# Patient Record
Sex: FEMALE | Race: WHITE | NOT HISPANIC OR LATINO | Employment: OTHER | URBAN - METROPOLITAN AREA
[De-identification: names, ages, dates, MRNs, and addresses within clinical notes are randomized per-mention and may not be internally consistent; named-entity substitution may affect disease eponyms.]

---

## 2017-01-03 ENCOUNTER — OFFICE VISIT (OUTPATIENT)
Dept: FAMILY MEDICINE | Facility: CLINIC | Age: 82
End: 2017-01-03
Payer: MEDICARE

## 2017-01-03 ENCOUNTER — ANTICOAGULATION THERAPY VISIT (OUTPATIENT)
Dept: NURSING | Facility: CLINIC | Age: 82
End: 2017-01-03
Payer: MEDICARE

## 2017-01-03 VITALS
RESPIRATION RATE: 16 BRPM | DIASTOLIC BLOOD PRESSURE: 72 MMHG | SYSTOLIC BLOOD PRESSURE: 138 MMHG | BODY MASS INDEX: 29.87 KG/M2 | WEIGHT: 185 LBS | HEART RATE: 78 BPM | OXYGEN SATURATION: 97 % | TEMPERATURE: 97.2 F

## 2017-01-03 DIAGNOSIS — Z23 NEED FOR PROPHYLACTIC VACCINATION WITH TETANUS-DIPHTHERIA (TD): ICD-10-CM

## 2017-01-03 DIAGNOSIS — F02.80 ALZHEIMER'S DEMENTIA WITHOUT BEHAVIORAL DISTURBANCE, UNSPECIFIED TIMING OF DEMENTIA ONSET: ICD-10-CM

## 2017-01-03 DIAGNOSIS — B35.1 DERMATOPHYTOSIS OF NAIL: ICD-10-CM

## 2017-01-03 DIAGNOSIS — I10 ESSENTIAL HYPERTENSION WITH GOAL BLOOD PRESSURE LESS THAN 140/90: ICD-10-CM

## 2017-01-03 DIAGNOSIS — Z79.01 LONG-TERM (CURRENT) USE OF ANTICOAGULANTS: Primary | ICD-10-CM

## 2017-01-03 DIAGNOSIS — G30.9 ALZHEIMER'S DEMENTIA WITHOUT BEHAVIORAL DISTURBANCE, UNSPECIFIED TIMING OF DEMENTIA ONSET: ICD-10-CM

## 2017-01-03 DIAGNOSIS — I48.20 CHRONIC ATRIAL FIBRILLATION (H): ICD-10-CM

## 2017-01-03 DIAGNOSIS — Z23 NEED FOR PROPHYLACTIC VACCINATION AND INOCULATION AGAINST INFLUENZA: ICD-10-CM

## 2017-01-03 DIAGNOSIS — R60.0 BILATERAL LOWER EXTREMITY EDEMA: Primary | ICD-10-CM

## 2017-01-03 LAB — INR POINT OF CARE: 2.6 (ref 0.86–1.14)

## 2017-01-03 PROCEDURE — 85610 PROTHROMBIN TIME: CPT | Mod: QW

## 2017-01-03 PROCEDURE — 99207 ZZC NO CHARGE NURSE ONLY: CPT

## 2017-01-03 PROCEDURE — 99214 OFFICE O/P EST MOD 30 MIN: CPT | Performed by: INTERNAL MEDICINE

## 2017-01-03 PROCEDURE — 36416 COLLJ CAPILLARY BLOOD SPEC: CPT

## 2017-01-03 RX ORDER — PRENATAL VIT 91/IRON/FOLIC/DHA 28-975-200
COMBINATION PACKAGE (EA) ORAL 2 TIMES DAILY
Qty: 12 G | Refills: 1 | Status: CANCELLED | OUTPATIENT
Start: 2017-01-03

## 2017-01-03 RX ORDER — RIVASTIGMINE TARTRATE 3 MG/1
CAPSULE ORAL
Refills: 3 | COMMUNITY
Start: 2016-12-20 | End: 2017-11-16

## 2017-01-03 ASSESSMENT — PAIN SCALES - GENERAL: PAINLEVEL: NO PAIN (0)

## 2017-01-03 NOTE — NURSING NOTE
"Chief Complaint   Patient presents with     Leg Swelling     right leg and ankle and left big toe       Initial /72 mmHg  Pulse 78  Temp(Src) 97.2  F (36.2  C) (Oral)  Resp 16  Wt 185 lb (83.915 kg)  SpO2 97%  Breastfeeding? No Estimated body mass index is 29.87 kg/(m^2) as calculated from the following:    Height as of 5/13/16: 5' 6\" (1.676 m).    Weight as of this encounter: 185 lb (83.915 kg).  BP completed using cuff size: sharath Ramirez CMA      "

## 2017-01-03 NOTE — PATIENT INSTRUCTIONS
[facility]    If you have any questions regarding to your visit please contact your care team:     Team Pink:   Clinic Hours Telephone Number   Internal Medicine:  Dr. Yani Braga NP       7am-7pm  Monday - Thursday   7am-5pm  Fridays  [phone]  (Appointment scheduling available 24/7)    Questions about your visit?  Team Line  [phone]   Urgent Care - [place] and [facility] - 11am-9pm Monday-Friday Saturday-Sunday- 9am-5pm   [place] - 5pm-9pm Monday-Friday Saturday-Sunday- 9am-5pm  [phone] - [place]   [phone] - [place]       What options do I have for visits at the clinic other than the traditional office visit?  To expand how we care for you, many of our providers are utilizing electronic visits (e-visits) and telephone visits, when medically appropriate, for interactions with their patients rather than a visit in the clinic.   We also offer nurse visits for many medical concerns. Just like any other service, we will bill your insurance company for this type of visit based on time spent on the phone with your provider. Not all insurance companies cover these visits. Please check with your medical insurance if this type of visit is covered. You will be responsible for any charges that are not paid by your insurance.      E-visits via [employer]:  generally incur a $35.00 fee.  Telephone visits:  Time spent on the phone: *charged based on time that is spent on the phone in increments of 10 minutes. Estimated cost:   5-10 mins $30.00   11-20 mins. $59.00   21-30 mins. $85.00   Use [employer] (secure email communication and access to your chart) to send your primary care provider a message or make an appointment. Ask someone on your Team how to sign up for [employer].    For a Price Quote for your services, please call our Consumer Price Line at [phone].    As always, Thank you for trusting us with your health care  needs!    Discharged by Raven EVERETT CMA ([facility])

## 2017-01-03 NOTE — PROGRESS NOTES
SUBJECTIVE:                                                    Guera Peters is a 81 year old female who presents to clinic today for the following health issues:    Chief Complaint   Patient presents with     Leg Swelling     right leg and ankle and left big toe        Bilateral lower extremity edema  Dermatophytosis of nail  Need for prophylactic vaccination and inoculation against influenza  Need for prophylactic vaccination with tetanus-diphtheria (TD)  Alzheimer's dementia without behavioral disturbance, unspecified timing of dementia onset  Essential hypertension with goal blood pressure less than 140/90     Guera Peters is a pleasant elderly woman here with spouse and daughter. There are a few different issues to review but basically patient is generally doing well. She's incapacitated by Alzhemiers dementia and has some chronic medical problems that are under adequate control with a history of coronary artery disease and myocardial infarction, status post percutaneous coronary intervention with drug-eluting stents, and hypertension. Seeing neurologist . Has hypothyroidism ,   TSH   Date Value Ref Range Status   05/05/2016 0.75 mcU/mL Final     Chief complaint is that daughter noticed how truly swollen this patient's legs were just over the holidays. It's right greater then left . Unfortunately , patient is unable to give a reliable history. Daughter honestly is not sure how long this edema has been present. She does not have a diagnosis of diabetes mellitus / diabetes neuropathy. Had a intraluminal peripheral arterial stent of the right leg at some point , details uncertain. No history of venous thromboembolic disease though she remains on coumadin secondary to a history of a mitral valve replacement and a pacemaker and chronic atrial fibrillation and has had a transient ischemic attack but no history of cerebrovascular accident.    This edema is better in the morning and worse at night ,  sometimes after taking of shoes and socks she sees a sock-line. There is some associated stasis dermatitis that looks really rather chronic. No ulcerations or signs or symptoms  Of cellulitis. She is known to have varicose veins. Absolutely no complaints of coughing, paroxsysmal nocturnal dyspnea or orthopnea or chest pain. No coughing or upper respiratory tract infection  Symptoms.    Wt Readings from Last 5 Encounters:   17 185 lb (83.915 kg)   16 181 lb 8 oz (82.328 kg)       Problem list and histories reviewed & adjusted, as indicated.  Additional history: as documented    Patient Active Problem List   Diagnosis     CKD (chronic kidney disease) stage 3, GFR 30-59 ml/min     Thrombocytopenia (H)     NSTEMI (non-ST elevated myocardial infarction) (H)     S/P mitral valve repair     Pacemaker     Chronic atrial fibrillation (H)     Essential hypertension with goal blood pressure less than 140/90     Hyperlipidemia LDL goal <100     History of TIA (transient ischemic attack) and stroke     Carotid stenosis, bilateral     Acquired hypothyroidism     A-fib (H)      (normal spontaneous vaginal delivery)     Osteoporosis     Alzheimer's dementia without behavioral disturbance, unspecified timing of dementia onset     Microalbuminuria     Long-term (current) use of anticoagulants [Z79.01]     Dermatophytosis of nail     Past Surgical History   Procedure Laterality Date     Embolization of angiomyolipoma  2010     Hc ptca single vessel  90      see Care Everywhere for cardiac medical records     Hc ptca single vessel  2006     PTCA/Taxus Stents of Proximal and Mid RCA [facility] ptca single vessel  2011     Successful complex percutaneous coronary intervention of the LAD using  Promus drug-eluting stents     Implant pacemaker       Repair valve mitral  2007     mitral valve repair and PFO closure (07)       Angioplasty       right leg     Appendectomy       Repair patent foramen  ovale  5/4/2007     mitral valve repair and PFO closure (5/4/07)       Hc removal of breast implant       C partial excision thyroid,unilat         Social History   Substance Use Topics     Smoking status: Never Smoker      Smokeless tobacco: Not on file     Alcohol Use: Yes      Comment: occ, monthly      History reviewed. No pertinent family history.      Current Outpatient Prescriptions   Medication Sig Dispense Refill     levothyroxine (SYNTHROID/LEVOTHROID) 100 MCG tablet TAKE 1 TABLET BY MOUTH DAILY 90 tablet 1     lisinopril (PRINIVIL,ZESTRIL) 20 MG tablet TAKE 1 TABLET BY MOUTH EVERY DAY 90 tablet 0     rosuvastatin (CRESTOR) 20 MG tablet Take 1 tablet (20 mg) by mouth At Bedtime 90 tablet 1     aspirin EC 81 MG tablet Take 81 mg by mouth       warfarin (COUMADIN) 5 MG tablet Take 1 tablet (5 mg) by mouth See Admin Instructions (Patient taking differently: Take 5 mg by mouth See Admin Instructions 5mg Sun/Thurs and 2.5mg ROW) 90 tablet 1     carvedilol (COREG) 12.5 MG tablet Take 1 tablet (12.5 mg) by mouth 2 times daily (with meals) 180 tablet 1     nitroglycerin (NITROSTAT) 0.4 MG SL tablet Place 1 tablet (0.4 mg) under the tongue every 5 minutes as needed for chest pain 25 tablet 1     alendronate (FOSAMAX) 70 MG tablet Take 1 tablet (70 mg) by mouth every 7 days 12 tablet 1     terbinafine (LAMISIL) 250 MG tablet Take 1 tablet (250 mg) by mouth daily 90 tablet 0     rivastigmine (EXELON) 3 MG capsule   3     No Known Allergies  Recent Labs   Lab Test  06/03/16   1142 05/05/16 05/01/15   LDL  75   --   101   HDL   --    --   43   TRIG   --    --   92   ALT  25   --    --    CR  0.75   --   0.89   GFRESTIMATED  74   --   >60   GFRESTBLACK  >90   GFR Calc     --   >60   POTASSIUM  4.0   --   4.0   TSH   --   0.75  0.98      BP Readings from Last 3 Encounters:   01/03/17 138/72   06/03/16 136/78   05/13/16 159/84    Wt Readings from Last 3 Encounters:   01/03/17 185 lb (83.915 kg)   05/13/16  181 lb 8 oz (82.328 kg)                  Labs reviewed in EPIC  Problem list, Medication list, Allergies, and Medical/Social/Surgical histories reviewed in [facility] and updated as appropriate.    ROS:  Constitutional, HEENT, cardiovascular, pulmonary, gi and gu systems are negative, except as otherwise noted.    OBJECTIVE:                                                    /72 mmHg  Pulse 78  Temp(Src) 97.2  F (36.2  C) (Oral)  Resp 16  Wt 185 lb (83.915 kg)  SpO2 97%  Breastfeeding? No  Body mass index is 29.87 kg/(m^2).  GENERAL APPEARANCE: healthy, alert and no distress, she has maintains the social graces but with a few simple direct questions , marked cognitive dysfunction is apparent.  EYES: Eyes grossly normal to inspection, PERRL and conjunctivae and sclerae normal  RESP: lungs clear to auscultation - no rales, rhonchi or wheezes  CV: regular rates and rhythm, normal S1 S2, no S3 or S4 and no murmur, click or rub  MS: there is a mild leg circumference discrepancy with right greater then left . Positive dorsalis pedis pulses present , within normal limits sensation . There's a solid 2+ edema left lower extremity  , 2-3+ right lower extremity  . Some varicose veins   SKIN: no suspicious lesions or rashes, mild to moderate stasis dermatitis / xerosis, she has significantly abnormal toenails consistent with onchyomycotic toenails especially with the great toenails but there's 3-4 affected toes on each foot  NEURO: Normal strength and tone, no focal findings , beyond cognitive dysfunction and some tendency towards confabulation    Diagnostic test results:  Diagnostic Test Results:  No orders of the defined types were placed in this encounter.          ASSESSMENT/PLAN:                                                    1. Bilateral lower extremity edema  We had a long discussion . This has all the earmarks of a chronic process and I don't think there's any compelling evidence of an underlying pathological  process here beyond dependant lower extremity edema  . We emphasized the 3 things that we can do for lower extremity edema     1. Leg elevation as much as possible , minimum of 20-25 minutes 2-3 times a day  2. Recommended regularly use of support stockings while out of bed  3. Judicious use of diuretic therapy - which we opted not to go with at all today   4. Possible further work-up depending on how things go . Doppler study to rule out acute deep vein thromboses, pelvic CT scan to rule out lesions causing obstruction of outflow veins but after some careful thought we are taking a posture of observation  At this point        2. Dermatophytosis of nail  She has opted for a cycle of treatment with Lamisil (Terbinafine)     3. Need for prophylactic vaccination and inoculation against influenza      4. Need for prophylactic vaccination with tetanus-diphtheria (TD)      5. Alzheimer's dementia without behavioral disturbance, unspecified timing of dementia onset  We lack the office visit notes with [facility] , We lack these medical records and will have patient sign a release of information for them.     6. Essential hypertension with goal blood pressure less than 140/90  Not out of range today. blood pressure  BP Readings from Last 3 Encounters:   01/03/17 138/72   06/03/16 136/78   05/13/16 159/84           Follow up with Provider - 3-6 months . We agreed that we could forego laboratory studies today but would want to do these around May-June     Brett Babb MD  [facility]

## 2017-01-03 NOTE — MR AVS SNAPSHOT
After Visit Summary   1/3/2017    Guera Peters    MRN: [id number]           Patient Information     Date Of Birth          1935        Visit Information        Provider Department      1/3/2017 2:10 PM Brett Babb MD [facility]        Today's Diagnoses     Bilateral lower extremity edema    -  1     Need for prophylactic vaccination and inoculation against influenza         Need for prophylactic vaccination with tetanus-diphtheria (TD)           Care Instructions    [facility]    If you have any questions regarding to your visit please contact your care team:     Team Pink:   Clinic Hours Telephone Number   Internal Medicine:  Dr. Yani Braga NP       7am-7pm  Monday - Thursday   7am-5pm  Fridays  [phone]  (Appointment scheduling available 24/7)    Questions about your visit?  Team Line  [phone]   [facility] [place] [facility] - 11am-9pm Monday-Friday Saturday-Sunday- 9am-5pm   [place] - 5pm-9pm Monday-Friday Saturday-Sunday- 9am-5pm  [phone] - [place]   [phone] - [place]       What options do I have for visits at the clinic other than the traditional office visit?  To expand how we care for you, many of our providers are utilizing electronic visits (e-visits) and telephone visits, when medically appropriate, for interactions with their patients rather than a visit in the clinic.   We also offer nurse visits for many medical concerns. Just like any other service, we will bill your insurance company for this type of visit based on time spent on the phone with your provider. Not all insurance companies cover these visits. Please check with your medical insurance if this type of visit is covered. You will be responsible for any charges that are not paid by your insurance.      E-visits via [employer]:  generally incur a $35.00 fee.  Telephone visits:  Time spent on the phone:  "*charged based on time that is spent on the phone in increments of 10 minutes. Estimated cost:   5-10 mins $30.00   11-20 mins. $59.00   21-30 mins. $85.00   Use [employer] (secure email communication and access to your chart) to send your primary care provider a message or make an appointment. Ask someone on your Team how to sign up for [employer].    For a Price Quote for your services, please call our Consumer Price Line at [phone].    As always, Thank you for trusting us with your health care needs!    Discharged by Raven EVERETT CMA ([facility])          Follow-ups after your visit        Who to contact     If you have questions or need follow up information about today's clinic visit or your schedule please contact [facility] directly at [phone].  Normal or non-critical lab and imaging results will be communicated to you by MyChart, letter or phone within 4 business days after the clinic has received the results. If you do not hear from us within 7 days, please contact the clinic through [employer] or phone. If you have a critical or abnormal lab result, we will notify you by phone as soon as possible.  Submit refill requests through [employer] or call your pharmacy and they will forward the refill request to us. Please allow 3 business days for your refill to be completed.          Additional Information About Your Visit        [employer] Information     [employer] lets you send messages to your doctor, view your test results, renew your prescriptions, schedule appointments and more. To sign up, go to [email or web address] . Click on \"Log in\" on the left side of the screen, which will take you to the Welcome page. Then click on \"Sign up Now\" on the right side of the page.     You will be asked to enter the access code listed below, as well as some personal information. Please follow the directions to create your username and password.     Your access code is: [email or web address]  Expires: 4/3/2017  3:00 PM     Your " access code will  in 90 days. If you need help or a new code, please call your [place] clinic or [phone].        Care EveryWhere ID     This is your Care EveryWhere ID. This could be used by other organizations to access your [place] medical records  [id number]        Your Vitals Were     Pulse Temperature Respirations Pulse Oximetry Breastfeeding?       78 97.2  F (36.2  C) (Oral) 16 97% No        Blood Pressure from Last 3 Encounters:   17 138/72   16 136/78   16 159/84    Weight from Last 3 Encounters:   17 185 lb (83.915 kg)   16 181 lb 8 oz (82.328 kg)              We Performed the Following     TDAP (ADACEL AGES 11-64)          Today's Medication Changes          These changes are accurate as of: 1/3/17  3:01 PM.  If you have any questions, ask your nurse or doctor.               These medicines have changed or have updated prescriptions.        Dose/Directions    warfarin 5 MG tablet   Commonly known as:  COUMADIN   This may have changed:  additional instructions   Used for:  Chronic atrial fibrillation (H)        Dose:  5 mg   Take 1 tablet (5 mg) by mouth See Admin Instructions   Quantity:  90 tablet   Refills:  1                Primary Care Provider Office Phone #    [facility] [phone]       No address on file        Thank you!     Thank you for choosing [facility]  for your care. Our goal is always to provide you with excellent care. Hearing back from our patients is one way we can continue to improve our services. Please take a few minutes to complete the written survey that you may receive in the mail after your visit with us. Thank you!             Your Updated Medication List - Protect others around you: Learn how to safely use, store and throw away your medicines at www.disposemymeds.org.          This list is accurate as of: 1/3/17  3:01 PM.  Always use your most recent med list.                   Brand Name Dispense  Instructions for use    alendronate 70 MG tablet    FOSAMAX    12 tablet    Take 1 tablet (70 mg) by mouth every 7 days       aspirin EC 81 MG EC tablet      Take 81 mg by mouth       carvedilol 12.5 MG tablet    COREG    180 tablet    Take 1 tablet (12.5 mg) by mouth 2 times daily (with meals)       levothyroxine 100 MCG tablet    SYNTHROID/LEVOTHROID    90 tablet    TAKE 1 TABLET BY MOUTH DAILY       lisinopril 20 MG tablet    PRINIVIL/ZESTRIL    90 tablet    TAKE 1 TABLET BY MOUTH EVERY DAY       nitroglycerin 0.4 MG sublingual tablet    NITROSTAT    25 tablet    Place 1 tablet (0.4 mg) under the tongue every 5 minutes as needed for chest pain       rivastigmine 3 MG capsule    EXELON         rosuvastatin 20 MG tablet    CRESTOR    90 tablet    Take 1 tablet (20 mg) by mouth At Bedtime       warfarin 5 MG tablet    COUMADIN    90 tablet    Take 1 tablet (5 mg) by mouth See Admin Instructions

## 2017-01-03 NOTE — MR AVS SNAPSHOT
Guera SHANIT Peetrs   1/3/2017 2:45 PM   Anticoagulation Therapy Visit    Description:  81 year old female   Provider:  [facility]   Department:  [place] [facility]           INR as of 1/3/2017     Selected INR 2.6 (1/3/2017)      Anticoagulation Summary as of 1/3/2017     INR goal 2.0-3.0   Selected INR 2.6 (1/3/2017)   Full instructions 5 mg on Sun, Thu; 2.5 mg all other days   Next INR check 1/31/2017    Indications   Long-term (current) use of anticoagulants [Z79.01] [Z79.01]  Chronic atrial fibrillation (H) [I48.2]         Your next Anticoagulation Clinic appointment(s)     Jan 31, 2017 10:30 AM   Anticoagulation Visit with [facility]   [facility]    [address] [address] [phone]              Contact Numbers     [facility]  Please call [phone] to cancel and/or reschedule your appointment   Please call [phone] with any problems or questions regarding your therapy.        January 2017 Details    Sun Mon Tue Wed Thu Fri Sat     1               2               3      2.5 mg   See details      4      2.5 mg         5      5 mg         6      2.5 mg         7      2.5 mg           8      5 mg         9      2.5 mg         10      2.5 mg         11      2.5 mg         12      5 mg         13      2.5 mg         14      2.5 mg           15      5 mg         16      2.5 mg         17      2.5 mg         18      2.5 mg         19      5 mg         20      2.5 mg         21      2.5 mg           22      5 mg         23      2.5 mg         24      2.5 mg         25      2.5 mg         26      5 mg         27      2.5 mg         28      2.5 mg           29      5 mg         30      2.5 mg         31                 Date Details   01/03 This INR check       Date of next INR:  1/31/2017         How to take your warfarin dose     To take:  2.5 mg Take 0.5 of a 5 mg tablet.    To take:  5 mg Take 1 of the 5 mg tablets.

## 2017-01-03 NOTE — PROGRESS NOTES
ANTICOAGULATION FOLLOW-UP CLINIC VISIT    Patient Name:  Guera Peters  Date:  1/3/2017  Contact Type:  Face to Face    SUBJECTIVE:     Patient Findings     Positives No Problem Findings           OBJECTIVE    INR PROTIME   Date Value Ref Range Status   01/03/2017 2.6* 0.86 - 1.14 Final       ASSESSMENT / PLAN  INR assessment THER    Recheck INR In: 4 WEEKS    INR Location Clinic      Anticoagulation Summary as of 1/3/2017     INR goal 2.0-3.0   Selected INR 2.6 (1/3/2017)   Maintenance plan 5 mg (5 mg x 1) on Sun, Thu; 2.5 mg (5 mg x 0.5) all other days   Full instructions 5 mg on Sun, Thu; 2.5 mg all other days   Weekly total 22.5 mg   No change documented Danish Garcia RN   Plan last modified Kemi Donaldson RN (6/21/2016)   Next INR check 1/31/2017   Priority INR   Target end date Indefinite    Indications   Long-term (current) use of anticoagulants [Z79.01] [Z79.01]  Chronic atrial fibrillation (H) [I48.2]         Anticoagulation Episode Summary     INR check location     Preferred lab     Send INR reminders to  ANTICOAG CLINIC    Comments       Anticoagulation Care Providers     Provider Role Specialty Phone number    Brett Babb MD [facility] Internal Medicine [phone]            See the Encounter Report to view Anticoagulation Flowsheet and Dosing Calendar (Go to Encounters tab in chart review, and find the Anticoagulation Therapy Visit)    RN reviewed patient's weekly warfarin dose.  Pt INR remains within therapeutic range.  Pt will continue with current dosing and monitoring as planned, based on physician directed care plan.    [facility] ANTI-COAG CLINIC     Danish Garcia RN

## 2017-01-04 ENCOUNTER — TELEPHONE (OUTPATIENT)
Dept: FAMILY MEDICINE | Facility: CLINIC | Age: 82
End: 2017-01-04

## 2017-01-04 DIAGNOSIS — B35.1 DERMATOPHYTOSIS OF NAIL: Primary | ICD-10-CM

## 2017-01-04 RX ORDER — TERBINAFINE HYDROCHLORIDE 250 MG/1
250 TABLET ORAL DAILY
Qty: 90 TABLET | Refills: 0 | Status: SHIPPED | OUTPATIENT
Start: 2017-01-04 | End: 2017-11-16

## 2017-01-04 NOTE — TELEPHONE ENCOUNTER
Per epic med list review, patient has never been prescribed anything for toenail fungus through [facility].    Routing to Dr. Babb to please advise.  Daughter requesting a prescription.    Michel Seymour RN

## 2017-01-04 NOTE — TELEPHONE ENCOUNTER
Reason for call: Medication   If this is a refill request, has the caller requested the refill from the pharmacy already? Yes  Will the patient be using a [facility]? No  Name of the pharmacy and phone number for the current request: [id number] - [place], [place] - [address] [place] [address] AT [facility] [address]    Name of the medication requested: Daughter unsure of name it is for toenail fungus    Other request: Please call patient when script has been sent.    Phone Number Pt can be reached at: Home number on file [phone] (home)  Best Time: anytime  Can we leave a detailed message on this number? YES

## 2017-01-05 NOTE — TELEPHONE ENCOUNTER
Ok. Lamisil (Terbinafine) is the medication. This is taken once a day.    For 3 months     Brett Babb MD

## 2017-01-05 NOTE — TELEPHONE ENCOUNTER
Patient notified of Provider's message as written.  Patient verbalized understanding.  Michel Seymour RN

## 2017-01-15 DIAGNOSIS — I10 ESSENTIAL HYPERTENSION WITH GOAL BLOOD PRESSURE LESS THAN 140/90: Primary | ICD-10-CM

## 2017-01-15 DIAGNOSIS — N18.30 CKD (CHRONIC KIDNEY DISEASE) STAGE 3, GFR 30-59 ML/MIN (H): ICD-10-CM

## 2017-01-16 RX ORDER — LISINOPRIL 40 MG/1
40 TABLET ORAL DAILY
Qty: 90 TABLET | Refills: 1 | Status: SHIPPED | OUTPATIENT
Start: 2017-01-16 | End: 2017-01-23

## 2017-01-16 NOTE — TELEPHONE ENCOUNTER
Reason for Call:  Other prescription    Detailed comments:  Sienna would like to know the current dosage patient should be taking for lisinopril    Phone Number Patient can be reached at: Cell number on file:    No relevant phone numbers on file.       Best Time: ASAP    Can we leave a detailed message on this number? YES    Call taken on 1/16/2017 at 11:13 AM by Vanita Borja

## 2017-01-16 NOTE — TELEPHONE ENCOUNTER
Lisinopril       Last Written Prescription Date: 11/28/2016  Last Fill Quantity: 90, # refills: 0    Last Office Visit with G, [facility] or [facility] prescribing provider:  01/03/2017   Future Office Visit:        BP Readings from Last 3 Encounters:   01/03/17 138/72   06/03/16 136/78   05/13/16 159/84

## 2017-01-16 NOTE — TELEPHONE ENCOUNTER
Yes, lisinopril is supposed to be 40 milligrams a day. She had been on 20 milligrams a day when she last saw her cardiologist, and her dose was increased since 9/2016. She was to take two of the 20 milligram tabs until gone and then she was going to go to the 40 milligram tab, once a day. Medication refill sent    Brett Babb MD

## 2017-01-16 NOTE — TELEPHONE ENCOUNTER
Per patient's daughter, patient has memory issues and currently in [place].  She was seen at a clinic there and her INR was high d/t patient had misunderstood and had been taking warfarin 5mg QD (should be 5mg Sun, Thur, and 2.5mg all other days of the week).  INR is being addressed there and is coming back down.  However, daughter, Sienna, would like to verify Lisinopril dose.  Patient has been taking 40mg daily since 9/2016, changed by Cardiology?  BP was good at 1/3/17 appointment with Dr. Babb  She wanted to make sure this is in fact the dose patient should be on.    Care Everywhere has this listed as 40mg daily  Refills need to go to [facility] as jim'd up    Ok to leave a detailed message on Sienna's   Michel Seymour RN

## 2017-01-19 ENCOUNTER — OFFICE VISIT (OUTPATIENT)
Dept: FAMILY MEDICINE | Facility: CLINIC | Age: 82
End: 2017-01-19
Payer: MEDICARE

## 2017-01-19 VITALS
HEIGHT: 65 IN | WEIGHT: 185 LBS | HEART RATE: 80 BPM | BODY MASS INDEX: 30.82 KG/M2 | SYSTOLIC BLOOD PRESSURE: 100 MMHG | TEMPERATURE: 97 F | DIASTOLIC BLOOD PRESSURE: 60 MMHG | OXYGEN SATURATION: 96 %

## 2017-01-19 DIAGNOSIS — L03.115 CELLULITIS OF RIGHT LOWER LEG: Primary | ICD-10-CM

## 2017-01-19 DIAGNOSIS — I95.9 HYPOTENSION, UNSPECIFIED HYPOTENSION TYPE: ICD-10-CM

## 2017-01-19 DIAGNOSIS — R53.81 MALAISE: ICD-10-CM

## 2017-01-19 DIAGNOSIS — S80.11XA CONTUSION OF RIGHT LEG, INITIAL ENCOUNTER: ICD-10-CM

## 2017-01-19 DIAGNOSIS — Z79.01 LONG-TERM (CURRENT) USE OF ANTICOAGULANTS: ICD-10-CM

## 2017-01-19 LAB
ALBUMIN SERPL-MCNC: 3.9 G/DL (ref 3.4–5)
ALBUMIN UR-MCNC: 30 MG/DL
ALP SERPL-CCNC: 96 U/L (ref 40–150)
ALT SERPL W P-5'-P-CCNC: 25 U/L (ref 0–50)
ANION GAP SERPL CALCULATED.3IONS-SCNC: 10 MMOL/L (ref 3–14)
APPEARANCE UR: CLEAR
AST SERPL W P-5'-P-CCNC: 20 U/L (ref 0–45)
BACTERIA #/AREA URNS HPF: ABNORMAL /HPF
BASOPHILS # BLD AUTO: 0 10E9/L (ref 0–0.2)
BASOPHILS NFR BLD AUTO: 0.5 %
BILIRUB SERPL-MCNC: 1.4 MG/DL (ref 0.2–1.3)
BILIRUB UR QL STRIP: ABNORMAL
BUN SERPL-MCNC: 17 MG/DL (ref 7–30)
CALCIUM SERPL-MCNC: 9.2 MG/DL (ref 8.5–10.1)
CHLORIDE SERPL-SCNC: 105 MMOL/L (ref 94–109)
CO2 SERPL-SCNC: 25 MMOL/L (ref 20–32)
COLOR UR AUTO: YELLOW
CREAT SERPL-MCNC: 0.9 MG/DL (ref 0.52–1.04)
DIFFERENTIAL METHOD BLD: ABNORMAL
EOSINOPHIL # BLD AUTO: 0.1 10E9/L (ref 0–0.7)
EOSINOPHIL NFR BLD AUTO: 1 %
ERYTHROCYTE [DISTWIDTH] IN BLOOD BY AUTOMATED COUNT: 13.8 % (ref 10–15)
GFR SERPL CREATININE-BSD FRML MDRD: 60 ML/MIN/1.7M2
GLUCOSE SERPL-MCNC: 139 MG/DL (ref 70–99)
GLUCOSE UR STRIP-MCNC: NEGATIVE MG/DL
HCT VFR BLD AUTO: 45 % (ref 35–47)
HGB BLD-MCNC: 13.8 G/DL (ref 11.7–15.7)
HGB UR QL STRIP: ABNORMAL
INR PPP: 2 (ref 0.86–1.14)
KETONES UR STRIP-MCNC: NEGATIVE MG/DL
LEUKOCYTE ESTERASE UR QL STRIP: NEGATIVE
LYMPHOCYTES # BLD AUTO: 0.8 10E9/L (ref 0.8–5.3)
LYMPHOCYTES NFR BLD AUTO: 13.5 %
MCH RBC QN AUTO: 27.7 PG (ref 26.5–33)
MCHC RBC AUTO-ENTMCNC: 30.7 G/DL (ref 31.5–36.5)
MCV RBC AUTO: 90 FL (ref 78–100)
MONOCYTES # BLD AUTO: 0.5 10E9/L (ref 0–1.3)
MONOCYTES NFR BLD AUTO: 8.4 %
MUCOUS THREADS #/AREA URNS LPF: PRESENT /LPF
NEUTROPHILS # BLD AUTO: 4.8 10E9/L (ref 1.6–8.3)
NEUTROPHILS NFR BLD AUTO: 76.6 %
NITRATE UR QL: NEGATIVE
NON-SQ EPI CELLS #/AREA URNS LPF: ABNORMAL /LPF
PH UR STRIP: 5.5 PH (ref 5–7)
PLATELET # BLD AUTO: 180 10E9/L (ref 150–450)
POTASSIUM SERPL-SCNC: 4.1 MMOL/L (ref 3.4–5.3)
PROT SERPL-MCNC: 7.3 G/DL (ref 6.8–8.8)
RBC # BLD AUTO: 4.98 10E12/L (ref 3.8–5.2)
RBC #/AREA URNS AUTO: ABNORMAL /HPF (ref 0–2)
SODIUM SERPL-SCNC: 140 MMOL/L (ref 133–144)
SP GR UR STRIP: >1.03 (ref 1–1.03)
URN SPEC COLLECT METH UR: ABNORMAL
UROBILINOGEN UR STRIP-ACNC: 1 EU/DL (ref 0.2–1)
WBC # BLD AUTO: 6.2 10E9/L (ref 4–11)
WBC #/AREA URNS AUTO: ABNORMAL /HPF (ref 0–2)

## 2017-01-19 PROCEDURE — 81001 URINALYSIS AUTO W/SCOPE: CPT | Performed by: NURSE PRACTITIONER

## 2017-01-19 PROCEDURE — 85610 PROTHROMBIN TIME: CPT | Performed by: NURSE PRACTITIONER

## 2017-01-19 PROCEDURE — 99214 OFFICE O/P EST MOD 30 MIN: CPT | Performed by: NURSE PRACTITIONER

## 2017-01-19 PROCEDURE — 85025 COMPLETE CBC W/AUTO DIFF WBC: CPT | Performed by: NURSE PRACTITIONER

## 2017-01-19 PROCEDURE — 36415 COLL VENOUS BLD VENIPUNCTURE: CPT | Performed by: NURSE PRACTITIONER

## 2017-01-19 PROCEDURE — 80053 COMPREHEN METABOLIC PANEL: CPT | Performed by: NURSE PRACTITIONER

## 2017-01-19 ASSESSMENT — PAIN SCALES - GENERAL: PAINLEVEL: NO PAIN (0)

## 2017-01-19 NOTE — NURSING NOTE
"Chief Complaint   Patient presents with     Cellulitis     [place] u/c[place] x 1 wk swollen and bruised lower right leg. INR level was at and 8 last checked on Monday at 1.9       Initial /56 mmHg  Pulse 80  Temp(Src) 97  F (36.1  C) (Oral)  Ht 5' 4.57\" (1.64 m)  Wt 185 lb (83.915 kg)  BMI 31.20 kg/m2  SpO2 96%  Breastfeeding? No Estimated body mass index is 31.2 kg/(m^2) as calculated from the following:    Height as of this encounter: 5' 4.57\" (1.64 m).    Weight as of this encounter: 185 lb (83.915 kg).  BP completed using cuff size: regular  K.JEROME/MA      "

## 2017-01-19 NOTE — MR AVS SNAPSHOT
After Visit Summary   1/19/2017    Guera Peters    MRN: [id number]           Patient Information     Date Of Birth          1935        Visit Information        Provider Department      1/19/2017 10:20 AM Ilana Braga APRN [facility]        Today's Diagnoses     Cellulitis of right lower leg    -  1     Contusion of right leg, initial encounter         Malaise         Long-term (current) use of anticoagulants [Z79.01]         Hypotension, unspecified hypotension type           Care Instructions    Hold lisinopril.  Follow-up on Monday.    [facility]    If you have any questions regarding to your visit please contact your care team:     Team Pink:   Clinic Hours Telephone Number   Internal Medicine:  Dr. Yani Braga, NP       7am-7pm  Monday - Thursday   7am-5pm  Fridays  [phone]  (Appointment scheduling available 24/7)    Questions about your visit?  Team Line  [phone]   Urgent Care - [place] - 11am-9pm Monday-Friday Saturday-Sunday- 9am-5pm   [place] - 5pm-9pm Monday-Friday Saturday-Sunday- 9am-5pm  [phone] - [place]   [phone] - [place]       What options do I have for visits at the clinic other than the traditional office visit?  To expand how we care for you, many of our providers are utilizing electronic visits (e-visits) and telephone visits, when medically appropriate, for interactions with their patients rather than a visit in the clinic.   We also offer nurse visits for many medical concerns. Just like any other service, we will bill your insurance company for this type of visit based on time spent on the phone with your provider. Not all insurance companies cover these visits. Please check with your medical insurance if this type of visit is covered. You will be responsible for any charges that are not paid by your insurance.      E-visits via  [employer]:  generally incur a $35.00 fee.  Telephone visits:  Time spent on the phone: *charged based on time that is spent on the phone in increments of 10 minutes. Estimated cost:   5-10 mins $30.00   11-20 mins. $59.00   21-30 mins. $85.00   Use [employer] (secure email communication and access to your chart) to send your primary care provider a message or make an appointment. Ask someone on your Team how to sign up for [employer].    For a Price Quote for your services, please call our [employer] Price Line at [phone].    As always, Thank you for trusting us with your health care needs!    Kary Funez CMA          Follow-ups after your visit        Your next 10 appointments already scheduled     Jan 31, 2017 10:30 AM   Anticoagulation Visit with [facility]   [facility] ([facility])    [address] [address] [phone]              Who to contact     If you have questions or need follow up information about today's clinic visit or your schedule please contact [facility] directly at [phone].  Normal or non-critical lab and imaging results will be communicated to you by MyChart, letter or phone within 4 business days after the clinic has received the results. If you do not hear from us within 7 days, please contact the clinic through MyChart or phone. If you have a critical or abnormal lab result, we will notify you by phone as soon as possible.  Submit refill requests through [employer] or call your pharmacy and they will forward the refill request to us. Please allow 3 business days for your refill to be completed.          Additional Information About Your Visit        [employer] Information     [employer] gives you secure access to your electronic health record. If you see a primary care provider, you can also send messages to your care team and make appointments. If you have questions, please call your primary care clinic.  If you do not have a  "primary care provider, please call [phone] and they will assist you.        Care EveryWhere ID     This is your Care EveryWhere ID. This could be used by other organizations to access your [place] medical records  [id number]        Your Vitals Were     Pulse Temperature Height BMI (Body Mass Index) Pulse Oximetry Breastfeeding?    80 97  F (36.1  C) (Oral) 5' 4.57\" (1.64 m) 31.20 kg/m2 96% No       Blood Pressure from Last 3 Encounters:   01/19/17 100/60   01/03/17 138/72   06/03/16 136/78    Weight from Last 3 Encounters:   01/19/17 185 lb (83.915 kg)   01/03/17 185 lb (83.915 kg)   05/13/16 181 lb 8 oz (82.328 kg)              We Performed the Following     *UA reflex to Microscopic and Culture ([facility] (except [place] and [place])     CBC with platelets differential     Comprehensive metabolic panel (BMP + Alb, Alk Phos, ALT, AST, Total. Bili, TP)     INR     Urine Microscopic          Today's Medication Changes          These changes are accurate as of: 1/19/17 11:52 AM.  If you have any questions, ask your nurse or doctor.               These medicines have changed or have updated prescriptions.        Dose/Directions    warfarin 5 MG tablet   Commonly known as:  COUMADIN   This may have changed:  additional instructions   Used for:  Chronic atrial fibrillation (H)        Dose:  5 mg   Take 1 tablet (5 mg) by mouth See Admin Instructions   Quantity:  90 tablet   Refills:  1                Primary Care Provider Office Phone #    [facility] [phone]       No address on file        Thank you!     Thank you for choosing [facility]  for your care. Our goal is always to provide you with excellent care. Hearing back from our patients is one way we can continue to improve our services. Please take a few minutes to complete the written survey that you may receive in the mail after your visit with us. Thank you!             Your Updated Medication " List - Protect others around you: Learn how to safely use, store and throw away your medicines at www.disposemymeds.org.          This list is accurate as of: 1/19/17 11:52 AM.  Always use your most recent med list.                   Brand Name Dispense Instructions for use    alendronate 70 MG tablet    FOSAMAX    12 tablet    Take 1 tablet (70 mg) by mouth every 7 days       aspirin EC 81 MG EC tablet      Take 81 mg by mouth       carvedilol 12.5 MG tablet    COREG    180 tablet    Take 1 tablet (12.5 mg) by mouth 2 times daily (with meals)       CEPHALEXIN PO      Take 500 mg by mouth 3 times daily       levothyroxine 100 MCG tablet    SYNTHROID/LEVOTHROID    90 tablet    TAKE 1 TABLET BY MOUTH DAILY       lisinopril 40 MG tablet    PRINIVIL/ZESTRIL    90 tablet    Take 1 tablet (40 mg) by mouth daily       nitroglycerin 0.4 MG sublingual tablet    NITROSTAT    25 tablet    Place 1 tablet (0.4 mg) under the tongue every 5 minutes as needed for chest pain       rivastigmine 3 MG capsule    EXELON         rosuvastatin 20 MG tablet    CRESTOR    90 tablet    Take 1 tablet (20 mg) by mouth At Bedtime       terbinafine 250 MG tablet    lamISIL    90 tablet    Take 1 tablet (250 mg) by mouth daily       warfarin 5 MG tablet    COUMADIN    90 tablet    Take 1 tablet (5 mg) by mouth See Admin Instructions

## 2017-01-19 NOTE — PATIENT INSTRUCTIONS
Hold lisinopril.  Follow-up on Monday.    [facility]    If you have any questions regarding to your visit please contact your care team:     Team Pink:   Clinic Hours Telephone Number   Internal Medicine:  Dr. Yani Braga, NP       7am-7pm  Monday - Thursday   7am-5pm  Fridays  [phone]  (Appointment scheduling available 24/7)    Questions about your visit?  Team Line  [phone]   [facility] [place] [facility] 11am-9pm Monday-Friday Saturday-Sunday- 9am-5pm   [place] - 5pm-9pm Monday-Friday Saturday-Sunday- 9am-5pm  [phone] - [place]   [phone] - [place]       What options do I have for visits at the clinic other than the traditional office visit?  To expand how we care for you, many of our providers are utilizing electronic visits (e-visits) and telephone visits, when medically appropriate, for interactions with their patients rather than a visit in the clinic.   We also offer nurse visits for many medical concerns. Just like any other service, we will bill your insurance company for this type of visit based on time spent on the phone with your provider. Not all insurance companies cover these visits. Please check with your medical insurance if this type of visit is covered. You will be responsible for any charges that are not paid by your insurance.      E-visits via [employer]:  generally incur a $35.00 fee.  Telephone visits:  Time spent on the phone: *charged based on time that is spent on the phone in increments of 10 minutes. Estimated cost:   5-10 mins $30.00   11-20 mins. $59.00   21-30 mins. $85.00   Use [employer] (secure email communication and access to your chart) to send your primary care provider a message or make an appointment. Ask someone on your Team how to sign up for [employer].    For a Price Quote for your services, please call our Consumer Price Line at [phone].    As always, Thank you for  trusting us with your health care needs!    Kary Funez, CMA

## 2017-01-19 NOTE — PROGRESS NOTES
SUBJECTIVE:                                                    Guera Peters is a 81 year old female who presents to clinic today for the following health issues:      Chief Complaint   Patient presents with     Cellulitis     [place] u[place] 1 wk swollen and bruised lower right leg. INR level was at and 8 last checked on Monday at 1.9     K.JEROME/MA    Patient was seen in ED in [place] for cellulitis, supratherapeutic INR of 8 on 1/3/17.  Patient had been taking her warfarin incorrectly.  She has been on 2.5 mg daily for the past 3 days.  She was treated with IV ceftriaxone and sent home with cephalexin.  Patient denies fever.  Patient complains of continued fatigue, malaise.  Redness has decreased, but a large area of ecchymosis remains.  Patient's blood pressure is lower than normal.  She denies dizziness, chest pain.      Problem list and histories reviewed & adjusted, as indicated.  Additional history: as documented    Patient Active Problem List   Diagnosis     CKD (chronic kidney disease) stage 3, GFR 30-59 ml/min     Thrombocytopenia (H)     NSTEMI (non-ST elevated myocardial infarction) (H)     S/P mitral valve repair     Pacemaker     Chronic atrial fibrillation (H)     Essential hypertension with goal blood pressure less than 140/90     Hyperlipidemia LDL goal <100     History of TIA (transient ischemic attack) and stroke     Carotid stenosis, bilateral     Acquired hypothyroidism     A-fib (H)      (normal spontaneous vaginal delivery)     Osteoporosis     Alzheimer's dementia without behavioral disturbance, unspecified timing of dementia onset     Microalbuminuria     Long-term (current) use of anticoagulants [Z79.01]     Dermatophytosis of nail     Past Surgical History   Procedure Laterality Date     Embolization of angiomyolipoma  2010      ptca single vessel  89      see Care Everywhere for cardiac medical records      ptca single vessel  2006     PTCA/Taxus  Stents of Proximal and Mid RCA [facility] ptca single vessel  9/1/2011     Successful complex percutaneous coronary intervention of the LAD using  Promus drug-eluting stents     Implant pacemaker       Repair valve mitral  5/4/2007     mitral valve repair and PFO closure (5/4/07)       Angioplasty       right leg     Appendectomy       Repair patent foramen ovale  5/4/2007     mitral valve repair and PFO closure (5/4/07)       Hc removal of breast implant       C partial excision thyroid,unilat         Social History   Substance Use Topics     Smoking status: Never Smoker      Smokeless tobacco: Not on file     Alcohol Use: Yes      Comment: occ, monthly      History reviewed. No pertinent family history.      Current Outpatient Prescriptions   Medication Sig Dispense Refill     CEPHALEXIN PO Take 500 mg by mouth 3 times daily       lisinopril (PRINIVIL/ZESTRIL) 40 MG tablet Take 1 tablet (40 mg) by mouth daily 90 tablet 1     terbinafine (LAMISIL) 250 MG tablet Take 1 tablet (250 mg) by mouth daily 90 tablet 0     rivastigmine (EXELON) 3 MG capsule   3     levothyroxine (SYNTHROID/LEVOTHROID) 100 MCG tablet TAKE 1 TABLET BY MOUTH DAILY 90 tablet 1     rosuvastatin (CRESTOR) 20 MG tablet Take 1 tablet (20 mg) by mouth At Bedtime 90 tablet 1     aspirin EC 81 MG tablet Take 81 mg by mouth       warfarin (COUMADIN) 5 MG tablet Take 1 tablet (5 mg) by mouth See Admin Instructions (Patient taking differently: Take 5 mg by mouth See Admin Instructions 5mg Sun/Thurs and 2.5mg ROW) 90 tablet 1     carvedilol (COREG) 12.5 MG tablet Take 1 tablet (12.5 mg) by mouth 2 times daily (with meals) 180 tablet 1     nitroglycerin (NITROSTAT) 0.4 MG SL tablet Place 1 tablet (0.4 mg) under the tongue every 5 minutes as needed for chest pain 25 tablet 1     alendronate (FOSAMAX) 70 MG tablet Take 1 tablet (70 mg) by mouth every 7 days 12 tablet 1     No Known Allergies  BP Readings from Last 3 Encounters:   01/19/17 100/60  "  01/03/17 138/72   06/03/16 136/78    Wt Readings from Last 3 Encounters:   01/19/17 185 lb (83.915 kg)   01/03/17 185 lb (83.915 kg)   05/13/16 181 lb 8 oz (82.328 kg)                  Problem list, Medication list, Allergies, and Medical/Social/Surgical histories reviewed in [facility] and updated as appropriate.    ROS:  Constitutional, HEENT, cardiovascular, pulmonary, gi and gu systems are negative, except as otherwise noted.    OBJECTIVE:                                                    /60 mmHg  Pulse 80  Temp(Src) 97  F (36.1  C) (Oral)  Ht 5' 4.57\" (1.64 m)  Wt 185 lb (83.915 kg)  BMI 31.20 kg/m2  SpO2 96%  Breastfeeding? No  Body mass index is 31.2 kg/(m^2).  GENERAL: healthy, alert and no distress  RESP: lungs clear to auscultation - no rales, rhonchi or wheezes  CV: regular rate and rhythm, normal S1 S2, no S3 or S4, no murmur, click or rub, no peripheral edema and peripheral pulses strong  ABDOMEN: soft, nontender, no hepatosplenomegaly, no masses and bowel sounds normal  MS: no gross musculoskeletal defects noted, no edema  SKIN: large area of ecchymosis and swelling noted to right lower leg, mild erythema noted to center, no warmth detected, hematoma present to right upper shin.    Diagnostic Test Results:  pending     ASSESSMENT/PLAN:                                                      1. Cellulitis of right lower leg  Appears to be resolving.  Finish cephalexin.    2. Contusion of right leg, initial encounter  Patient advised that this may take some time to resolve.  I recommended elevating leg, applying heat.    3. Malaise  Normal CBC, urine.  Patient to push fluids, rest.  - CBC with platelets differential  - Comprehensive metabolic panel (BMP + Alb, Alk Phos, ALT, AST, Total. Bili, TP)  - *UA reflex to Microscopic and Culture ([facility] (except [place] and [place])  - Urine Microscopic    4. Long-term (current) use of anticoagulants [Z79.01]    - " INR    5. Hypotension, unspecified hypotension type  Patient is possibly hypovolemic.  I encouraged her to push fluids.  I asked that she hold her lisinopril and follow-up in 4 days for recheck.  If she developed dizziness, she should seek emergency care.  Patient verbalized understanding.       FUTURE APPOINTMENTS:       - Follow-up visit in 4 days.    JANIS Milan CNP  [facility]

## 2017-01-19 NOTE — PROGRESS NOTES
Quick Note:    Lab results discussed here. Please see visit notes and plan.    Ilana Braga, CNP    ______

## 2017-01-20 ENCOUNTER — TELEPHONE (OUTPATIENT)
Dept: FAMILY MEDICINE | Facility: CLINIC | Age: 82
End: 2017-01-20

## 2017-01-20 NOTE — PROGRESS NOTES
Quick Note:    Please call patient-    Her labs show a slight decrease in her kidney function and I think she is dehydrated. Please encourage her to push fluids.    Ilana Braga CNP    ______

## 2017-01-20 NOTE — TELEPHONE ENCOUNTER
Reason for Call:  Other call back    Detailed comments: patient daughter calling and seeing a lab result on patient's mychart. Patient sees that someone is supposed to contact patient to discuss the results. Daughter is requesting to call her instead of patient as she can become confused at times. Please contact daughter    Phone Number Patient can be reached at: Other phone number:  [phone] cell Phone: [phone]        Best Time: any time    Can we leave a detailed message on this number? YES    Call taken on 1/20/2017 at 12:36 PM by Bettye Miller

## 2017-01-23 ENCOUNTER — ANTICOAGULATION THERAPY VISIT (OUTPATIENT)
Dept: NURSING | Facility: CLINIC | Age: 82
End: 2017-01-23
Payer: MEDICARE

## 2017-01-23 ENCOUNTER — OFFICE VISIT (OUTPATIENT)
Dept: FAMILY MEDICINE | Facility: CLINIC | Age: 82
End: 2017-01-23
Payer: MEDICARE

## 2017-01-23 ENCOUNTER — TELEPHONE (OUTPATIENT)
Dept: FAMILY MEDICINE | Facility: CLINIC | Age: 82
End: 2017-01-23

## 2017-01-23 VITALS
DIASTOLIC BLOOD PRESSURE: 90 MMHG | TEMPERATURE: 97.7 F | OXYGEN SATURATION: 97 % | SYSTOLIC BLOOD PRESSURE: 146 MMHG | BODY MASS INDEX: 31.37 KG/M2 | WEIGHT: 186 LBS | HEART RATE: 78 BPM

## 2017-01-23 DIAGNOSIS — N18.30 CKD (CHRONIC KIDNEY DISEASE) STAGE 3, GFR 30-59 ML/MIN (H): ICD-10-CM

## 2017-01-23 DIAGNOSIS — L03.115 CELLULITIS OF RIGHT LEG: ICD-10-CM

## 2017-01-23 DIAGNOSIS — I48.20 CHRONIC ATRIAL FIBRILLATION (H): ICD-10-CM

## 2017-01-23 DIAGNOSIS — I10 ESSENTIAL HYPERTENSION WITH GOAL BLOOD PRESSURE LESS THAN 140/90: ICD-10-CM

## 2017-01-23 DIAGNOSIS — Z79.01 LONG-TERM (CURRENT) USE OF ANTICOAGULANTS: Primary | ICD-10-CM

## 2017-01-23 DIAGNOSIS — I48.20 CHRONIC ATRIAL FIBRILLATION (H): Primary | ICD-10-CM

## 2017-01-23 LAB — INR POINT OF CARE: 2.5 (ref 0.86–1.14)

## 2017-01-23 PROCEDURE — 85610 PROTHROMBIN TIME: CPT | Mod: QW

## 2017-01-23 PROCEDURE — 36416 COLLJ CAPILLARY BLOOD SPEC: CPT

## 2017-01-23 PROCEDURE — 99213 OFFICE O/P EST LOW 20 MIN: CPT | Performed by: NURSE PRACTITIONER

## 2017-01-23 PROCEDURE — 99207 ZZC NO CHARGE NURSE ONLY: CPT

## 2017-01-23 RX ORDER — WARFARIN SODIUM 5 MG/1
5 TABLET ORAL SEE ADMIN INSTRUCTIONS
Qty: 90 TABLET | Refills: 1 | Status: SHIPPED | OUTPATIENT
Start: 2017-01-23 | End: 2017-11-16

## 2017-01-23 RX ORDER — LISINOPRIL 20 MG/1
20 TABLET ORAL DAILY
Qty: 90 TABLET | Refills: 1 | Status: SHIPPED | OUTPATIENT
Start: 2017-01-23 | End: 2017-09-07

## 2017-01-23 ASSESSMENT — PAIN SCALES - GENERAL: PAINLEVEL: NO PAIN (0)

## 2017-01-23 NOTE — NURSING NOTE
"Chief Complaint   Patient presents with     RECHECK     BP       Initial /90 mmHg  Pulse 78  Temp(Src) 97.7  F (36.5  C) (Oral)  Wt 186 lb (84.369 kg)  SpO2 97%  Breastfeeding? No Estimated body mass index is 31.37 kg/(m^2) as calculated from the following:    Height as of 1/19/17: 5' 4.57\" (1.64 m).    Weight as of this encounter: 186 lb (84.369 kg).  BP completed using cuff size: sharath Funez CMA      "

## 2017-01-23 NOTE — MR AVS SNAPSHOT
Guera Pridesen   1/23/2017 1:30 PM   Anticoagulation Therapy Visit    Description:  81 year old female   Provider:  [facility]   Department:  [place] [facility]           INR as of 1/23/2017     Selected INR 2.5 (1/23/2017)      Anticoagulation Summary as of 1/23/2017     INR goal 2.0-3.0   Selected INR 2.5 (1/23/2017)   Full instructions 1/29: 2.5 mg; Otherwise 5 mg on Sun, Thu; 2.5 mg all other days   Next INR check 1/31/2017    Indications   Long-term (current) use of anticoagulants [Z79.01] [Z79.01]  Chronic atrial fibrillation (H) [I48.2]         Your next Anticoagulation Clinic appointment(s)     Jan 31, 2017 10:30 AM   Anticoagulation Visit with [facility] ANTI [facility]   [facility] ([address] [address] [phone]              Contact Numbers     [facility]  Please call [phone] to cancel and/or reschedule your appointment   Please call [phone] with any problems or questions regarding your therapy.        January 2017 Details    Sun Mon Tue Wed Thu Fri Sat     1               2               3               4               5               6               7                 8               9               10               11               12               13               14                 15               16               17               18               19               20               21                 22               23      2.5 mg   See details      24      2.5 mg         25      2.5 mg         26      5 mg         27      2.5 mg         28      2.5 mg           29      2.5 mg         30      2.5 mg         31                 Date Details   01/23 This INR check       Date of next INR:  1/31/2017         How to take your warfarin dose     To take:  2.5 mg Take 0.5 of a 5 mg tablet.    To take:  5 mg Take 1 of the 5 mg tablets.

## 2017-01-23 NOTE — TELEPHONE ENCOUNTER
Patient called RN hotline stating that she would like her results.  Advised patient that labs were not drawn.  Patient is already scheduled for lab f/u on 1/31/17- informed patient of time, patient wrote this on her calendar.   Stefanie Ballesteros RN

## 2017-01-23 NOTE — PATIENT INSTRUCTIONS
Start lisinopril 20 mg daily.  Follow-up with nurse visit for blood pressure check in 1 week.  Schedule follow-up lab only appointment to recheck kidney function.    [facility]    If you have any questions regarding to your visit please contact your care team:     Team Pink:   Clinic Hours Telephone Number   Internal Medicine:  Dr. Yani Braga, NP       7am-7pm  Monday - Thursday   7am-5pm  Fridays  [phone]  (Appointment scheduling available 24/7)    Questions about your visit?  Team Line  [phone]   [facility] [place] - 11am-9pm Monday-Friday Saturday-Sunday- 9am-5pm   [place] - 5pm-9pm Monday-Friday Saturday-Sunday- 9am-5pm  [phone] - [place]   [phone] - [place]       What options do I have for visits at the clinic other than the traditional office visit?  To expand how we care for you, many of our providers are utilizing electronic visits (e-visits) and telephone visits, when medically appropriate, for interactions with their patients rather than a visit in the clinic.   We also offer nurse visits for many medical concerns. Just like any other service, we will bill your insurance company for this type of visit based on time spent on the phone with your provider. Not all insurance companies cover these visits. Please check with your medical insurance if this type of visit is covered. You will be responsible for any charges that are not paid by your insurance.      E-visits via [employer]:  generally incur a $35.00 fee.  Telephone visits:  Time spent on the phone: *charged based on time that is spent on the phone in increments of 10 minutes. Estimated cost:   5-10 mins $30.00   11-20 mins. $59.00   21-30 mins. $85.00   Use [employer] (secure email communication and access to your chart) to send your primary care provider a message or make an appointment. Ask someone on your Team how to sign up for  [employer].    For a Price Quote for your services, please call our Consumer Price Line at [phone].    As always, Thank you for trusting us with your health care needs!    Discharged by UNIQUE Juares

## 2017-01-23 NOTE — PROGRESS NOTES
ANTICOAGULATION FOLLOW-UP CLINIC VISIT    Patient Name:  Guera Peters  Date:  1/23/2017  Contact Type:  Face to Face    SUBJECTIVE:     Patient Findings     Positives No Problem Findings           OBJECTIVE    INR PROTIME   Date Value Ref Range Status   01/23/2017 2.5* 0.86 - 1.14 Final       ASSESSMENT / PLAN  INR assessment THER    Recheck INR In: 1 WEEK    INR Location Clinic      Anticoagulation Summary as of 1/23/2017     INR goal 2.0-3.0   Selected INR 2.5 (1/23/2017)   Maintenance plan 5 mg (5 mg x 1) on Sun, Thu; 2.5 mg (5 mg x 0.5) all other days   Full instructions 1/29: 2.5 mg; Otherwise 5 mg on Sun, Thu; 2.5 mg all other days   Weekly total 22.5 mg   Plan last modified Kemi Donaldson RN (6/21/2016)   Next INR check 1/31/2017   Priority INR   Target end date Indefinite    Indications   Long-term (current) use of anticoagulants [Z79.01] [Z79.01]  Chronic atrial fibrillation (H) [I48.2]         Anticoagulation Episode Summary     INR check location     Preferred lab     Send INR reminders to [facility]    Comments       Anticoagulation Care Providers     Provider Role Specialty Phone number    Brett Babb MD Responsible Internal Medicine [phone]            See the Encounter Report to view Anticoagulation Flowsheet and Dosing Calendar (Go to Encounters tab in chart review, and find the Anticoagulation Therapy Visit)    RN reviewed patient's weekly warfarin dose.  Pt INR remains within therapeutic range.  Pt will continue with current dosing and monitoring as planned, based on physician directed care plan.  Patient had INR >8 while in [place]. Patient was taking double doses of coumadin and was found to have cellulitis.  Patient currently on antibiotics. Will monitor closely until cellulitis improves.    [place] [facility]     Danish Garcia RN

## 2017-01-23 NOTE — TELEPHONE ENCOUNTER
RN spoke with patient's daughter Sienna and states they have seen the [employer] message on Friday and she has been encouraging her mother to drink fluid over the weekend.  Per Sienna patient is coming to see Ilana as well.  No further action needed.           Notes Recorded by Michel Seymour RN on 1/20/2017 at 5:40 PM  [employer] message sent    Michel Seymour RN    Notes Recorded by Audi Walls RN on 1/20/2017 at 10:59 AM  Message left for patient to call the RN hotline # at [phone]    Audi RODRIGES RN, BSN    Notes Recorded by Ilana Braga APRN CNP on 1/20/2017 at 7:54 AM  Please call patient-    Her labs show a slight decrease in her kidney function and I think she is dehydrated.  Please encourage her to push fluids.    Ilana RODRIGES, RN, BSN

## 2017-01-23 NOTE — MR AVS SNAPSHOT
After Visit Summary   1/23/2017    Guera Peters    MRN: [id number]           Patient Information     Date Of Birth          1935        Visit Information        Provider Department      1/23/2017 1:40 PM Ilana Braga APRN [facility]        Today's Diagnoses     Chronic atrial fibrillation (H)    -  1     Essential hypertension with goal blood pressure less than 140/90         CKD (chronic kidney disease) stage 3, GFR 30-59 ml/min         Cellulitis of right leg           Care Instructions    Start lisinopril 20 mg daily.  Follow-up with nurse visit for blood pressure check in 1 week.  Schedule follow-up lab only appointment to recheck kidney function.    [facility]    If you have any questions regarding to your visit please contact your care team:     Team Pink:   Clinic Hours Telephone Number   Internal Medicine:  Dr. Yani Braga, NP       7am-7pm  Monday - Thursday   7am-5pm  Fridays  [phone]  (Appointment scheduling available 24/7)    Questions about your visit?  Team Line  [phone]   Urgent Care - [place] [facility] - 11am-9pm Monday-Friday Saturday-Sunday- 9am-5pm   [place] - 5pm-9pm Monday-Friday Saturday-Sunday- 9am-5pm  [phone] - [place]   [phone] - [place]       What options do I have for visits at the clinic other than the traditional office visit?  To expand how we care for you, many of our providers are utilizing electronic visits (e-visits) and telephone visits, when medically appropriate, for interactions with their patients rather than a visit in the clinic.   We also offer nurse visits for many medical concerns. Just like any other service, we will bill your insurance company for this type of visit based on time spent on the phone with your provider. Not all insurance companies cover these visits. Please check with your medical insurance if this  type of visit is covered. You will be responsible for any charges that are not paid by your insurance.      E-visits via Dailymotionhart:  generally incur a $35.00 fee.  Telephone visits:  Time spent on the phone: *charged based on time that is spent on the phone in increments of 10 minutes. Estimated cost:   5-10 mins $30.00   11-20 mins. $59.00   21-30 mins. $85.00   Use VeriWavet (secure email communication and access to your chart) to send your primary care provider a message or make an appointment. Ask someone on your Team how to sign up for flikdate.    For a Price Quote for your services, please call our Topple Track Line at [phone].    As always, Thank you for trusting us with your health care needs!    Discharged by UNIQUE Juares          Follow-ups after your visit        Your next 10 appointments already scheduled     Jan 31, 2017 10:30 AM   Anticoagulation Visit with FZ ANTI COAG   [facility] ([place] [id number] [facility] [address] [phone]              Future tests that were ordered for you today     Open Future Orders        Priority Expected Expires Ordered    Basic metabolic panel Routine 1/30/2017 1/23/2018 1/23/2017            Who to contact     If you have questions or need follow up information about today's clinic visit or your schedule please contact [facility] directly at [phone].  Normal or non-critical lab and imaging results will be communicated to you by MyChart, letter or phone within 4 business days after the clinic has received the results. If you do not hear from us within 7 days, please contact the clinic through MyChart or phone. If you have a critical or abnormal lab result, we will notify you by phone as soon as possible.  Submit refill requests through flikdate or call your pharmacy and they will forward the refill request to us. Please allow 3 business days for your refill to be completed.          Additional  Information About Your Visit        Varentechart Information     [employer]SC[employer]P gives you secure access to your electronic health record. If you see a primary care provider, you can also send messages to your care team and make appointments. If you have questions, please call your primary care clinic.  If you do not have a primary care provider, please call [phone] and they will assist you.        Care EveryWhere ID     This is your Care EveryWhere ID. This could be used by other organizations to access your [place] medical records  [id number]        Your Vitals Were     Pulse Temperature Pulse Oximetry Breastfeeding?          78 97.7  F (36.5  C) (Oral) 97% No         Blood Pressure from Last 3 Encounters:   01/23/17 146/90   01/19/17 100/60   01/03/17 138/72    Weight from Last 3 Encounters:   01/23/17 186 lb (84.369 kg)   01/19/17 185 lb (83.915 kg)   01/03/17 185 lb (83.915 kg)                 Today's Medication Changes          These changes are accurate as of: 1/23/17  2:31 PM.  If you have any questions, ask your nurse or doctor.               These medicines have changed or have updated prescriptions.        Dose/Directions    lisinopril 20 MG tablet   Commonly known as:  PRINIVIL/ZESTRIL   This may have changed:    - medication strength  - how much to take   Used for:  Essential hypertension with goal blood pressure less than 140/90, CKD (chronic kidney disease) stage 3, GFR 30-59 ml/min   Changed by:  Ilana Braga APRN CNP        Dose:  20 mg   Take 1 tablet (20 mg) by mouth daily   Quantity:  90 tablet   Refills:  1       warfarin 5 MG tablet   Commonly known as:  COUMADIN   This may have changed:  additional instructions   Used for:  Chronic atrial fibrillation (H)        Dose:  5 mg   Take 1 tablet (5 mg) by mouth See Admin Instructions   Quantity:  90 tablet   Refills:  1            Where to get your medicines      These medications were sent to [id number] - [place], [place] -  [address]  [address], [place] [address]    Hours:  24-hours Phone:  [phone]    - lisinopril 20 MG tablet  - warfarin 5 MG tablet             Primary Care Provider Office Phone #    Natividad [facility] [phone]       No address on file        Thank you!     Thank you for choosing [facility]  for your care. Our goal is always to provide you with excellent care. Hearing back from our patients is one way we can continue to improve our services. Please take a few minutes to complete the written survey that you may receive in the mail after your visit with us. Thank you!             Your Updated Medication List - Protect others around you: Learn how to safely use, store and throw away your medicines at www.disposemymeds.org.          This list is accurate as of: 1/23/17  2:31 PM.  Always use your most recent med list.                   Brand Name Dispense Instructions for use    alendronate 70 MG tablet    FOSAMAX    12 tablet    Take 1 tablet (70 mg) by mouth every 7 days       aspirin EC 81 MG EC tablet      Take 81 mg by mouth       carvedilol 12.5 MG tablet    COREG    180 tablet    Take 1 tablet (12.5 mg) by mouth 2 times daily (with meals)       CEPHALEXIN PO      Take 500 mg by mouth 3 times daily       levothyroxine 100 MCG tablet    SYNTHROID/LEVOTHROID    90 tablet    TAKE 1 TABLET BY MOUTH DAILY       lisinopril 20 MG tablet    PRINIVIL/ZESTRIL    90 tablet    Take 1 tablet (20 mg) by mouth daily       nitroglycerin 0.4 MG sublingual tablet    NITROSTAT    25 tablet    Place 1 tablet (0.4 mg) under the tongue every 5 minutes as needed for chest pain       rivastigmine 3 MG capsule    EXELON         rosuvastatin 20 MG tablet    CRESTOR    90 tablet    Take 1 tablet (20 mg) by mouth At Bedtime       terbinafine 250 MG tablet    lamISIL    90 tablet    Take 1 tablet (250 mg) by mouth daily       warfarin 5 MG tablet    COUMADIN     90 tablet    Take 1 tablet (5 mg) by mouth See Admin Instructions

## 2017-01-23 NOTE — PROGRESS NOTES
SUBJECTIVE:                                                    Guera Peters is a 81 year old female who presents to clinic today for the following health issues:      Chief Complaint   Patient presents with     RECHECK     BP     Patient was noted to by hypotensive at last visit after follow-up of cellulitis.  Patient's lisinopril was held.  She reports feeling much better.  She continues to finish cephalexin and denies fever or pain to her right lower leg.  She denies malaise as well.  Her INR has been stable.    Problem list and histories reviewed & adjusted, as indicated.  Additional history: as documented    Patient Active Problem List   Diagnosis     CKD (chronic kidney disease) stage 3, GFR 30-59 ml/min     Thrombocytopenia (H)     NSTEMI (non-ST elevated myocardial infarction) (H)     S/P mitral valve repair     Pacemaker     Chronic atrial fibrillation (H)     Essential hypertension with goal blood pressure less than 140/90     Hyperlipidemia LDL goal <100     History of TIA (transient ischemic attack) and stroke     Carotid stenosis, bilateral     Acquired hypothyroidism     A-fib (H)      (normal spontaneous vaginal delivery)     Osteoporosis     Alzheimer's dementia without behavioral disturbance, unspecified timing of dementia onset     Microalbuminuria     Long-term (current) use of anticoagulants [Z79.01]     Dermatophytosis of nail     Past Surgical History   Procedure Laterality Date     Embolization of angiomyolipoma  2010      ptca single vessel  90      see Care Everywhere for cardiac medical records      ptca single vessel  2006     PTCA/Taxus Stents of Proximal and Mid RCA [facility] ptca single vessel  2011     Successful complex percutaneous coronary intervention of the LAD using  Promus drug-eluting stents     Implant pacemaker       Repair valve mitral  2007     mitral valve repair and PFO closure (07)       Angioplasty       right leg      Appendectomy       Repair patent foramen ovale  5/4/2007     mitral valve repair and PFO closure (5/4/07)       Hc removal of breast implant       C partial excision thyroid,unilat         Social History   Substance Use Topics     Smoking status: Never Smoker      Smokeless tobacco: Not on file     Alcohol Use: Yes      Comment: occ, monthly      History reviewed. No pertinent family history.      Current Outpatient Prescriptions   Medication Sig Dispense Refill     warfarin (COUMADIN) 5 MG tablet Take 1 tablet (5 mg) by mouth See Admin Instructions 90 tablet 1     lisinopril (PRINIVIL/ZESTRIL) 20 MG tablet Take 1 tablet (20 mg) by mouth daily 90 tablet 1     CEPHALEXIN PO Take 500 mg by mouth 3 times daily       terbinafine (LAMISIL) 250 MG tablet Take 1 tablet (250 mg) by mouth daily 90 tablet 0     rivastigmine (EXELON) 3 MG capsule   3     levothyroxine (SYNTHROID/LEVOTHROID) 100 MCG tablet TAKE 1 TABLET BY MOUTH DAILY 90 tablet 1     rosuvastatin (CRESTOR) 20 MG tablet Take 1 tablet (20 mg) by mouth At Bedtime 90 tablet 1     aspirin EC 81 MG tablet Take 81 mg by mouth       carvedilol (COREG) 12.5 MG tablet Take 1 tablet (12.5 mg) by mouth 2 times daily (with meals) 180 tablet 1     nitroglycerin (NITROSTAT) 0.4 MG SL tablet Place 1 tablet (0.4 mg) under the tongue every 5 minutes as needed for chest pain 25 tablet 1     alendronate (FOSAMAX) 70 MG tablet Take 1 tablet (70 mg) by mouth every 7 days 12 tablet 1     No Known Allergies  BP Readings from Last 3 Encounters:   01/23/17 146/90   01/19/17 100/60   01/03/17 138/72    Wt Readings from Last 3 Encounters:   01/23/17 186 lb (84.369 kg)   01/19/17 185 lb (83.915 kg)   01/03/17 185 lb (83.915 kg)                  Problem list, Medication list, Allergies, and Medical/Social/Surgical histories reviewed in EPIC and updated as appropriate.    ROS:  Constitutional, HEENT, cardiovascular, pulmonary, gi and gu systems are negative, except as otherwise  noted.    OBJECTIVE:                                                    /90 mmHg  Pulse 78  Temp(Src) 97.7  F (36.5  C) (Oral)  Wt 186 lb (84.369 kg)  SpO2 97%  Breastfeeding? No  Body mass index is 31.37 kg/(m^2).  GENERAL: healthy, alert and no distress  RESP: lungs clear to auscultation - no rales, rhonchi or wheezes  CV: regular rate and rhythm, normal S1 S2, no S3 or S4, no murmur, click or rub, no peripheral edema and peripheral pulses strong  MS: no gross musculoskeletal defects noted, no edema  SKIN: Ecchymosis and mild erythema noted to right shin, no warmth noted.    Diagnostic Test Results:  none      ASSESSMENT/PLAN:                                                      1. Chronic atrial fibrillation (H)  Refill requested.  - warfarin (COUMADIN) 5 MG tablet; Take 1 tablet (5 mg) by mouth See Admin Instructions  Dispense: 90 tablet; Refill: 1    2. Essential hypertension with goal blood pressure less than 140/90  Blood pressure has improved.  Will resume lisinopril at lower dose and have patient return for ancillary blood pressure check and lab check in 1 week.  - lisinopril (PRINIVIL/ZESTRIL) 20 MG tablet; Take 1 tablet (20 mg) by mouth daily  Dispense: 90 tablet; Refill: 1  - Basic metabolic panel; Future    3. CKD (chronic kidney disease) stage 3, GFR 30-59 ml/min  As above.   - lisinopril (PRINIVIL/ZESTRIL) 20 MG tablet; Take 1 tablet (20 mg) by mouth daily  Dispense: 90 tablet; Refill: 1    4. Cellulitis of right leg  Improved.      FUTURE APPOINTMENTS:       - Follow-up for annual visit or as needed    JANIS [facility]

## 2017-01-30 NOTE — TELEPHONE ENCOUNTER
Patient left a message on RN hotline requesting lab results.  Called patient and reminded patient that her labs need to be drawn and her appointment is tomorrow.  Patient states she did have this written down and will come to clinic tomorrow     Stefanie Ballesteros RN

## 2017-01-31 ENCOUNTER — DOCUMENTATION ONLY (OUTPATIENT)
Dept: LAB | Facility: CLINIC | Age: 82
End: 2017-01-31

## 2017-01-31 ENCOUNTER — ALLIED HEALTH/NURSE VISIT (OUTPATIENT)
Dept: NURSING | Facility: CLINIC | Age: 82
End: 2017-01-31

## 2017-01-31 ENCOUNTER — ANTICOAGULATION THERAPY VISIT (OUTPATIENT)
Dept: NURSING | Facility: CLINIC | Age: 82
End: 2017-01-31
Payer: MEDICARE

## 2017-01-31 VITALS — HEART RATE: 92 BPM | DIASTOLIC BLOOD PRESSURE: 76 MMHG | OXYGEN SATURATION: 97 % | SYSTOLIC BLOOD PRESSURE: 128 MMHG

## 2017-01-31 DIAGNOSIS — I48.21 PERMANENT ATRIAL FIBRILLATION (H): Primary | ICD-10-CM

## 2017-01-31 DIAGNOSIS — Z79.01 LONG-TERM (CURRENT) USE OF ANTICOAGULANTS: ICD-10-CM

## 2017-01-31 DIAGNOSIS — I48.21 PERMANENT ATRIAL FIBRILLATION (H): ICD-10-CM

## 2017-01-31 DIAGNOSIS — Z79.01 LONG-TERM (CURRENT) USE OF ANTICOAGULANTS: Primary | ICD-10-CM

## 2017-01-31 DIAGNOSIS — I10 ESSENTIAL HYPERTENSION WITH GOAL BLOOD PRESSURE LESS THAN 140/90: Primary | ICD-10-CM

## 2017-01-31 DIAGNOSIS — I48.20 CHRONIC ATRIAL FIBRILLATION (H): ICD-10-CM

## 2017-01-31 DIAGNOSIS — I10 ESSENTIAL HYPERTENSION WITH GOAL BLOOD PRESSURE LESS THAN 140/90: ICD-10-CM

## 2017-01-31 LAB
ANION GAP SERPL CALCULATED.3IONS-SCNC: 10 MMOL/L (ref 3–14)
BUN SERPL-MCNC: 15 MG/DL (ref 7–30)
CALCIUM SERPL-MCNC: 9.5 MG/DL (ref 8.5–10.1)
CHLORIDE SERPL-SCNC: 104 MMOL/L (ref 94–109)
CO2 SERPL-SCNC: 27 MMOL/L (ref 20–32)
CREAT SERPL-MCNC: 0.8 MG/DL (ref 0.52–1.04)
GFR SERPL CREATININE-BSD FRML MDRD: 69 ML/MIN/1.7M2
GLUCOSE SERPL-MCNC: 115 MG/DL (ref 70–99)
INR POINT OF CARE: 2.2 (ref 0.86–1.14)
INR PPP: 2.2 (ref 0.86–1.14)
POTASSIUM SERPL-SCNC: 3.9 MMOL/L (ref 3.4–5.3)
SODIUM SERPL-SCNC: 141 MMOL/L (ref 133–144)

## 2017-01-31 PROCEDURE — 85610 PROTHROMBIN TIME: CPT | Mod: QW

## 2017-01-31 PROCEDURE — 36416 COLLJ CAPILLARY BLOOD SPEC: CPT

## 2017-01-31 PROCEDURE — 85610 PROTHROMBIN TIME: CPT | Performed by: INTERNAL MEDICINE

## 2017-01-31 PROCEDURE — 99207 ZZC NO CHARGE NURSE ONLY: CPT

## 2017-01-31 PROCEDURE — 80048 BASIC METABOLIC PNL TOTAL CA: CPT | Performed by: NURSE PRACTITIONER

## 2017-01-31 NOTE — PROGRESS NOTES
ANTICOAGULATION FOLLOW-UP CLINIC VISIT    Patient Name:  Guera Peters  Date:  1/31/2017  Contact Type:  Face to Face    SUBJECTIVE:     Patient Findings     Positives No Problem Findings           OBJECTIVE    INR PROTIME   Date Value Ref Range Status   01/31/2017 2.2* 0.86 - 1.14 Final       ASSESSMENT / PLAN  INR assessment THER    Recheck INR In: 2 WEEKS    INR Location Clinic      Anticoagulation Summary as of 1/31/2017     INR goal 2.0-3.0   Selected INR 2.2 (1/31/2017)   Maintenance plan 5 mg (5 mg x 1) on Thu; 2.5 mg (5 mg x 0.5) all other days   Full instructions 5 mg on Thu; 2.5 mg all other days   Weekly total 20 mg   Plan last modified Giovanna Reveles RN (1/31/2017)   Next INR check 2/14/2017   Priority INR   Target end date Indefinite    Indications   Long-term (current) use of anticoagulants [Z79.01] [Z79.01]  Chronic atrial fibrillation (H) [I48.2]         Anticoagulation Episode Summary     INR check location     Preferred lab     Send INR reminders to [facility]    Comments       Anticoagulation Care Providers     Provider Role Specialty Phone number    Brett Babb MD Responsible Internal Medicine [phone]            See the Encounter Report to view Anticoagulation Flowsheet and Dosing Calendar (Go to Encounters tab in chart review, and find the Anticoagulation Therapy Visit)    Patient's last 7 days of warfarin dosing reviewed and patient is therapeutic, therefore the weekly dosage of warfarin will stay the same.      Giovanna Reveles RN

## 2017-01-31 NOTE — MR AVS SNAPSHOT
Guera Peters   1/31/2017   Anticoagulation Therapy Visit    Description:  81 year old female   Provider:  Natividad Stauffer   Department:  [place] [facility]           INR as of 1/31/2017     Selected INR 2.2 (1/31/2017)      Anticoagulation Summary as of 1/31/2017     INR goal 2.0-3.0   Selected INR 2.2 (1/31/2017)   Full instructions 5 mg on Thu; 2.5 mg all other days   Next INR check 2/14/2017    Indications   Long-term (current) use of anticoagulants [Z79.01] [Z79.01]  Chronic atrial fibrillation (H) [I48.2]         Your next Anticoagulation Clinic appointment(s)     Feb 14, 2017 11:00 AM   Anticoagulation Visit with FZ ANTI COAG   [facility] ([facility]    [address] [place], [place] [address] [address] [phone]              Contact Numbers     [facility]  Please call [phone] to cancel and/or reschedule your appointment   Please call [phone] with any problems or questions regarding your therapy.        January 2017 Details    Sun Mon Tue Wed Thu Fri Sat     1               2               3               4               5               6               7                 8               9               10               11               12               13               14                 15               16               17               18               19               20               21                 22               23               24               25               26               27               28                 29               30               31      2.5 mg   See details           Date Details   01/31 This INR check               How to take your warfarin dose     To take:  2.5 mg Take 0.5 of a 5 mg tablet.           February 2017 Details    Sun Mon Tue Wed Thu Fri Sat        1      2.5 mg         2      5 mg         3      2.5 mg         4      2.5 mg           5      2.5 mg         6      2.5 mg         7      2.5 mg         8      2.5 mg          9      5 mg         10      2.5 mg         11      2.5 mg           12      2.5 mg         13      2.5 mg         14            15               16               17               18                 19               20               21               22               23               24               25                 26               27               28                    Date Details   No additional details    Date of next INR:  2/14/2017         How to take your warfarin dose     To take:  2.5 mg Take 0.5 of a 5 mg tablet.    To take:  5 mg Take 1 of the 5 mg tablets.

## 2017-01-31 NOTE — NURSING NOTE
"Chief Complaint   Patient presents with     Hypertension     BP Check       Initial /76 mmHg  Pulse 92  SpO2 97%  Breastfeeding? No Estimated body mass index is 31.37 kg/(m^2) as calculated from the following:    Height as of 1/19/17: 5' 4.57\" (1.64 m).    Weight as of 1/23/17: 186 lb (84.369 kg).  BP completed using cuff size: sharath Peters is a 81 year old female who comes in today for a Blood Pressure check because of medication change.    *Document pulse and BP  *Use new set of vitals button for multiple readings.  *Use extended vitals for orthostatic    Vitals as recorded, a regular cuff was used.    Patient is taking medication as prescribed  Patient is tolerating medications well.  Patient is monitoring Blood Pressure at home.  Average readings if yes are unknown    Current complaints: none    Disposition: results routed to MD/CARLITOS HERNANDEZ CMA ([facility])        "

## 2017-02-01 NOTE — PROGRESS NOTES
Quick Note:    Dear Guera,    Your recent test results are attached.     Normal kidney function and electrolytes.      If you have any questions please feel free to contact [phone] or myself via Intelligent Fingerprintingt.    Sincerely,  Ilana Braga CNP    ______

## 2017-02-03 ENCOUNTER — TELEPHONE (OUTPATIENT)
Dept: NURSING | Facility: CLINIC | Age: 82
End: 2017-02-03

## 2017-02-03 NOTE — TELEPHONE ENCOUNTER
"Call Type: Triage Call    Presenting Problem: \" I am calling for Gita, and she has been in a  few times for her leg. She had cellulitis and was treated in [place]  and then saw Ilana up here in [place]. Her leg looks much better,  but still has the same  lump on it, it is warm to touch and  painful  and swollen. She is off the antibiotic.  What should we do?\" Advised  to be seen within 8 hours per guidelines.  Patient prefers to wait  until tomorrow AM at  in [place] or [place]. Advised  to call with questions or concerns.  Triage Note:  Guideline Title: Leg Injury  Recommended Disposition: See Provider within 8 Hours  Original Inclination: Wanted to speak with a nurse  Override Disposition:  Intended Action: Go to Urgent Care Center  Physician Contacted: No  Any signs and symptoms of soft tissue infection that have not improved with 48  hours of medical care ?  YES  Burn injury ? NO  Orthopedic hardware (metal plate, renetta or screw) newly bulging under or through  skin ? NO  New moderate to severe pain (able to bear some weight, limits normal activities) ?  NO  Wearing cast or splint AND new or worsening pain, swelling, numbness, tingling,  coolness or change in color that is NOT improved by elevation for 30 minutes OR  not resolved within 2 hours ? NO  New marked swelling (twice the normal size as compared to usual appearance) ? NO  New onset numbness/tingling, weakness/paralysis, or inability to purposely move at  or below the site of injury ? NO  Crushing or penetrating injury ? NO  New onset of inability to take unassisted weight-bearing steps ? NO  Obvious new deformity (bone is visibly out of place or misshapen) ? NO  Unbearable pain since injury ? NO  Cut, abrasion, or laceration is the primary concern ? NO  Puncture wound is the primary concern ? NO  Extremity swelling or limitation of range of motion AND known bleeding disorder OR  taking blood thinner, chemotherapy or transplant medications ? " NO  Any new OR worsening signs and symptoms of soft tissue infection ? NO  Severe traumatic injury to extremity(ies) including complete or partial amputation  (other than single finger/toe); large amount or uncontrolled bleeding; severe  crush injury; or open fracture (bone through skin) ? NO  Injury immediately followed new (within last 8 hours) unexplained  weakness/paralysis, change in sensation, or inability to purposely move,  especially when only one side of body is involved. ? NO  New, persistent  change in sensation (numb, tingling, or no feeling), change in  skin color (pale or blue), feels cool to the touch, severe pain or no pulse  below level of injury. ? NO  Physician Instructions:  Care Advice: Another adult should drive.  Call provider if symptoms worsen or new symptoms develop.  CAUTIONS  List, or take, all current prescription(s), nonprescription or alternative  medication(s) to provider for evaluation.

## 2017-02-06 ENCOUNTER — TELEPHONE (OUTPATIENT)
Dept: FAMILY MEDICINE | Facility: CLINIC | Age: 82
End: 2017-02-06

## 2017-02-06 NOTE — TELEPHONE ENCOUNTER
Spoke with patient.  Her right leg cellulitis is better but she has a persistent lump that is warm to the touch.  Explained to patient that she should reschedule the appointment she cancelled to have her leg re assessed in person.  She agreed.  Appointment made for tomorrow with Ilana Braga NP.  She will have her daughter call to reschedule if this time does not work out    Michel Seymour RN

## 2017-02-06 NOTE — TELEPHONE ENCOUNTER
Reason for Call:  Other call back    Detailed comments: daughter angela calling. Guera is cancelling the appointment today to have her leg re checked. There is a warm lump yet. Is this normal, or will it go away? Please call and advise.     Phone Number Patient can be reached at: Home number on file [phone] (home)    Best Time: any    Can we leave a detailed message on this number? YES    Call taken on 2/6/2017 at 9:29 AM by Alannah Clement

## 2017-02-07 ENCOUNTER — OFFICE VISIT (OUTPATIENT)
Dept: FAMILY MEDICINE | Facility: CLINIC | Age: 82
End: 2017-02-07
Payer: MEDICARE

## 2017-02-07 VITALS
BODY MASS INDEX: 31.4 KG/M2 | OXYGEN SATURATION: 99 % | SYSTOLIC BLOOD PRESSURE: 118 MMHG | DIASTOLIC BLOOD PRESSURE: 70 MMHG | TEMPERATURE: 97.1 F | WEIGHT: 186.2 LBS | HEART RATE: 85 BPM

## 2017-02-07 DIAGNOSIS — L03.115 CELLULITIS OF RIGHT LEG WITHOUT FOOT: Primary | ICD-10-CM

## 2017-02-07 PROCEDURE — 99213 OFFICE O/P EST LOW 20 MIN: CPT | Performed by: NURSE PRACTITIONER

## 2017-02-07 RX ORDER — CLINDAMYCIN HCL 300 MG
300 CAPSULE ORAL 3 TIMES DAILY
Qty: 21 CAPSULE | Refills: 0 | Status: SHIPPED | OUTPATIENT
Start: 2017-02-07 | End: 2017-02-14

## 2017-02-07 ASSESSMENT — PAIN SCALES - GENERAL: PAINLEVEL: MILD PAIN (3)

## 2017-02-07 NOTE — PATIENT INSTRUCTIONS
[facility]    If you have any questions regarding to your visit please contact your care team:     Team Pink:   Clinic Hours Telephone Number   Internal Medicine:  Dr. Yani Braga NP       7am-7pm  Monday - Thursday   7am-5pm  Fridays  [phone]  (Appointment scheduling available 24/7)    Questions about your visit?  Team Line  [phone]   Urgent Care - [place] [facility] - 11am-9pm Monday-Friday Saturday-Sunday- 9am-5pm   [place] - 5pm-9pm Monday-Friday Saturday-Sunday- 9am-5pm  [phone] - [place]   [phone] - [place]       What options do I have for visits at the clinic other than the traditional office visit?  To expand how we care for you, many of our providers are utilizing electronic visits (e-visits) and telephone visits, when medically appropriate, for interactions with their patients rather than a visit in the clinic.   We also offer nurse visits for many medical concerns. Just like any other service, we will bill your insurance company for this type of visit based on time spent on the phone with your provider. Not all insurance companies cover these visits. Please check with your medical insurance if this type of visit is covered. You will be responsible for any charges that are not paid by your insurance.      E-visits via Kamida:  generally incur a $35.00 fee.  Telephone visits:  Time spent on the phone: *charged based on time that is spent on the phone in increments of 10 minutes. Estimated cost:   5-10 mins $30.00   11-20 mins. $59.00   21-30 mins. $85.00   Use Informaatt (secure email communication and access to your chart) to send your primary care provider a message or make an appointment. Ask someone on your Team how to sign up for Kamida.    For a Price Quote for your services, please call our Consumer Price Line at [phone].    As always, Thank you for trusting us with your health care  needs!    Discharged by Raven EVERETT CMA ([facility])

## 2017-02-07 NOTE — NURSING NOTE
"Chief Complaint   Patient presents with     RECHECK     lump on right leg, myriam down in size but still warm to touch.       Initial /80 mmHg  Pulse 85  Temp(Src) 97.1  F (36.2  C) (Oral)  Wt 186 lb 3.2 oz (84.46 kg)  SpO2 99%  Breastfeeding? No Estimated body mass index is 31.4 kg/(m^2) as calculated from the following:    Height as of 1/19/17: 5' 4.57\" (1.64 m).    Weight as of this encounter: 186 lb 3.2 oz (84.46 kg).  Medication Reconciliation: complete       Kary Funez CMA      "

## 2017-02-07 NOTE — PROGRESS NOTES
SUBJECTIVE:                                                    Guera Peters is a 81 year old female who presents to clinic today for the following health issues:      Chief Complaint   Patient presents with     RECHECK     lump on right leg, myriam down in size but still warm to touch.     Patient was diagnosed with cellulitis nearly a month ago in [place].  She completed a course of cephalexin and symptoms had improved.  She has noticed some warmth and tenderness to the area of the contusion that had been on her leg.  Swelling has gone down overall.  Patient denies fever, malaise.    Problem list and histories reviewed & adjusted, as indicated.  Additional history: as documented    Patient Active Problem List   Diagnosis     CKD (chronic kidney disease) stage 3, GFR 30-59 ml/min     Thrombocytopenia (H)     NSTEMI (non-ST elevated myocardial infarction) (H)     S/P mitral valve repair     Pacemaker     Chronic atrial fibrillation (H)     Essential hypertension with goal blood pressure less than 140/90     Hyperlipidemia LDL goal <100     History of TIA (transient ischemic attack) and stroke     Carotid stenosis, bilateral     Acquired hypothyroidism      (normal spontaneous vaginal delivery)     Osteoporosis     Alzheimer's dementia without behavioral disturbance, unspecified timing of dementia onset     Microalbuminuria     Long-term (current) use of anticoagulants [Z79.01]     Dermatophytosis of nail     Permanent atrial fibrillation (H)     Past Surgical History   Procedure Laterality Date     Embolization of angiomyolipoma  2010      ptca single vessel  90      see Care Everywhere for cardiac medical records     Hc ptca single vessel  2006     PTCA/Taxus Stents of Proximal and Mid RCA [facility] ptca single vessel  2011     Successful complex percutaneous coronary intervention of the LAD using  Promus drug-eluting stents     Implant pacemaker       Repair valve mitral  2007      mitral valve repair and PFO closure (5/4/07)       Angioplasty       right leg     Appendectomy       Repair patent foramen ovale  5/4/2007     mitral valve repair and PFO closure (5/4/07)       Hc removal of breast implant       C partial excision thyroid,unilat         Social History   Substance Use Topics     Smoking status: Never Smoker      Smokeless tobacco: Not on file     Alcohol Use: Yes      Comment: occ, monthly      History reviewed. No pertinent family history.      Current Outpatient Prescriptions   Medication Sig Dispense Refill     clindamycin (CLEOCIN) 300 MG capsule Take 1 capsule (300 mg) by mouth 3 times daily for 7 days 21 capsule 0     warfarin (COUMADIN) 5 MG tablet Take 1 tablet (5 mg) by mouth See Admin Instructions 90 tablet 1     lisinopril (PRINIVIL/ZESTRIL) 20 MG tablet Take 1 tablet (20 mg) by mouth daily 90 tablet 1     terbinafine (LAMISIL) 250 MG tablet Take 1 tablet (250 mg) by mouth daily 90 tablet 0     rivastigmine (EXELON) 3 MG capsule   3     levothyroxine (SYNTHROID/LEVOTHROID) 100 MCG tablet TAKE 1 TABLET BY MOUTH DAILY 90 tablet 1     rosuvastatin (CRESTOR) 20 MG tablet Take 1 tablet (20 mg) by mouth At Bedtime 90 tablet 1     aspirin EC 81 MG tablet Take 81 mg by mouth       carvedilol (COREG) 12.5 MG tablet Take 1 tablet (12.5 mg) by mouth 2 times daily (with meals) 180 tablet 1     nitroglycerin (NITROSTAT) 0.4 MG SL tablet Place 1 tablet (0.4 mg) under the tongue every 5 minutes as needed for chest pain 25 tablet 1     alendronate (FOSAMAX) 70 MG tablet Take 1 tablet (70 mg) by mouth every 7 days 12 tablet 1     No Known Allergies  BP Readings from Last 3 Encounters:   02/07/17 118/70   01/31/17 128/76   01/23/17 146/90    Wt Readings from Last 3 Encounters:   02/07/17 186 lb 3.2 oz (84.46 kg)   01/23/17 186 lb (84.369 kg)   01/19/17 185 lb (83.915 kg)                  Problem list, Medication list, Allergies, and Medical/Social/Surgical histories reviewed in EPIC and  updated as appropriate.    ROS:  C: NEGATIVE for fever, chills, change in weight  INTEGUMENTARY/SKIN: POSITIVE for erythema, swelling  R: NEGATIVE for significant cough or SOB  CV: NEGATIVE for chest pain, palpitations or peripheral edema    OBJECTIVE:                                                    /70 mmHg  Pulse 85  Temp(Src) 97.1  F (36.2  C) (Oral)  Wt 186 lb 3.2 oz (84.46 kg)  SpO2 99%  Breastfeeding? No  Body mass index is 31.4 kg/(m^2).  GENERAL: healthy, alert and no distress  RESP: lungs clear to auscultation - no rales, rhonchi or wheezes  CV: irregularly irregular rhythm, normal S1 S2, no S3 or S4, no murmur, click or rub, peripheral pulses strong and no peripheral edema  MS: no gross musculoskeletal defects noted, no edema  SKIN: warmth, erythema, and tenderness noted over hematoma to right shin.    Diagnostic Test Results:  none      ASSESSMENT/PLAN:                                                      1. Cellulitis of right leg without foot    - clindamycin (CLEOCIN) 300 MG capsule; Take 1 capsule (300 mg) by mouth 3 times daily for 7 days  Dispense: 21 capsule; Refill: 0    FUTURE APPOINTMENTS:       - Follow-up visit in 1 week.    JANIS Milan [facility]

## 2017-02-07 NOTE — MR AVS SNAPSHOT
After Visit Summary   2/7/2017    Guera Peters    MRN: [id number]           Patient Information     Date Of Birth          1935        Visit Information        Provider Department      2/7/2017 10:20 AM Ilana Braga APRN CNP [facility]        Today's Diagnoses     Cellulitis of right leg without foot    -  1       Care Instructions    [facility]    If you have any questions regarding to your visit please contact your care team:     Team Pink:   Clinic Hours Telephone Number   Internal Medicine:  Dr. Yani Braga, NP       7am-7pm  Monday - Thursday   7am-5pm  Fridays  [phone]  (Appointment scheduling available 24/7)    Questions about your visit?  Team Line  [phone]   Urgent Care - [place] [facility] - 11am-9pm Monday-Friday Saturday-Sunday- 9am-5pm   [place] - 5pm-9pm Monday-Friday Saturday-Sunday- 9am-5pm  [phone] - [place]   [phone] - [place]       What options do I have for visits at the clinic other than the traditional office visit?  To expand how we care for you, many of our providers are utilizing electronic visits (e-visits) and telephone visits, when medically appropriate, for interactions with their patients rather than a visit in the clinic.   We also offer nurse visits for many medical concerns. Just like any other service, we will bill your insurance company for this type of visit based on time spent on the phone with your provider. Not all insurance companies cover these visits. Please check with your medical insurance if this type of visit is covered. You will be responsible for any charges that are not paid by your insurance.      E-visits via Zimride:  generally incur a $35.00 fee.  Telephone visits:  Time spent on the phone: *charged based on time that is spent on the phone in increments of 10 minutes. Estimated cost:   5-10 mins $30.00   11-20 mins.  $59.00   21-30 mins. $85.00   Use Brandkidst (secure email communication and access to your chart) to send your primary care provider a message or make an appointment. Ask someone on your Team how to sign up for Brandkidst.    For a Price Quote for your services, please call our Consumer Price Line at [phone].    As always, Thank you for trusting us with your health care needs!    Discharged by Raven EVERETT CMA ([facility])          Follow-ups after your visit        Your next 10 appointments already scheduled     Feb 14, 2017 11:00 AM   Anticoagulation Visit with [facility] AN[facility]   [facility]    [address] [address] [phone]              Who to contact     If you have questions or need follow up information about today's clinic visit or your schedule please contact [facility] directly at [phone].  Normal or non-critical lab and imaging results will be communicated to you by Xencorhart, letter or phone within 4 business days after the clinic has received the results. If you do not hear from us within 7 days, please contact the clinic through Brandkidst or phone. If you have a critical or abnormal lab result, we will notify you by phone as soon as possible.  Submit refill requests through Mimix Broadband or call your pharmacy and they will forward the refill request to us. Please allow 3 business days for your refill to be completed.          Additional Information About Your Visit        Xencorhart Information     Mimix Broadband gives you secure access to your electronic health record. If you see a primary care provider, you can also send messages to your care team and make appointments. If you have questions, please call your primary care clinic.  If you do not have a primary care provider, please call [phone] and they will assist you.        Care EveryWhere ID     This is your Care EveryWhere ID. This could be used by other organizations to access your  [facility] medical records  [id number]        Your Vitals Were     Pulse Temperature Pulse Oximetry Breastfeeding?          85 97.1  F (36.2  C) (Oral) 99% No         Blood Pressure from Last 3 Encounters:   02/07/17 118/70   01/31/17 128/76   01/23/17 146/90    Weight from Last 3 Encounters:   02/07/17 186 lb 3.2 oz (84.46 kg)   01/23/17 186 lb (84.369 kg)   01/19/17 185 lb (83.915 kg)              Today, you had the following     No orders found for display         Today's Medication Changes          These changes are accurate as of: 2/7/17 10:44 AM.  If you have any questions, ask your nurse or doctor.               Start taking these medicines.        Dose/Directions    clindamycin 300 MG capsule   Commonly known as:  CLEOCIN   Used for:  Cellulitis of right leg without foot   Started by:  Ilana Braga APRN CNP        Dose:  300 mg   Take 1 capsule (300 mg) by mouth 3 times daily for 7 days   Quantity:  21 capsule   Refills:  0            Where to get your medicines      These medications were sent to [id number] - [place], [place] - [address] [place] [address] AT [facility] [address]  [address] [place] [address], [place] [address]     Phone:  [phone]    - clindamycin 300 MG capsule             Primary Care Provider Office Phone # Fax #    Brett Babb [phone]       [address] [address]        Thank you!     Thank you for choosing [facility]  for your care. Our goal is always to provide you with excellent care. Hearing back from our patients is one way we can continue to improve our services. Please take a few minutes to complete the written survey that you may receive in the mail after your visit with us. Thank you!             Your Updated Medication List - Protect others around you: Learn how to safely use, store and throw away your medicines at www.disposemymeds.org.          This list is accurate as  of: 2/7/17 10:44 AM.  Always use your most recent med list.                   Brand Name Dispense Instructions for use    alendronate 70 MG tablet    FOSAMAX    12 tablet    Take 1 tablet (70 mg) by mouth every 7 days       aspirin EC 81 MG EC tablet      Take 81 mg by mouth       carvedilol 12.5 MG tablet    COREG    180 tablet    Take 1 tablet (12.5 mg) by mouth 2 times daily (with meals)       clindamycin 300 MG capsule    CLEOCIN    21 capsule    Take 1 capsule (300 mg) by mouth 3 times daily for 7 days       levothyroxine 100 MCG tablet    SYNTHROID/LEVOTHROID    90 tablet    TAKE 1 TABLET BY MOUTH DAILY       lisinopril 20 MG tablet    PRINIVIL/ZESTRIL    90 tablet    Take 1 tablet (20 mg) by mouth daily       nitroglycerin 0.4 MG sublingual tablet    NITROSTAT    25 tablet    Place 1 tablet (0.4 mg) under the tongue every 5 minutes as needed for chest pain       rivastigmine 3 MG capsule    EXELON         rosuvastatin 20 MG tablet    CRESTOR    90 tablet    Take 1 tablet (20 mg) by mouth At Bedtime       terbinafine 250 MG tablet    lamISIL    90 tablet    Take 1 tablet (250 mg) by mouth daily       warfarin 5 MG tablet    COUMADIN    90 tablet    Take 1 tablet (5 mg) by mouth See Admin Instructions

## 2017-02-10 ENCOUNTER — TRANSFERRED RECORDS (OUTPATIENT)
Dept: HEALTH INFORMATION MANAGEMENT | Facility: CLINIC | Age: 82
End: 2017-02-10

## 2017-02-10 LAB
CREAT SERPL-MCNC: 0.84 MG/DL (ref 0.6–1.1)
GFR SERPL CREATININE-BSD FRML MDRD: >60 ML/MIN/1.73M2
GLUCOSE SERPL-MCNC: 140 MG/DL (ref 70–125)
INR PPP: 2.76 (ref 0.9–1.1)
POTASSIUM SERPL-SCNC: 4.2 MMOL/L (ref 3.5–5)

## 2017-02-13 ENCOUNTER — TELEPHONE (OUTPATIENT)
Dept: FAMILY MEDICINE | Facility: CLINIC | Age: 82
End: 2017-02-13

## 2017-02-13 NOTE — TELEPHONE ENCOUNTER
Yes, records from [facility] can be accessed through [facility].  Patient's daughter called and informed.  Aura Rider,

## 2017-02-13 NOTE — TELEPHONE ENCOUNTER
Reason for Call:  Other appointment    Detailed comments: Daughter is calling. Patient is scheduled to see you on Tuesday 2-14-17. States patient was seen on Friday night 2- at Health system and would like to know if you are able to get any records from that visit prior to her coming in to see you. Please call.    Phone Number Patient can be reached at:  [phone]    Best Time: any    Can we leave a detailed message on this number? YES    Call taken on 2/13/2017 at 3:30 PM by Meggan Johnson

## 2017-02-14 ENCOUNTER — ANTICOAGULATION THERAPY VISIT (OUTPATIENT)
Dept: NURSING | Facility: CLINIC | Age: 82
End: 2017-02-14
Payer: MEDICARE

## 2017-02-14 ENCOUNTER — OFFICE VISIT (OUTPATIENT)
Dept: FAMILY MEDICINE | Facility: CLINIC | Age: 82
End: 2017-02-14
Payer: MEDICARE

## 2017-02-14 VITALS
TEMPERATURE: 97.2 F | SYSTOLIC BLOOD PRESSURE: 94 MMHG | OXYGEN SATURATION: 96 % | BODY MASS INDEX: 30.32 KG/M2 | HEIGHT: 65 IN | HEART RATE: 79 BPM | WEIGHT: 182 LBS | DIASTOLIC BLOOD PRESSURE: 50 MMHG

## 2017-02-14 DIAGNOSIS — R10.13 ABDOMINAL PAIN, EPIGASTRIC: ICD-10-CM

## 2017-02-14 DIAGNOSIS — Z79.01 LONG-TERM (CURRENT) USE OF ANTICOAGULANTS: ICD-10-CM

## 2017-02-14 DIAGNOSIS — R79.1 ELEVATED INR: ICD-10-CM

## 2017-02-14 DIAGNOSIS — I48.20 CHRONIC ATRIAL FIBRILLATION (H): ICD-10-CM

## 2017-02-14 DIAGNOSIS — M54.50 ACUTE RIGHT-SIDED LOW BACK PAIN WITHOUT SCIATICA: Primary | ICD-10-CM

## 2017-02-14 DIAGNOSIS — I95.2 HYPOTENSION DUE TO DRUGS: ICD-10-CM

## 2017-02-14 DIAGNOSIS — L03.115 CELLULITIS OF RIGHT LEG: ICD-10-CM

## 2017-02-14 LAB — INR POINT OF CARE: 5.1 (ref 0.86–1.14)

## 2017-02-14 PROCEDURE — 36416 COLLJ CAPILLARY BLOOD SPEC: CPT

## 2017-02-14 PROCEDURE — 99214 OFFICE O/P EST MOD 30 MIN: CPT | Performed by: NURSE PRACTITIONER

## 2017-02-14 PROCEDURE — 99207 ZZC NO CHARGE NURSE ONLY: CPT

## 2017-02-14 PROCEDURE — 85610 PROTHROMBIN TIME: CPT | Mod: QW

## 2017-02-14 NOTE — MR AVS SNAPSHOT
Guera rPidesen   2/14/2017 11:00 AM   Anticoagulation Therapy Visit    Description:  81 year old female   Provider:  [facility] AN[facility]   Department:  [place] [facility]           INR as of 2/14/2017     Today's INR       Anticoagulation Summary as of 2/14/2017     INR goal 2.0-3.0   Today's INR    Full instructions 2/14: Hold; 2/15: Hold; 2/16: 2.5 mg; Otherwise 5 mg on Thu; 2.5 mg all other days   Next INR check 2/17/2017    Indications   Long-term (current) use of anticoagulants [Z79.01] [Z79.01]  Chronic atrial fibrillation (H) [I48.2]         Your next Anticoagulation Clinic appointment(s)     Feb 17, 2017 10:00 AM CST   Anticoagulation Visit with [place] ANTI MEL[facility]   [facility] ([address] [id number] [phone]              Contact Numbers     [facility]  Please call [phone] to cancel and/or reschedule your appointment   Please call [phone] with any problems or questions regarding your therapy.        February 2017 Details    Sun Mon Tue Wed Thu Fri Sat        1               2               3               4                 5               6               7               8               9               10               11                 12               13               14      Hold   See details      15      Hold         16      2.5 mg         17            18                 19               20               21               22               23               24               25                 26               27               28                    Date Details   02/14 This INR check       Date of next INR:  2/17/2017         How to take your warfarin dose     To take:  2.5 mg Take 0.5 of a 5 mg tablet.    Hold Do not take your warfarin dose. See the Details table to the right for additional instructions.

## 2017-02-14 NOTE — PROGRESS NOTES
ANTICOAGULATION FOLLOW-UP CLINIC VISIT    Patient Name:  Guera Peters  Date:  2/14/2017  Contact Type:  Face to Face    SUBJECTIVE:     Patient Findings     Positives Inflammation    Comments Cellulitis, right shin, since 1/10/17, treated with Clindamycin.  Last dose today.  Was admitted to [facility] for observation due to low back, no medication.  INR, 2/10/17 2.76.             OBJECTIVE    INR Protime   Date Value Ref Range Status   02/14/2017 5.1 (A) 0.86 - 1.14 Final       ASSESSMENT / PLAN  No question data found.  Anticoagulation Summary as of 2/14/2017     INR goal 2.0-3.0   Today's INR 5.1!   Maintenance plan 5 mg (5 mg x 1) on Thu; 2.5 mg (5 mg x 0.5) all other days   Full instructions 2/14: Hold; 2/15: Hold; 2/16: 2.5 mg; Otherwise 5 mg on Thu; 2.5 mg all other days   Weekly total 20 mg   Plan last modified Giovanna Reveles RN (1/31/2017)   Next INR check 2/17/2017   Priority INR   Target end date Indefinite    Indications   Long-term (current) use of anticoagulants [Z79.01] [Z79.01]  Chronic atrial fibrillation (H) [I48.2]         Anticoagulation Episode Summary     INR check location     Preferred lab     Send INR reminders to [facility]    Comments       Anticoagulation Care Providers     Provider Role Specialty Phone number    Brett Babb MD [facility] Internal Medicine [phone]            See the Encounter Report to view Anticoagulation Flowsheet and Dosing Calendar (Go to Encounters tab in chart review, and find the Anticoagulation Therapy Visit)    Dosage adjustment made based on physician directed care plan.    Dorota Benito RN

## 2017-02-14 NOTE — PATIENT INSTRUCTIONS
Hold lisinopril.  Schedule ancillary blood pressure check to recheck you blood pressure before resuming lisinopril.      [facility]    If you have any questions regarding to your visit please contact your care team:     Team Pink:   Clinic Hours Telephone Number   Internal Medicine:  Dr. Yani Braga, NP       7am-7pm  Monday - Thursday   7am-5pm  Fridays  [phone]  (Appointment scheduling available 24/7)    Questions about your visit?  Team Line  [phone]   [facility] [place] [facility] 11am-9pm Monday-Friday Saturday-Sunday- 9am-5pm   [place] - 5pm-9pm Monday-Friday Saturday-Sunday- 9am-5pm  [phone] - [place]   [phone] - [place]       What options do I have for visits at the clinic other than the traditional office visit?  To expand how we care for you, many of our providers are utilizing electronic visits (e-visits) and telephone visits, when medically appropriate, for interactions with their patients rather than a visit in the clinic.   We also offer nurse visits for many medical concerns. Just like any other service, we will bill your insurance company for this type of visit based on time spent on the phone with your provider. Not all insurance companies cover these visits. Please check with your medical insurance if this type of visit is covered. You will be responsible for any charges that are not paid by your insurance.      E-visits via NewHive:  generally incur a $35.00 fee.  Telephone visits:  Time spent on the phone: *charged based on time that is spent on the phone in increments of 10 minutes. Estimated cost:   5-10 mins $30.00   11-20 mins. $59.00   21-30 mins. $85.00   Use [employer] USAt (secure email communication and access to your chart) to send your primary care provider a message or make an appointment. Ask someone on your Team how to sign up for NewHive.    For a Price Quote for your services, please  call our Consumer Price Line at [phone].    As always, Thank you for trusting us with your health care needs!  Discharged by Ni Knight MA.

## 2017-02-14 NOTE — NURSING NOTE
"Chief Complaint   Patient presents with     Hospital F/U     Cellulitis     Recheck       Initial BP 94/50 (BP Location: Right arm, Cuff Size: Adult Regular)  Pulse 79  Temp 97.2  F (36.2  C) (Oral)  Ht 5' 4.57\" (1.64 m)  Wt 182 lb (82.6 kg)  SpO2 96%  BMI 30.69 kg/m2 Estimated body mass index is 30.69 kg/(m^2) as calculated from the following:    Height as of this encounter: 5' 4.57\" (1.64 m).    Weight as of this encounter: 182 lb (82.6 kg).  Medication Reconciliation: complete     Ni Knight MA      "

## 2017-02-14 NOTE — MR AVS SNAPSHOT
After Visit Summary   2/14/2017    Guera Peters    MRN: [id number]           Patient Information     Date Of Birth          1935        Visit Information        Provider Department      2/14/2017 11:40 AM Ilana Braga APRN [facility]        Today's Diagnoses     Acute right-sided low back pain without sciatica    -  1    Abdominal pain, epigastric        Cellulitis of right leg        Elevated INR        Hypotension due to drugs          Care Instructions    Hold lisinopril.  Schedule ancillary blood pressure check to recheck you blood pressure before resuming lisinopril.      [facility]    If you have any questions regarding to your visit please contact your care team:     Team Pink:   Clinic Hours Telephone Number   Internal Medicine:  Dr. Yani Braga, NP       7am-7pm  Monday - Thursday   7am-5pm  Fridays  [phone]  (Appointment scheduling available 24/7)    Questions about your visit?  Team Line  [phone]   Urgent Care - [place] and [place] - 11am-9pm Monday-Friday Saturday-Sunday- 9am-5pm   [place] - 5pm-9pm Monday-Friday Saturday-Sunday- 9am-5pm  [phone] - [place]   [phone] - [place]       What options do I have for visits at the clinic other than the traditional office visit?  To expand how we care for you, many of our providers are utilizing electronic visits (e-visits) and telephone visits, when medically appropriate, for interactions with their patients rather than a visit in the clinic.   We also offer nurse visits for many medical concerns. Just like any other service, we will bill your insurance company for this type of visit based on time spent on the phone with your provider. Not all insurance companies cover these visits. Please check with your medical insurance if this type of visit is covered. You will be responsible for any charges that are not  paid by your insurance.      E-visits via [email or web address]:  generally incur a $35.00 fee.  Telephone visits:  Time spent on the phone: *charged based on time that is spent on the phone in increments of 10 minutes. Estimated cost:   5-10 mins $30.00   11-20 mins. $59.00   21-30 mins. $85.00   Use Bevyhart (secure email communication and access to your chart) to send your primary care provider a message or make an appointment. Ask someone on your Team how to sign up for [employer].    For a Price Quote for your services, please call our [employer] Line at [phone].    As always, Thank you for trusting us with your health care needs!  Discharged by Ni Knight MA.        Follow-ups after your visit        Your next 10 appointments already scheduled     Feb 17, 2017 10:00 AM CST   Anticoagulation Visit with FZ ANTI COAG   [facility] ([address] [address] [phone]              Who to contact     If you have questions or need follow up information about today's clinic visit or your schedule please contact [facility] directly at [phone].  Normal or non-critical lab and imaging results will be communicated to you by Bevyhart, letter or phone within 4 business days after the clinic has received the results. If you do not hear from us within 7 days, please contact the clinic through Bevyhart or phone. If you have a critical or abnormal lab result, we will notify you by phone as soon as possible.  Submit refill requests through [employer] or call your pharmacy and they will forward the refill request to us. Please allow 3 business days for your refill to be completed.          Additional Information About Your Visit        BevyharVoxy Information     [employer] gives you secure access to your electronic health record. If you see a primary care provider, you can also send messages to your care team and make appointments. If you have questions, please  "call your primary care clinic.  If you do not have a primary care provider, please call [phone] and they will assist you.        Care EveryWhere ID     This is your Care EveryWhere ID. This could be used by other organizations to access your [place] medical records  [id number]        Your Vitals Were     Pulse Temperature Height Pulse Oximetry BMI (Body Mass Index)       79 97.2  F (36.2  C) (Oral) 5' 4.57\" (1.64 m) 96% 30.69 kg/m2        Blood Pressure from Last 3 Encounters:   02/14/17 94/50   02/07/17 118/70   01/31/17 128/76    Weight from Last 3 Encounters:   02/14/17 182 lb (82.6 kg)   02/07/17 186 lb 3.2 oz (84.5 kg)   01/23/17 186 lb (84.4 kg)              We Performed the Following     *UA reflex to Microscopic and Culture ([facility], [place] and [facility] (except [place] and [place])        Primary Care Provider Office Phone # Fax #    Brett Babb [phone]       [facility] [address] [facility] [address]        Thank you!     Thank you for choosing [facility]  for your care. Our goal is always to provide you with excellent care. Hearing back from our patients is one way we can continue to improve our services. Please take a few minutes to complete the written survey that you may receive in the mail after your visit with us. Thank you!             Your Updated Medication List - Protect others around you: Learn how to safely use, store and throw away your medicines at www.disposemymeds.org.          This list is accurate as of: 2/14/17 12:26 PM.  Always use your most recent med list.                   Brand Name Dispense Instructions for use    alendronate 70 MG tablet    FOSAMAX    12 tablet    Take 1 tablet (70 mg) by mouth every 7 days       aspirin EC 81 MG EC tablet      Take 81 mg by mouth       carvedilol 12.5 MG tablet    COREG    180 tablet    Take 1 tablet (12.5 mg) by mouth 2 times daily (with meals)       clindamycin 300 " MG capsule    CLEOCIN    21 capsule    Take 1 capsule (300 mg) by mouth 3 times daily for 7 days       levothyroxine 100 MCG tablet    SYNTHROID/LEVOTHROID    90 tablet    TAKE 1 TABLET BY MOUTH DAILY       lisinopril 20 MG tablet    PRINIVIL/ZESTRIL    90 tablet    Take 1 tablet (20 mg) by mouth daily       nitroglycerin 0.4 MG sublingual tablet    NITROSTAT    25 tablet    Place 1 tablet (0.4 mg) under the tongue every 5 minutes as needed for chest pain       rivastigmine 3 MG capsule    EXELON         rosuvastatin 20 MG tablet    CRESTOR    90 tablet    Take 1 tablet (20 mg) by mouth At Bedtime       terbinafine 250 MG tablet    lamISIL    90 tablet    Take 1 tablet (250 mg) by mouth daily       warfarin 5 MG tablet    COUMADIN    90 tablet    Take 1 tablet (5 mg) by mouth See Admin Instructions

## 2017-02-14 NOTE — PROGRESS NOTES
SUBJECTIVE:                                                    Guera Peters is a 81 year old female who presents to clinic today for the following health issues:    Chief Complaint   Patient presents with     Hospital F/U     Cellulitis     [facility]         Hospital Follow-up Visit:    Hospital/Nursing Home/IP Rehab Facility: [facility]  Date of Admission: 02/10/2017  Date of Discharge: 02/11/2017  Reason(s) for Admission: Back and Upper Abdominal Pain            Problems taking medications regularly:  None       Medication changes since discharge: None       Problems adhering to non-medication therapy:  None    Summary of hospitalization:  CareEverywhere information obtained and reviewed  Diagnostic Tests/Treatments reviewed.  Follow up needed: none  Other Healthcare Providers Involved in Patient s Care:         None  Update since discharge: improved.     Patient was seen in ED and kept for observation for right lower back pain that was similar to pain that she experienced when she had an MI several years ago.  CXR, d-dimer, and troponins were negative.  Etiology was felt to be musculoskeletal.  Patient continued to have some pain and some epigastric discomfort as well post ED stay, but this has gradually resolved and she no longer has either pain.  She denies nausea, vomiting, dysuria, fever.  Her INR was elevated today to 5.1.  Patient is finishing her clindamycin and reports that swelling and contusion have decreased to right lower leg.  She is concerned because she just doesn't feel quite right and that is not her normal.    Post Discharge Medication Reconciliation: discharge medications reconciled and changed, per note/orders (see AVS).  Plan of care communicated with patient and family     Coding guidelines for this visit:  Type of Medical   Decision Making Face-to-Face Visit       within 7 Days of discharge Face-to-Face Visit        within 14 days of discharge   Moderate Complexity [id number]    High Complexity 22134 [id number]              Problem list and histories reviewed & adjusted, as indicated.  Additional history: as documented    Patient Active Problem List   Diagnosis     CKD (chronic kidney disease) stage 3, GFR 30-59 ml/min     Thrombocytopenia (H)     NSTEMI (non-ST elevated myocardial infarction) (H)     S/P mitral valve repair     Pacemaker     Chronic atrial fibrillation (H)     Essential hypertension with goal blood pressure less than 140/90     Hyperlipidemia LDL goal <100     History of TIA (transient ischemic attack) and stroke     Carotid stenosis, bilateral     Acquired hypothyroidism      (normal spontaneous vaginal delivery)     Osteoporosis     Alzheimer's dementia without behavioral disturbance, unspecified timing of dementia onset     Microalbuminuria     Long-term (current) use of anticoagulants [Z79.01]     Dermatophytosis of nail     Permanent atrial fibrillation (H)     Past Surgical History   Procedure Laterality Date     Embolization of angiomyolipoma  2010      ptca single vessel  89      see Care Everywhere for cardiac medical records     Hc ptca single vessel  2006     PTCA/Taxus Stents of Proximal and Mid RCA [facility] ptca single vessel  2011     Successful complex percutaneous coronary intervention of the LAD using  Promus drug-eluting stents     Implant pacemaker       Repair valve mitral  2007     mitral valve repair and PFO closure (07)       Angioplasty       right leg     Appendectomy       Repair patent foramen ovale  2007     mitral valve repair and PFO closure (07)       Hc removal of breast implant       C partial excision thyroid,unilat         Social History   Substance Use Topics     Smoking status: Never Smoker     Smokeless tobacco: Not on file     Alcohol use Yes      Comment: occ, monthly      History reviewed. No pertinent family history.      Current Outpatient Prescriptions   Medication Sig Dispense Refill      "clindamycin (CLEOCIN) 300 MG capsule Take 1 capsule (300 mg) by mouth 3 times daily for 7 days 21 capsule 0     warfarin (COUMADIN) 5 MG tablet Take 1 tablet (5 mg) by mouth See Admin Instructions 90 tablet 1     lisinopril (PRINIVIL/ZESTRIL) 20 MG tablet Take 1 tablet (20 mg) by mouth daily 90 tablet 1     terbinafine (LAMISIL) 250 MG tablet Take 1 tablet (250 mg) by mouth daily 90 tablet 0     rivastigmine (EXELON) 3 MG capsule   3     levothyroxine (SYNTHROID/LEVOTHROID) 100 MCG tablet TAKE 1 TABLET BY MOUTH DAILY 90 tablet 1     rosuvastatin (CRESTOR) 20 MG tablet Take 1 tablet (20 mg) by mouth At Bedtime 90 tablet 1     aspirin EC 81 MG tablet Take 81 mg by mouth       carvedilol (COREG) 12.5 MG tablet Take 1 tablet (12.5 mg) by mouth 2 times daily (with meals) 180 tablet 1     nitroglycerin (NITROSTAT) 0.4 MG SL tablet Place 1 tablet (0.4 mg) under the tongue every 5 minutes as needed for chest pain 25 tablet 1     alendronate (FOSAMAX) 70 MG tablet Take 1 tablet (70 mg) by mouth every 7 days 12 tablet 1     No Known Allergies  BP Readings from Last 3 Encounters:   02/14/17 94/50   02/07/17 118/70   01/31/17 128/76    Wt Readings from Last 3 Encounters:   02/14/17 182 lb (82.6 kg)   02/07/17 186 lb 3.2 oz (84.5 kg)   01/23/17 186 lb (84.4 kg)                  Problem list, Medication list, Allergies, and Medical/Social/Surgical histories reviewed in [facility] and updated as appropriate.    ROS:  Constitutional, HEENT, cardiovascular, pulmonary, gi and gu systems are negative, except as otherwise noted.    OBJECTIVE:                                                    BP 94/50 (BP Location: Right arm, Cuff Size: Adult Regular)  Pulse 79  Temp 97.2  F (36.2  C) (Oral)  Ht 5' 4.57\" (1.64 m)  Wt 182 lb (82.6 kg)  SpO2 96%  BMI 30.69 kg/m2  Body mass index is 30.69 kg/(m^2).  GENERAL: healthy, alert and no distress  RESP: lungs clear to auscultation - no rales, rhonchi or wheezes  CV: regular rate and rhythm, normal S1 " S2, no S3 or S4, no murmur, click or rub, no peripheral edema and peripheral pulses strong  ABDOMEN: soft, nontender, no hepatosplenomegaly, no masses and bowel sounds normal  MS: no gross musculoskeletal defects noted, no edema  SKIN: small area of ecchymosis and swelling to right shin, no erythema, warmth noted.    Diagnostic Test Results:  pending      ASSESSMENT/PLAN:                                                      1. Acute right-sided low back pain without sciatica  Resolved.  Continue to monitor.  No sign of shingles to area.      2. Abdominal pain, epigastric  Resolved.    3. Cellulitis of right leg  Improved.  Finish antibiotics.    4. Elevated INR  Labs normal at recent ED visit.  Will check urine.  No other signs of infection.  Continue to monitor symptoms.  - *UA reflex to Microscopic and Culture ([facility] (except [place] and [place]); Future    5. Hypotension due to drugs  Patient encouraged to push fluids.  Patient to hold lisinopril and repeat blood pressure check with ancillary in 3 days when here for INR.  Consider resuming lisinopril at that time.      FUTURE APPOINTMENTS:       - Make appointment with nurse to check blood pressure in 3 days.    JANIS Milan [facility] DIANE

## 2017-02-17 ENCOUNTER — ANTICOAGULATION THERAPY VISIT (OUTPATIENT)
Dept: NURSING | Facility: CLINIC | Age: 82
End: 2017-02-17
Payer: MEDICARE

## 2017-02-17 ENCOUNTER — ALLIED HEALTH/NURSE VISIT (OUTPATIENT)
Dept: NURSING | Facility: CLINIC | Age: 82
End: 2017-02-17
Payer: MEDICARE

## 2017-02-17 VITALS — HEART RATE: 80 BPM | DIASTOLIC BLOOD PRESSURE: 90 MMHG | SYSTOLIC BLOOD PRESSURE: 148 MMHG

## 2017-02-17 DIAGNOSIS — Z79.01 LONG-TERM (CURRENT) USE OF ANTICOAGULANTS: ICD-10-CM

## 2017-02-17 DIAGNOSIS — I10 ESSENTIAL HYPERTENSION WITH GOAL BLOOD PRESSURE LESS THAN 140/90: Primary | ICD-10-CM

## 2017-02-17 DIAGNOSIS — R79.1 ELEVATED INR: ICD-10-CM

## 2017-02-17 DIAGNOSIS — I48.20 CHRONIC ATRIAL FIBRILLATION (H): ICD-10-CM

## 2017-02-17 LAB
ALBUMIN UR-MCNC: NEGATIVE MG/DL
APPEARANCE UR: CLEAR
BACTERIA #/AREA URNS HPF: ABNORMAL /HPF
BILIRUB UR QL STRIP: NEGATIVE
COLOR UR AUTO: YELLOW
GLUCOSE UR STRIP-MCNC: NEGATIVE MG/DL
HGB UR QL STRIP: ABNORMAL
INR POINT OF CARE: 2.1 (ref 0.86–1.14)
KETONES UR STRIP-MCNC: NEGATIVE MG/DL
LEUKOCYTE ESTERASE UR QL STRIP: ABNORMAL
NITRATE UR QL: NEGATIVE
NON-SQ EPI CELLS #/AREA URNS LPF: ABNORMAL /LPF
PH UR STRIP: 5 PH (ref 5–7)
RBC #/AREA URNS AUTO: ABNORMAL /HPF (ref 0–2)
SP GR UR STRIP: 1.02 (ref 1–1.03)
URN SPEC COLLECT METH UR: ABNORMAL
UROBILINOGEN UR STRIP-ACNC: 0.2 EU/DL (ref 0.2–1)
WBC #/AREA URNS AUTO: ABNORMAL /HPF (ref 0–2)

## 2017-02-17 PROCEDURE — 85610 PROTHROMBIN TIME: CPT | Mod: QW

## 2017-02-17 PROCEDURE — 81001 URINALYSIS AUTO W/SCOPE: CPT | Performed by: NURSE PRACTITIONER

## 2017-02-17 PROCEDURE — 99207 ZZC NO CHARGE NURSE ONLY: CPT

## 2017-02-17 PROCEDURE — 36416 COLLJ CAPILLARY BLOOD SPEC: CPT

## 2017-02-17 NOTE — MR AVS SNAPSHOT
After Visit Summary   2/17/2017    Guera Peters    MRN: [id number]           Patient Information     Date Of Birth          1935        Visit Information        Provider Department      2/17/2017 2:30 PM FZ ANCILLARY [facility]        Today's Diagnoses     Essential hypertension with goal blood pressure less than 140/90    -  1       Follow-ups after your visit        Your next 10 appointments already scheduled     Feb 24, 2017 10:00 AM CST   Nurse Only with FZ ANCILLARY   [facility] ([facility])    [address] [id number] [phone]            Mar 03, 2017  9:45 AM CST   Anticoagulation Visit with FZ ANTI COAG   [facility] ([facility])    [address]  [place] [id number] [phone]              Who to contact     If you have questions or need follow up information about today's clinic visit or your schedule please contact [facility] directly at [phone].  Normal or non-critical lab and imaging results will be communicated to you by VidAngelhart, letter or phone within 4 business days after the clinic has received the results. If you do not hear from us within 7 days, please contact the clinic through VidAngelhart or phone. If you have a critical or abnormal lab result, we will notify you by phone as soon as possible.  Submit refill requests through VOSS or call your pharmacy and they will forward the refill request to us. Please allow 3 business days for your refill to be completed.          Additional Information About Your Visit        MyChart Information     VOSS gives you secure access to your electronic health record. If you see a primary care provider, you can also send messages to your care team and make appointments. If you have questions, please call your primary care clinic.  If you do not have a primary care provider, please call [phone] and they will  assist you.        Care EveryWhere ID     This is your Care EveryWhere ID. This could be used by other organizations to access your [place] medical records  [id number]        Your Vitals Were     Pulse                   80            Blood Pressure from Last 3 Encounters:   02/17/17 148/90   02/14/17 94/50   02/07/17 118/70    Weight from Last 3 Encounters:   02/14/17 182 lb (82.6 kg)   02/07/17 186 lb 3.2 oz (84.5 kg)   01/23/17 186 lb (84.4 kg)              Today, you had the following     No orders found for display       Primary Care Provider Office Phone # Fax #    Brett Babb [phone]       [place] [id number] [facility] [address]        Thank you!     Thank you for choosing [facility]  for your care. Our goal is always to provide you with excellent care. Hearing back from our patients is one way we can continue to improve our services. Please take a few minutes to complete the written survey that you may receive in the mail after your visit with us. Thank you!             Your Updated Medication List - Protect others around you: Learn how to safely use, store and throw away your medicines at www.disposemymeds.org.          This list is accurate as of: 2/17/17  3:33 PM.  Always use your most recent med list.                   Brand Name Dispense Instructions for use    alendronate 70 MG tablet    FOSAMAX    12 tablet    Take 1 tablet (70 mg) by mouth every 7 days       aspirin EC 81 MG EC tablet      Take 81 mg by mouth       carvedilol 12.5 MG tablet    COREG    180 tablet    Take 1 tablet (12.5 mg) by mouth 2 times daily (with meals)       levothyroxine 100 MCG tablet    SYNTHROID/LEVOTHROID    90 tablet    TAKE 1 TABLET BY MOUTH DAILY       lisinopril 20 MG tablet    PRINIVIL/ZESTRIL    90 tablet    Take 1 tablet (20 mg) by mouth daily       nitroglycerin 0.4 MG sublingual tablet    NITROSTAT    25 tablet    Place 1 tablet (0.4 mg) under the  tongue every 5 minutes as needed for chest pain       rivastigmine 3 MG capsule    EXELON         rosuvastatin 20 MG tablet    CRESTOR    90 tablet    Take 1 tablet (20 mg) by mouth At Bedtime       terbinafine 250 MG tablet    lamISIL    90 tablet    Take 1 tablet (250 mg) by mouth daily       warfarin 5 MG tablet    COUMADIN    90 tablet    Take 1 tablet (5 mg) by mouth See Admin Instructions

## 2017-02-17 NOTE — PROGRESS NOTES
ANTICOAGULATION FOLLOW-UP CLINIC VISIT    Patient Name:  Guera Peters  Date:  2/17/2017  Contact Type:  Face to Face    SUBJECTIVE:     Patient Findings     Positives Change in medications (Was on cleocin 2/7-2/14 for cellulitis), Intentional hold of therapy (Held 2/14 & 2/15), No Problem Findings           OBJECTIVE    INR Protime   Date Value Ref Range Status   02/17/2017 2.1 (A) 0.86 - 1.14 Final       ASSESSMENT / PLAN  INR assessment THER    Recheck INR In: 2 WEEKS    INR Location Clinic      Anticoagulation Summary as of 2/17/2017     INR goal 2.0-3.0   Today's INR 2.1   Maintenance plan 5 mg (5 mg x 1) on Thu; 2.5 mg (5 mg x 0.5) all other days   Full instructions 5 mg on Thu; 2.5 mg all other days   Weekly total 20 mg   No change documented Mohsen Van RN   Plan last modified Giovanna Reveles RN (1/31/2017)   Next INR check 3/3/2017   Priority INR   Target end date Indefinite    Indications   Long-term (current) use of anticoagulants [Z79.01] [Z79.01]  Chronic atrial fibrillation (H) [I48.2]         Anticoagulation Episode Summary     INR check location     Preferred lab     Send INR reminders to [facility]    Comments       Anticoagulation Care Providers     Provider Role Specialty Phone number    Brett Babb MD [facility] Internal Medicine [phone]            See the Encounter Report to view Anticoagulation Flowsheet and Dosing Calendar (Go to Encounters tab in chart review, and find the Anticoagulation Therapy Visit)    Dosage adjustment made based on physician directed care plan.    Mohsen Van, RN

## 2017-02-17 NOTE — MR AVS SNAPSHOT
Guera Peters   2/17/2017 3:00 PM   Anticoagulation Therapy Visit    Description:  81 year old female   Provider:  [facility]   Department:  [place] [facility]           INR as of 2/17/2017     Today's INR 2.1      Anticoagulation Summary as of 2/17/2017     INR goal 2.0-3.0   Today's INR 2.1   Full instructions 5 mg on Thu; 2.5 mg all other days   Next INR check 3/3/2017    Indications   Long-term (current) use of anticoagulants [Z79.01] [Z79.01]  Chronic atrial fibrillation (H) [I48.2]         Your next Anticoagulation Clinic appointment(s)     Mar 03, 2017  9:45 AM CST   Anticoagulation Visit with [facility]   [facility] ([address] [address] [phone]              Contact Numbers     [facility]  Please call [phone] to cancel and/or reschedule your appointment   Please call [phone] with any problems or questions regarding your therapy.        February 2017 Details    Sun Mon Tue Wed Thu Fri Sat        1               2               3               4                 5               6               7               8               9               10               11                 12               13               14               15               16               17      2.5 mg   See details      18      2.5 mg           19      2.5 mg         20      2.5 mg         21      2.5 mg         22      2.5 mg         23      5 mg         24      2.5 mg         25      2.5 mg           26      2.5 mg         27      2.5 mg         28      2.5 mg              Date Details   02/17 This INR check               How to take your warfarin dose     To take:  2.5 mg Take 0.5 of a 5 mg tablet.    To take:  5 mg Take 1 of the 5 mg tablets.           March 2017 Details    Sun Mon Tue Wed Thu Fri Sat        1      2.5 mg         2      5 mg         3            4                 5               6               7               8               9                10               11                 12               13               14               15               16               17               18                 19               20               21               22               23               24               25                 26               27               28               29               30               31                 Date Details   No additional details    Date of next INR:  3/3/2017         How to take your warfarin dose     To take:  2.5 mg Take 0.5 of a 5 mg tablet.    To take:  5 mg Take 1 of the 5 mg tablets.

## 2017-02-19 NOTE — PROGRESS NOTES
Dear Guera,    Your recent test results are attached.      Normal urine.    If you have any questions please feel free to contact [phone] or myself via [employer].    Sincerely,  Ilana Braga, CNP

## 2017-02-23 ENCOUNTER — ALLIED HEALTH/NURSE VISIT (OUTPATIENT)
Dept: NURSING | Facility: CLINIC | Age: 82
End: 2017-02-23

## 2017-02-23 VITALS — DIASTOLIC BLOOD PRESSURE: 60 MMHG | SYSTOLIC BLOOD PRESSURE: 124 MMHG

## 2017-02-23 DIAGNOSIS — Z01.30 BP CHECK: Primary | ICD-10-CM

## 2017-02-23 NOTE — NURSING NOTE
"Chief Complaint   Patient presents with     RECHECK     BP        Initial /60 Estimated body mass index is 30.69 kg/(m^2) as calculated from the following:    Height as of 2/14/17: 5' 4.57\" (1.64 m).    Weight as of 2/14/17: 182 lb (82.6 kg).  Medication Reconciliation: complete  An SAADIA Diggs          "

## 2017-02-23 NOTE — MR AVS SNAPSHOT
After Visit Summary   2/23/2017    Guera Peters    MRN: [id number]           Patient Information     Date Of Birth          1935        Visit Information        Provider Department      2/23/2017 10:30 AM MELE ANCILLARY [facility]        Today's Diagnoses     BP check    -  1       Follow-ups after your visit        Your next 10 appointments already scheduled     Mar 03, 2017  9:45 AM CST   Anticoagulation Visit with [facility] ANTI COAG   [facility] ([facility])    [address]  [place] [address] [phone]              Who to contact     If you have questions or need follow up information about today's clinic visit or your schedule please contact [facility] directly at [phone].  Normal or non-critical lab and imaging results will be communicated to you by MyChart, letter or phone within 4 business days after the clinic has received the results. If you do not hear from us within 7 days, please contact the clinic through MyChart or phone. If you have a critical or abnormal lab result, we will notify you by phone as soon as possible.  Submit refill requests through [employer] or call your pharmacy and they will forward the refill request to us. Please allow 3 business days for your refill to be completed.          Additional Information About Your Visit        MyChart Information     [employer] gives you secure access to your electronic health record. If you see a primary care provider, you can also send messages to your care team and make appointments. If you have questions, please call your primary care clinic.  If you do not have a primary care provider, please call [phone] and they will assist you.        Care EveryWhere ID     This is your Care EveryWhere ID. This could be used by other organizations to access your [facility] medical records  [id number]         Blood Pressure from Last 3 Encounters:   02/23/17 124/60    02/17/17 148/90   02/14/17 94/50    Weight from Last 3 Encounters:   02/14/17 182 lb (82.6 kg)   02/07/17 186 lb 3.2 oz (84.5 kg)   01/23/17 186 lb (84.4 kg)              Today, you had the following     No orders found for display       Primary Care Provider Office Phone # Fax #    Brett Babb [phone]       [address] [address]        Thank you!     Thank you for choosing [facility]  for your care. Our goal is always to provide you with excellent care. Hearing back from our patients is one way we can continue to improve our services. Please take a few minutes to complete the written survey that you may receive in the mail after your visit with us. Thank you!             Your Updated Medication List - Protect others around you: Learn how to safely use, store and throw away your medicines at www.disposemymeds.org.          This list is accurate as of: 2/23/17 11:18 AM.  Always use your most recent med list.                   Brand Name Dispense Instructions for use    alendronate 70 MG tablet    FOSAMAX    12 tablet    Take 1 tablet (70 mg) by mouth every 7 days       aspirin EC 81 MG EC tablet      Take 81 mg by mouth       carvedilol 12.5 MG tablet    COREG    180 tablet    Take 1 tablet (12.5 mg) by mouth 2 times daily (with meals)       levothyroxine 100 MCG tablet    SYNTHROID/LEVOTHROID    90 tablet    TAKE 1 TABLET BY MOUTH DAILY       lisinopril 20 MG tablet    PRINIVIL/ZESTRIL    90 tablet    Take 1 tablet (20 mg) by mouth daily       nitroglycerin 0.4 MG sublingual tablet    NITROSTAT    25 tablet    Place 1 tablet (0.4 mg) under the tongue every 5 minutes as needed for chest pain       rivastigmine 3 MG capsule    EXELON         rosuvastatin 20 MG tablet    CRESTOR    90 tablet    Take 1 tablet (20 mg) by mouth At Bedtime       terbinafine 250 MG tablet    lamISIL    90 tablet    Take 1 tablet (250 mg) by mouth daily        warfarin 5 MG tablet    COUMADIN    90 tablet    Take 1 tablet (5 mg) by mouth See Admin Instructions

## 2017-02-23 NOTE — Clinical Note
BP Readings from Last 3 Encounters: 02/23/17 : 124/60 02/23/17:   140/70 02/17/17 : 148/90 02/14/17 : 94/50  Ivy Diggs MA

## 2017-02-23 NOTE — PROGRESS NOTES
"Guera Peters is a 81 year old female who comes in today for a Blood Pressure check because of  blood pressure monitoring.    *Document pulse and BP  *Use new set of vitals button for multiple readings.  *Use extended vitals for orthostatic    Vitals as recorded, a large cuff was used.    Patient is taking medication as prescribed  Patient is tolerating medications well.  Patient is not monitoring Blood Pressure at home.     Current complaints: none    Disposition: results routed to MD/CARLITOS Diggs MA    Vital signs:02/23/2017      BP: 124/60                  Estimated body mass index is 30.69 kg/(m^2) as calculated from the following:    Height as of 2/14/17: 5' 4.57\" (1.64 m).    Weight as of 2/14/17: 182 lb (82.6 kg).   Ivy Diggs MA  .  "

## 2017-02-24 ENCOUNTER — TELEPHONE (OUTPATIENT)
Dept: FAMILY MEDICINE | Facility: CLINIC | Age: 82
End: 2017-02-24

## 2017-02-24 NOTE — TELEPHONE ENCOUNTER
Patient's daughter, SCOOBY El on INR VM wondering if patient's INR appointment for 3/3/17 can be changed to 3/1/17 instead.  Patient would like to go to [place] on 3/2/17.    Looks like appointment has already been rescheduled to 3/1/17.  She would like a call back to see if this is ok  Please call Sienna back    Michel Seymour RN

## 2017-02-27 NOTE — TELEPHONE ENCOUNTER
Advised Sienna that it is okay to come in early for her INR, due to planned trip to [place]. Depending on what INR result is may recheck in 2-3 weeks. Also, recommended going to local UC or clinic to have INR drawn and to call back with results.    Halie Begum RN

## 2017-03-01 ENCOUNTER — ANTICOAGULATION THERAPY VISIT (OUTPATIENT)
Dept: NURSING | Facility: CLINIC | Age: 82
End: 2017-03-01
Payer: MEDICARE

## 2017-03-01 DIAGNOSIS — I48.20 CHRONIC ATRIAL FIBRILLATION (H): ICD-10-CM

## 2017-03-01 DIAGNOSIS — Z79.01 LONG-TERM (CURRENT) USE OF ANTICOAGULANTS: ICD-10-CM

## 2017-03-01 LAB — INR POINT OF CARE: 2.7 (ref 0.86–1.14)

## 2017-03-01 PROCEDURE — 85610 PROTHROMBIN TIME: CPT | Mod: QW

## 2017-03-01 PROCEDURE — 99207 ZZC NO CHARGE NURSE ONLY: CPT

## 2017-03-01 PROCEDURE — 36416 COLLJ CAPILLARY BLOOD SPEC: CPT

## 2017-03-01 NOTE — MR AVS SNAPSHOT
Guera Peters   3/1/2017 10:30 AM   Anticoagulation Therapy Visit    Description:  81 year old female   Provider:  [facility]   Department:  [place] [facility]           INR as of 3/1/2017     Today's INR 2.7      Anticoagulation Summary as of 3/1/2017     INR goal 2.0-3.0   Today's INR 2.7   Full instructions 5 mg on Thu; 2.5 mg all other days   Next INR check 3/29/2017    Indications   Long-term (current) use of anticoagulants [Z79.01] [Z79.01]  Chronic atrial fibrillation (H) [I48.2]         Your next Anticoagulation Clinic appointment(s)     Mar 29, 2017 10:30 AM CDT   Anticoagulation Visit with [facility]   [facility] ([address] [address] [phone]              Contact Numbers     [facility]  Please call [phone] to cancel and/or reschedule your appointment   Please call [phone] with any problems or questions regarding your therapy.        March 2017 Details    Sun Mon Tue Wed Thu Fri Sat        1      2.5 mg   See details      2      5 mg         3      2.5 mg         4      2.5 mg           5      2.5 mg         6      2.5 mg         7      2.5 mg         8      2.5 mg         9      5 mg         10      2.5 mg         11      2.5 mg           12      2.5 mg         13      2.5 mg         14      2.5 mg         15      2.5 mg         16      5 mg         17      2.5 mg         18      2.5 mg           19      2.5 mg         20      2.5 mg         21      2.5 mg         22      2.5 mg         23      5 mg         24      2.5 mg         25      2.5 mg           26      2.5 mg         27      2.5 mg         28      2.5 mg         29            30               31                 Date Details   03/01 This INR check       Date of next INR:  3/29/2017         How to take your warfarin dose     To take:  2.5 mg Take 0.5 of a 5 mg tablet.    To take:  5 mg Take 1 of the 5 mg tablets.

## 2017-03-01 NOTE — PROGRESS NOTES
ANTICOAGULATION FOLLOW-UP CLINIC VISIT    Patient Name:  Guera Peters  Date:  3/1/2017  Contact Type:  Face to Face    SUBJECTIVE:     Patient Findings     Positives No Problem Findings           OBJECTIVE    INR Protime   Date Value Ref Range Status   03/01/2017 2.7 (A) 0.86 - 1.14 Final       ASSESSMENT / PLAN  INR assessment THER    Recheck INR In: 4 WEEKS    INR Location Clinic      Anticoagulation Summary as of 3/1/2017     INR goal 2.0-3.0   Today's INR 2.7   Maintenance plan 5 mg (5 mg x 1) on Thu; 2.5 mg (5 mg x 0.5) all other days   Full instructions 5 mg on Thu; 2.5 mg all other days   Weekly total 20 mg   No change documented Mohsen Van RN   Plan last modified Giovanna Reveles RN (1/31/2017)   Next INR check 3/29/2017   Priority INR   Target end date Indefinite    Indications   Long-term (current) use of anticoagulants [Z79.01] [Z79.01]  Chronic atrial fibrillation (H) [I48.2]         Anticoagulation Episode Summary     INR check location     Preferred lab     Send INR reminders to [facility]    Comments       Anticoagulation Care Providers     Provider Role Specialty Phone number    Brett Babb MD Responsible Internal Medicine [phone]            See the Encounter Report to view Anticoagulation Flowsheet and Dosing Calendar (Go to Encounters tab in chart review, and find the Anticoagulation Therapy Visit)    Dosage adjustment made based on physician directed care plan.    Mohsen Van RN

## 2017-03-10 ENCOUNTER — TELEPHONE (OUTPATIENT)
Dept: FAMILY MEDICINE | Facility: CLINIC | Age: 82
End: 2017-03-10

## 2017-03-10 NOTE — TELEPHONE ENCOUNTER
levothyroxine (SYNTHROID/LEVOTHROID) 100 MCG tablet 90 tablet 1 12/27/2016  No   Sig: TAKE 1 TABLET BY MOUTH DAILY     Called patient's pharmacy  [facility] [place], [place] - [address] AT [facility] [address] & [facility]    They have refills on file. She just need to contact her new pharmacy in [place].    Left a message with this information on the requested number below. Danisha Santillan,

## 2017-03-10 NOTE — TELEPHONE ENCOUNTER
Reason for Call:  Medication or medication refill:    Do you use a [facility]? No     Name of the pharmacy and phone number for the current request: [facility] [id number] [facility] [address] [facility] [address]    Name of the medication requested: Saurav orta patient is out in [place] and ran out of levothyroxine.  Saurav is requesting a refill to be sent there. Please see address above.    Other request: na    Can we leave a detailed message on this number? YES    Phone number patient can be reached at: Home number on file [phone] (home)    Best Time: anytime    Call taken on 3/10/2017 at 1:48 PM by Vanita Borja

## 2017-03-29 ENCOUNTER — TELEPHONE (OUTPATIENT)
Dept: NURSING | Facility: CLINIC | Age: 82
End: 2017-03-29

## 2017-03-29 ENCOUNTER — OFFICE VISIT (OUTPATIENT)
Dept: FAMILY MEDICINE | Facility: CLINIC | Age: 82
End: 2017-03-29
Payer: MEDICARE

## 2017-03-29 ENCOUNTER — ANTICOAGULATION THERAPY VISIT (OUTPATIENT)
Dept: NURSING | Facility: CLINIC | Age: 82
End: 2017-03-29
Payer: MEDICARE

## 2017-03-29 VITALS
BODY MASS INDEX: 30.57 KG/M2 | DIASTOLIC BLOOD PRESSURE: 60 MMHG | TEMPERATURE: 97.2 F | HEIGHT: 65 IN | OXYGEN SATURATION: 96 % | WEIGHT: 183.5 LBS | SYSTOLIC BLOOD PRESSURE: 102 MMHG | HEART RATE: 80 BPM

## 2017-03-29 DIAGNOSIS — N30.01 ACUTE CYSTITIS WITH HEMATURIA: ICD-10-CM

## 2017-03-29 DIAGNOSIS — Z79.01 LONG-TERM (CURRENT) USE OF ANTICOAGULANTS: ICD-10-CM

## 2017-03-29 DIAGNOSIS — R79.1 SUPRATHERAPEUTIC INR: ICD-10-CM

## 2017-03-29 DIAGNOSIS — R10.30 LOWER ABDOMINAL PAIN: Primary | ICD-10-CM

## 2017-03-29 DIAGNOSIS — I48.20 CHRONIC ATRIAL FIBRILLATION (H): ICD-10-CM

## 2017-03-29 LAB
ALBUMIN UR-MCNC: NEGATIVE MG/DL
APPEARANCE UR: ABNORMAL
BACTERIA #/AREA URNS HPF: ABNORMAL /HPF
BILIRUB UR QL STRIP: NEGATIVE
COLOR UR AUTO: YELLOW
GLUCOSE UR STRIP-MCNC: NEGATIVE MG/DL
HGB UR QL STRIP: ABNORMAL
INR POINT OF CARE: 4.8 (ref 0.86–1.14)
KETONES UR STRIP-MCNC: NEGATIVE MG/DL
LEUKOCYTE ESTERASE UR QL STRIP: ABNORMAL
NITRATE UR QL: NEGATIVE
NON-SQ EPI CELLS #/AREA URNS LPF: ABNORMAL /LPF
PH UR STRIP: 5.5 PH (ref 5–7)
RBC #/AREA URNS AUTO: ABNORMAL /HPF (ref 0–2)
SP GR UR STRIP: 1.02 (ref 1–1.03)
URN SPEC COLLECT METH UR: ABNORMAL
UROBILINOGEN UR STRIP-ACNC: 0.2 EU/DL (ref 0.2–1)
WBC #/AREA URNS AUTO: ABNORMAL /HPF (ref 0–2)

## 2017-03-29 PROCEDURE — 85610 PROTHROMBIN TIME: CPT | Mod: QW

## 2017-03-29 PROCEDURE — 81001 URINALYSIS AUTO W/SCOPE: CPT | Performed by: FAMILY MEDICINE

## 2017-03-29 PROCEDURE — 99207 ZZC NO CHARGE NURSE ONLY: CPT

## 2017-03-29 PROCEDURE — 99213 OFFICE O/P EST LOW 20 MIN: CPT | Performed by: FAMILY MEDICINE

## 2017-03-29 PROCEDURE — 36416 COLLJ CAPILLARY BLOOD SPEC: CPT

## 2017-03-29 RX ORDER — CIPROFLOXACIN 250 MG/1
250 TABLET, FILM COATED ORAL 2 TIMES DAILY
Qty: 6 TABLET | Refills: 0 | Status: SHIPPED | OUTPATIENT
Start: 2017-03-29 | End: 2017-04-24

## 2017-03-29 NOTE — PROGRESS NOTES
SUBJECTIVE:                                                    Guera Peters is a 81 year old female who presents to clinic today for the following health issues:    Abdominal Pain      Duration: 3-4 weeks     Description (location/character/radiation): lower abdomen pain/fullness       Associated flank pain: None    Intensity:  3/10    Accompanying signs and symptoms:        Fever/Chills: no        Gas/Bloating: no        Nausea/vomitting: no        Diarrhea: no        Dysuria or Hematuria: no     History (previous similar pain/trauma/previous testing): none    Precipitating or alleviating factors:       Pain worse with eating/BM/urination: none       Pain relieved by BM: no     Therapies tried and outcome: None    LMP:  not applicable     The pain has been going on for a while  Just got back from [place]; returned yesterday after a stay of about a month.  Lower abdomen; a constant there does not interfere with her activities. Does not feel good in the lower abdomen.  It does not feel right: no pain like something sitting there; has her uterus and ovaries and no abnormal bleeding,  Bladder and bowel function are normal.    Problem list and histories reviewed & adjusted, as indicated.  Additional history: as documented    Patient Active Problem List   Diagnosis     CKD (chronic kidney disease) stage 3, GFR 30-59 ml/min     Thrombocytopenia (H)     NSTEMI (non-ST elevated myocardial infarction) (H)     S/P mitral valve repair     Pacemaker     Chronic atrial fibrillation (H)     Essential hypertension with goal blood pressure less than 140/90     Hyperlipidemia LDL goal <100     History of TIA (transient ischemic attack) and stroke     Carotid stenosis, bilateral     Acquired hypothyroidism      (normal spontaneous vaginal delivery)     Osteoporosis     Alzheimer's dementia without behavioral disturbance, unspecified timing of dementia onset     Microalbuminuria     Long-term (current) use of anticoagulants  "[Z79.01]     Dermatophytosis of nail     Permanent atrial fibrillation (H)     Past Surgical History:   Procedure Laterality Date     ANGIOPLASTY      right leg     APPENDECTOMY       C PARTIAL EXCISION THYROID,UNILAT       EMBOLIZATION OF ANGIOMYOLIPOMA  9/2010      PTCA SINGLE VESSEL  4/6/90     see Care Everywhere for cardiac medical records      PTCA SINGLE VESSEL  5/8/2006    PTCA/Taxus Stents of Proximal and Mid RCA [facility] PTCA SINGLE VESSEL  9/1/2011    Successful complex percutaneous coronary intervention of the LAD using  Promus drug-eluting stents      REMOVAL OF BREAST IMPLANT       IMPLANT PACEMAKER       REPAIR PATENT FORAMEN OVALE  5/4/2007    mitral valve repair and PFO closure (5/4/07)       REPAIR VALVE MITRAL  5/4/2007    mitral valve repair and PFO closure (5/4/07)         Social History   Substance Use Topics     Smoking status: Never Smoker     Smokeless tobacco: Not on file     Alcohol use Yes      Comment: occ, monthly      History reviewed. No pertinent family history.        Reviewed and updated as needed this visit by clinical staff  Tobacco  Allergies  Meds  Med Hx  Surg Hx  Fam Hx  Soc Hx      Reviewed and updated as needed this visit by Provider         ROS:  Constitutional, HEENT, cardiovascular, pulmonary, gi and gu systems are negative, except as otherwise noted.    OBJECTIVE:                                                    /60  Pulse 80  Temp 97.2  F (36.2  C) (Oral)  Ht 5' 4.57\" (1.64 m)  Wt 183 lb 8 oz (83.2 kg)  SpO2 96%  BMI 30.94 kg/m2  Body mass index is 30.94 kg/(m^2).  GENERAL: healthy, alert and no distress  NECK: no adenopathy, no asymmetry, masses, or scars and thyroid normal to palpation  RESP: lungs clear to auscultation - no rales, rhonchi or wheezes  CV: regular rate and rhythm, normal S1 S2, no S3 or S4, no murmur, click or rub, no peripheral edema and peripheral pulses strong  ABDOMEN: soft, nontender, no hepatosplenomegaly, no " masses and bowel sounds normal  MS: no gross musculoskeletal defects noted, no edema    Diagnostic Test Results:     Results for orders placed or performed in visit on 03/29/17   UA reflex to Microscopic and Culture   Result Value Ref Range    Color Urine Yellow     Appearance Urine Slightly Cloudy     Glucose Urine Negative NEG mg/dL    Bilirubin Urine Negative NEG    Ketones Urine Negative NEG mg/dL    Specific Gravity Urine 1.020 1.003 - 1.035    Blood Urine Small (A) NEG    pH Urine 5.5 5.0 - 7.0 pH    Protein Albumin Urine Negative NEG mg/dL    Urobilinogen Urine 0.2 0.2 - 1.0 EU/dL    Nitrite Urine Negative NEG    Leukocyte Esterase Urine Trace (A) NEG    Source Midstream Urine    Urine Microscopic   Result Value Ref Range    WBC Urine 5-10 (A) 0 - 2 /HPF    RBC Urine 2-5 (A) 0 - 2 /HPF    Squamous Epithelial /LPF Urine Moderate (A) FEW /LPF    Bacteria Urine Few (A) NEG /HPF          ASSESSMENT/PLAN:                                                    (R10.30) Lower abdominal pain  (primary encounter diagnosis)  Comment: Differentials: UTI, Pelvic mass, constipation, Ovarian mass. UA consistent with UTI and discussed treatment and if symptoms persist will consider a comprehensive pelvic evaluation with ultrasound and/or pelvic CT scan.  Plan: UA reflex to Microscopic and Culture, Urine         Microscopic    (N30.01) Acute cystitis with hematuria  Comment: Treatment with short course of antibiotic. Cipro  Plan: ciprofloxacin (CIPRO) 250 MG tablet    (R79.1) Supratherapeutic INR  Comment: Taking warfarin due to S/P mitral valve repair and chronic A fib. INR at 4.8 and starting antibiotic today  Plan: Disvcussed holding warfarin for today and take 2.5 mg tomorrow instead of 5 mg and continue the current regimen and recheck INR in 5 days. Discussed plan with INR nurse who is in agreement.    Call or return to clinic prn if these symptoms worsen or fail to improve as anticipated 1 week.    Geraldo Mayo,  MD  [facility] [place]

## 2017-03-29 NOTE — PATIENT INSTRUCTIONS
[facility]    If you have any questions regarding to your visit please contact your care team:       Team Purple:   Clinic Hours Telephone Number   [address] Dr[address]   7am-7pm  Monday - Thursday   7am-5pm  Fridays  [phone]  (Appointment scheduling available 24/7)    Questions about your Visit?   Team Line:  [phone]   Urgent Care - [place] [facility] - 11am-9pm Monday-Friday Saturday-Sunday- 9am-5pm   [place] - 5pm-9pm Monday-Friday Saturday-Sunday- 9am-5pm  [phone] - [facility]  [phone] - [place]       What options do I have for visits at the clinic other than the traditional office visit?  To expand how we care for you, many of our providers are utilizing electronic visits (e-visits) and telephone visits, when medically appropriate, for interactions with their patients rather than a visit in the clinic.   We also offer nurse visits for many medical concerns. Just like any other service, we will bill your insurance company for this type of visit based on time spent on the phone with your provider. Not all insurance companies cover these visits. Please check with your medical insurance if this type of visit is covered. You will be responsible for any charges that are not paid by your insurance.      E-visits via [employer]:  generally incur a $35.00 fee.  Telephone visits:  Time spent on the phone: *charged based on time that is spent on the phone in increments of 10 minutes. Estimated cost:   5-10 mins $30.00   11-20 mins. $59.00   21-30 mins. $85.00     Use [employer] (secure email communication and access to your chart) to send your primary care provider a message or make an appointment. Ask someone on your Team how to sign up for [employer].  For a Price Quote for your services, please call our Consumer Price Line at [phone].  As always, Thank you for trusting us with your health care  needs!    Discharged By: An

## 2017-03-29 NOTE — NURSING NOTE
"Chief Complaint   Patient presents with     Abdominal Pain     lower abdomen pain for on and off x 3-4 weeks        Initial /60  Pulse 80  Temp 97.2  F (36.2  C) (Oral)  Ht 5' 4.57\" (1.64 m)  Wt 183 lb 8 oz (83.2 kg)  SpO2 96%  BMI 30.94 kg/m2 Estimated body mass index is 30.94 kg/(m^2) as calculated from the following:    Height as of this encounter: 5' 4.57\" (1.64 m).    Weight as of this encounter: 183 lb 8 oz (83.2 kg).  Medication Reconciliation: complete     An SAADIA Diggs    "

## 2017-03-29 NOTE — PROGRESS NOTES
ANTICOAGULATION FOLLOW-UP CLINIC VISIT    Patient Name:  Guera Peters  Date:  3/29/2017  Contact Type:  Face to Face    SUBJECTIVE:     Patient Findings     Positives Change in diet/appetite, Activity level change, No Problem Findings    Comments Patient returned from [place] yesterday.           OBJECTIVE    INR Protime   Date Value Ref Range Status   03/29/2017 4.8 (A) 0.86 - 1.14 Final       ASSESSMENT / PLAN  INR assessment SUPRA    Recheck INR In: 2 WEEKS    INR Location Clinic      Anticoagulation Summary as of 3/29/2017     INR goal 2.0-3.0   Today's INR 4.8!   Maintenance plan 5 mg (5 mg x 1) on Thu; 2.5 mg (5 mg x 0.5) all other days   Full instructions 3/29: Hold; Otherwise 5 mg on Thu; 2.5 mg all other days   Weekly total 20 mg   Plan last modified Giovanna Reveles RN (1/31/2017)   Next INR check 4/12/2017   Priority INR   Target end date Indefinite    Indications   Long-term (current) use of anticoagulants [Z79.01] [Z79.01]  Chronic atrial fibrillation (H) [I48.2]         Anticoagulation Episode Summary     INR check location     Preferred lab     Send INR reminders to [facility] CLINIC    Comments       Anticoagulation Care Providers     Provider Role Specialty Phone number    Brett Babb MD [facility] Internal Medicine [phone]            See the Encounter Report to view Anticoagulation Flowsheet and Dosing Calendar (Go to Encounters tab in chart review, and find the Anticoagulation Therapy Visit)    Dosage adjustment made based on physician directed care plan.    Teri Burgess, BELIA

## 2017-03-29 NOTE — MR AVS SNAPSHOT
Guera T Peters   3/29/2017 10:30 AM   Anticoagulation Therapy Visit    Description:  81 year old female   Provider:  [facility]   Department:  [place] [facility]           INR as of 3/29/2017     Today's INR 4.8!      Anticoagulation Summary as of 3/29/2017     INR goal 2.0-3.0   Today's INR 4.8!   Full instructions 3/29: Hold; Otherwise 5 mg on Thu; 2.5 mg all other days   Next INR check 4/12/2017    Indications   Long-term (current) use of anticoagulants [Z79.01] [Z79.01]  Chronic atrial fibrillation (H) [I48.2]         Your next Anticoagulation Clinic appointment(s)     Mar 29, 2017 10:30 AM CDT   Anticoagulation Visit with [place]   [facility] ([address] [address] [phone]            Apr 12, 2017 10:45 AM CDT   Anticoagulation Visit with [place] ANTI [facility]   [facility] ([address] [address] [phone]              Contact Numbers     [facility]  Please call [phone] to cancel and/or reschedule your appointment   Please call [phone] with any problems or questions regarding your therapy.        March 2017 Details    Sun Mon Tue Wed Thu Fri Sat        1               2               3               4                 5               6               7               8               9               10               11                 12               13               14               15               16               17               18                 19               20               21               22               23               24               25                 26               27               28               29      Hold   See details      30      5 mg         31      2.5 mg           Date Details   03/29 This INR check               How to take your warfarin dose     To take:  2.5 mg Take 0.5 of a 5 mg tablet.    To take:  5 mg Take 1 of the 5 mg  tablets.    Hold Do not take your warfarin dose. See the Details table to the right for additional instructions.                April 2017 Details    Sun Mon Tue Wed Thu Fri Sat           1      2.5 mg           2      2.5 mg         3      2.5 mg         4      2.5 mg         5      2.5 mg         6      5 mg         7      2.5 mg         8      2.5 mg           9      2.5 mg         10      2.5 mg         11      2.5 mg         12            13               14               15                 16               17               18               19               20               21               22                 23               24               25               26               27               28               29                 30                      Date Details   No additional details    Date of next INR:  4/12/2017         How to take your warfarin dose     To take:  2.5 mg Take 0.5 of a 5 mg tablet.    To take:  5 mg Take 1 of the 5 mg tablets.

## 2017-03-29 NOTE — TELEPHONE ENCOUNTER
Patient was into see Dr. Mayo after her INR appointment today and was started on Cipro for a UTI. Verbal order per  was to give 2.5 mg of coumadin tomorrow instead of 5 mg as well as hold today's dose as discussed in INR visit. Called and notified patient of changes. Patient verbalized understanding and has no further questions or concerns.    Trei Burgess RN - BC

## 2017-03-29 NOTE — MR AVS SNAPSHOT
After Visit Summary   3/29/2017    Guera Peters    MRN: [id number]           Patient Information     Date Of Birth          1935        Visit Information        Provider Department      3/29/2017 10:40 AM Geraldo Mayo MD [facility]        Today's Diagnoses     Lower abdominal pain    -  1    Acute cystitis with hematuria          Care Instructions    [facility]    If you have any questions regarding to your visit please contact your care team:       Team Purple:   Clinic Hours Telephone Number   [address] Dr[address]   7am-7pm  Monday - Thursday   7am-5pm  Fridays  [phone]  (Appointment scheduling available 24/7)    Questions about your Visit?   Team Line:  [phone]   Urgent Care - [place] - 11am-9pm Monday-Friday Saturday-Sunday- 9am-5pm   [place] - 5pm-9pm Monday-Friday Saturday-Sunday- 9am-5pm  [phone] - [facility]  [phone] - [place]       What options do I have for visits at the clinic other than the traditional office visit?  To expand how we care for you, many of our providers are utilizing electronic visits (e-visits) and telephone visits, when medically appropriate, for interactions with their patients rather than a visit in the clinic.   We also offer nurse visits for many medical concerns. Just like any other service, we will bill your insurance company for this type of visit based on time spent on the phone with your provider. Not all insurance companies cover these visits. Please check with your medical insurance if this type of visit is covered. You will be responsible for any charges that are not paid by your insurance.      E-visits via GoFish:  generally incur a $35.00 fee.  Telephone visits:  Time spent on the phone: *charged based on time that is spent on the phone in increments of 10 minutes. Estimated cost:   5-10 mins $30.00    11-20 mins. $59.00   21-30 mins. $85.00     Use Game Insight (secure email communication and access to your chart) to send your primary care provider a message or make an appointment. Ask someone on your Team how to sign up for Game Insight.  For a Price Quote for your services, please call our Securus Price Line at [phone].  As always, Thank you for trusting us with your health care needs!    Discharged By: An          Follow-ups after your visit        Follow-up notes from your care team     Return in about 2 years (around 3/29/2019) for Consider repeat UA and consider ultrasound if still has symptomsd.      Your next 10 appointments already scheduled     Apr 12, 2017 10:45 AM CDT   Anticoagulation Visit with [facility]   [facility] [place] ([facility])    [address]  [place] [address] [phone]              Who to contact     If you have questions or need follow up information about today's clinic visit or your schedule please contact [facility] [place] directly at [phone].  Normal or non-critical lab and imaging results will be communicated to you by LAVEGOhart, letter or phone within 4 business days after the clinic has received the results. If you do not hear from us within 7 days, please contact the clinic through LAVEGOhart or phone. If you have a critical or abnormal lab result, we will notify you by phone as soon as possible.  Submit refill requests through Game Insight or call your pharmacy and they will forward the refill request to us. Please allow 3 business days for your refill to be completed.          Additional Information About Your Visit        LAVEGOhart Information     Game Insight gives you secure access to your electronic health record. If you see a primary care provider, you can also send messages to your care team and make appointments. If you have questions, please call your primary care clinic.  If you do not have a primary care provider, please call  "[phone] and they will assist you.        Care EveryWhere ID     This is your Care EveryWhere ID. This could be used by other organizations to access your [place] medical records  [id number]        Your Vitals Were     Pulse Temperature Height Pulse Oximetry BMI (Body Mass Index)       80 97.2  F (36.2  C) (Oral) 5' 4.57\" (1.64 m) 96% 30.94 kg/m2        Blood Pressure from Last 3 Encounters:   03/29/17 102/60   02/23/17 124/60   02/17/17 148/90    Weight from Last 3 Encounters:   03/29/17 183 lb 8 oz (83.2 kg)   02/14/17 182 lb (82.6 kg)   02/07/17 186 lb 3.2 oz (84.5 kg)              We Performed the Following     UA reflex to Microscopic and Culture     Urine Microscopic          Today's Medication Changes          These changes are accurate as of: 3/29/17 11:38 AM.  If you have any questions, ask your nurse or doctor.               Start taking these medicines.        Dose/Directions    ciprofloxacin 250 MG tablet   Commonly known as:  CIPRO   Used for:  Acute cystitis with hematuria   Started by:  Geraldo Mayo MD        Dose:  250 mg   Take 1 tablet (250 mg) by mouth 2 times daily   Quantity:  6 tablet   Refills:  0            Where to get your medicines      [facility] [address] AT [facility] [address]  [address] [place] [address], [place] [address]     Phone:  [phone]     ciprofloxacin 250 MG tablet                Primary Care Provider Office Phone # Fax #    Brett Babb [phone]       [facility] [address] [facility] [address]        Thank you!     Thank you for choosing [facility]  for your care. Our goal is always to provide you with excellent care. Hearing back from our patients is one way we can continue to improve our services. Please take a few minutes to complete the written survey that you may receive in the mail after your visit with us. " Thank you!             Your Updated Medication List - Protect others around you: Learn how to safely use, store and throw away your medicines at www.disposemymeds.org.          This list is accurate as of: 3/29/17 11:38 AM.  Always use your most recent med list.                   Brand Name Dispense Instructions for use    alendronate 70 MG tablet    FOSAMAX    12 tablet    Take 1 tablet (70 mg) by mouth every 7 days       aspirin EC 81 MG EC tablet      Take 81 mg by mouth       carvedilol 12.5 MG tablet    COREG    180 tablet    Take 1 tablet (12.5 mg) by mouth 2 times daily (with meals)       ciprofloxacin 250 MG tablet    CIPRO    6 tablet    Take 1 tablet (250 mg) by mouth 2 times daily       levothyroxine 100 MCG tablet    SYNTHROID/LEVOTHROID    90 tablet    TAKE 1 TABLET BY MOUTH DAILY       lisinopril 20 MG tablet    PRINIVIL/ZESTRIL    90 tablet    Take 1 tablet (20 mg) by mouth daily       nitroglycerin 0.4 MG sublingual tablet    NITROSTAT    25 tablet    Place 1 tablet (0.4 mg) under the tongue every 5 minutes as needed for chest pain       rivastigmine 3 MG capsule    EXELON         rosuvastatin 20 MG tablet    CRESTOR    90 tablet    Take 1 tablet (20 mg) by mouth At Bedtime       terbinafine 250 MG tablet    lamISIL    90 tablet    Take 1 tablet (250 mg) by mouth daily       warfarin 5 MG tablet    COUMADIN    90 tablet    Take 1 tablet (5 mg) by mouth See Admin Instructions

## 2017-04-03 ENCOUNTER — TELEPHONE (OUTPATIENT)
Dept: FAMILY MEDICINE | Facility: CLINIC | Age: 82
End: 2017-04-03

## 2017-04-03 DIAGNOSIS — N39.0 URINARY TRACT INFECTION: Primary | ICD-10-CM

## 2017-04-03 NOTE — TELEPHONE ENCOUNTER
Reason for Call:  Other call back    Detailed comments: Patient was seen last week and given cipro. She is not any better. Please call and advise.     Phone Number Patient can be reached at: Home number on file [phone] (home)    Best Time: any    Can we leave a detailed message on this number? YES    Call taken on 4/3/2017 at 12:23 PM by Alannah Clement

## 2017-04-03 NOTE — TELEPHONE ENCOUNTER
Patient notified of providers message as written. Patient verbalized understanding and has no further questions or concerns.    Teri Burgess RN - BC

## 2017-04-03 NOTE — TELEPHONE ENCOUNTER
We don't understand these irritative voiding symptoms and we need a repeat urine analysis and urine culture as soon as possible. This is to rule out recurrence of urinary tract infection or possible a resistant bacteria resistant to the antibiotics given , meanwhile she can use azo [ phenazopyridine ] over the counter nonprescription for symptoms [ after we get the urine analysis ]    Brett Babb MD

## 2017-04-04 DIAGNOSIS — N39.0 URINARY TRACT INFECTION: ICD-10-CM

## 2017-04-04 LAB
ALBUMIN UR-MCNC: 30 MG/DL
APPEARANCE UR: CLEAR
BILIRUB UR QL STRIP: ABNORMAL
COLOR UR AUTO: YELLOW
GLUCOSE UR STRIP-MCNC: NEGATIVE MG/DL
HGB UR QL STRIP: NEGATIVE
KETONES UR STRIP-MCNC: ABNORMAL MG/DL
LEUKOCYTE ESTERASE UR QL STRIP: ABNORMAL
NITRATE UR QL: NEGATIVE
NON-SQ EPI CELLS #/AREA URNS LPF: ABNORMAL /LPF
PH UR STRIP: 5.5 PH (ref 5–7)
RBC #/AREA URNS AUTO: ABNORMAL /HPF (ref 0–2)
SP GR UR STRIP: >1.03 (ref 1–1.03)
URN SPEC COLLECT METH UR: ABNORMAL
UROBILINOGEN UR STRIP-ACNC: 1 EU/DL (ref 0.2–1)
WBC #/AREA URNS AUTO: ABNORMAL /HPF (ref 0–2)

## 2017-04-04 PROCEDURE — 81001 URINALYSIS AUTO W/SCOPE: CPT | Performed by: INTERNAL MEDICINE

## 2017-04-06 ENCOUNTER — TELEPHONE (OUTPATIENT)
Dept: FAMILY MEDICINE | Facility: CLINIC | Age: 82
End: 2017-04-06

## 2017-04-06 NOTE — TELEPHONE ENCOUNTER
Per lab, Urine Macroscopic and Microscopic were completed  Patient's urine results did not qualify for a culture.  If Dr. Babb wanted this cultured, would need to recollect and order specifically a UC    Please advise    Michel Seymour RN

## 2017-04-06 NOTE — TELEPHONE ENCOUNTER
Patient notified of Provider's message as written.  Patient verbalized understanding.    She was already seen by Dr. Mayo for this issue and advised that imaging may be necessary if symptoms persist.  She stated it is ok today and she will just keep an eye on things.  She will call if symptoms persist or worsens      Michel Seymour RN

## 2017-04-06 NOTE — TELEPHONE ENCOUNTER
Reason for call: question  Patient called regarding (reason for call): Patient is stilling having pain in the lower stomach and is wondering if her results came back yet.  Additional comments: Please call patient to discuss what she should do    Phone Number Pt can be reached at: [phone]  Best Time:anytime  Can we leave a detailed message on this number? YES

## 2017-04-06 NOTE — TELEPHONE ENCOUNTER
The issue is not a urinary tract infection. We can reassure patient of that. If she's still sick or concerned about abdominal pain., an appointment is likely necessary. We can squeezed in for an urgent appointment tomorrow , possibly today ?      Brett Babb MD

## 2017-04-07 ENCOUNTER — OFFICE VISIT (OUTPATIENT)
Dept: FAMILY MEDICINE | Facility: CLINIC | Age: 82
End: 2017-04-07
Payer: MEDICARE

## 2017-04-07 VITALS
SYSTOLIC BLOOD PRESSURE: 122 MMHG | HEART RATE: 82 BPM | TEMPERATURE: 96.5 F | HEIGHT: 65 IN | BODY MASS INDEX: 30.19 KG/M2 | DIASTOLIC BLOOD PRESSURE: 76 MMHG | WEIGHT: 181.2 LBS | OXYGEN SATURATION: 98 %

## 2017-04-07 DIAGNOSIS — R10.84 ABDOMINAL PAIN, GENERALIZED: Primary | ICD-10-CM

## 2017-04-07 DIAGNOSIS — Z23 NEED FOR PROPHYLACTIC VACCINATION WITH TETANUS-DIPHTHERIA (TD): ICD-10-CM

## 2017-04-07 LAB
ALBUMIN SERPL-MCNC: 3.7 G/DL (ref 3.4–5)
ALP SERPL-CCNC: 91 U/L (ref 40–150)
ALT SERPL W P-5'-P-CCNC: 34 U/L (ref 0–50)
ANION GAP SERPL CALCULATED.3IONS-SCNC: 6 MMOL/L (ref 3–14)
AST SERPL W P-5'-P-CCNC: 27 U/L (ref 0–45)
BASOPHILS # BLD AUTO: 0 10E9/L (ref 0–0.2)
BASOPHILS NFR BLD AUTO: 0.5 %
BILIRUB SERPL-MCNC: 0.7 MG/DL (ref 0.2–1.3)
BUN SERPL-MCNC: 22 MG/DL (ref 7–30)
CALCIUM SERPL-MCNC: 9.4 MG/DL (ref 8.5–10.1)
CHLORIDE SERPL-SCNC: 105 MMOL/L (ref 94–109)
CO2 SERPL-SCNC: 30 MMOL/L (ref 20–32)
CREAT SERPL-MCNC: 0.8 MG/DL (ref 0.52–1.04)
CRP SERPL-MCNC: 3.9 MG/L (ref 0–8)
DIFFERENTIAL METHOD BLD: NORMAL
EOSINOPHIL # BLD AUTO: 0.1 10E9/L (ref 0–0.7)
EOSINOPHIL NFR BLD AUTO: 1.4 %
ERYTHROCYTE [DISTWIDTH] IN BLOOD BY AUTOMATED COUNT: 14 % (ref 10–15)
ERYTHROCYTE [SEDIMENTATION RATE] IN BLOOD BY WESTERGREN METHOD: 16 MM/H (ref 0–30)
GFR SERPL CREATININE-BSD FRML MDRD: 69 ML/MIN/1.7M2
GLUCOSE SERPL-MCNC: 111 MG/DL (ref 70–99)
HCT VFR BLD AUTO: 44.3 % (ref 35–47)
HGB BLD-MCNC: 14.1 G/DL (ref 11.7–15.7)
LYMPHOCYTES # BLD AUTO: 1.1 10E9/L (ref 0.8–5.3)
LYMPHOCYTES NFR BLD AUTO: 19.7 %
MCH RBC QN AUTO: 28.6 PG (ref 26.5–33)
MCHC RBC AUTO-ENTMCNC: 31.8 G/DL (ref 31.5–36.5)
MCV RBC AUTO: 90 FL (ref 78–100)
MONOCYTES # BLD AUTO: 0.7 10E9/L (ref 0–1.3)
MONOCYTES NFR BLD AUTO: 11.9 %
NEUTROPHILS # BLD AUTO: 3.9 10E9/L (ref 1.6–8.3)
NEUTROPHILS NFR BLD AUTO: 66.5 %
PLATELET # BLD AUTO: 194 10E9/L (ref 150–450)
POTASSIUM SERPL-SCNC: 4.2 MMOL/L (ref 3.4–5.3)
PROT SERPL-MCNC: 7.4 G/DL (ref 6.8–8.8)
RBC # BLD AUTO: 4.93 10E12/L (ref 3.8–5.2)
SODIUM SERPL-SCNC: 141 MMOL/L (ref 133–144)
WBC # BLD AUTO: 5.8 10E9/L (ref 4–11)

## 2017-04-07 PROCEDURE — 85652 RBC SED RATE AUTOMATED: CPT | Performed by: INTERNAL MEDICINE

## 2017-04-07 PROCEDURE — 90715 TDAP VACCINE 7 YRS/> IM: CPT | Performed by: INTERNAL MEDICINE

## 2017-04-07 PROCEDURE — 80053 COMPREHEN METABOLIC PANEL: CPT | Performed by: INTERNAL MEDICINE

## 2017-04-07 PROCEDURE — 36415 COLL VENOUS BLD VENIPUNCTURE: CPT | Performed by: INTERNAL MEDICINE

## 2017-04-07 PROCEDURE — 90471 IMMUNIZATION ADMIN: CPT | Performed by: INTERNAL MEDICINE

## 2017-04-07 PROCEDURE — 99213 OFFICE O/P EST LOW 20 MIN: CPT | Mod: 25 | Performed by: INTERNAL MEDICINE

## 2017-04-07 PROCEDURE — 85025 COMPLETE CBC W/AUTO DIFF WBC: CPT | Performed by: INTERNAL MEDICINE

## 2017-04-07 PROCEDURE — 86140 C-REACTIVE PROTEIN: CPT | Performed by: INTERNAL MEDICINE

## 2017-04-07 ASSESSMENT — PAIN SCALES - GENERAL: PAINLEVEL: NO PAIN (0)

## 2017-04-07 NOTE — NURSING NOTE
"Chief Complaint   Patient presents with     Gastrointestinal Problem       Initial /76  Pulse 82  Temp 96.5  F (35.8  C) (Oral)  Ht 5' 4.57\" (1.64 m)  Wt 181 lb 3.2 oz (82.2 kg)  SpO2 98%  BMI 30.56 kg/m2 Estimated body mass index is 30.56 kg/(m^2) as calculated from the following:    Height as of this encounter: 5' 4.57\" (1.64 m).    Weight as of this encounter: 181 lb 3.2 oz (82.2 kg).  Medication Reconciliation: complete       Shell Armstrong CMA      "

## 2017-04-07 NOTE — MR AVS SNAPSHOT
After Visit Summary   4/7/2017    Guera Peters    MRN: [id number]           Patient Information     Date Of Birth          1935        Visit Information        Provider Department      4/7/2017 12:10 PM Brett Babb MD [facility]        Today's Diagnoses     Abdominal pain, generalized    -  1    Need for prophylactic vaccination with tetanus-diphtheria (TD)          Care Instructions    Labs drawn today -- I will call you with results    Follow up in one week if symptoms persist    [facility]    If you have any questions regarding to your visit please contact your care team:     Team Pink:   Clinic Hours Telephone Number   Internal Medicine:  Dr. Yani Braga NP       7am-7pm  Monday - Thursday   7am-5pm  Fridays  [phone]  (Appointment scheduling available 24/7)    Questions about your visit?  Team Line  [phone]   Urgent Care - [place] and [place] - 11am-9pm Monday-Friday Saturday-Sunday- 9am-5pm   [place] - 5pm-9pm Monday-Friday Saturday-Sunday- 9am-5pm  [phone] - [place]   [phone] - [place]       What options do I have for visits at the clinic other than the traditional office visit?  To expand how we care for you, many of our providers are utilizing electronic visits (e-visits) and telephone visits, when medically appropriate, for interactions with their patients rather than a visit in the clinic.   We also offer nurse visits for many medical concerns. Just like any other service, we will bill your insurance company for this type of visit based on time spent on the phone with your provider. Not all insurance companies cover these visits. Please check with your medical insurance if this type of visit is covered. You will be responsible for any charges that are not paid by your insurance.      E-visits via [employer]:  generally incur a $35.00 fee.  Telephone visits:  Time spent  on the phone: *charged based on time that is spent on the phone in increments of 10 minutes. Estimated cost:   5-10 mins $30.00   11-20 mins. $59.00   21-30 mins. $85.00   Use Smartdatet (secure email communication and access to your chart) to send your primary care provider a message or make an appointment. Ask someone on your Team how to sign up for Federated Media.    For a Price Quote for your services, please call our GoMiles Line at [phone].    As always, Thank you for trusting us with your health care needs!    Discharged By:  SAADIA LOZOYA          Follow-ups after your visit        Your next 10 appointments already scheduled     Apr 12, 2017 10:45 AM CDT   Anticoagulation Visit with [facility] ANTI COAG   [facility] ([facility])    [address]  [place] [address] [phone]              Who to contact     If you have questions or need follow up information about today's clinic visit or your schedule please contact [facility] directly at [phone].  Normal or non-critical lab and imaging results will be communicated to you by MyChart, letter or phone within 4 business days after the clinic has received the results. If you do not hear from us within 7 days, please contact the clinic through Executive Caddiehart or phone. If you have a critical or abnormal lab result, we will notify you by phone as soon as possible.  Submit refill requests through Federated Media or call your pharmacy and they will forward the refill request to us. Please allow 3 business days for your refill to be completed.          Additional Information About Your Visit        Executive Caddiehart Information     Federated Media gives you secure access to your electronic health record. If you see a primary care provider, you can also send messages to your care team and make appointments. If you have questions, please call your primary care clinic.  If you do not have a primary care provider, please call [phone] and they will  "assist you.        Care EveryWhere ID     This is your Care EveryWhere ID. This could be used by other organizations to access your [place] medical records  [id number]        Your Vitals Were     Pulse Temperature Height Pulse Oximetry BMI (Body Mass Index)       82 96.5  F (35.8  C) (Oral) 5' 4.57\" (1.64 m) 98% 30.56 kg/m2        Blood Pressure from Last 3 Encounters:   04/07/17 122/76   03/29/17 102/60   02/23/17 124/60    Weight from Last 3 Encounters:   04/07/17 181 lb 3.2 oz (82.2 kg)   03/29/17 183 lb 8 oz (83.2 kg)   02/14/17 182 lb (82.6 kg)              We Performed the Following     CBC with platelets differential     Comprehensive metabolic panel     CRP inflammation     Erythrocyte sedimentation rate auto     TDAP VACCINE (ADACEL)        Primary Care Provider Office Phone # Fax #    Brett Babb [phone]       [address] [address]        Thank you!     Thank you for choosing [facility]  for your care. Our goal is always to provide you with excellent care. Hearing back from our patients is one way we can continue to improve our services. Please take a few minutes to complete the written survey that you may receive in the mail after your visit with us. Thank you!             Your Updated Medication List - Protect others around you: Learn how to safely use, store and throw away your medicines at www.disposemymeds.org.          This list is accurate as of: 4/7/17 12:30 PM.  Always use your most recent med list.                   Brand Name Dispense Instructions for use    alendronate 70 MG tablet    FOSAMAX    12 tablet    Take 1 tablet (70 mg) by mouth every 7 days       aspirin EC 81 MG EC tablet      Take 81 mg by mouth       carvedilol 12.5 MG tablet    COREG    180 tablet    Take 1 tablet (12.5 mg) by mouth 2 times daily (with meals)       ciprofloxacin 250 MG tablet    CIPRO    6 tablet    Take 1 tablet (250 mg) by mouth " 2 times daily       levothyroxine 100 MCG tablet    SYNTHROID/LEVOTHROID    90 tablet    TAKE 1 TABLET BY MOUTH DAILY       lisinopril 20 MG tablet    PRINIVIL/ZESTRIL    90 tablet    Take 1 tablet (20 mg) by mouth daily       nitroglycerin 0.4 MG sublingual tablet    NITROSTAT    25 tablet    Place 1 tablet (0.4 mg) under the tongue every 5 minutes as needed for chest pain       rivastigmine 3 MG capsule    EXELON         rosuvastatin 20 MG tablet    CRESTOR    90 tablet    Take 1 tablet (20 mg) by mouth At Bedtime       terbinafine 250 MG tablet    lamISIL    90 tablet    Take 1 tablet (250 mg) by mouth daily       warfarin 5 MG tablet    COUMADIN    90 tablet    Take 1 tablet (5 mg) by mouth See Admin Instructions

## 2017-04-07 NOTE — PATIENT INSTRUCTIONS
Labs drawn today -- I will call you with results    Follow up in one week if symptoms persist    [facility]    If you have any questions regarding to your visit please contact your care team:     Team Pink:   Clinic Hours Telephone Number   Internal Medicine:  Dr. Yani Braga, NP       7am-7pm  Monday - Thursday   7am-5pm  Fridays  [phone]  (Appointment scheduling available 24/7)    Questions about your visit?  Team Line  [phone]   [facility] [place] [facility] 11am-9pm Monday-Friday Saturday-Sunday- 9am-5pm   [place] - 5pm-9pm Monday-Friday Saturday-Sunday- 9am-5pm  [phone] - [place]   [phone] - [place]       What options do I have for visits at the clinic other than the traditional office visit?  To expand how we care for you, many of our providers are utilizing electronic visits (e-visits) and telephone visits, when medically appropriate, for interactions with their patients rather than a visit in the clinic.   We also offer nurse visits for many medical concerns. Just like any other service, we will bill your insurance company for this type of visit based on time spent on the phone with your provider. Not all insurance companies cover these visits. Please check with your medical insurance if this type of visit is covered. You will be responsible for any charges that are not paid by your insurance.      E-visits via Varsity [employer]ics:  generally incur a $35.00 fee.  Telephone visits:  Time spent on the phone: *charged based on time that is spent on the phone in increments of 10 minutes. Estimated cost:   5-10 mins $30.00   11-20 mins. $59.00   21-30 mins. $85.00   Use Scale Computingt (secure email communication and access to your chart) to send your primary care provider a message or make an appointment. Ask someone on your Team how to sign up for Varsity Optics.    For a Price Quote for your services, please call our Consumer Price  Line at [phone].    As always, Thank you for trusting us with your health care needs!    Discharged By:  SAADIA LOZOYA

## 2017-04-07 NOTE — PROGRESS NOTES
SUBJECTIVE:                                                    Guera Peters is a 81 year old female who presents to clinic today with her  for the following health issues:    Note 4/7/17 --     Abdominal Pain -- Guera reported that she is still experiencing stomach discomfort/nausea and reported some early satiety. Denied constipation/diarrhea, urinary issues, severe pain, heartburn, antacid use, changes in diet, bloating.     Note 3/29/17 ([facility]) --   Abdominal Pain       Duration: 3-4 weeks     Description (location/character/radiation): lower abdomen pain/fullness  Associated flank pain: None    Intensity: 3/10    Accompanying signs and symptoms:   Fever/Chills: no   Gas/Bloating: no   Nausea/vomitting: no   Diarrhea: no   Dysuria or Hematuria: no     History (previous similar pain/trauma/previous testing): none    Precipitating or alleviating factors:  Pain worse with eating/BM/urination: none  Pain relieved by BM: no     Therapies tried and outcome: None    LMP: not applicable      The pain has been going on for a while  Just got back from [place]; returned yesterday after a stay of about a month.  Lower abdomen; a constant there does not interfere with her activities. Does not feel good in the lower abdomen.  It does not feel right: no pain like something sitting there; has her uterus and ovaries and no abnormal bleeding,  Bladder and bowel function are normal.        Problem list and histories reviewed & adjusted, as indicated.  Additional history: as documented    Patient Active Problem List   Diagnosis     CKD (chronic kidney disease) stage 3, GFR 30-59 ml/min     Thrombocytopenia (H)     NSTEMI (non-ST elevated myocardial infarction) (H)     S/P mitral valve repair     Pacemaker     Chronic atrial fibrillation (H)     Essential hypertension with goal blood pressure less than 140/90     Hyperlipidemia LDL goal <100     History of TIA (transient ischemic attack) and stroke     Carotid  stenosis, bilateral     Acquired hypothyroidism      (normal spontaneous vaginal delivery)     Osteoporosis     Alzheimer's dementia without behavioral disturbance, unspecified timing of dementia onset     Microalbuminuria     Long-term (current) use of anticoagulants [Z79.01]     Dermatophytosis of nail     Permanent atrial fibrillation (H)     Past Surgical History:   Procedure Laterality Date     ANGIOPLASTY      right leg     APPENDECTOMY       C PARTIAL EXCISION THYROID,UNILAT       EMBOLIZATION OF ANGIOMYOLIPOMA  2010      PTCA SINGLE VESSEL  90     see Care Everywhere for cardiac medical records      PTCA SINGLE VESSEL  2006    PTCA/Taxus Stents of Proximal and Mid RCA [facility] PTCA SINGLE VESSEL  2011    Successful complex percutaneous coronary intervention of the LAD using  Promus drug-eluting stents      REMOVAL OF BREAST IMPLANT       IMPLANT PACEMAKER       REPAIR PATENT FORAMEN OVALE  2007    mitral valve repair and PFO closure (07)       REPAIR VALVE MITRAL  2007    mitral valve repair and PFO closure (07)         Social History   Substance Use Topics     Smoking status: Never Smoker     Smokeless tobacco: Not on file     Alcohol use Yes      Comment: occ, monthly      History reviewed. No pertinent family history.      Current Outpatient Prescriptions   Medication Sig Dispense Refill     ciprofloxacin (CIPRO) 250 MG tablet Take 1 tablet (250 mg) by mouth 2 times daily 6 tablet 0     warfarin (COUMADIN) 5 MG tablet Take 1 tablet (5 mg) by mouth See Admin Instructions 90 tablet 1     lisinopril (PRINIVIL/ZESTRIL) 20 MG tablet Take 1 tablet (20 mg) by mouth daily 90 tablet 1     terbinafine (LAMISIL) 250 MG tablet Take 1 tablet (250 mg) by mouth daily 90 tablet 0     rivastigmine (EXELON) 3 MG capsule   3     levothyroxine (SYNTHROID/LEVOTHROID) 100 MCG tablet TAKE 1 TABLET BY MOUTH DAILY 90 tablet 1     rosuvastatin (CRESTOR) 20 MG tablet Take 1 tablet (20  "mg) by mouth At Bedtime 90 tablet 1     aspirin EC 81 MG tablet Take 81 mg by mouth       carvedilol (COREG) 12.5 MG tablet Take 1 tablet (12.5 mg) by mouth 2 times daily (with meals) 180 tablet 1     nitroglycerin (NITROSTAT) 0.4 MG SL tablet Place 1 tablet (0.4 mg) under the tongue every 5 minutes as needed for chest pain 25 tablet 1     alendronate (FOSAMAX) 70 MG tablet Take 1 tablet (70 mg) by mouth every 7 days 12 tablet 1       Reviewed and updated as needed this visit by clinical staff  Tobacco  Allergies  Meds  Med Hx  Surg Hx  Fam Hx  Soc Hx      Reviewed and updated as needed this visit by Provider         ROS:  Constitutional, HEENT, cardiovascular, pulmonary, GI, , musculoskeletal, neuro, skin, endocrine and psych systems are negative, except as otherwise noted.    This document serves as a record of the services and decisions personally performed and made by Brett Babb MD. It was created on their behalf by Afshin Maradiaga, a trained medical scribe. The creation of this document is based the provider's statements to the medical scribe.  Afshin Maradiaga April 7, 2017 12:16 PM      OBJECTIVE:                                                    /76  Pulse 82  Temp 96.5  F (35.8  C) (Oral)  Ht 1.64 m (5' 4.57\")  Wt 82.2 kg (181 lb 3.2 oz)  SpO2 98%  BMI 30.56 kg/m2  Body mass index is 30.56 kg/(m^2).  GENERAL: healthy, alert and no distress, obese  EYES: Eyes grossly normal to inspection, PERRL and conjunctivae and sclerae normal  ABDOMEN: soft, nontender, negative Kent's sign no hepatosplenomegaly, no masses and bowel sounds normal  SKIN: diffuse strawberry hemangiomas to abdomen  NEURO: mentation at baseline and speech normal  PSYCH: mentation appears at baseline, affect normal/bright    Diagnostic Test Results:  Results for orders placed or performed in visit on 04/04/17   *UA reflex to Microscopic and Culture ([facility] (except [place] and [place])   Result Value Ref " Range    Color Urine Yellow     Appearance Urine Clear     Glucose Urine Negative NEG mg/dL    Bilirubin Urine (A) NEG     Moderate  This is an unconfirmed screening test result. A positive result may be false.      Ketones Urine Trace (A) NEG mg/dL    Specific Gravity Urine >1.030 1.003 - 1.035    Blood Urine Negative NEG    pH Urine 5.5 5.0 - 7.0 pH    Protein Albumin Urine 30 (A) NEG mg/dL    Urobilinogen Urine 1.0 0.2 - 1.0 EU/dL    Nitrite Urine Negative NEG    Leukocyte Esterase Urine Trace (A) NEG    Source Midstream Urine    Urine Microscopic   Result Value Ref Range    WBC Urine 5-10 (A) 0 - 2 /HPF    RBC Urine 2-5 (A) 0 - 2 /HPF    Squamous Epithelial /LPF Urine Moderate (A) FEW /LPF        ASSESSMENT/PLAN:                                                      (R10.84) Abdominal pain, generalized  (primary encounter diagnosis)  Comment: her history is entirely benign and there's a totally benign abdominal exam, this patient has a dense Alzhemiers dementia and her history is difficult .. I spoke largely with  . We agree to use laboratory studies as our first step if everything turns out to be completely normal, I think we can assume a posture of observation . We discussed what to be on the watch for, update me if significant changes have taken place . Recheck in one month if still has complaints of abdominal pain   Plan: Erythrocyte sedimentation rate auto, CRP         inflammation, CBC with platelets differential,         Comprehensive metabolic panel            (Z23) Need for prophylactic vaccination with tetanus-diphtheria (TD)  Comment: an unrelated matter   Plan: TDAP VACCINE (ADACEL)              Patient Instructions   Labs drawn today -- I will call you with results    Follow up in one week if symptoms persist      The information in this document, created by the medical scribe for me, accurately reflects the services I personally performed and the decisions made by me. I have reviewed and  approved this document for accuracy.   MD Brett Galeano MD  [facility]

## 2017-04-07 NOTE — NURSING NOTE
Screening Questionnaire for Adult Immunization    Are you sick today?   No   Do you have allergies to medications, food, a vaccine component or latex?   No   Have you ever had a serious reaction after receiving a vaccination?   No   Do you have a long-term health problem with heart disease, lung disease, asthma, kidney disease, metabolic disease (e.g. diabetes), anemia, or other blood disorder?   Asthma   Do you have cancer, leukemia, HIV/AIDS, or any other immune system problem?   No   In the past 3 months, have you taken medications that affect  your immune system, such as prednisone, other steroids, or anticancer drugs; drugs for the treatment of rheumatoid arthritis, Crohn s disease, or psoriasis; or have you had radiation treatments?   No   Have you had a seizure, or a brain or other nervous system problem?   No   During the past year, have you received a transfusion of blood or blood     products, or been given immune (gamma) globulin or antiviral drug?   No   For women: Are you pregnant or is there a chance you could become        pregnant during the next month?   No   Have you received any vaccinations in the past 4 weeks?   No     Immunization questionnaire answers were all negative.      MNVFC doesn't apply on this patient    Per orders of Dr. Babb, injection of Tdap given by Shanel Medina. Patient instructed to remain in clinic for 20 minutes afterwards, and to report any adverse reaction to me immediately.       Screening performed by Shanel Medina on 4/7/2017 at 12:39 PM.

## 2017-04-12 ENCOUNTER — TELEPHONE (OUTPATIENT)
Dept: NURSING | Facility: CLINIC | Age: 82
End: 2017-04-12

## 2017-04-12 ENCOUNTER — ANTICOAGULATION THERAPY VISIT (OUTPATIENT)
Dept: NURSING | Facility: CLINIC | Age: 82
End: 2017-04-12
Payer: MEDICARE

## 2017-04-12 DIAGNOSIS — I48.20 CHRONIC ATRIAL FIBRILLATION (H): ICD-10-CM

## 2017-04-12 DIAGNOSIS — Z79.01 LONG-TERM (CURRENT) USE OF ANTICOAGULANTS: ICD-10-CM

## 2017-04-12 DIAGNOSIS — Z86.73 HISTORY OF TIA (TRANSIENT ISCHEMIC ATTACK) AND STROKE: Primary | ICD-10-CM

## 2017-04-12 LAB — INR POINT OF CARE: 2.6 (ref 0.86–1.14)

## 2017-04-12 PROCEDURE — 85610 PROTHROMBIN TIME: CPT | Mod: QW

## 2017-04-12 PROCEDURE — 99207 ZZC NO CHARGE NURSE ONLY: CPT

## 2017-04-12 PROCEDURE — 36416 COLLJ CAPILLARY BLOOD SPEC: CPT

## 2017-04-12 RX ORDER — WARFARIN SODIUM 2.5 MG/1
2.5 TABLET ORAL DAILY
Qty: 90 TABLET | Refills: 1 | Status: SHIPPED | OUTPATIENT
Start: 2017-04-12 | End: 2017-11-16

## 2017-04-12 NOTE — TELEPHONE ENCOUNTER
Warfarin  Prescription approved per [facility] Refill Protocol.  Halie Begum RN

## 2017-04-12 NOTE — PROGRESS NOTES
ANTICOAGULATION FOLLOW-UP CLINIC VISIT    Patient Name:  Guera Peters  Date:  4/12/2017  Contact Type:  Face to Face    SUBJECTIVE:     Patient Findings     Positives No Problem Findings           OBJECTIVE    INR Protime   Date Value Ref Range Status   03/29/2017 4.8 (A) 0.86 - 1.14 Final       ASSESSMENT / PLAN  INR assessment THER    Recheck INR In: 2 WEEKS    INR Location Clinic      Anticoagulation Summary as of 4/12/2017     INR goal 2.0-3.0   Today's INR    Maintenance plan 2.5 mg (5 mg x 0.5) every day   Full instructions 2.5 mg every day   Weekly total 17.5 mg   Plan last modified Halie Begum RN (4/12/2017)   Next INR check 4/26/2017   Priority INR   Target end date Indefinite    Indications   Long-term (current) use of anticoagulants [Z79.01] [Z79.01]  Chronic atrial fibrillation (H) [I48.2]         Anticoagulation Episode Summary     INR check location     Preferred lab     Send INR reminders to [facility]    Comments       Anticoagulation Care Providers     Provider Role Specialty Phone number    Brett Babb MD [facility] Internal Medicine [phone]            See the Encounter Report to view Anticoagulation Flowsheet and Dosing Calendar (Go to Encounters tab in chart review, and find the Anticoagulation Therapy Visit)    Dosage adjustment made based on physician directed care plan. Reviewed both bleeding and clotting signs and symptoms with patient this visit. Pt. has no further questions or concerns.  Will call with any changes to diet, medications, or missed doses at INR line [phone].      Halie Begum RN

## 2017-04-12 NOTE — MR AVS SNAPSHOT
Guera Peters   4/12/2017 10:45 AM   Anticoagulation Therapy Visit    Description:  81 year old female   Provider:  [facility] ANTI COAG   Department:  [place] [facility]           INR as of 4/12/2017     Today's INR       Anticoagulation Summary as of 4/12/2017     INR goal 2.0-3.0   Today's INR    Full instructions 2.5 mg every day   Next INR check 4/26/2017    Indications   Long-term (current) use of anticoagulants [Z79.01] [Z79.01]  Chronic atrial fibrillation (H) [I48.2]         Your next Anticoagulation Clinic appointment(s)     Apr 26, 2017 10:30 AM CDT   Anticoagulation Visit with [facility] ANTI MELO   [facility] ([facility] [place] [facility]    [address] [address] [phone]              Contact Numbers     [facility]  Please call [phone] to cancel and/or reschedule your appointment   Please call [phone] with any problems or questions regarding your therapy.        April 2017 Details    Sun Mon Tue Wed Thu Fri Sat           1                 2               3               4               5               6               7               8                 9               10               11               12      2.5 mg   See details      13      2.5 mg         14      2.5 mg         15      2.5 mg           16      2.5 mg         17      2.5 mg         18      2.5 mg         19      2.5 mg         20      2.5 mg         21      2.5 mg         22      2.5 mg           23      2.5 mg         24      2.5 mg         25      2.5 mg         26            27               28               29                 30                      Date Details   04/12 This INR check       Date of next INR:  4/26/2017         How to take your warfarin dose     To take:  2.5 mg Take 0.5 of a 5 mg tablet.

## 2017-04-17 ENCOUNTER — TELEPHONE (OUTPATIENT)
Dept: FAMILY MEDICINE | Facility: CLINIC | Age: 82
End: 2017-04-17

## 2017-04-17 NOTE — TELEPHONE ENCOUNTER
"Per Sienna, she is not sure what patient is referring to.  She stated that patient wrote down a \"track infraction\"  Asked Sienna if patient was referring to a urinary track infection which she was tested and treated for.  Sienna is not sure.  She stated that patient has still been complaining of abd discomfort, not a pain, but patient does not know how to describe her symptoms.  Sienna stated that patient just does not feel normal like herself.    Advise that patient come in for another assessment as her labs came back normal and Dr. Babb recommended a f/u in 1 week if patient still had concerns.  Furthermore, the last OV was focused on abd \"pain\" which Sienna stated patient is denying any pain, \"it is not a pain, she said she just doesn't feel right.\"    Appointment made for 4/24/17    Michel Seymour RN    "

## 2017-04-17 NOTE — TELEPHONE ENCOUNTER
Reason for Call:  Other call back    Detailed comments: Sienna called to discuss the last office visit as the patient mentioned to her of an Track infraction / Sienna would like clarification on what that means    Phone Number Patient can be reached at: Other phone number:  [phone]    Best Time: today    Can we leave a detailed message on this number? YES    Call taken on 4/17/2017 at 9:43 AM by Derrick Polk

## 2017-04-24 ENCOUNTER — ANTICOAGULATION THERAPY VISIT (OUTPATIENT)
Dept: NURSING | Facility: CLINIC | Age: 82
End: 2017-04-24
Payer: MEDICARE

## 2017-04-24 ENCOUNTER — OFFICE VISIT (OUTPATIENT)
Dept: FAMILY MEDICINE | Facility: CLINIC | Age: 82
End: 2017-04-24
Payer: MEDICARE

## 2017-04-24 VITALS
BODY MASS INDEX: 30.62 KG/M2 | WEIGHT: 181.6 LBS | OXYGEN SATURATION: 98 % | TEMPERATURE: 97 F | HEART RATE: 88 BPM | SYSTOLIC BLOOD PRESSURE: 150 MMHG | DIASTOLIC BLOOD PRESSURE: 78 MMHG

## 2017-04-24 DIAGNOSIS — R10.32 LEFT LOWER QUADRANT PAIN: Primary | ICD-10-CM

## 2017-04-24 DIAGNOSIS — I48.20 CHRONIC ATRIAL FIBRILLATION (H): ICD-10-CM

## 2017-04-24 DIAGNOSIS — Z79.01 LONG-TERM (CURRENT) USE OF ANTICOAGULANTS: ICD-10-CM

## 2017-04-24 LAB — INR POINT OF CARE: 3.2 (ref 0.86–1.14)

## 2017-04-24 PROCEDURE — 36416 COLLJ CAPILLARY BLOOD SPEC: CPT

## 2017-04-24 PROCEDURE — 85610 PROTHROMBIN TIME: CPT | Mod: QW

## 2017-04-24 PROCEDURE — 99207 ZZC NO CHARGE NURSE ONLY: CPT

## 2017-04-24 PROCEDURE — 99214 OFFICE O/P EST MOD 30 MIN: CPT | Performed by: INTERNAL MEDICINE

## 2017-04-24 NOTE — PATIENT INSTRUCTIONS
- I will follow up with you with CT results.  [facility]    If you have any questions regarding to your visit please contact your care team:     Team Pink:   Clinic Hours Telephone Number   Internal Medicine:  Dr. Yani Braga, NP       7am-7pm  Monday - Thursday   7am-5pm  Fridays  [phone]  (Appointment scheduling available 24/7)    Questions about your visit?  Team Line  [phone]   Urgent Care - [place] and [facility] - 11am-9pm Monday-Friday Saturday-Sunday- 9am-5pm   [place] - 5pm-9pm Monday-Friday Saturday-Sunday- 9am-5pm  [phone] - [place]   [phone] - [place]       What options do I have for visits at the clinic other than the traditional office visit?  To expand how we care for you, many of our providers are utilizing electronic visits (e-visits) and telephone visits, when medically appropriate, for interactions with their patients rather than a visit in the clinic.   We also offer nurse visits for many medical concerns. Just like any other service, we will bill your insurance company for this type of visit based on time spent on the phone with your provider. Not all insurance companies cover these visits. Please check with your medical insurance if this type of visit is covered. You will be responsible for any charges that are not paid by your insurance.      E-visits via [employer]:  generally incur a $35.00 fee.  Telephone visits:  Time spent on the phone: *charged based on time that is spent on the phone in increments of 10 minutes. Estimated cost:   5-10 mins $30.00   11-20 mins. $59.00   21-30 mins. $85.00   Use [employer] (secure email communication and access to your chart) to send your primary care provider a message or make an appointment. Ask someone on your Team how to sign up for [employer].    For a Price Quote for your services, please call our Consumer Price Line at [phone].    As always, Thank you for  trusting us with your health care needs!    PARVEEN/MA

## 2017-04-24 NOTE — MR AVS SNAPSHOT
After Visit Summary   4/24/2017    Guera Peters    MRN: [id number]           Patient Information     Date Of Birth          1935        Visit Information        Provider Department      4/24/2017 10:50 AM Brett Babb MD [facility]        Today's Diagnoses     Left lower quadrant pain    -  1      Care Instructions    - I will follow up with you with CT results.  [facility]    If you have any questions regarding to your visit please contact your care team:     Team Pink:   Clinic Hours Telephone Number   Internal Medicine:  Dr. Yani Braga NP       7am-7pm  Monday - Thursday   7am-5pm  Fridays  [phone]  (Appointment scheduling available 24/7)    Questions about your visit?  Team Line  [phone]   Urgent Care - [place] and [facility] - 11am-9pm Monday-Friday Saturday-Sunday- 9am-5pm   [place] - 5pm-9pm Monday-Friday Saturday-Sunday- 9am-5pm  [phone] - [facility]  [phone] - [place]       What options do I have for visits at the clinic other than the traditional office visit?  To expand how we care for you, many of our providers are utilizing electronic visits (e-visits) and telephone visits, when medically appropriate, for interactions with their patients rather than a visit in the clinic.   We also offer nurse visits for many medical concerns. Just like any other service, we will bill your insurance company for this type of visit based on time spent on the phone with your provider. Not all insurance companies cover these visits. Please check with your medical insurance if this type of visit is covered. You will be responsible for any charges that are not paid by your insurance.      E-visits via [employer] Network:  generally incur a $35.00 fee.  Telephone visits:  Time spent on the phone: *charged based on time that is spent on the phone in increments of 10 minutes. Estimated cost:   5-10 mins  $30.00   11-20 mins. $59.00   21-30 mins. $85.00   Use [employer] (secure email communication and access to your chart) to send your primary care provider a message or make an appointment. Ask someone on your Team how to sign up for Jamn.    For a Price Quote for your services, please call our [employer] Price Line at [phone].    As always, Thank you for trusting us with your health care needs!    PARVEEN/MA          Follow-ups after your visit        Your next 10 appointments already scheduled     May 22, 2017 10:15 AM CDT   Anticoagulation Visit with ME[facility] ANTI COAG   [facility] ([facility])    [address] [address] [phone]              Future tests that were ordered for you today     Open Future Orders        Priority Expected Expires Ordered    CT Abdomen Pelvis w Contrast Routine  4/24/2018 4/24/2017            Who to contact     If you have questions or need follow up information about today's clinic visit or your schedule please contact [facility] directly at [phone].  Normal or non-critical lab and imaging results will be communicated to you by [employer], letter or phone within 4 business days after the clinic has received the results. If you do not hear from us within 7 days, please contact the clinic through [employer] or phone. If you have a critical or abnormal lab result, we will notify you by phone as soon as possible.  Submit refill requests through Jamn or call your pharmacy and they will forward the refill request to us. Please allow 3 business days for your refill to be completed.          Additional Information About Your Visit        [employer] Information     Jamn gives you secure access to your electronic health record. If you see a primary care provider, you can also send messages to your care team and make appointments. If you have questions, please call your primary care clinic.  If you do not have a primary care  provider, please call [phone] and they will assist you.        Care EveryWhere ID     This is your Care EveryWhere ID. This could be used by other organizations to access your [facility] medical records  [id number]        Your Vitals Were     Pulse Temperature Pulse Oximetry BMI (Body Mass Index)          88 97  F (36.1  C) (Oral) 98% 30.62 kg/m2         Blood Pressure from Last 3 Encounters:   04/24/17 150/78   04/07/17 122/76   03/29/17 102/60    Weight from Last 3 Encounters:   04/24/17 181 lb 9.6 oz (82.4 kg)   04/07/17 181 lb 3.2 oz (82.2 kg)   03/29/17 183 lb 8 oz (83.2 kg)               Primary Care Provider Office Phone # Fax #    Brett Babb [phone]       [address] [address]        Thank you!     Thank you for choosing [facility]  for your care. Our goal is always to provide you with excellent care. Hearing back from our patients is one way we can continue to improve our services. Please take a few minutes to complete the written survey that you may receive in the mail after your visit with us. Thank you!             Your Updated Medication List - Protect others around you: Learn how to safely use, store and throw away your medicines at www.disposemymeds.org.          This list is accurate as of: 4/24/17 11:13 AM.  Always use your most recent med list.                   Brand Name Dispense Instructions for use    alendronate 70 MG tablet    FOSAMAX    12 tablet    Take 1 tablet (70 mg) by mouth every 7 days       aspirin EC 81 MG EC tablet      Take 81 mg by mouth       carvedilol 12.5 MG tablet    COREG    180 tablet    Take 1 tablet (12.5 mg) by mouth 2 times daily (with meals)       levothyroxine 100 MCG tablet    SYNTHROID/LEVOTHROID    90 tablet    TAKE 1 TABLET BY MOUTH DAILY       lisinopril 20 MG tablet    PRINIVIL/ZESTRIL    90 tablet    Take 1 tablet (20 mg) by mouth daily       nitroglycerin 0.4 MG sublingual  tablet    NITROSTAT    25 tablet    Place 1 tablet (0.4 mg) under the tongue every 5 minutes as needed for chest pain       rivastigmine 3 MG capsule    EXELON         rosuvastatin 20 MG tablet    CRESTOR    90 tablet    Take 1 tablet (20 mg) by mouth At Bedtime       terbinafine 250 MG tablet    lamISIL    90 tablet    Take 1 tablet (250 mg) by mouth daily       * warfarin 5 MG tablet    COUMADIN    90 tablet    Take 1 tablet (5 mg) by mouth See Admin Instructions       * warfarin 2.5 MG tablet    COUMADIN    90 tablet    Take 1 tablet (2.5 mg) by mouth daily       * Notice:  This list has 2 medication(s) that are the same as other medications prescribed for you. Read the directions carefully, and ask your doctor or other care provider to review them with you.

## 2017-04-24 NOTE — MR AVS SNAPSHOT
Guera T Peters   4/24/2017 10:15 AM   Anticoagulation Therapy Visit    Description:  81 year old female   Provider:  [facility]   Department:  [place] [facility]           INR as of 4/24/2017     Today's INR 3.2!      Anticoagulation Summary as of 4/24/2017     INR goal 2.0-3.0   Today's INR 3.2!   Full instructions 2.5 mg every day   Next INR check 5/22/2017    Indications   Long-term (current) use of anticoagulants [Z79.01] [Z79.01]  Chronic atrial fibrillation (H) [I48.2]         Your next Anticoagulation Clinic appointment(s)     May 22, 2017 10:15 AM CDT   Anticoagulation Visit with [place]   [facility] ([facility]    [address] [address] [phone]              Contact Numbers     [facility]  Please call [phone] to cancel and/or reschedule your appointment   Please call [phone] with any problems or questions regarding your therapy.        April 2017 Details    Sun Mon Tue Wed Thu Fri Sat           1                 2               3               4               5               6               7               8                 9               10               11               12               13               14               15                 16               17               18               19               20               21               22                 23               24      2.5 mg   See details      25      2.5 mg         26      2.5 mg         27      2.5 mg         28      2.5 mg         29      2.5 mg           30      2.5 mg                Date Details   04/24 This INR check               How to take your warfarin dose     To take:  2.5 mg Take 0.5 of a 5 mg tablet.           May 2017 Details    Sun Mon Tue Wed Thu Fri Sat      1      2.5 mg         2      2.5 mg         3      2.5 mg         4      2.5 mg         5      2.5 mg         6      2.5 mg           7      2.5 mg         8      2.5 mg         9      2.5 mg          10      2.5 mg         11      2.5 mg         12      2.5 mg         13      2.5 mg           14      2.5 mg         15      2.5 mg         16      2.5 mg         17      2.5 mg         18      2.5 mg         19      2.5 mg         20      2.5 mg           21      2.5 mg         22            23               24               25               26               27                 28               29               30               31                   Date Details   No additional details    Date of next INR:  5/22/2017         How to take your warfarin dose     To take:  2.5 mg Take 0.5 of a 5 mg tablet.

## 2017-04-24 NOTE — NURSING NOTE
"Chief Complaint   Patient presents with     RECHECK     Abdominal discomfort, dull ache, getting better       Initial /78 (BP Location: Left arm, Patient Position: Chair, Cuff Size: Adult Regular)  Pulse 88  Temp 97  F (36.1  C) (Oral)  Wt 181 lb 9.6 oz (82.4 kg)  SpO2 98%  BMI 30.62 kg/m2 Estimated body mass index is 30.62 kg/(m^2) as calculated from the following:    Height as of 4/7/17: 5' 4.57\" (1.64 m).    Weight as of this encounter: 181 lb 9.6 oz (82.4 kg).  Medication Reconciliation: complete   Raven EVERETT CMA ([facility])      "

## 2017-04-24 NOTE — PROGRESS NOTES
"  SUBJECTIVE:                                                    Guera Peters is a 81 year old female who presents to clinic today for the following health issues:     Note 4/24/17 --  Patient presents with:  RECHECK: Abdominal discomfort, dull ache, getting better    Abdominal Pain -- Patient states she still has LLQ abdominal pain. She says it is not \"hurtful but it is annoying\". She notes she sometimes has to lay down during the day because of it. She has never taken any medication for it. She denies vaginal pain, hematuria, and constipation.    Note 4/7/17 --   Guera Peters is a 81 year old female who presents to clinic today with her  for the following health issues:    Abdominal Pain -- Guera reported that she is still experiencing stomach discomfort/nausea and reported some early satiety. Denied constipation/diarrhea, urinary issues, severe pain, heartburn, antacid use, changes in diet, bloating.      Note 3/29/17 ([facility]) --   Abdominal Pain        Duration: 3-4 weeks     Description (location/character/radiation): lower abdomen pain/fullness  Associated flank pain: None    Intensity: 3/10    Accompanying signs and symptoms:   Fever/Chills: no   Gas/Bloating: no   Nausea/vomitting: no   Diarrhea: no   Dysuria or Hematuria: no     History (previous similar pain/trauma/previous testing): none    Precipitating or alleviating factors:  Pain worse with eating/BM/urination: none  Pain relieved by BM: no     Therapies tried and outcome: None    LMP: not applicable       The pain has been going on for a while  Just got back from [place]; returned yesterday after a stay of about a month.  Lower abdomen; a constant there does not interfere with her activities. Does not feel good in the lower abdomen.  It does not feel right: no pain like something sitting there; has her uterus and ovaries and no abnormal bleeding,  Bladder and bowel function are normal.      Problem list and histories reviewed & " adjusted, as indicated.  Additional history: as documented    Patient Active Problem List   Diagnosis     CKD (chronic kidney disease) stage 3, GFR 30-59 ml/min     Thrombocytopenia (H)     NSTEMI (non-ST elevated myocardial infarction) (H)     S/P mitral valve repair     Pacemaker     Chronic atrial fibrillation (H)     Essential hypertension with goal blood pressure less than 140/90     Hyperlipidemia LDL goal <100     History of TIA (transient ischemic attack) and stroke     Carotid stenosis, bilateral     Acquired hypothyroidism      (normal spontaneous vaginal delivery)     Osteoporosis     Alzheimer's dementia without behavioral disturbance, unspecified timing of dementia onset     Microalbuminuria     Long-term (current) use of anticoagulants [Z79.01]     Dermatophytosis of nail     Permanent atrial fibrillation (H)     Past Surgical History:   Procedure Laterality Date     ANGIOPLASTY      right leg     APPENDECTOMY       C PARTIAL EXCISION THYROID,UNILAT       EMBOLIZATION OF ANGIOMYOLIPOMA  2010      PTCA SINGLE VESSEL  90     see Care Everywhere for cardiac medical records      PTCA SINGLE VESSEL  2006    PTCA/Taxus Stents of Proximal and Mid RCA [facility] PTCA SINGLE VESSEL  2011    Successful complex percutaneous coronary intervention of the LAD using  Promus drug-eluting stents      REMOVAL OF BREAST IMPLANT       IMPLANT PACEMAKER       REPAIR PATENT FORAMEN OVALE  2007    mitral valve repair and PFO closure (07)       REPAIR VALVE MITRAL  2007    mitral valve repair and PFO closure (07)         Social History   Substance Use Topics     Smoking status: Never Smoker     Smokeless tobacco: Not on file     Alcohol use Yes      Comment: occ, monthly      History reviewed. No pertinent family history.      Current Outpatient Prescriptions   Medication Sig Dispense Refill     warfarin (COUMADIN) 2.5 MG tablet Take 1 tablet (2.5 mg) by mouth daily 90 tablet 1      warfarin (COUMADIN) 5 MG tablet Take 1 tablet (5 mg) by mouth See Admin Instructions 90 tablet 1     lisinopril (PRINIVIL/ZESTRIL) 20 MG tablet Take 1 tablet (20 mg) by mouth daily 90 tablet 1     terbinafine (LAMISIL) 250 MG tablet Take 1 tablet (250 mg) by mouth daily 90 tablet 0     rivastigmine (EXELON) 3 MG capsule   3     levothyroxine (SYNTHROID/LEVOTHROID) 100 MCG tablet TAKE 1 TABLET BY MOUTH DAILY 90 tablet 1     rosuvastatin (CRESTOR) 20 MG tablet Take 1 tablet (20 mg) by mouth At Bedtime 90 tablet 1     aspirin EC 81 MG tablet Take 81 mg by mouth       carvedilol (COREG) 12.5 MG tablet Take 1 tablet (12.5 mg) by mouth 2 times daily (with meals) 180 tablet 1     nitroglycerin (NITROSTAT) 0.4 MG SL tablet Place 1 tablet (0.4 mg) under the tongue every 5 minutes as needed for chest pain 25 tablet 1     alendronate (FOSAMAX) 70 MG tablet Take 1 tablet (70 mg) by mouth every 7 days 12 tablet 1     Labs reviewed in EPIC    Reviewed and updated as needed this visit by clinical staff  Tobacco  Allergies  Meds  Med Hx  Surg Hx  Fam Hx  Soc Hx      Reviewed and updated as needed this visit by Provider            ROS:  Constitutional, HEENT, cardiovascular, pulmonary, GI, , musculoskeletal, neuro, skin, endocrine and psych systems are negative, except as otherwise noted.    This document serves as a record of the services and decisions personally performed and made by Brett Babb MD. It was created on their behalf by Nico Gonzalez, a trained medical scribe. The creation of this document is based the provider's statements to the medical scribe.  Nico Gonzalez April 24, 2017 11:04 AM    OBJECTIVE:                                                    /78 (BP Location: Left arm, Patient Position: Chair, Cuff Size: Adult Regular)  Pulse 88  Temp 97  F (36.1  C) (Oral)  Wt 82.4 kg (181 lb 9.6 oz)  SpO2 98%  BMI 30.62 kg/m2  Body mass index is 30.62 kg/(m^2).  GENERAL: healthy, alert and no  distress, obese  EYES: Eyes grossly normal to inspection, PERRL and conjunctivae and sclerae normal  ABDOMEN: soft, nontender, no hepatosplenomegaly, no masses and bowel sounds normal, no peritoneal signs  SKIN: no suspicious lesions or rashes to visible skin  NEURO: mentation intact and speech normal  PSYCH: mentation appears normal, affect normal/bright    Diagnostic Test Results:  Results for orders placed or performed in visit on 04/24/17   INR point of care   Result Value Ref Range    INR Protime 3.2 (A) 0.86 - 1.14        ASSESSMENT/PLAN:                                                      (R10.32) Left lower quadrant pain  (primary encounter diagnosis)  Comment: this patient has a completely benign laboratory workup and a completely benign exam and history. Nevertheless she is absolutely persistent that something is wrong with her abdomen. Given that she already has the impairment of dementia , I am impressed with the consistency of her history. We need a abdominal CT scan to exclude any pathological process at this point   Plan: CT Abdomen Pelvis w Contrast        And further follow up depending on the test results       Patient Instructions     - I will follow up with you with CT results.  [facility]    If you have any questions regarding to your visit please contact your care team:     Team Pink:   Clinic Hours Telephone Number   Internal Medicine:  Dr. Yani Braga, NP       7am-7pm  Monday - Thursday   7am-5pm  Fridays  [phone]  (Appointment scheduling available 24/7)    Questions about your visit?  Team Line  [phone]   [place]gent [facility] - [place] - 11am-9pm Monday-Friday Saturday-Sunday- 9am-5pm   [place] - 5pm-9pm Monday-Friday Saturday-Sunday- 9am-5pm  [phone] - [place]   [phone] - [place]       What options do I have for visits at the clinic other than the traditional office visit?  To expand how  we care for you, many of our providers are utilizing electronic visits (e-visits) and telephone visits, when medically appropriate, for interactions with their patients rather than a visit in the clinic.   We also offer nurse visits for many medical concerns. Just like any other service, we will bill your insurance company for this type of visit based on time spent on the phone with your provider. Not all insurance companies cover these visits. Please check with your medical insurance if this type of visit is covered. You will be responsible for any charges that are not paid by your insurance.      E-visits via [employer]:  generally incur a $35.00 fee.  Telephone visits:  Time spent on the phone: *charged based on time that is spent on the phone in increments of 10 minutes. Estimated cost:   5-10 mins $30.00   11-20 mins. $59.00   21-30 mins. $85.00   Use [employer] (secure email communication and access to your chart) to send your primary care provider a message or make an appointment. Ask someone on your Team how to sign up for [employer].    For a Price Quote for your services, please call our Consumer Price Line at [phone].    As always, Thank you for trusting us with your health care needs!    PARVEEN/MA      The information in this document, created by the medical scribe for me, accurately reflects the services I personally performed and the decisions made by me. I have reviewed and approved this document for accuracy.   MD Brett Galeano MD  [facility]

## 2017-04-24 NOTE — PROGRESS NOTES
ANTICOAGULATION FOLLOW-UP CLINIC VISIT    Patient Name:  Guera Peters  Date:  4/24/2017  Contact Type:  Face to Face    SUBJECTIVE:     Patient Findings     Positives No Problem Findings    Comments Encouraging greens.           OBJECTIVE    INR Protime   Date Value Ref Range Status   04/24/2017 3.2 (A) 0.86 - 1.14 Final       ASSESSMENT / PLAN  INR assessment THER    Recheck INR In: 4 WEEKS    INR Location Clinic      Anticoagulation Summary as of 4/24/2017     INR goal 2.0-3.0   Today's INR 3.2!   Maintenance plan 2.5 mg (5 mg x 0.5) every day   Full instructions 2.5 mg every day   Weekly total 17.5 mg   No change documented Halie Begum RN   Plan last modified Halie Begum RN (4/12/2017)   Next INR check 5/22/2017   Priority INR   Target end date Indefinite    Indications   Long-term (current) use of anticoagulants [Z79.01] [Z79.01]  Chronic atrial fibrillation (H) [I48.2]         Anticoagulation Episode Summary     INR check location     Preferred lab     Send INR reminders to [facility]    Comments       Anticoagulation Care Providers     Provider Role Specialty Phone number    Brett Babb MD Responsible Internal Medicine [phone]            See the Encounter Report to view Anticoagulation Flowsheet and Dosing Calendar (Go to Encounters tab in chart review, and find the Anticoagulation Therapy Visit)    Dosage adjustment made based on physician directed care plan. Reviewed both bleeding and clotting signs and symptoms with patient this visit. Pt. has no further questions or concerns.  Will call with any changes to diet, medications, or missed doses at INR line [phone].      Halie Begum RN

## 2017-04-25 ENCOUNTER — RADIANT APPOINTMENT (OUTPATIENT)
Dept: CT IMAGING | Facility: CLINIC | Age: 82
End: 2017-04-25
Attending: INTERNAL MEDICINE
Payer: MEDICARE

## 2017-04-25 DIAGNOSIS — R10.32 LEFT LOWER QUADRANT PAIN: ICD-10-CM

## 2017-04-25 PROCEDURE — 74177 CT ABD & PELVIS W/CONTRAST: CPT | Mod: TC

## 2017-04-25 RX ORDER — IOPAMIDOL 755 MG/ML
89 INJECTION, SOLUTION INTRAVASCULAR ONCE
Status: COMPLETED | OUTPATIENT
Start: 2017-04-25 | End: 2017-04-25

## 2017-04-25 RX ADMIN — IOPAMIDOL 89 ML: 755 INJECTION, SOLUTION INTRAVASCULAR at 12:00

## 2017-04-28 DIAGNOSIS — E78.5 HYPERLIPIDEMIA LDL GOAL <100: ICD-10-CM

## 2017-04-28 RX ORDER — ROSUVASTATIN CALCIUM 20 MG/1
TABLET, COATED ORAL
Qty: 90 TABLET | Refills: 0 | Status: SHIPPED | OUTPATIENT
Start: 2017-04-28 | End: 2017-07-28

## 2017-04-28 NOTE — TELEPHONE ENCOUNTER
rosuvastatin (CRESTOR) 20 MG tablet     Last Written Prescription Date: 10/6/16  Last Fill Quantity: 90, # refills: 1  Last Office Visit with [facility], [facility] or [facility] prescribing provider: 4/24/17       Lab Results   Component Value Date    CHOL 162 05/01/2015     Lab Results   Component Value Date    HDL 43 05/01/2015     Lab Results   Component Value Date    LDL 75 06/03/2016     05/01/2015     Lab Results   Component Value Date    TRIG 92 05/01/2015     No results found for: CHOLHDLRATIO

## 2017-04-28 NOTE — TELEPHONE ENCOUNTER
Routing refill request to provider for review/approval because:  Labs out of range:  FLP    Teri Burgess RN - BC

## 2017-05-22 ENCOUNTER — ANTICOAGULATION THERAPY VISIT (OUTPATIENT)
Dept: NURSING | Facility: CLINIC | Age: 82
End: 2017-05-22
Payer: MEDICARE

## 2017-05-22 DIAGNOSIS — I48.20 CHRONIC ATRIAL FIBRILLATION (H): ICD-10-CM

## 2017-05-22 DIAGNOSIS — Z79.01 LONG-TERM (CURRENT) USE OF ANTICOAGULANTS: ICD-10-CM

## 2017-05-22 LAB — INR POINT OF CARE: 2.7 (ref 0.86–1.14)

## 2017-05-22 PROCEDURE — 99207 ZZC NO CHARGE NURSE ONLY: CPT

## 2017-05-22 PROCEDURE — 36416 COLLJ CAPILLARY BLOOD SPEC: CPT

## 2017-05-22 PROCEDURE — 85610 PROTHROMBIN TIME: CPT | Mod: QW

## 2017-05-22 NOTE — MR AVS SNAPSHOT
Guera T Peters   5/22/2017 10:15 AM   Anticoagulation Therapy Visit    Description:  81 year old female   Provider:  [place] [facility]   Department:  [place] [facility]           INR as of 5/22/2017     Today's INR 2.7      Anticoagulation Summary as of 5/22/2017     INR goal 2.0-3.0   Today's INR 2.7   Full instructions 2.5 mg every day   Next INR check 6/26/2017    Indications   Long-term (current) use of anticoagulants [Z79.01] [Z79.01]  Chronic atrial fibrillation (H) [I48.2]         Your next Anticoagulation Clinic appointment(s)     Jun 26, 2017 10:15 AM CDT   Anticoagulation Visit with [place] [facility]   [facility] ([facility]    [address] [address] [phone]              Contact Numbers     [facility]  Please call [phone] to cancel and/or reschedule your appointment   Please call [phone] with any problems or questions regarding your therapy.        May 2017 Details    Sun Mon Tue Wed Thu Fri Sat      1               2               3               4               5               6                 7               8               9               10               11               12               13                 14               15               16               17               18               19               20                 21               22      2.5 mg   See details      23      2.5 mg         24      2.5 mg         25      2.5 mg         26      2.5 mg         27      2.5 mg           28      2.5 mg         29      2.5 mg         30      2.5 mg         31      2.5 mg             Date Details   05/22 This INR check               How to take your warfarin dose     To take:  2.5 mg Take 0.5 of a 5 mg tablet.           June 2017 Details    Sun Mon Tue Wed Thu Fri Sat         1      2.5 mg         2      2.5 mg         3      2.5 mg           4      2.5 mg         5      2.5 mg         6      2.5 mg         7      2.5 mg         8      2.5  mg         9      2.5 mg         10      2.5 mg           11      2.5 mg         12      2.5 mg         13      2.5 mg         14      2.5 mg         15      2.5 mg         16      2.5 mg         17      2.5 mg           18      2.5 mg         19      2.5 mg         20      2.5 mg         21      2.5 mg         22      2.5 mg         23      2.5 mg         24      2.5 mg           25      2.5 mg         26            27               28               29               30                 Date Details   No additional details    Date of next INR:  6/26/2017         How to take your warfarin dose     To take:  2.5 mg Take 0.5 of a 5 mg tablet.

## 2017-05-22 NOTE — PROGRESS NOTES
ANTICOAGULATION FOLLOW-UP CLINIC VISIT    Patient Name:  Guera Peters  Date:  5/22/2017  Contact Type:  Face to Face    SUBJECTIVE:     Patient Findings     Comments Occasional dose missed           OBJECTIVE    INR Protime   Date Value Ref Range Status   05/22/2017 2.7 (A) 0.86 - 1.14 Final       ASSESSMENT / PLAN  INR assessment THER    Recheck INR In: 5 WEEKS    INR Location Clinic      Anticoagulation Summary as of 5/22/2017     INR goal 2.0-3.0   Today's INR 2.7   Maintenance plan 2.5 mg (5 mg x 0.5) every day   Full instructions 2.5 mg every day   Weekly total 17.5 mg   No change documented Halie Begum RN   Plan last modified Halie Begum RN (4/12/2017)   Next INR check 6/26/2017   Priority INR   Target end date Indefinite    Indications   Long-term (current) use of anticoagulants [Z79.01] [Z79.01]  Chronic atrial fibrillation (H) [I48.2]         Anticoagulation Episode Summary     INR check location     Preferred lab     Send INR reminders to [facility]    Comments       Anticoagulation Care Providers     Provider Role Specialty Phone number    Brett Babb MD [facility] Internal Medicine [phone]            See the Encounter Report to view Anticoagulation Flowsheet and Dosing Calendar (Go to Encounters tab in chart review, and find the Anticoagulation Therapy Visit)    Dosage adjustment made based on physician directed care plan. Reviewed both bleeding and clotting signs and symptoms with patient this visit. Pt. has no further questions or concerns.  Will call with any changes to diet, medications, or missed doses at INR line [phone].      Halie Begum RN

## 2017-05-31 ENCOUNTER — TRANSFERRED RECORDS (OUTPATIENT)
Dept: HEALTH INFORMATION MANAGEMENT | Facility: CLINIC | Age: 82
End: 2017-05-31

## 2017-06-22 ENCOUNTER — TELEPHONE (OUTPATIENT)
Dept: FAMILY MEDICINE | Facility: CLINIC | Age: 82
End: 2017-06-22

## 2017-06-22 DIAGNOSIS — I10 ESSENTIAL HYPERTENSION WITH GOAL BLOOD PRESSURE LESS THAN 140/90: ICD-10-CM

## 2017-06-22 DIAGNOSIS — N18.30 CKD (CHRONIC KIDNEY DISEASE) STAGE 3, GFR 30-59 ML/MIN (H): ICD-10-CM

## 2017-06-22 NOTE — TELEPHONE ENCOUNTER
I have no such documentation and don't know where this came from !    According to my understanding patient should still be taking lisinopril. I approve a new prescription for 90 days with refill if a refill is needed    Brett Babb MD

## 2017-06-22 NOTE — TELEPHONE ENCOUNTER
Per daughter, Ilana Braga NP had held the lisinopril for a few days at an OV when BP was low. See OV notes 2/14/17  However, per chart review, when patient came in for a BP recheck, her BP was elevated and she was advised to go back on the Lisinopril. See 2/17/17 BP appointment notes    Explained to daughter that patient should be on the lisinopril.  She verbalized understanding and will let patient know  She will have patient come into the pharmacy at her next INR appointment for a BP recheck    She does not need a refill at this time    Michel Seymour RN

## 2017-06-22 NOTE — TELEPHONE ENCOUNTER
Lisinopril is still active on med list.  Unable to find any recent information where lisinopril was either discontinued or held    Routing to provider to please clarify if patient should still be taking Lisinopril    Michel Seymour RN

## 2017-06-22 NOTE — TELEPHONE ENCOUNTER
Reason for Call:  Other     Detailed comments: Sienna wants to know if patient is off lisinopril, as was reported by patient.    Phone Number Patient can be reached at: Other phone number:  [phone]    Best Time: anytime    Can we leave a detailed message on this number? YES    Call taken on 6/22/2017 at 8:23 AM by Vanita Borja

## 2017-06-26 ENCOUNTER — ANTICOAGULATION THERAPY VISIT (OUTPATIENT)
Dept: NURSING | Facility: CLINIC | Age: 82
End: 2017-06-26
Payer: MEDICARE

## 2017-06-26 DIAGNOSIS — I48.20 CHRONIC ATRIAL FIBRILLATION (H): ICD-10-CM

## 2017-06-26 DIAGNOSIS — Z79.01 LONG-TERM (CURRENT) USE OF ANTICOAGULANTS: ICD-10-CM

## 2017-06-26 LAB — INR POINT OF CARE: 4.2 (ref 0.86–1.14)

## 2017-06-26 PROCEDURE — 36416 COLLJ CAPILLARY BLOOD SPEC: CPT

## 2017-06-26 PROCEDURE — 99207 ZZC NO CHARGE NURSE ONLY: CPT

## 2017-06-26 PROCEDURE — 85610 PROTHROMBIN TIME: CPT | Mod: QW

## 2017-06-26 NOTE — MR AVS SNAPSHOT
Guera Pridesen   6/26/2017 10:15 AM   Anticoagulation Therapy Visit    Description:  82 year old female   Provider:  [place] [facility]   Department:  [place] [facility]           INR as of 6/26/2017     Today's INR 4.2!      Anticoagulation Summary as of 6/26/2017     INR goal 2.0-3.0   Today's INR 4.2!   Full instructions 6/26: Hold; Otherwise 2.5 mg every day   Next INR check 7/3/2017    Indications   Long-term (current) use of anticoagulants [Z79.01] [Z79.01]  Chronic atrial fibrillation (H) [I48.2]         Description     Hold dose today, encouraged greens. Recheck in a week.      Your next Anticoagulation Clinic appointment(s)     Jul 03, 2017 10:15 AM CDT   Anticoagulation Visit with [facility] [facility]   [facility] ([address] [address] [phone]              Contact Numbers     [facility]  Please call [phone] to cancel and/or reschedule your appointment   Please call [phone] with any problems or questions regarding your therapy.        June 2017 Details    Sun Mon Tue Wed Thu Fri Sat         1               2               3                 4               5               6               7               8               9               10                 11               12               13               14               15               16               17                 18               19               20               21               22               23               24                 25               26      Hold   See details      27      2.5 mg         28      2.5 mg         29      2.5 mg         30      2.5 mg           Date Details   06/26 This INR check               How to take your warfarin dose     To take:  2.5 mg Take 0.5 of a 5 mg tablet.    Hold Do not take your warfarin dose. See the Details table to the right for additional instructions.                July 2017 Details    Sun Mon Tue Wed Thu Fri Sat           1       2.5 mg           2      2.5 mg         3            4               5               6               7               8                 9               10               11               12               13               14               15                 16               17               18               19               20               21               22                 23               24               25               26               27               28               29                 30               31                     Date Details   No additional details    Date of next INR:  7/3/2017         How to take your warfarin dose     To take:  2.5 mg Take 0.5 of a 5 mg tablet.

## 2017-06-26 NOTE — PROGRESS NOTES
ANTICOAGULATION FOLLOW-UP CLINIC VISIT    Patient Name:  Guera Peters  Date:  6/26/2017  Contact Type:  Face to Face    SUBJECTIVE:     Patient Findings     Positives No Problem Findings           OBJECTIVE    INR Protime   Date Value Ref Range Status   06/26/2017 4.2 (A) 0.86 - 1.14 Final       ASSESSMENT / PLAN  INR assessment SUPRA    Recheck INR In: 1 WEEK    INR Location Clinic      Anticoagulation Summary as of 6/26/2017     INR goal 2.0-3.0   Today's INR 4.2!   Maintenance plan 2.5 mg (5 mg x 0.5) every day   Full instructions 6/26: Hold; Otherwise 2.5 mg every day   Weekly total 17.5 mg   Plan last modified Halie Begum RN (4/12/2017)   Next INR check 7/3/2017   Priority INR   Target end date Indefinite    Indications   Long-term (current) use of anticoagulants [Z79.01] [Z79.01]  Chronic atrial fibrillation (H) [I48.2]         Anticoagulation Episode Summary     INR check location     Preferred lab     Send INR reminders to [facility] CLINIC    Comments       Anticoagulation Care Providers     Provider Role Specialty Phone number    Brett Babb MD [facility] Internal Medicine [phone]            See the Encounter Report to view Anticoagulation Flowsheet and Dosing Calendar (Go to Encounters tab in chart review, and find the Anticoagulation Therapy Visit)    Dosage adjustment made based on physician directed care plan. Reviewed both bleeding and clotting signs and symptoms with patient this visit. Pt. has no further questions or concerns.  Will call with any changes to diet, medications, or missed doses at INR line [phone].      Halie Begum, RN

## 2017-07-03 ENCOUNTER — TELEPHONE (OUTPATIENT)
Dept: NURSING | Facility: CLINIC | Age: 82
End: 2017-07-03

## 2017-07-03 ENCOUNTER — ANTICOAGULATION THERAPY VISIT (OUTPATIENT)
Dept: NURSING | Facility: CLINIC | Age: 82
End: 2017-07-03
Payer: MEDICARE

## 2017-07-03 DIAGNOSIS — Z79.01 LONG-TERM (CURRENT) USE OF ANTICOAGULANTS: ICD-10-CM

## 2017-07-03 DIAGNOSIS — I48.20 CHRONIC ATRIAL FIBRILLATION (H): Primary | ICD-10-CM

## 2017-07-03 DIAGNOSIS — I48.20 CHRONIC ATRIAL FIBRILLATION (H): ICD-10-CM

## 2017-07-03 LAB — INR POINT OF CARE: 2.1 (ref 0.86–1.14)

## 2017-07-03 PROCEDURE — 99207 ZZC NO CHARGE NURSE ONLY: CPT

## 2017-07-03 PROCEDURE — 85610 PROTHROMBIN TIME: CPT | Mod: QW

## 2017-07-03 PROCEDURE — 36416 COLLJ CAPILLARY BLOOD SPEC: CPT

## 2017-07-03 NOTE — MR AVS SNAPSHOT
Guera T Peters   7/3/2017 10:15 AM   Anticoagulation Therapy Visit    Description:  82 year old female   Provider:  [facility] ANTI [facility]AG   Department:  [place] Nurse           INR as of 7/3/2017     Today's INR 2.1      Anticoagulation Summary as of 7/3/2017     INR goal 2.0-3.0   Today's INR 2.1   Full instructions 2.5 mg every day   Next INR check 7/17/2017    Indications   Long-term (current) use of anticoagulants [Z79.01] [Z79.01]  Chronic atrial fibrillation (H) [I48.2]         Your next Anticoagulation Clinic appointment(s)     Jul 17, 2017 10:30 AM CDT   Anticoagulation Visit with [place] ANTI MELO   [facility] ([facility]    [address] [address] [phone]              Contact Numbers     [facility]  Please call [phone] to cancel and/or reschedule your appointment   Please call [phone] with any problems or questions regarding your therapy.        July 2017 Details    Sun Mon Tue Wed Thu Fri Sat           1                 2               3      2.5 mg   See details      4      2.5 mg         5      2.5 mg         6      2.5 mg         7      2.5 mg         8      2.5 mg           9      2.5 mg         10      2.5 mg         11      2.5 mg         12      2.5 mg         13      2.5 mg         14      2.5 mg         15      2.5 mg           16      2.5 mg         17            18               19               20               21               22                 23               24               25               26               27               28               29                 30               31                     Date Details   07/03 This INR check       Date of next INR:  7/17/2017         How to take your warfarin dose     To take:  2.5 mg Take 0.5 of a 5 mg tablet.

## 2017-07-03 NOTE — PROGRESS NOTES
ANTICOAGULATION FOLLOW-UP CLINIC VISIT    Patient Name:  Guera Peters  Date:  7/3/2017  Contact Type:  Face to Face    SUBJECTIVE:     Patient Findings     Positives No Problem Findings           OBJECTIVE    INR Protime   Date Value Ref Range Status   07/03/2017 2.1 (A) 0.86 - 1.14 Final       ASSESSMENT / PLAN  INR assessment THER    Recheck INR In: 2 WEEKS    INR Location Clinic      Anticoagulation Summary as of 7/3/2017     INR goal 2.0-3.0   Today's INR 2.1   Maintenance plan 2.5 mg (5 mg x 0.5) every day   Full instructions 2.5 mg every day   Weekly total 17.5 mg   No change documented Halie Begum RN   Plan last modified Halie Begum RN (4/12/2017)   Next INR check 7/17/2017   Priority INR   Target end date Indefinite    Indications   Long-term (current) use of anticoagulants [Z79.01] [Z79.01]  Chronic atrial fibrillation (H) [I48.2]         Anticoagulation Episode Summary     INR check location     Preferred lab     Send INR reminders to [facility]    Comments       Anticoagulation Care Providers     Provider Role Specialty Phone number    Brett Babb MD [facility] Internal Medicine [phone]            See the Encounter Report to view Anticoagulation Flowsheet and Dosing Calendar (Go to Encounters tab in chart review, and find the Anticoagulation Therapy Visit)    Dosage adjustment made based on physician directed care plan. Reviewed both bleeding and clotting signs and symptoms with patient this visit. Pt. has no further questions or concerns.  Will call with any changes to diet, medications, or missed doses at INR line [phone].      Halie Begum RN

## 2017-07-17 ENCOUNTER — ANTICOAGULATION THERAPY VISIT (OUTPATIENT)
Dept: NURSING | Facility: CLINIC | Age: 82
End: 2017-07-17
Payer: MEDICARE

## 2017-07-17 DIAGNOSIS — Z79.01 LONG-TERM (CURRENT) USE OF ANTICOAGULANTS: ICD-10-CM

## 2017-07-17 DIAGNOSIS — I48.20 CHRONIC ATRIAL FIBRILLATION (H): ICD-10-CM

## 2017-07-17 LAB — INR POINT OF CARE: 2.8 (ref 0.86–1.14)

## 2017-07-17 PROCEDURE — 36416 COLLJ CAPILLARY BLOOD SPEC: CPT

## 2017-07-17 PROCEDURE — 85610 PROTHROMBIN TIME: CPT | Mod: QW

## 2017-07-17 PROCEDURE — 99207 ZZC NO CHARGE NURSE ONLY: CPT

## 2017-07-17 NOTE — PROGRESS NOTES
ANTICOAGULATION FOLLOW-UP CLINIC VISIT    Patient Name:  Guera Peters  Date:  7/17/2017  Contact Type:  Face to Face    SUBJECTIVE:     Patient Findings     Positives No Problem Findings           OBJECTIVE    INR Protime   Date Value Ref Range Status   07/17/2017 2.8 (A) 0.86 - 1.14 Final       ASSESSMENT / PLAN  INR assessment THER    Recheck INR In: 4 WEEKS    INR Location Clinic      Anticoagulation Summary as of 7/17/2017     INR goal 2.0-3.0   Today's INR 2.8   Maintenance plan 2.5 mg (5 mg x 0.5) every day   Full instructions 2.5 mg every day   Weekly total 17.5 mg   No change documented Halie Begum RN   Plan last modified Halie Begum RN (4/12/2017)   Next INR check 8/14/2017   Priority INR   Target end date Indefinite    Indications   Long-term (current) use of anticoagulants [Z79.01] [Z79.01]  Chronic atrial fibrillation (H) [I48.2]         Anticoagulation Episode Summary     INR check location     Preferred lab     Send INR reminders to [facility]    Comments       Anticoagulation Care Providers     Provider Role Specialty Phone number    Brett Babb MD [facility] Internal Medicine [phone]            See the Encounter Report to view Anticoagulation Flowsheet and Dosing Calendar (Go to Encounters tab in chart review, and find the Anticoagulation Therapy Visit)    Dosage adjustment made based on physician directed care plan. Reviewed both bleeding and clotting signs and symptoms with patient this visit. Pt. has no further questions or concerns.  Will call with any changes to diet, medications, or missed doses at INR line [phone].      Halie Begum RN

## 2017-07-17 NOTE — MR AVS SNAPSHOT
Guera Peters   7/17/2017 10:30 AM   Anticoagulation Therapy Visit    Description:  82 year old female   Provider:  [facility]   Department:  [place] Nurse           INR as of 7/17/2017     Today's INR 2.8      Anticoagulation Summary as of 7/17/2017     INR goal 2.0-3.0   Today's INR 2.8   Full instructions 2.5 mg every day   Next INR check 8/14/2017    Indications   Long-term (current) use of anticoagulants [Z79.01] [Z79.01]  Chronic atrial fibrillation (H) [I48.2]         Your next Anticoagulation Clinic appointment(s)     Aug 14, 2017 10:30 AM CDT   Anticoagulation Visit with [place] ANTI COAG   [facility] ([facility]    [address] [address] [phone]              Contact Numbers     [facility]  Please call [phone] to cancel and/or reschedule your appointment   Please call [phone] with any problems or questions regarding your therapy.        July 2017 Details    Sun Mon Tue Wed Thu Fri Sat           1                 2               3               4               5               6               7               8                 9               10               11               12               13               14               15                 16               17      2.5 mg   See details      18      2.5 mg         19      2.5 mg         20      2.5 mg         21      2.5 mg         22      2.5 mg           23      2.5 mg         24      2.5 mg         25      2.5 mg         26      2.5 mg         27      2.5 mg         28      2.5 mg         29      2.5 mg           30      2.5 mg         31      2.5 mg               Date Details   07/17 This INR check               How to take your warfarin dose     To take:  2.5 mg Take 0.5 of a 5 mg tablet.           August 2017 Details    Sun Mon Tue Wed Thu Fri Sat       1      2.5 mg         2      2.5 mg         3      2.5 mg         4      2.5 mg         5      2.5 mg           6      2.5 mg          7      2.5 mg         8      2.5 mg         9      2.5 mg         10      2.5 mg         11      2.5 mg         12      2.5 mg           13      2.5 mg         14            15               16               17               18               19                 20               21               22               23               24               25               26                 27               28               29               30               31                  Date Details   No additional details    Date of next INR:  8/14/2017         How to take your warfarin dose     To take:  2.5 mg Take 0.5 of a 5 mg tablet.

## 2017-07-28 DIAGNOSIS — E78.5 HYPERLIPIDEMIA LDL GOAL <100: ICD-10-CM

## 2017-07-28 RX ORDER — ROSUVASTATIN CALCIUM 20 MG/1
TABLET, COATED ORAL
Qty: 90 TABLET | Refills: 0 | Status: SHIPPED | OUTPATIENT
Start: 2017-07-28 | End: 2017-10-24

## 2017-07-28 NOTE — TELEPHONE ENCOUNTER
rosuvastatin (CRESTOR) 20 MG tablet     Last Written Prescription Date: 4/28/2017  Last Fill Quantity: 90, # refills: 0  Last Office Visit with G, P or [facility] prescribing provider: 4/24/17       Lab Results   Component Value Date    CHOL 162 05/01/2015     Lab Results   Component Value Date    HDL 43 05/01/2015     Lab Results   Component Value Date    LDL 75 06/03/2016     05/01/2015     Lab Results   Component Value Date    TRIG 92 05/01/2015     No results found for: ABDI RODRIGES, RN, BSN

## 2017-07-28 NOTE — TELEPHONE ENCOUNTER
Routing refill request to provider for review/approval because:  Zeina given x1 and patient did not follow up, please advise  Labs not current:  NEFTALI RODRIGES RN, BSN

## 2017-08-07 ENCOUNTER — TELEPHONE (OUTPATIENT)
Dept: INTERNAL MEDICINE | Facility: CLINIC | Age: 82
End: 2017-08-07

## 2017-08-07 NOTE — TELEPHONE ENCOUNTER
Panel Management Review      Patient has the following on her problem list:     Hypertension   Last three blood pressure readings:  BP Readings from Last 3 Encounters:   04/24/17 150/78   04/07/17 122/76   03/29/17 102/60     Blood pressure: Passed    HTN Guidelines:  Age 18-59 BP range:  Less than 140/90  Age 60-85 with Diabetes:  Less than 140/90  Age 60-85 without Diabetes:  less than 150/90        Composite cancer screening  Chart review shows that this patient is due/due soon for the following None  Summary:    Patient is due/failing the following:   FOLLOW UP    Action needed:   none    Type of outreach:    Sent letter.    Questions for provider review:    None                                                                                                                                    Raven EVERETT CMA ([facility])

## 2017-08-07 NOTE — LETTER
[address]  [place] [address]  Phone: [phone]            August 7, 2017          Guera Peters,  [address] [address]        Dear Guera Peters      Monitoring and managing your preventative and chronic health conditions are very important to us. Our records indicate that you have not scheduled for Appointment with your provider  which was recommended by Dr. Babb.      If you have received your health care elsewhere, please call the clinic so the information can be documented in your chart.    Please call [phone] or message us through your [employer] account to schedule an appointment or provide information for your chart.     Feel free to contact us if you have any questions or concerns!    I look forward to seeing you and working with you on your health care needs.     Sincerely,         Brett Babb / Raven EVERETT CMA ([facility])

## 2017-08-14 ENCOUNTER — ANTICOAGULATION THERAPY VISIT (OUTPATIENT)
Dept: NURSING | Facility: CLINIC | Age: 82
End: 2017-08-14
Payer: MEDICARE

## 2017-08-14 DIAGNOSIS — Z79.01 LONG-TERM (CURRENT) USE OF ANTICOAGULANTS: ICD-10-CM

## 2017-08-14 DIAGNOSIS — I48.20 CHRONIC ATRIAL FIBRILLATION (H): ICD-10-CM

## 2017-08-14 LAB — INR POINT OF CARE: 2.4 (ref 0.86–1.14)

## 2017-08-14 PROCEDURE — 85610 PROTHROMBIN TIME: CPT | Mod: QW

## 2017-08-14 PROCEDURE — 36416 COLLJ CAPILLARY BLOOD SPEC: CPT

## 2017-08-14 PROCEDURE — 99207 ZZC NO CHARGE NURSE ONLY: CPT

## 2017-08-14 NOTE — PROGRESS NOTES
ANTICOAGULATION FOLLOW-UP CLINIC VISIT    Patient Name:  Guera Peters  Date:  8/14/2017  Contact Type:  Face to Face    SUBJECTIVE:     Patient Findings     Positives No Problem Findings           OBJECTIVE    INR Protime   Date Value Ref Range Status   08/14/2017 2.4 (A) 0.86 - 1.14 Final       ASSESSMENT / PLAN  INR assessment THER    Recheck INR In: 5 WEEKS    INR Location Clinic      Anticoagulation Summary as of 8/14/2017     INR goal 2.0-3.0   Today's INR 2.4   Maintenance plan 2.5 mg (5 mg x 0.5) every day   Full instructions 2.5 mg every day   Weekly total 17.5 mg   No change documented Halie Begum RN   Plan last modified Halie Begum RN (4/12/2017)   Next INR check 9/18/2017   Priority INR   Target end date Indefinite    Indications   Long-term (current) use of anticoagulants [Z79.01] [Z79.01]  Chronic atrial fibrillation (H) [I48.2]         Anticoagulation Episode Summary     INR check location     Preferred lab     Send INR reminders to [facility]    Comments       Anticoagulation Care Providers     Provider Role Specialty Phone number    Brett Babb MD [facility] Internal Medicine [phone]            See the Encounter Report to view Anticoagulation Flowsheet and Dosing Calendar (Go to Encounters tab in chart review, and find the Anticoagulation Therapy Visit)    Dosage adjustment made based on physician directed care plan. Reviewed both bleeding and clotting signs and symptoms with patient this visit. Pt. has no further questions or concerns.  Will call with any changes to diet, medications, or missed doses at INR line [phone].      Halie Begum RN

## 2017-08-14 NOTE — MR AVS SNAPSHOT
Guera T Lorraine   8/14/2017 10:30 AM   Anticoagulation Therapy Visit    Description:  82 year old female   Provider:  [facility] ANTI [facility]AG   Department:  [place] Nurse           INR as of 8/14/2017     Today's INR 2.4      Anticoagulation Summary as of 8/14/2017     INR goal 2.0-3.0   Today's INR 2.4   Full instructions 2.5 mg every day   Next INR check 9/18/2017    Indications   Long-term (current) use of anticoagulants [Z79.01] [Z79.01]  Chronic atrial fibrillation (H) [I48.2]         Your next Anticoagulation Clinic appointment(s)     Aug 14, 2017 10:30 AM CDT   Anticoagulation Visit with [place] ANTI COAG   [facility] ([place] [id number] [facility] [id number] [phone]            Sep 18, 2017 10:30 AM CDT   Anticoagulation Visit with FR[place] ANTI COAG   [facility] ([place] [id number] [facility] [address] [phone]              Contact Numbers     [facility]  Please call [phone] to cancel and/or reschedule your appointment   Please call [phone] with any problems or questions regarding your therapy.        August 2017 Details    Sun Mon Tue Wed Thu Fri Sat       1               2               3               4               5                 6               7               8               9               10               11               12                 13               14      2.5 mg   See details      15      2.5 mg         16      2.5 mg         17      2.5 mg         18      2.5 mg         19      2.5 mg           20      2.5 mg         21      2.5 mg         22      2.5 mg         23      2.5 mg         24      2.5 mg         25      2.5 mg         26      2.5 mg           27      2.5 mg         28      2.5 mg         29      2.5 mg         30      2.5 mg         31      2.5 mg            Date Details   08/14 This INR check               How to take your warfarin dose     To take:  2.5 mg Take 0.5 of a 5  mg tablet.           September 2017 Details    Sun Mon Tue Wed Thu Fri Sat          1      2.5 mg         2      2.5 mg           3      2.5 mg         4      2.5 mg         5      2.5 mg         6      2.5 mg         7      2.5 mg         8      2.5 mg         9      2.5 mg           10      2.5 mg         11      2.5 mg         12      2.5 mg         13      2.5 mg         14      2.5 mg         15      2.5 mg         16      2.5 mg           17      2.5 mg         18            19               20               21               22               23                 24               25               26               27               28               29               30                Date Details   No additional details    Date of next INR:  9/18/2017         How to take your warfarin dose     To take:  2.5 mg Take 0.5 of a 5 mg tablet.

## 2017-09-06 DIAGNOSIS — M81.0 OSTEOPOROSIS: ICD-10-CM

## 2017-09-06 RX ORDER — ALENDRONATE SODIUM 70 MG/1
TABLET ORAL
Qty: 12 TABLET | Refills: 0 | Status: SHIPPED | OUTPATIENT
Start: 2017-09-06 | End: 2017-11-16

## 2017-09-06 NOTE — TELEPHONE ENCOUNTER
alendronate (FOSAMAX) 70 MG tablet    Last Written Prescription Date: 6/3/2016  Last Fill Quantity: 12, # refills: 1  Last Office Visit with G, P or [facility] prescribing provider: 4/24/2017       DEXA Scan:  No order of DX HIP/PELVIS/SPINE is found.     No order of DX HIP/PELVIS/SPINE W LAT FRACTION ANALYSIS is found.       Creatinine   Date Value Ref Range Status   04/07/2017 0.80 0.52 - 1.04 mg/dL Final

## 2017-09-06 NOTE — TELEPHONE ENCOUNTER
Pt needs DEXA, annual exam, mammogram and fasting labs. Please call to schedule.  Giovanna Reveles RN

## 2017-09-06 NOTE — LETTER
[facility]  [address]  [place] [address]  [phone]          September 13, 2017    Guera Peters                                                                                                               [address]  [place] [address]            Dear Guera,    We have tried to reach you by phone, but have been unable to do so.    We recently refilled your alendronate (FOSAMAX) 70 MG tablet and lisinopril (PRINIVIL/ZESTRIL) 20 MG tablet, but you will need to be seen before any further refills can be given within the next 30 days.    Our records also show that you are due for a DEXA (bone density study), an annual exam, mammogram and fasting labs.    Please call [phone], to schedule these appointments.  Feel free to contact us with any questions or concerns.  Thank you.    Sincerely,         Brett Babb MD/rahul

## 2017-09-07 DIAGNOSIS — I10 ESSENTIAL HYPERTENSION WITH GOAL BLOOD PRESSURE LESS THAN 140/90: ICD-10-CM

## 2017-09-07 DIAGNOSIS — N18.30 CKD (CHRONIC KIDNEY DISEASE) STAGE 3, GFR 30-59 ML/MIN (H): ICD-10-CM

## 2017-09-08 DIAGNOSIS — N18.30 CKD (CHRONIC KIDNEY DISEASE) STAGE 3, GFR 30-59 ML/MIN (H): ICD-10-CM

## 2017-09-08 DIAGNOSIS — I10 ESSENTIAL HYPERTENSION WITH GOAL BLOOD PRESSURE LESS THAN 140/90: ICD-10-CM

## 2017-09-08 RX ORDER — LISINOPRIL 20 MG/1
20 TABLET ORAL DAILY
Qty: 30 TABLET | Refills: 0 | Status: SHIPPED | OUTPATIENT
Start: 2017-09-08 | End: 2017-10-07

## 2017-09-08 NOTE — TELEPHONE ENCOUNTER
Please make appointment within 30 days     Refill medication ok for one month only    Brett Babb MD

## 2017-09-08 NOTE — TELEPHONE ENCOUNTER
lisinopril (PRINIVIL/ZESTRIL) 20 MG tablet      Last Written Prescription Date: 1/23/2017  Last Fill Quantity: 90, # refills: 1  Last Office Visit with G, P or [facility] prescribing provider: 4/24/2017       Potassium   Date Value Ref Range Status   04/07/2017 4.2 3.4 - 5.3 mmol/L Final     Creatinine   Date Value Ref Range Status   04/07/2017 0.80 0.52 - 1.04 mg/dL Final     BP Readings from Last 3 Encounters:   04/24/17 150/78   04/07/17 122/76   03/29/17 102/60

## 2017-09-08 NOTE — TELEPHONE ENCOUNTER
Called and verified with pharmacy on duplicate prescription. Please disregard. Lisha Dunaway MA

## 2017-09-11 RX ORDER — LISINOPRIL 20 MG/1
TABLET ORAL
Qty: 90 TABLET | Refills: 0 | OUTPATIENT
Start: 2017-09-11

## 2017-09-17 DIAGNOSIS — E03.9 ACQUIRED HYPOTHYROIDISM: ICD-10-CM

## 2017-09-18 RX ORDER — LEVOTHYROXINE SODIUM 100 UG/1
TABLET ORAL
Qty: 90 TABLET | Refills: 0 | Status: SHIPPED | OUTPATIENT
Start: 2017-09-18 | End: 2017-11-16

## 2017-09-18 NOTE — TELEPHONE ENCOUNTER
levothyroxine (SYNTHROID/LEVOTHROID) 100 MCG tablet     Last Written Prescription Date: 12/27/2016  Last Quantity: 90, # refills: 1  Last Office Visit with G, P or [facility] prescribing provider: 4/24/2017   Next 5 appointments (look out 90 days)     Sep 22, 2017 10:10 AM CDT   Zora Celeste with Brett Babb MD   [facility] ([facility])    [address]  [place] [address]   [phone]                   TSH   Date Value Ref Range Status   05/05/2016 0.75 mcU/mL Final

## 2017-09-18 NOTE — TELEPHONE ENCOUNTER
Routing refill request to provider for review/approval because:  Labs not current:  TSH    Jahaira Gore RN

## 2017-09-19 ENCOUNTER — ANTICOAGULATION THERAPY VISIT (OUTPATIENT)
Dept: NURSING | Facility: CLINIC | Age: 82
End: 2017-09-19
Payer: MEDICARE

## 2017-09-19 DIAGNOSIS — I48.20 CHRONIC ATRIAL FIBRILLATION (H): ICD-10-CM

## 2017-09-19 DIAGNOSIS — Z79.01 LONG-TERM (CURRENT) USE OF ANTICOAGULANTS: ICD-10-CM

## 2017-09-19 LAB — INR POINT OF CARE: 3.7 (ref 0.86–1.14)

## 2017-09-19 PROCEDURE — 36416 COLLJ CAPILLARY BLOOD SPEC: CPT

## 2017-09-19 PROCEDURE — 85610 PROTHROMBIN TIME: CPT | Mod: QW

## 2017-09-19 PROCEDURE — 99207 ZZC NO CHARGE NURSE ONLY: CPT

## 2017-09-19 NOTE — PROGRESS NOTES
ANTICOAGULATION FOLLOW-UP CLINIC VISIT    Patient Name:  Guera Peters  Date:  9/19/2017  Contact Type:  Face to Face    SUBJECTIVE:     Patient Findings     Positives No Problem Findings, Unexplained INR or factor level change    Comments Patient thinks she may have take 5 mg on a couple days but could not remember which days.            OBJECTIVE    INR Protime   Date Value Ref Range Status   09/19/2017 3.7 (A) 0.86 - 1.14 Final       ASSESSMENT / PLAN  No question data found.  Anticoagulation Summary as of 9/19/2017     INR goal 2.0-3.0   Today's INR 3.7!   Maintenance plan 2.5 mg (5 mg x 0.5) every day   Full instructions 9/19: Hold; Otherwise 2.5 mg every day   Weekly total 17.5 mg   Plan last modified Halie Begum RN (4/12/2017)   Next INR check 9/27/2017   Priority INR   Target end date Indefinite    Indications   Long-term (current) use of anticoagulants [Z79.01] [Z79.01]  Chronic atrial fibrillation (H) [I48.2]         Anticoagulation Episode Summary     INR check location     Preferred lab     Send INR reminders to [facility] CLINIC    Comments       Anticoagulation Care Providers     Provider Role Specialty Phone number    Brett Babb MD [facility] Internal Medicine [phone]            See the Encounter Report to view Anticoagulation Flowsheet and Dosing Calendar (Go to Encounters tab in chart review, and find the Anticoagulation Therapy Visit)    Dosage adjustment made based on physician directed care plan.    Teri Burgess RN

## 2017-09-19 NOTE — MR AVS SNAPSHOT
Guera Peters   9/19/2017 10:30 AM   Anticoagulation Therapy Visit    Description:  82 year old female   Provider:  [place] [facility]   Department:  [place] Nurse           INR as of 9/19/2017     Today's INR 3.7!      Anticoagulation Summary as of 9/19/2017     INR goal 2.0-3.0   Today's INR 3.7!   Full instructions 9/19: Hold; Otherwise 2.5 mg every day   Next INR check 9/27/2017    Indications   Long-term (current) use of anticoagulants [Z79.01] [Z79.01]  Chronic atrial fibrillation (H) [I48.2]         Your next Anticoagulation Clinic appointment(s)     Sep 27, 2017 11:00 AM CDT   Anticoagulation Visit with [place] ANTI COAG   [facility] ([facility]    [address] [place] [address] [phone]              Contact Numbers     [facility]  Please call [phone] to cancel and/or reschedule your appointment   Please call [phone] with any problems or questions regarding your therapy.        September 2017 Details    Sun Mon Tue Wed Thu Fri Sat          1               2                 3               4               5               6               7               8               9                 10               11               12               13               14               15               16                 17               18               19      Hold   See details      20      2.5 mg         21      2.5 mg         22      2.5 mg         23      2.5 mg           24      2.5 mg         25      2.5 mg         26      2.5 mg         27            28               29               30                Date Details   09/19 This INR check       Date of next INR:  9/27/2017         How to take your warfarin dose     To take:  2.5 mg Take 0.5 of a 5 mg tablet.    Hold Do not take your warfarin dose. See the Details table to the right for additional instructions.

## 2017-09-28 ENCOUNTER — ANTICOAGULATION THERAPY VISIT (OUTPATIENT)
Dept: NURSING | Facility: CLINIC | Age: 82
End: 2017-09-28
Payer: MEDICARE

## 2017-09-28 DIAGNOSIS — I48.20 CHRONIC ATRIAL FIBRILLATION (H): ICD-10-CM

## 2017-09-28 DIAGNOSIS — Z79.01 LONG-TERM (CURRENT) USE OF ANTICOAGULANTS: ICD-10-CM

## 2017-09-28 LAB — INR POINT OF CARE: 3.8 (ref 0.86–1.14)

## 2017-09-28 PROCEDURE — 36416 COLLJ CAPILLARY BLOOD SPEC: CPT

## 2017-09-28 PROCEDURE — 85610 PROTHROMBIN TIME: CPT | Mod: QW

## 2017-09-28 PROCEDURE — 99207 ZZC NO CHARGE NURSE ONLY: CPT

## 2017-09-28 NOTE — MR AVS SNAPSHOT
Guera Peters   9/28/2017 12:45 PM   Anticoagulation Therapy Visit    Description:  82 year old female   Provider:  [facility]   Department:  [place] [facility]           INR as of 9/28/2017     Today's INR 3.8!      Anticoagulation Summary as of 9/28/2017     INR goal 2.0-3.0   Today's INR 3.8!   Full instructions 1.25 mg on Thu; 2.5 mg all other days   Next INR check 10/12/2017    Indications   Long-term (current) use of anticoagulants [Z79.01] [Z79.01]  Chronic atrial fibrillation (H) [I48.2]         Your next Anticoagulation Clinic appointment(s)     Oct 12, 2017 10:15 AM CDT   Anticoagulation Visit with [place] ANTI MELO   [facility] ([facility]    [address] [place] [address] [phone]              Contact Numbers     [facility]  Please call [phone] to cancel and/or reschedule your appointment   Please call [phone] with any problems or questions regarding your therapy.        September 2017 Details    Sun Mon Tue Wed Thu Fri Sat          1               2                 3               4               5               6               7               8               9                 10               11               12               13               14               15               16                 17               18               19               20               21               22               23                 24               25               26               27               28      1.25 mg   See details      29      2.5 mg         30      2.5 mg          Date Details   09/28 This INR check               How to take your warfarin dose     To take:  1.25 mg Take 0.5 of a 2.5 mg tablet.    To take:  2.5 mg Take 1 of the 2.5 mg tablets.           October 2017 Details    Sun Mon Tue Wed Thu Fri Sat     1      2.5 mg         2      2.5 mg         3      2.5 mg         4      2.5 mg         5      1.25 mg         6      2.5 mg         7      2.5  mg           8      2.5 mg         9      2.5 mg         10      2.5 mg         11      2.5 mg         12            13               14                 15               16               17               18               19               20               21                 22               23               24               25               26               27               28                 29               30               31                    Date Details   No additional details    Date of next INR:  10/12/2017         How to take your warfarin dose     To take:  1.25 mg Take 0.5 of a 2.5 mg tablet.    To take:  2.5 mg Take 1 of the 2.5 mg tablets.

## 2017-09-28 NOTE — PROGRESS NOTES
ANTICOAGULATION FOLLOW-UP CLINIC VISIT    Patient Name:  Guera Peters  Date:  9/28/2017  Contact Type:  Face to Face    SUBJECTIVE:     Patient Findings     Positives Activity level change (Reports decrease in activity), No Problem Findings    Comments Pt unsure of whether dose taken today already or not.  Advised if already took dose to just take 1.25 mg 9/29 and then follow calendar for dosing             OBJECTIVE    INR Protime   Date Value Ref Range Status   09/28/2017 3.8 (A) 0.86 - 1.14 Final       ASSESSMENT / PLAN  INR assessment SUPRA    Recheck INR In: 2 WEEKS    INR Location Clinic      Anticoagulation Summary as of 9/28/2017     INR goal 2.0-3.0   Today's INR 3.8!   Maintenance plan 1.25 mg (2.5 mg x 0.5) on Thu; 2.5 mg (2.5 mg x 1) all other days   Full instructions 1.25 mg on Thu; 2.5 mg all other days   Weekly total 16.25 mg   Plan last modified Mohsen Van RN (9/28/2017)   Next INR check 10/12/2017   Priority INR   Target end date Indefinite    Indications   Long-term (current) use of anticoagulants [Z79.01] [Z79.01]  Chronic atrial fibrillation (H) [I48.2]         Anticoagulation Episode Summary     INR check location     Preferred lab     Send INR reminders to [facility] CLINIC    Comments       Anticoagulation Care Providers     Provider Role Specialty Phone number    Brett Babb MD [facility] Internal Medicine [phone]            See the Encounter Report to view Anticoagulation Flowsheet and Dosing Calendar (Go to Encounters tab in chart review, and find the Anticoagulation Therapy Visit)    Dosage adjustment made based on physician directed care plan.    Mohsen Van, BELIA

## 2017-10-07 DIAGNOSIS — N18.30 CKD (CHRONIC KIDNEY DISEASE) STAGE 3, GFR 30-59 ML/MIN (H): ICD-10-CM

## 2017-10-07 DIAGNOSIS — I10 ESSENTIAL HYPERTENSION WITH GOAL BLOOD PRESSURE LESS THAN 140/90: ICD-10-CM

## 2017-10-09 DIAGNOSIS — N18.30 CKD (CHRONIC KIDNEY DISEASE) STAGE 3, GFR 30-59 ML/MIN (H): ICD-10-CM

## 2017-10-09 DIAGNOSIS — I10 ESSENTIAL HYPERTENSION WITH GOAL BLOOD PRESSURE LESS THAN 140/90: ICD-10-CM

## 2017-10-09 RX ORDER — LISINOPRIL 20 MG/1
20 TABLET ORAL DAILY
Qty: 30 TABLET | Refills: 0 | Status: SHIPPED | OUTPATIENT
Start: 2017-10-09 | End: 2017-11-07

## 2017-10-09 NOTE — TELEPHONE ENCOUNTER
lisinopril (PRINIVIL/ZESTRIL) 20 MG tablet      Last Written Prescription Date: 9/8/2017  Last Fill Quantity: 30, # refills: 0  Last Office Visit with G, P or [facility] prescribing provider: 9/22/2017       Potassium   Date Value Ref Range Status   04/07/2017 4.2 3.4 - 5.3 mmol/L Final     Creatinine   Date Value Ref Range Status   04/07/2017 0.80 0.52 - 1.04 mg/dL Final     BP Readings from Last 3 Encounters:   04/24/17 150/78   04/07/17 122/76   03/29/17 102/60

## 2017-10-09 NOTE — TELEPHONE ENCOUNTER
Pending Prescriptions:                       Disp   Refills    lisinopril (PRINIVIL/ZESTRIL) 20 MG tablet*30 tab*0        Sig: TAKE 1 TABLET BY MOUTH DAILY    Routing refill request to provider for review/approval because:  Patient needs to be seen because:  bp not in range at last office visit (150/78)    Britney Sutton RN

## 2017-10-10 RX ORDER — LISINOPRIL 20 MG/1
TABLET ORAL
Qty: 90 TABLET | Refills: 0
Start: 2017-10-10

## 2017-10-10 NOTE — TELEPHONE ENCOUNTER
Please help see to it that appointment is generated , please make appointment within 30 days     Brett Babb MD

## 2017-10-11 ENCOUNTER — TRANSFERRED RECORDS (OUTPATIENT)
Dept: HEALTH INFORMATION MANAGEMENT | Facility: CLINIC | Age: 82
End: 2017-10-11

## 2017-10-11 LAB — INR PPP: 2.7

## 2017-10-16 ENCOUNTER — ANTICOAGULATION THERAPY VISIT (OUTPATIENT)
Dept: NURSING | Facility: CLINIC | Age: 82
End: 2017-10-16

## 2017-10-16 ENCOUNTER — TELEPHONE (OUTPATIENT)
Dept: NURSING | Facility: CLINIC | Age: 82
End: 2017-10-16

## 2017-10-16 DIAGNOSIS — Z79.01 LONG-TERM (CURRENT) USE OF ANTICOAGULANTS: ICD-10-CM

## 2017-10-16 DIAGNOSIS — I48.20 CHRONIC ATRIAL FIBRILLATION (H): ICD-10-CM

## 2017-10-16 NOTE — TELEPHONE ENCOUNTER
Telephone call back from Guera, gave verbal consent to talk with Sienna, daughter, and states that she started taking Vitamin D. Telephone call to pt's daughter, Sienna.  She states pt's INR was 2.7 last week on Wednesday, and that she has just had pt continue taking 1.25 mg on Thursdays and 2.5 mg all the other days.  Scheduled appt for pt on 11/1/17.  Sienna asks for INR nurse to call pt about dosing.  Telephone call to pt, gave dosing and follow up instructions.  See anticoagulation encounter.    Mohsen Van RN, BSN

## 2017-10-16 NOTE — TELEPHONE ENCOUNTER
Message from Sienna, pt's daughter, states that pt had INR done at [facility] last week when she had a cardiology appt and is wondering when pt should follow up for INR.  Requests a call back.    No consent to communicate on file to speak with daughter.  Telephone call attempted to Guera, no answer.  Left message for pt to call back.    Mohsen Van RN, BSN

## 2017-10-16 NOTE — MR AVS SNAPSHOT
Guera Peters   10/16/2017   Anticoagulation Therapy Visit    Description:  82 year old female   Provider:  Brett Babb MD   Department:  [facility]           INR as of 10/16/2017     Today's INR 2.7 (10/11/2017)      Anticoagulation Summary as of 10/16/2017     INR goal 2.0-3.0   Today's INR 2.7 (10/11/2017)   Full instructions 1.25 mg on Thu; 2.5 mg all other days   Next INR check 11/1/2017    Indications   Long-term (current) use of anticoagulants [Z79.01] [Z79.01]  Chronic atrial fibrillation (H) [I48.2]         Your next Anticoagulation Clinic appointment(s)     Nov 01, 2017 12:45 PM CDT   Anticoagulation Visit with ME[place] ANTI COAG   [facility] ([address] [address] [phone]              Contact Numbers     [facility]  Please call [phone] to cancel and/or reschedule your appointment   Please call [phone] with any problems or questions regarding your therapy.        October 2017 Details    Sun Mon Tue Wed Thu Fri Sat     1               2               3               4               5               6               7                 8               9               10               11               12               13               14                 15               16      2.5 mg   See details      17      2.5 mg         18      2.5 mg         19      1.25 mg         20      2.5 mg         21      2.5 mg           22      2.5 mg         23      2.5 mg         24      2.5 mg         25      2.5 mg         26      1.25 mg         27      2.5 mg         28      2.5 mg           29      2.5 mg         30      2.5 mg         31      2.5 mg              Date Details   10/16 This INR check               How to take your warfarin dose     To take:  1.25 mg Take 0.5 of a 2.5 mg tablet.    To take:  2.5 mg Take 1 of the 2.5 mg tablets.           November 2017 Details    Sun Mon Tue Wed Thu Fri Sat        1            2                3               4                 5               6               7               8               9               10               11                 12               13               14               15               16               17               18                 19               20               21               22               23               24               25                 26               27               28               29               30                  Date Details   No additional details    Date of next INR:  11/1/2017         How to take your warfarin dose     To take:  2.5 mg Take 1 of the 2.5 mg tablets.

## 2017-10-16 NOTE — PROGRESS NOTES
ANTICOAGULATION FOLLOW-UP CLINIC VISIT    Patient Name:  Guera Peters  Date:  10/16/2017  Contact Type:  Telephone/ to pt and daughterSienna    SUBJECTIVE:     Patient Findings     Positives No Problem Findings    Comments S/O:  Pt was called with the following orders: INR 10/11/17 of 2.7.  Pt is on Coumadin for A. Fib.  Current Coumadin dose is 1.25 mg Thurs emiliano 2.5 mg ROW=16.25 mg.   Concerns today are: INR was done on 10/11/17 at cardiology appt at [facility].  Daughter Sienna reports INR and states Isidra was going to fax results over.  See TE from 10/16/17 for further information.    A/P: Pt's INR is Therapeutic  for his/her goal range of 2 - 3.  Reasons why INR is out of range may include:NA.  Recommended to have pt remain on the same Coumadin dose and to have his/her INR rechecked in 3 weeks on 11/1/17.      Mohsen Van RN, BSN                 OBJECTIVE    INR   Date Value Ref Range Status   10/11/2017 2.7  Final       ASSESSMENT / PLAN  INR assessment THER    Recheck INR In: 3 WEEKS    INR Location Outside lab      Anticoagulation Summary as of 10/16/2017     INR goal 2.0-3.0   Today's INR 2.7 (10/11/2017)   Maintenance plan 1.25 mg (2.5 mg x 0.5) on Thu; 2.5 mg (2.5 mg x 1) all other days   Full instructions 1.25 mg on Thu; 2.5 mg all other days   Weekly total 16.25 mg   No change documented Mohsen Van RN   Plan last modified Mohsen Van RN (9/28/2017)   Next INR check 11/1/2017   Priority INR   Target end date Indefinite    Indications   Long-term (current) use of anticoagulants [Z79.01] [Z79.01]  Chronic atrial fibrillation (H) [I48.2]         Anticoagulation Episode Summary     INR check location     Preferred lab     Send INR reminders to [facility]    Comments       Anticoagulation Care Providers     Provider Role Specialty Phone number    Brett Babb MD Responsible Internal Medicine [phone]            See the Encounter Report to view Anticoagulation Flowsheet and Dosing  Calendar (Go to Encounters tab in chart review, and find the Anticoagulation Therapy Visit)    Dosage adjustment made based on physician directed care plan.    Mohsen Van RN

## 2017-10-24 ENCOUNTER — TELEPHONE (OUTPATIENT)
Dept: INTERNAL MEDICINE | Facility: CLINIC | Age: 82
End: 2017-10-24

## 2017-10-24 DIAGNOSIS — E78.5 HYPERLIPIDEMIA LDL GOAL <100: ICD-10-CM

## 2017-10-24 NOTE — TELEPHONE ENCOUNTER
Pending Prescriptions:                       Disp   Refills    rosuvastatin (CRESTOR) 20 MG tablet [Pharm*90 tab*0        Sig: TAKE 1 TABLET BY MOUTH DAILY AT BEDTIME    Routing refill request to provider for review/approval because:  Labs not current:  NEFTALI Landrum RN

## 2017-10-26 NOTE — TELEPHONE ENCOUNTER
Called patient and left VM to call clinic.  Marva HERNANDEZ CMA ([facility])

## 2017-10-27 ENCOUNTER — TELEPHONE (OUTPATIENT)
Dept: INTERNAL MEDICINE | Facility: CLINIC | Age: 82
End: 2017-10-27

## 2017-10-27 DIAGNOSIS — E78.5 HYPERLIPIDEMIA LDL GOAL <100: ICD-10-CM

## 2017-10-27 RX ORDER — ROSUVASTATIN CALCIUM 20 MG/1
TABLET, COATED ORAL
Qty: 90 TABLET | Refills: 3 | Status: SHIPPED | OUTPATIENT
Start: 2017-10-27 | End: 2021-09-03

## 2017-10-27 RX ORDER — ROSUVASTATIN CALCIUM 20 MG/1
TABLET, COATED ORAL
Qty: 30 TABLET | Refills: 0 | Status: SHIPPED | OUTPATIENT
Start: 2017-10-27 | End: 2017-10-27

## 2017-10-27 NOTE — TELEPHONE ENCOUNTER
Reason for Call:  Other prescription    Detailed comments: Daughter is checking status of refill request. States patient only has 2 pills left. Please call soon.    Phone Number Patient can be reached at: Other phone number:  [phone]    Best Time: any    Can we leave a detailed message on this number? YES    Call taken on 10/27/2017 at 11:41 AM by Meggan Johnson

## 2017-10-27 NOTE — TELEPHONE ENCOUNTER
DaughterSienna ([phone]) called and informed that we did get the fax of labs that Guera had done on 10-11-17, from [facility].  These included a lipid profile.  These were given to Dr. Babb to review.  Lab appointment she had scheduled for November 1st was cancelled.    Routing to RN pool to give refills on Crestor. Aura Rider,

## 2017-10-27 NOTE — TELEPHONE ENCOUNTER
Reason for Call:  Labs    Detailed comments: Daughter would like to know if you received the lab work that was faxed yesterday afternoon from [facility] [employer]. Please call.    Phone Number Patient can be reached at: Other phone number:  [phone]    Best Time: anytime    Can we leave a detailed message on this number? YES    Call taken on 10/27/2017 at 11:38 AM by Meggan Johnson

## 2017-10-27 NOTE — TELEPHONE ENCOUNTER
These laboratory studies are reviewed . They show no real areas of concern. The cholesterol panel numbers are fine. INR is 2.7 [ goal is 2-3] Vitamin D levels good. N-Terminal Pro beta natriuretic peptide at 390 is nonspecific and probably normal.    Please let me know if patient has questions for me     Brett Babb MD

## 2017-10-27 NOTE — TELEPHONE ENCOUNTER
Fax received of labs results from 10-11-17, and given to Dr. Babb to review and advise. Aura Rider,

## 2017-10-27 NOTE — TELEPHONE ENCOUNTER
Cholesterol labs were completed on 10/11/17   Faxed results received from [facility] reviewed    Refills sent    Michel Seymour RN

## 2017-10-27 NOTE — TELEPHONE ENCOUNTER
Called and confirmed duplicate. Pharmacy stated they are requesting a 90 day supply. Please advise. Lisha Dunaway MA

## 2017-11-01 ENCOUNTER — ANTICOAGULATION THERAPY VISIT (OUTPATIENT)
Dept: NURSING | Facility: CLINIC | Age: 82
End: 2017-11-01
Payer: MEDICARE

## 2017-11-01 DIAGNOSIS — I48.20 CHRONIC ATRIAL FIBRILLATION (H): ICD-10-CM

## 2017-11-01 DIAGNOSIS — Z79.01 LONG-TERM (CURRENT) USE OF ANTICOAGULANTS: ICD-10-CM

## 2017-11-01 LAB — INR POINT OF CARE: 3.3 (ref 0.86–1.14)

## 2017-11-01 PROCEDURE — 36416 COLLJ CAPILLARY BLOOD SPEC: CPT

## 2017-11-01 PROCEDURE — 99207 ZZC NO CHARGE NURSE ONLY: CPT

## 2017-11-01 PROCEDURE — 85610 PROTHROMBIN TIME: CPT | Mod: QW

## 2017-11-01 NOTE — PROGRESS NOTES
ANTICOAGULATION FOLLOW-UP CLINIC VISIT    Patient Name:  Guera Peters  Date:  11/1/2017  Contact Type:  Face to Face    SUBJECTIVE:     Patient Findings     Positives No Problem Findings, Unexplained INR or factor level change           OBJECTIVE    INR Protime   Date Value Ref Range Status   11/01/2017 3.3 (A) 0.86 - 1.14 Final       ASSESSMENT / PLAN  No question data found.  Anticoagulation Summary as of 11/1/2017     INR goal 2.0-3.0   Today's INR 3.3!   Maintenance plan 1.25 mg (2.5 mg x 0.5) on Mon, Thu; 2.5 mg (2.5 mg x 1) all other days   Full instructions 1.25 mg on Mon, Thu; 2.5 mg all other days   Weekly total 15 mg   Plan last modified Teri Burgess RN (11/1/2017)   Next INR check 11/22/2017   Priority INR   Target end date Indefinite    Indications   Long-term (current) use of anticoagulants [Z79.01] [Z79.01]  Chronic atrial fibrillation (H) [I48.2]         Anticoagulation Episode Summary     INR check location     Preferred lab     Send INR reminders to [facility] CL[facility]    Comments       Anticoagulation Care Providers     Provider Role Specialty Phone number    Brett Babb MD Responsible Internal Medicine [phone]            See the Encounter Report to view Anticoagulation Flowsheet and Dosing Calendar (Go to Encounters tab in chart review, and find the Anticoagulation Therapy Visit)    Dosage adjustment made based on physician directed care plan.    Teri Burgess RN

## 2017-11-01 NOTE — MR AVS SNAPSHOT
Gueramilagros Peters   11/1/2017 12:45 PM   Anticoagulation Therapy Visit    Description:  82 year old female   Provider:  [facility] [facility]   Department:  [place] [facility]           INR as of 11/1/2017     Today's INR 3.3!      Anticoagulation Summary as of 11/1/2017     INR goal 2.0-3.0   Today's INR 3.3!   Full instructions 1.25 mg on Mon, Thu; 2.5 mg all other days   Next INR check 11/22/2017    Indications   Long-term (current) use of anticoagulants [Z79.01] [Z79.01]  Chronic atrial fibrillation (H) [I48.2]         Your next Anticoagulation Clinic appointment(s)     Nov 01, 2017 12:45 PM CDT   Anticoagulation Visit with [place] ANTI COAG   [facility] ([facility])    [address] [place] [address] [phone]            Nov 22, 2017 10:45 AM CST   Anticoagulation Visit with [place] ANTI COAG   [facility] ([address] [address] [phone]              Contact Numbers     [facility]  Please call [phone] to cancel and/or reschedule your appointment   Please call [phone] with any problems or questions regarding your therapy.        November 2017 Details    Sun Mon Tue Wed Thu Fri Sat        1      2.5 mg   See details      2      1.25 mg         3      2.5 mg         4      2.5 mg           5      2.5 mg         6      1.25 mg         7      2.5 mg         8      2.5 mg         9      1.25 mg         10      2.5 mg         11      2.5 mg           12      2.5 mg         13      1.25 mg         14      2.5 mg         15      2.5 mg         16      1.25 mg         17      2.5 mg         18      2.5 mg           19      2.5 mg         20      1.25 mg         21      2.5 mg         22            23               24               25                 26               27               28               29               30                  Date Details   11/01 This INR check       Date of next INR:  11/22/2017          How to take your warfarin dose     To take:  1.25 mg Take 0.5 of a 2.5 mg tablet.    To take:  2.5 mg Take 1 of the 2.5 mg tablets.

## 2017-11-07 DIAGNOSIS — N18.30 CKD (CHRONIC KIDNEY DISEASE) STAGE 3, GFR 30-59 ML/MIN (H): ICD-10-CM

## 2017-11-07 DIAGNOSIS — I10 ESSENTIAL HYPERTENSION WITH GOAL BLOOD PRESSURE LESS THAN 140/90: ICD-10-CM

## 2017-11-09 RX ORDER — LISINOPRIL 20 MG/1
20 TABLET ORAL DAILY
Qty: 30 TABLET | Refills: 0 | Status: SHIPPED | OUTPATIENT
Start: 2017-11-09 | End: 2017-11-09

## 2017-11-09 NOTE — TELEPHONE ENCOUNTER
Routing refill request to provider for review/approval because:  Zeina given x1 and patient did not follow up, please advise

## 2017-11-10 NOTE — TELEPHONE ENCOUNTER
We need to call daughter , not patient or spouse and see to it that a one month follow up appointment is arranged. If patient is not seen within the next month we have to deny further refill requests     Brett Babb MD

## 2017-11-16 ENCOUNTER — ANTICOAGULATION THERAPY VISIT (OUTPATIENT)
Dept: NURSING | Facility: CLINIC | Age: 82
End: 2017-11-16
Payer: MEDICARE

## 2017-11-16 ENCOUNTER — OFFICE VISIT (OUTPATIENT)
Dept: INTERNAL MEDICINE | Facility: CLINIC | Age: 82
End: 2017-11-16
Payer: MEDICARE

## 2017-11-16 VITALS
WEIGHT: 174.4 LBS | DIASTOLIC BLOOD PRESSURE: 78 MMHG | SYSTOLIC BLOOD PRESSURE: 128 MMHG | BODY MASS INDEX: 29.41 KG/M2 | OXYGEN SATURATION: 96 % | TEMPERATURE: 98.4 F | HEART RATE: 76 BPM

## 2017-11-16 DIAGNOSIS — F02.80 ALZHEIMER'S DEMENTIA WITHOUT BEHAVIORAL DISTURBANCE, UNSPECIFIED TIMING OF DEMENTIA ONSET: ICD-10-CM

## 2017-11-16 DIAGNOSIS — E55.9 HYPOVITAMINOSIS D: ICD-10-CM

## 2017-11-16 DIAGNOSIS — D69.6 THROMBOCYTOPENIA (H): ICD-10-CM

## 2017-11-16 DIAGNOSIS — M81.0 OSTEOPOROSIS WITHOUT CURRENT PATHOLOGICAL FRACTURE, UNSPECIFIED OSTEOPOROSIS TYPE: ICD-10-CM

## 2017-11-16 DIAGNOSIS — G30.9 ALZHEIMER'S DEMENTIA WITHOUT BEHAVIORAL DISTURBANCE, UNSPECIFIED TIMING OF DEMENTIA ONSET: ICD-10-CM

## 2017-11-16 DIAGNOSIS — I21.4 NSTEMI (NON-ST ELEVATED MYOCARDIAL INFARCTION) (H): ICD-10-CM

## 2017-11-16 DIAGNOSIS — Z86.73 HISTORY OF TIA (TRANSIENT ISCHEMIC ATTACK) AND STROKE: ICD-10-CM

## 2017-11-16 DIAGNOSIS — Z79.01 LONG-TERM (CURRENT) USE OF ANTICOAGULANTS: ICD-10-CM

## 2017-11-16 DIAGNOSIS — R73.09 ELEVATED GLUCOSE: ICD-10-CM

## 2017-11-16 DIAGNOSIS — R80.9 MICROALBUMINURIA: ICD-10-CM

## 2017-11-16 DIAGNOSIS — I10 ESSENTIAL HYPERTENSION WITH GOAL BLOOD PRESSURE LESS THAN 140/90: Primary | ICD-10-CM

## 2017-11-16 DIAGNOSIS — R31.0 GROSS HEMATURIA: ICD-10-CM

## 2017-11-16 DIAGNOSIS — I48.20 CHRONIC ATRIAL FIBRILLATION (H): ICD-10-CM

## 2017-11-16 DIAGNOSIS — E03.9 ACQUIRED HYPOTHYROIDISM: ICD-10-CM

## 2017-11-16 LAB
ANION GAP SERPL CALCULATED.3IONS-SCNC: 11 MMOL/L (ref 3–14)
BUN SERPL-MCNC: 17 MG/DL (ref 7–30)
CALCIUM SERPL-MCNC: 9.4 MG/DL (ref 8.5–10.1)
CHLORIDE SERPL-SCNC: 105 MMOL/L (ref 94–109)
CO2 SERPL-SCNC: 26 MMOL/L (ref 20–32)
CREAT SERPL-MCNC: 0.71 MG/DL (ref 0.52–1.04)
GFR SERPL CREATININE-BSD FRML MDRD: 79 ML/MIN/1.7M2
GLUCOSE SERPL-MCNC: 100 MG/DL (ref 70–99)
HBA1C MFR BLD: 6 % (ref 4.3–6)
INR POINT OF CARE: 2.6 (ref 0.86–1.14)
POTASSIUM SERPL-SCNC: 4.3 MMOL/L (ref 3.4–5.3)
SODIUM SERPL-SCNC: 142 MMOL/L (ref 133–144)
TSH SERPL DL<=0.005 MIU/L-ACNC: 0.43 MU/L (ref 0.4–4)

## 2017-11-16 PROCEDURE — 99214 OFFICE O/P EST MOD 30 MIN: CPT | Performed by: INTERNAL MEDICINE

## 2017-11-16 PROCEDURE — 85610 PROTHROMBIN TIME: CPT | Mod: QW

## 2017-11-16 PROCEDURE — 83036 HEMOGLOBIN GLYCOSYLATED A1C: CPT | Performed by: INTERNAL MEDICINE

## 2017-11-16 PROCEDURE — 82306 VITAMIN D 25 HYDROXY: CPT | Performed by: INTERNAL MEDICINE

## 2017-11-16 PROCEDURE — 84443 ASSAY THYROID STIM HORMONE: CPT | Performed by: INTERNAL MEDICINE

## 2017-11-16 PROCEDURE — 36416 COLLJ CAPILLARY BLOOD SPEC: CPT

## 2017-11-16 PROCEDURE — 99207 ZZC NO CHARGE NURSE ONLY: CPT

## 2017-11-16 PROCEDURE — 80048 BASIC METABOLIC PNL TOTAL CA: CPT | Performed by: INTERNAL MEDICINE

## 2017-11-16 RX ORDER — NITROGLYCERIN 0.4 MG/1
0.4 TABLET SUBLINGUAL EVERY 5 MIN PRN
Qty: 25 TABLET | Refills: 1 | Status: SHIPPED | OUTPATIENT
Start: 2017-11-16 | End: 2022-04-19

## 2017-11-16 RX ORDER — RIVASTIGMINE TARTRATE 3 MG/1
3 CAPSULE ORAL 2 TIMES DAILY
Qty: 180 CAPSULE | Refills: 1 | Status: SHIPPED | OUTPATIENT
Start: 2017-11-16 | End: 2021-08-30

## 2017-11-16 RX ORDER — CARVEDILOL 12.5 MG/1
12.5 TABLET ORAL 2 TIMES DAILY WITH MEALS
Qty: 180 TABLET | Refills: 1 | Status: SHIPPED | OUTPATIENT
Start: 2017-11-16 | End: 2018-05-29

## 2017-11-16 RX ORDER — LISINOPRIL 20 MG/1
TABLET ORAL
Qty: 90 TABLET | Refills: 1 | Status: SHIPPED | OUTPATIENT
Start: 2017-11-16 | End: 2018-07-30

## 2017-11-16 RX ORDER — WARFARIN SODIUM 2.5 MG/1
2.5 TABLET ORAL DAILY
Qty: 90 TABLET | Refills: 1 | Status: SHIPPED | OUTPATIENT
Start: 2017-11-16 | End: 2022-05-12 | Stop reason: DRUGHIGH

## 2017-11-16 RX ORDER — ALENDRONATE SODIUM 70 MG/1
TABLET ORAL
Qty: 12 TABLET | Refills: 1 | Status: SHIPPED | OUTPATIENT
Start: 2017-11-16 | End: 2017-11-29

## 2017-11-16 RX ORDER — LEVOTHYROXINE SODIUM 100 UG/1
100 TABLET ORAL DAILY
Qty: 90 TABLET | Refills: 1 | Status: SHIPPED | OUTPATIENT
Start: 2017-11-16 | End: 2018-06-23

## 2017-11-16 ASSESSMENT — PAIN SCALES - GENERAL: PAINLEVEL: NO PAIN (0)

## 2017-11-16 NOTE — Clinical Note
Patient forgot to give us a urine sample. Let daughter know if possible , it would be good to collect this. Both the Albumin random urine quantitative and also a urine analysis to check for blood.  It's been ordered as a laboratory future order . This isn't an urgent thing and can wait , worry it wasn't done at her appointment.

## 2017-11-16 NOTE — MR AVS SNAPSHOT
After Visit Summary   11/16/2017    Guera Peters    MRN: [id number]           Patient Information     Date Of Birth          1935        Visit Information        Provider Department      11/16/2017 10:30 AM Brett Babb MD [facility]        Today's Diagnoses     Essential hypertension with goal blood pressure less than 140/90    -  1    Acquired hypothyroidism        History of TIA (transient ischemic attack) and stroke        NSTEMI (non-ST elevated myocardial infarction) (H)        Chronic atrial fibrillation (H)        Thrombocytopenia (H)        Alzheimer's dementia without behavioral disturbance, unspecified timing of dementia onset        Microalbuminuria        Elevated glucose        Hypovitaminosis D        Osteoporosis without current pathological fracture, unspecified osteoporosis type          Care Instructions    - A daily dose of 3000 units of Vitamin D should be sufficient. But, I will follow up with you about lab results and let you know.    - Follow up for a DEXA scan.    - Follow up with me in 6 months.     [facility]    If you have any questions regarding to your visit please contact your care team:     Team Pink:   Clinic Hours Telephone Number   Internal Medicine:  Dr. Yani Braga NP       7am-7pm  Monday - Thursday   7am-5pm  Fridays  [phone]  (Appointment scheduling available 24/7)    Questions about your visit?  Team Line  [phone]   Urgent Care - [place] - 11am-9pm Monday-Friday Saturday-Sunday- 9am-5pm   [place] - 5pm-9pm Monday-Friday Saturday-Sunday- 9am-5pm  [phone] - [place]   [phone] - [place]       What options do I have for visits at the clinic other than the traditional office visit?  To expand how we care for you, many of our providers are utilizing electronic visits (e-visits) and telephone visits, when medically appropriate, for  interactions with their patients rather than a visit in the clinic.   We also offer nurse visits for many medical concerns. Just like any other service, we will bill your insurance company for this type of visit based on time spent on the phone with your provider. Not all insurance companies cover these visits. Please check with your medical insurance if this type of visit is covered. You will be responsible for any charges that are not paid by your insurance.      E-visits via STARFACEhart:  generally incur a $35.00 fee.  Telephone visits:  Time spent on the phone: *charged based on time that is spent on the phone in increments of 10 minutes. Estimated cost:   5-10 mins $30.00   11-20 mins. $59.00   21-30 mins. $85.00   Use [employer] (secure email communication and access to your chart) to send your primary care provider a message or make an appointment. Ask someone on your Team how to sign up for [employer].    For a Price Quote for your services, please call our [employer] Line at [phone].    As always, Thank you for trusting us with your health care needs!    Discharge by SAADIA COTE             Follow-ups after your visit        Your next 10 appointments already scheduled     Dec 14, 2017 10:15 AM CST   Anticoagulation Visit with MELE ANTI COAG   [facility]    [address] [place] [address]   [phone]            May 17, 2018 10:30 AM CDT   Office Visit with Brett Babb MD   [facility] ([facility]    [address] [facility] [address]   [phone]           Bring a current list of meds and any records pertaining to this visit. For Physicals, please bring immunization records and any forms needing to be filled out. Please arrive 10 minutes early to complete paperwork.              Future tests that were ordered for you today     Open Future Orders        Priority Expected Expires Ordered    DX Hip/Pelvis/Spine Routine   11/16/2018 11/16/2017            Who to contact     If you have questions or need follow up information about today's clinic visit or your schedule please contact [facility] DIANE directly at [phone].  Normal or non-critical lab and imaging results will be communicated to you by Syncbakhart, letter or phone within 4 business days after the clinic has received the results. If you do not hear from us within 7 days, please contact the clinic through Sync[employer] or phone. If you have a critical or abnormal lab result, we will notify you by phone as soon as possible.  Submit refill requests through Backspaces or call your pharmacy and they will forward the refill request to us. Please allow 3 business days for your refill to be completed.          Additional Information About Your Visit        [employer] Information     Back[employer] gives you secure access to your electronic health record. If you see a primary care provider, you can also send messages to your care team and make appointments. If you have questions, please call your primary care clinic.  If you do not have a primary care provider, please call [phone] and they will assist you.        Care EveryWhere ID     This is your Care EveryWhere ID. This could be used by other organizations to access your [place] medical records  [id number]        Your Vitals Were     Pulse Temperature Pulse Oximetry Breastfeeding? BMI (Body Mass Index)       76 98.4  F (36.9  C) (Oral) 96% No 29.41 kg/m2        Blood Pressure from Last 3 Encounters:   11/16/17 128/78   04/24/17 150/78   04/07/17 122/76    Weight from Last 3 Encounters:   11/16/17 174 lb 6.4 oz (79.1 kg)   04/24/17 181 lb 9.6 oz (82.4 kg)   04/07/17 181 lb 3.2 oz (82.2 kg)              We Performed the Following     Basic metabolic panel     Hemoglobin A1c     TSH with free T4 reflex     Vitamin D Deficiency          Today's Medication Changes          These changes are accurate as of: 11/16/17 11:12 AM.  If you  have any questions, ask your nurse or doctor.               These medicines have changed or have updated prescriptions.        Dose/Directions    alendronate 70 MG tablet   Commonly known as:  FOSAMAX   This may have changed:  See the new instructions.   Used for:  Osteoporosis without current pathological fracture, unspecified osteoporosis type   Changed by:  Brett Babb MD        TAKE 1 TABLET BY MOUTH EVERY 7 DAYS BEFORE BREAKFAST   Quantity:  12 tablet   Refills:  1       levothyroxine 100 MCG tablet   Commonly known as:  SYNTHROID/LEVOTHROID   This may have changed:  See the new instructions.   Used for:  Acquired hypothyroidism   Changed by:  Brett Babb MD        Dose:  100 mcg   Take 1 tablet (100 mcg) by mouth daily   Quantity:  90 tablet   Refills:  1       lisinopril 20 MG tablet   Commonly known as:  PRINIVIL/ZESTRIL   This may have changed:  See the new instructions.   Used for:  Essential hypertension with goal blood pressure less than 140/90   Changed by:  Brett Babb MD        TAKE 1 TABLET BY MOUTH DAILY   Quantity:  90 tablet   Refills:  1       rivastigmine 3 MG capsule   Commonly known as:  EXELON   This may have changed:    - how much to take  - how to take this  - when to take this   Used for:  Alzheimer's dementia without behavioral disturbance, unspecified timing of dementia onset   Changed by:  Brett Babb MD        Dose:  3 mg   Take 1 capsule (3 mg) by mouth 2 times daily   Quantity:  180 capsule   Refills:  1            Where to get your medicines      These medications were sent to [facility] [id number] - [place], [place] - [facility] [address] AT [facility] [address]  [facility] [address], [place] [address]     Phone:  [phone]     alendronate 70 MG tablet    carvedilol 12.5 MG tablet    levothyroxine 100 MCG tablet    lisinopril 20 MG tablet    nitroGLYcerin 0.4 MG sublingual tablet    rivastigmine 3 MG capsule    warfarin 2.5 MG tablet                 Primary Care Provider Office Phone # Fax #    Brett Babb [phone] [address] [facility] [address]        Equal Access to Services     MAHESHJUSTIN BREANA : Hadii heidy lobo cristophero Sobryanali, waaxda luqadaha, qaybta kaalmada adejen, danny chang laSujataángel marshall. So [facility] [phone].    ATENCIÓN: Si habla español, tiene a shane disposición servicios gratuitos de asistencia lingüística. Llame al [phone].    We comply with applicable federal civil rights laws and [place] laws. We do not discriminate on the basis of race, color, national origin, age, disability, sex, sexual orientation, or gender identity.            Thank you!     Thank you for choosing [facility]  for your care. Our goal is always to provide you with excellent care. Hearing back from our patients is one way we can continue to improve our services. Please take a few minutes to complete the written survey that you may receive in the mail after your visit with us. Thank you!             Your Updated Medication List - Protect others around you: Learn how to safely use, store and throw away your medicines at www.disposemymeds.org.          This list is accurate as of: 11/16/17 11:12 AM.  Always use your most recent med list.                   Brand Name Dispense Instructions for use Diagnosis    alendronate 70 MG tablet    FOSAMAX    12 tablet    TAKE 1 TABLET BY MOUTH EVERY 7 DAYS BEFORE BREAKFAST    Osteoporosis without current pathological fracture, unspecified osteoporosis type       aspirin EC 81 MG EC tablet      Take 81 mg by mouth        carvedilol 12.5 MG tablet    COREG    180 tablet    Take 1 tablet (12.5 mg) by mouth 2 times daily (with meals)    Essential hypertension with goal blood pressure less than 140/90       levothyroxine 100 MCG tablet    SYNTHROID/LEVOTHROID    90 tablet    Take 1 tablet (100 mcg) by mouth daily    Acquired hypothyroidism       lisinopril 20 MG tablet     PRINIVIL/ZESTRIL    90 tablet    TAKE 1 TABLET BY MOUTH DAILY    Essential hypertension with goal blood pressure less than 140/90       nitroGLYcerin 0.4 MG sublingual tablet    NITROSTAT    25 tablet    Place 1 tablet (0.4 mg) under the tongue every 5 minutes as needed for chest pain    NSTEMI (non-ST elevated myocardial infarction) (H)       rivastigmine 3 MG capsule    EXELON    180 capsule    Take 1 capsule (3 mg) by mouth 2 times daily    Alzheimer's dementia without behavioral disturbance, unspecified timing of dementia onset       rosuvastatin 20 MG tablet    CRESTOR    90 tablet    TAKE 1 TABLET BY MOUTH DAILY AT BEDTIME    Hyperlipidemia LDL goal <100       VITAMIN D-3 PO           warfarin 2.5 MG tablet    COUMADIN    90 tablet    Take 1 tablet (2.5 mg) by mouth daily    History of TIA (transient ischemic attack) and stroke

## 2017-11-16 NOTE — PROGRESS NOTES
ANTICOAGULATION FOLLOW-UP CLINIC VISIT    Patient Name:  Guera Peters  Date:  11/16/2017  Contact Type:  Face to Face    SUBJECTIVE:     Patient Findings     Positives No Problem Findings           OBJECTIVE    INR Protime   Date Value Ref Range Status   11/16/2017 2.6 (A) 0.86 - 1.14 Final       ASSESSMENT / PLAN  No question data found.  Anticoagulation Summary as of 11/16/2017     INR goal 2.0-3.0   Today's INR 2.6   Maintenance plan 1.25 mg (2.5 mg x 0.5) on Mon, Thu; 2.5 mg (2.5 mg x 1) all other days   Full instructions 1.25 mg on Mon, Thu; 2.5 mg all other days   Weekly total 15 mg   No change documented Teri Burgess RN   Plan last modified Teri Burgess RN (11/1/2017)   Next INR check 12/14/2017   Priority INR   Target end date Indefinite    Indications   Long-term (current) use of anticoagulants [Z79.01] [Z79.01]  Chronic atrial fibrillation (H) [I48.2]         Anticoagulation Episode Summary     INR check location     Preferred lab     Send INR reminders to [facility] CL[facility]    Comments       Anticoagulation Care Providers     Provider Role Specialty Phone number    Brett Babb MD [facility] Internal Medicine [phone]            See the Encounter Report to view Anticoagulation Flowsheet and Dosing Calendar (Go to Encounters tab in chart review, and find the Anticoagulation Therapy Visit)    Dosage adjustment made based on physician directed care plan.    Teri Burgess RN

## 2017-11-16 NOTE — MR AVS SNAPSHOT
Guera Peters   11/16/2017 10:00 AM   Anticoagulation Therapy Visit    Description:  82 year old female   Provider:  FR[facility] ANTI COAG   Department:  [place] [facility]           INR as of 11/16/2017     Today's INR 2.6      Anticoagulation Summary as of 11/16/2017     INR goal 2.0-3.0   Today's INR 2.6   Full instructions 1.25 mg on Mon, Thu; 2.5 mg all other days   Next INR check 12/14/2017    Indications   Long-term (current) use of anticoagulants [Z79.01] [Z79.01]  Chronic atrial fibrillation (H) [I48.2]         Your next Anticoagulation Clinic appointment(s)     Dec 14, 2017 10:15 AM CST   Anticoagulation Visit with FRANCE ANTI COASARAI   [facility] ([facility]    [address] [address] [phone]              Contact Numbers     [facility]  Please call [phone] to cancel and/or reschedule your appointment   Please call [phone] with any problems or questions regarding your therapy.        November 2017 Details    Sun Mon Tue Wed Thu Fri Sat        1               2               3               4                 5               6               7               8               9               10               11                 12               13               14               15               16      1.25 mg   See details      17      2.5 mg         18      2.5 mg           19      2.5 mg         20      1.25 mg         21      2.5 mg         22      2.5 mg         23      1.25 mg         24      2.5 mg         25      2.5 mg           26      2.5 mg         27      1.25 mg         28      2.5 mg         29      2.5 mg         30      1.25 mg            Date Details   11/16 This INR check               How to take your warfarin dose     To take:  1.25 mg Take 0.5 of a 2.5 mg tablet.    To take:  2.5 mg Take 1 of the 2.5 mg tablets.           December 2017 Details    Sun Mon Tue Wed Thu Fri Sat          1      2.5 mg         2      2.5 mg           3      2.5  mg         4      1.25 mg         5      2.5 mg         6      2.5 mg         7      1.25 mg         8      2.5 mg         9      2.5 mg           10      2.5 mg         11      1.25 mg         12      2.5 mg         13      2.5 mg         14            15               16                 17               18               19               20               21               22               23                 24               25               26               27               28               29               30                 31                      Date Details   No additional details    Date of next INR:  12/14/2017         How to take your warfarin dose     To take:  1.25 mg Take 0.5 of a 2.5 mg tablet.    To take:  2.5 mg Take 1 of the 2.5 mg tablets.

## 2017-11-16 NOTE — PATIENT INSTRUCTIONS
- A daily dose of 3000 units of Vitamin D should be sufficient. But, I will follow up with you about lab results and let you know.    - Follow up for a DEXA scan.    - Follow up with me in 6 months.     [facility]    If you have any questions regarding to your visit please contact your care team:     Team Pink:   Clinic Hours Telephone Number   Internal Medicine:  Dr. Yani Braga, NP       7am-7pm  Monday - Thursday   7am-5pm  Fridays  [phone]  (Appointment scheduling available 24/7)    Questions about your visit?  Team Line  [phone]   Urgent Care - [place] and [facility] - 11am-9pm Monday-Friday Saturday-Sunday- 9am-5pm   [place] - 5pm-9pm Monday-Friday Saturday-Sunday- 9am-5pm  [phone] - [place]   [phone] - [place]       What options do I have for visits at the clinic other than the traditional office visit?  To expand how we care for you, many of our providers are utilizing electronic visits (e-visits) and telephone visits, when medically appropriate, for interactions with their patients rather than a visit in the clinic.   We also offer nurse visits for many medical concerns. Just like any other service, we will bill your insurance company for this type of visit based on time spent on the phone with your provider. Not all insurance companies cover these visits. Please check with your medical insurance if this type of visit is covered. You will be responsible for any charges that are not paid by your insurance.      E-visits via [employer]:  generally incur a $35.00 fee.  Telephone visits:  Time spent on the phone: *charged based on time that is spent on the phone in increments of 10 minutes. Estimated cost:   5-10 mins $30.00   11-20 mins. $59.00   21-30 mins. $85.00   Use [employer] (secure email communication and access to your chart) to send your primary care provider a message or make an appointment. Ask someone on your  Team how to sign up for [employer].    For a Price Quote for your services, please call our [employer] Line at [phone].    As always, Thank you for trusting us with your health care needs!    Discharge by SAADIA COTE

## 2017-11-16 NOTE — PROGRESS NOTES
SUBJECTIVE:   Guera Peters is a 82 year old female who presents to clinic today with her  and daughter for the following health issues:      Hematuria -- Patient reports she went to the ED in May for hematuria. They did not find any dangerous abnormalities, detailed below. They also followed up with a urologist and had tests done. Nothing was found and she has not had symptoms since. We debated if any further follow up was actually necessary     Vitamin D -- The daughter states [place] 73091 units of Vitamin D is almost done and they are curious about what they should do now. We reviewed the ergocalciferol, 50,000 international unit vitamin D dose treatment protocol and how this has largely fallen out of accepted medical practice at this point     Osteoporosis -- They are also curious about Fosamax [Alendronate]. Patient is not sure how long she has been taking Fosamax. This is important and we also reviewed the role of repeat DEXA scans along with calcium and vitamin D replacement therapy     Hypertension -- She is following up with a cardiologist, Dr. Ramon Mercado, once a year. See [facility] with care everywhere   BP Readings from Last 3 Encounters:   11/16/17 128/78   04/24/17 150/78   04/07/17 122/76     Kidney Function --  GFR Estimate   Date Value Ref Range Status   04/07/2017 69 >60 mL/min/1.7m2 Final     Comment:     Non  GFR Calc   02/10/2017 >60 >60 ml/min/1.73m2 Final   01/31/2017 69 >60 mL/min/1.7m2 Final     Comment:     Non  GFR Calc     Additional Notes -- Patient notes she had her influenza vaccination at [facility] in October. She is following up with a neurologist at [facility].    ED Note 5/28/2017 --  RADIOLOGY  Ct Abdomen Pelvis Without Oral With Without Iv Contrast    Result Date: 5/28/2017  CT ABDOMEN AND PELVIS WITHOUT AND WITH IV CONTRAST 05/28/2017, 5:32 AM INDICATION: Painless hematuria. TECHNIQUE: CT  abdomen and pelvis without contrast and with IV contrast with delayed images. Multiplanar reformation images (MPR). Dose reduction techniques were used.? IV CONTRAST: Iohexol (Omni) 100 mL. COMPARISON: None. FINDINGS: LUNG BASES: Negative. ABDOMEN: 3.3 x 2.5 cm peripherally enhancing exophytic mass lesion seen arising from the inferior pole of the right kidney. Focal cortical scarring of the right upper lobe. 7 mm too small to characterize fluid attenuation exophytic left renal cyst. No hydroureteronephrosis. No obstructing renal or ureteral stone. Status post cholecystectomy. Fatty liver. Normal spleen, adrenals, and pancreas. No abdominal or retroperitoneal lymphadenopathy. PELVIS: Normal appendix. Diverticulosis without evidence of diverticulitis. Normal uterus and bladder. MUSCULOSKELETAL: Negative.     CONCLUSION: 1. 3.3 x 2.5 cm irregular fat-containing exophytic mass seen arising from the right inferior renal pole favored to represent an angiomyolipoma. 2. Small exophytic cyst seen arising from the left kidney. 3. No hydroureteronephrosis. No obstructing renal or ureteral stone. 4. Atherosclerotic vascular disease. 5. Diverticulosis without evidence of diverticulitis.     ED COURSE & MEDICAL DECISION MAKING   80 yo F, history of CAD with previous MI, atrial fibrillation (on coumadin), HTN, HLD, CKD, TIA and hypothyroidism, who presents for evaluation of painless hematuria that started this afternoon. Exam unremarkable.    UA contaminated (>100 sq epi cells) without suggestion of infection (trace LE, 5-10 WBCs and no bacteria) with large blood (>100 RBCs). Urine culture pending.    Labs otherwise remarkable for no leukocytosis (WBC 6.2) or anemia (Hb 13.4). No electrolyte derangement or renal impairment. INR therapeutic at 2.26.    Hematuria protocol CT performed and demonstrated 3.3 x 2.5cm irregular fat-containing exophytic mass arising from the right inferior pole favored to be an angiomyolipoma; small  exophytic cyst arising from the left kidney; no hydroureteronephrosis; no stones.    I spoke with Urologist on-call who agrees with outpatient follow-up and holding on antibiotics at this time. Pt comfortable with plan.    Pt discharged to home with follow-up [facility]. Return instructions provided. Pt hemodynamically stable throughout ed course.    FINAL IMPRESSION   Painless hematuria, on coumadin    Problem list and histories reviewed & adjusted, as indicated.  Additional history: as documented    Patient Active Problem List   Diagnosis     Thrombocytopenia (H)     NSTEMI (non-ST elevated myocardial infarction) (H)     S/P mitral valve repair     Pacemaker     Chronic atrial fibrillation (H)     Essential hypertension with goal blood pressure less than 140/90     Hyperlipidemia LDL goal <100     History of TIA (transient ischemic attack) and stroke     Carotid stenosis, bilateral     Acquired hypothyroidism      (normal spontaneous vaginal delivery)     Alzheimer's dementia without behavioral disturbance, unspecified timing of dementia onset     Microalbuminuria     Long-term (current) use of anticoagulants [Z79.01]     Dermatophytosis of nail     Elevated glucose     Hypovitaminosis D     Osteoporosis without current pathological fracture, unspecified osteoporosis type     Past Surgical History:   Procedure Laterality Date     ANGIOPLASTY      right leg     APPENDECTOMY       C PARTIAL EXCISION THYROID,UNILAT       EMBOLIZATION OF ANGIOMYOLIPOMA  2010      PTCA SINGLE VESSEL  90     see Care Everywhere for cardiac medical records      PTCA SINGLE VESSEL  2006    PTCA/Taxus Stents of Proximal and Mid RCA [facility] PTCA SINGLE VESSEL  2011    Successful complex percutaneous coronary intervention of the LAD using  Promus drug-eluting stents      REMOVAL OF BREAST IMPLANT       IMPLANT PACEMAKER       REPAIR PATENT FORAMEN OVALE  2007    mitral valve repair and PFO closure  (5/4/07)       REPAIR VALVE MITRAL  5/4/2007    mitral valve repair and PFO closure (5/4/07)         Social History   Substance Use Topics     Smoking status: Never Smoker     Smokeless tobacco: Never Used     Alcohol use Yes      Comment: occ, monthly      History reviewed. No pertinent family history.      Current Outpatient Prescriptions   Medication Sig Dispense Refill     Cholecalciferol (VITAMIN D-3 PO)        lisinopril (PRINIVIL/ZESTRIL) 20 MG tablet TAKE 1 TABLET BY MOUTH DAILY 90 tablet 1     levothyroxine (SYNTHROID/LEVOTHROID) 100 MCG tablet Take 1 tablet (100 mcg) by mouth daily 90 tablet 1     alendronate (FOSAMAX) 70 MG tablet TAKE 1 TABLET BY MOUTH EVERY 7 DAYS BEFORE BREAKFAST 12 tablet 1     warfarin (COUMADIN) 2.5 MG tablet Take 1 tablet (2.5 mg) by mouth daily 90 tablet 1     rivastigmine (EXELON) 3 MG capsule Take 1 capsule (3 mg) by mouth 2 times daily 180 capsule 1     carvedilol (COREG) 12.5 MG tablet Take 1 tablet (12.5 mg) by mouth 2 times daily (with meals) 180 tablet 1     nitroGLYcerin (NITROSTAT) 0.4 MG sublingual tablet Place 1 tablet (0.4 mg) under the tongue every 5 minutes as needed for chest pain 25 tablet 1     rosuvastatin (CRESTOR) 20 MG tablet TAKE 1 TABLET BY MOUTH DAILY AT BEDTIME 90 tablet 3     aspirin EC 81 MG tablet Take 81 mg by mouth       [DISCONTINUED] lisinopril (PRINIVIL/ZESTRIL) 20 MG tablet TAKE 1 TABLET BY MOUTH DAILY, NO FURTHER REFILL UNTIL APPOINTMENT, PLEASE MAKE APPOINTMENT WITHIN 30 DAYS. 30 tablet 0     [DISCONTINUED] levothyroxine (SYNTHROID/LEVOTHROID) 100 MCG tablet TAKE 1 TABLET BY MOUTH EVERY DAY 90 tablet 0     [DISCONTINUED] alendronate (FOSAMAX) 70 MG tablet TAKE 1 TABLET BY MOUTH EVERY 7 DAYS BEFORE BREAKFAST 12 tablet 0     [DISCONTINUED] warfarin (COUMADIN) 2.5 MG tablet Take 1 tablet (2.5 mg) by mouth daily 90 tablet 1     [DISCONTINUED] warfarin (COUMADIN) 5 MG tablet Take 1 tablet (5 mg) by mouth See Admin Instructions (Patient not taking:  Reported on 11/16/2017) 90 tablet 1     [DISCONTINUED] carvedilol (COREG) 12.5 MG tablet Take 1 tablet (12.5 mg) by mouth 2 times daily (with meals) 180 tablet 1     [DISCONTINUED] nitroglycerin (NITROSTAT) 0.4 MG SL tablet Place 1 tablet (0.4 mg) under the tongue every 5 minutes as needed for chest pain 25 tablet 1     Labs reviewed in EPIC      Reviewed and updated as needed this visit by clinical staffTobacco  Allergies  Meds  Med Hx  Surg Hx  Fam Hx  Soc Hx      Reviewed and updated as needed this visit by Provider         ROS:  Constitutional, HEENT, cardiovascular, pulmonary, GI, , musculoskeletal, neuro, skin, endocrine and psych systems are negative, except as otherwise noted.    This document serves as a record of the services and decisions personally performed and made by Brett Babb MD. It was created on their behalf by Nico Gonzalez, a trained medical scribe. The creation of this document is based the provider's statements to the medical scribe.  Nico Gonzalez November 16, 2017 10:55 AM    OBJECTIVE:   /78 (BP Location: Right arm, Cuff Size: Adult Regular)  Pulse 76  Temp 98.4  F (36.9  C) (Oral)  Wt 79.1 kg (174 lb 6.4 oz)  SpO2 96%  Breastfeeding? No  BMI 29.41 kg/m2  Body mass index is 29.41 kg/(m^2).  GENERAL: healthy, alert and no distress, answers all questions appropriately, appropriate grooming and affect, appears stated age   EYES: Eyes grossly normal to inspection, EOMI, conjunctivae and sclerae normal  RESP: lungs clear to auscultation - no rales, rhonchi or wheezes  CV: regular rate and rhythm, normal S1 S2, no S3 or S4, no murmur, click or rub, peripheral pulses strong  MS: no gross musculoskeletal defects noted, 1+ bilateral lower extremity edema   SKIN: no suspicious lesions or rashes to visible skin   NEURO: mentation intact and speech normal  PSYCH: mentation appears normal, affect normal/bright    Diagnostic Test Results:  Results for orders placed or performed  in visit on 11/16/17   INR point of care   Result Value Ref Range    INR Protime 2.6 (A) 0.86 - 1.14     ASSESSMENT/PLAN:     (I10) Essential hypertension with goal blood pressure less than 140/90  (primary encounter diagnosis)  Comment: controlled within acceptable limits   Plan: lisinopril (PRINIVIL/ZESTRIL) 20 MG tablet,         carvedilol (COREG) 12.5 MG tablet, Basic         metabolic panel            (E03.9) Acquired hypothyroidism  Comment: due for laboratory studies today   Plan: levothyroxine (SYNTHROID/LEVOTHROID) 100 MCG         tablet, TSH with free T4 reflex        Follow up as indicated on results     (Z86.73) History of TIA (transient ischemic attack) and stroke  Comment: continue current plan of care     Plan: warfarin (COUMADIN) 2.5 MG tablet        A neurological consultation is upcoming I am told    (I21.4) NSTEMI (non-ST elevated myocardial infarction) (H)  Comment: continue current plan of care     Plan: nitroGLYcerin (NITROSTAT) 0.4 MG sublingual         tablet        Not using this medication     (I48.2) Chronic atrial fibrillation (H)  Comment: continue current plan of care   With warfarin   Plan: as above     (D69.6) Thrombocytopenia (H)  Comment: stable phase of chronic illness   Plan: complete blood count with differential obtained     (G30.9,  F02.80) Alzheimer's dementia without behavioral disturbance, unspecified timing of dementia onset  Comment: further follow up with neurologist to be pursued per daughter   Plan: rivastigmine (EXELON) 3 MG capsule            (R80.9) Microalbuminuria  Comment:   Lab Results   Component Value Date    MICROL 63 06/03/2016     No results found for: MICROALBUMIN  Plan: recheck      (R73.09) Elevated glucose  Comment:   Plan: Basic metabolic panel, Hemoglobin A1c        Doubt clinical significance but warrants hemoglobin a1c  [ diabetes test ]     (E55.9) Hypovitaminosis D  Comment: due for recheck   Plan: Vitamin D Deficiency            (M81.0)  Osteoporosis without current pathological fracture, unspecified osteoporosis type  Comment: recheck DEXA scan   Plan: alendronate (FOSAMAX) 70 MG tablet, DX         Hip/Pelvis/Spine              Patient Instructions   - A daily dose of 3000 units of Vitamin D should be sufficient. But, I will follow up with you about lab results and let you know.    - Follow up for a DEXA scan.    - Follow up with me in 6 months.     The information in this document, created by the medical scribe for me, accurately reflects the services I personally performed and the decisions made by me. I have reviewed and approved this document for accuracy.   MD Brett Galeano MD  [facility]

## 2017-11-16 NOTE — NURSING NOTE
"Chief Complaint   Patient presents with     Recheck Medication     Refills. Discuss vitmain d3       Initial /78 (BP Location: Right arm, Cuff Size: Adult Regular)  Pulse 76  Temp 98.4  F (36.9  C) (Oral)  Wt 174 lb 6.4 oz (79.1 kg)  SpO2 96%  Breastfeeding? No  BMI 29.41 kg/m2 Estimated body mass index is 29.41 kg/(m^2) as calculated from the following:    Height as of 4/7/17: 5' 4.57\" (1.64 m).    Weight as of this encounter: 174 lb 6.4 oz (79.1 kg).  Medication Reconciliation: complete       Kary Funez CMA      "

## 2017-11-17 ENCOUNTER — TELEPHONE (OUTPATIENT)
Dept: INTERNAL MEDICINE | Facility: CLINIC | Age: 82
End: 2017-11-17

## 2017-11-17 DIAGNOSIS — M81.0 OSTEOPOROSIS WITHOUT CURRENT PATHOLOGICAL FRACTURE, UNSPECIFIED OSTEOPOROSIS TYPE: ICD-10-CM

## 2017-11-17 LAB — DEPRECATED CALCIDIOL+CALCIFEROL SERPL-MC: 58 UG/L (ref 20–75)

## 2017-11-17 RX ORDER — NITROGLYCERIN 0.4 MG/1
TABLET SUBLINGUAL
Qty: 450 TABLET | Refills: 1 | OUTPATIENT
Start: 2017-11-17

## 2017-11-17 NOTE — TELEPHONE ENCOUNTER
When was Fosamax started ? We need this information to determine how long she needs to be on this medication.    Patient forgot to give us a urine sample. Let daughter know if possible, it would be good to collect this. Both the albumin random urine quantitative and also a urine analysis to check for blood.  It's been ordered as a laboratory future order. This isn't an urgent thing and can wait, it wasn't done at her appointment.   Brett Babb MD

## 2017-11-17 NOTE — TELEPHONE ENCOUNTER
Message left for patient to return call to clinic in regards to message below    Shanel Medina MA

## 2017-11-21 NOTE — TELEPHONE ENCOUNTER
Left message for patient or patients daughter to call back in regards to message below.    Kary Funez, CMA

## 2017-11-21 NOTE — TELEPHONE ENCOUNTER
Called patient and left message on daughters voicemail to call pink team back.     Raven EVERETT CMA ([facility])

## 2017-11-27 NOTE — TELEPHONE ENCOUNTER
Called patients daughter angela and made appointment for patient tomorrow afternoon, and will call back with information on the fosamax.     Raven EVERETT CMA ([facility])

## 2017-11-28 NOTE — TELEPHONE ENCOUNTER
Patient's daughter angela called back and stated that Guera had been taking her fosamax back in august 2013.

## 2017-11-28 NOTE — TELEPHONE ENCOUNTER
"Lets plan on cessation of Fosamax (alendronate) in August 2018 unless her DEXA scan showed any severe osteoporosis and if so we could move from Fosamax (alendronate) onto a \": stronger medication\" for osteoporosis . I doubt this is going to be necessary     Brett Babb MD        "

## 2017-11-29 DIAGNOSIS — E55.9 HYPOVITAMINOSIS D: ICD-10-CM

## 2017-11-29 DIAGNOSIS — R80.9 MICROALBUMINURIA: ICD-10-CM

## 2017-11-29 DIAGNOSIS — R31.0 GROSS HEMATURIA: ICD-10-CM

## 2017-11-29 LAB
ALBUMIN UR-MCNC: ABNORMAL MG/DL
APPEARANCE UR: ABNORMAL
BACTERIA #/AREA URNS HPF: ABNORMAL /HPF
BILIRUB UR QL STRIP: ABNORMAL
COLOR UR AUTO: YELLOW
CREAT UR-MCNC: 268 MG/DL
GLUCOSE UR STRIP-MCNC: NEGATIVE MG/DL
HGB UR QL STRIP: ABNORMAL
KETONES UR STRIP-MCNC: NEGATIVE MG/DL
LEUKOCYTE ESTERASE UR QL STRIP: NEGATIVE
MICROALBUMIN UR-MCNC: 30 MG/L
MICROALBUMIN/CREAT UR: 11.01 MG/G CR (ref 0–25)
NITRATE UR QL: NEGATIVE
NON-SQ EPI CELLS #/AREA URNS LPF: ABNORMAL /LPF
PH UR STRIP: 5.5 PH (ref 5–7)
RBC #/AREA URNS AUTO: ABNORMAL /HPF
SOURCE: ABNORMAL
SP GR UR STRIP: >1.03 (ref 1–1.03)
UROBILINOGEN UR STRIP-ACNC: 0.2 EU/DL (ref 0.2–1)
WBC #/AREA URNS AUTO: ABNORMAL /HPF

## 2017-11-29 PROCEDURE — 82043 UR ALBUMIN QUANTITATIVE: CPT | Performed by: INTERNAL MEDICINE

## 2017-11-29 PROCEDURE — 81001 URINALYSIS AUTO W/SCOPE: CPT | Performed by: INTERNAL MEDICINE

## 2017-11-29 RX ORDER — ALENDRONATE SODIUM 70 MG/1
TABLET ORAL
Qty: 12 TABLET | Refills: 1 | Status: SHIPPED | OUTPATIENT
Start: 2017-11-29 | End: 2021-10-28

## 2017-11-29 NOTE — TELEPHONE ENCOUNTER
Patient's spouse, Saurav, called back  Updated him on Dr. Babb's message below  Michel Seymour RN

## 2017-11-29 NOTE — TELEPHONE ENCOUNTER
Left message on voicemail for patient to return call to RN hotline at # [phone].  Patient has Alzheimer's dementia on problem list. May need to speak with family     Michel Seymour RN

## 2017-12-06 NOTE — TELEPHONE ENCOUNTER
Daughter, Sienna, called back stating that patient was updated with a message but does not know what it was regarding.  Explained the below to Sienna.  Also updated her that patient was recently updated on lab results, no UTI, no abx needed     Michel Seymour RN

## 2017-12-07 ENCOUNTER — TELEPHONE (OUTPATIENT)
Dept: LAB | Facility: CLINIC | Age: 82
End: 2017-12-07

## 2017-12-07 DIAGNOSIS — I48.20 CHRONIC ATRIAL FIBRILLATION (H): Primary | ICD-10-CM

## 2017-12-07 NOTE — TELEPHONE ENCOUNTER
Patient coming in for lab work on Thursday, 12/14/17.       Patient is requesting an INR be drawn.        Please place future orders. Thanks

## 2017-12-08 NOTE — TELEPHONE ENCOUNTER
Patient is already enrolled in the [facility] INR clinic and Dr. Babb is listed as the responsible provider who is managing.      Routing to INR clinic    Michel Seymour RN

## 2017-12-08 NOTE — TELEPHONE ENCOUNTER
Lab order placed so patient can have INR drawn in [place]. Patient cancelled INR appointment for same day in [place].    Teri Burgess RN - [facility]

## 2017-12-12 ENCOUNTER — RECORDS - HEALTHEAST (OUTPATIENT)
Dept: ADMINISTRATIVE | Facility: OTHER | Age: 82
End: 2017-12-12

## 2017-12-14 ENCOUNTER — ANTICOAGULATION THERAPY VISIT (OUTPATIENT)
Dept: NURSING | Facility: CLINIC | Age: 82
End: 2017-12-14

## 2017-12-14 DIAGNOSIS — I48.20 CHRONIC ATRIAL FIBRILLATION (H): ICD-10-CM

## 2017-12-14 DIAGNOSIS — Z79.01 LONG-TERM (CURRENT) USE OF ANTICOAGULANTS: ICD-10-CM

## 2017-12-14 LAB — INR PPP: 2.6 (ref 0.86–1.14)

## 2017-12-14 PROCEDURE — 85610 PROTHROMBIN TIME: CPT | Performed by: INTERNAL MEDICINE

## 2017-12-14 PROCEDURE — 36415 COLL VENOUS BLD VENIPUNCTURE: CPT | Performed by: INTERNAL MEDICINE

## 2017-12-14 NOTE — MR AVS SNAPSHOT
Guera Peters   12/14/2017   Anticoagulation Therapy Visit    Description:  82 year old female   Provider:  Brett Babb MD   Department:  [facility]           INR as of 12/14/2017     Today's INR 2.60      Anticoagulation Summary as of 12/14/2017     INR goal 2.0-3.0   Today's INR 2.60   Full instructions 1.25 mg on Mon, Thu; 2.5 mg all other days   Next INR check 1/11/2018    Indications   Long-term (current) use of anticoagulants [Z79.01] [Z79.01]  Chronic atrial fibrillation (H) [I48.2]         Your next Anticoagulation Clinic appointment(s)     Jan 11, 2018 10:30 AM CST   Anticoagulation Visit with MELE ANTI COAG   [facility]    [address] [place] [id number] [phone]              Contact Numbers     [facility]  Please call [phone] to cancel and/or reschedule your appointment   Please call [phone] with any problems or questions regarding your therapy.        December 2017 Details    Sun Mon Tue Wed Thu Fri Sat          1               2                 3               4               5               6               7               8               9                 10               11               12               13               14      1.25 mg   See details      15      2.5 mg         16      2.5 mg           17      2.5 mg         18      1.25 mg         19      2.5 mg         20      2.5 mg         21      1.25 mg         22      2.5 mg         23      2.5 mg           24      2.5 mg         25      1.25 mg         26      2.5 mg         27      2.5 mg         28      1.25 mg         29      2.5 mg         30      2.5 mg           31      2.5 mg                Date Details   12/14 This INR check               How to take your warfarin dose     To take:  1.25 mg Take 0.5 of a 2.5 mg tablet.    To take:  2.5 mg Take 1 of the 2.5 mg tablets.           January 2018 Details    Sun Mon Tue Wed Thu Fri Sat      1      1.25 mg          2      2.5 mg         3      2.5 mg         4      1.25 mg         5      2.5 mg         6      2.5 mg           7      2.5 mg         8      1.25 mg         9      2.5 mg         10      2.5 mg         11            12               13                 14               15               16               17               18               19               20                 21               22               23               24               25               26               27                 28               29               30               31                   Date Details   No additional details    Date of next INR:  1/11/2018         How to take your warfarin dose     To take:  1.25 mg Take 0.5 of a 2.5 mg tablet.    To take:  2.5 mg Take 1 of the 2.5 mg tablets.

## 2017-12-14 NOTE — PROGRESS NOTES
ANTICOAGULATION FOLLOW-UP CLINIC VISIT    Patient Name:  Guera Peters  Date:  12/14/2017  Contact Type:  Telephone    SUBJECTIVE:     Patient Findings     Positives No Problem Findings           OBJECTIVE    INR   Date Value Ref Range Status   12/14/2017 2.60 (H) 0.86 - 1.14 Final       ASSESSMENT / PLAN  No question data found.  Anticoagulation Summary as of 12/14/2017     INR goal 2.0-3.0   Today's INR 2.60   Maintenance plan 1.25 mg (2.5 mg x 0.5) on Mon, Thu; 2.5 mg (2.5 mg x 1) all other days   Full instructions 1.25 mg on Mon, Thu; 2.5 mg all other days   Weekly total 15 mg   No change documented Teri Burgess RN   Plan last modified Teri Burgess RN (11/1/2017)   Next INR check 1/11/2018   Priority INR   Target end date Indefinite    Indications   Long-term (current) use of anticoagulants [Z79.01] [Z79.01]  Chronic atrial fibrillation (H) [I48.2]         Anticoagulation Episode Summary     INR check location     Preferred lab     Send INR reminders to [facility]    Comments       Anticoagulation Care Providers     Provider Role Specialty Phone number    Brett Babb MD Responsible Internal Medicine [phone]            See the Encounter Report to view Anticoagulation Flowsheet and Dosing Calendar (Go to Encounters tab in chart review, and find the Anticoagulation Therapy Visit)    Dosage adjustment made based on physician directed care plan.    Teri Burgess RN

## 2017-12-19 ENCOUNTER — OFFICE VISIT (OUTPATIENT)
Dept: FAMILY MEDICINE | Facility: CLINIC | Age: 82
End: 2017-12-19
Payer: MEDICARE

## 2017-12-19 ENCOUNTER — RADIANT APPOINTMENT (OUTPATIENT)
Dept: GENERAL RADIOLOGY | Facility: CLINIC | Age: 82
End: 2017-12-19
Attending: FAMILY MEDICINE
Payer: MEDICARE

## 2017-12-19 VITALS
HEART RATE: 60 BPM | BODY MASS INDEX: 29.16 KG/M2 | SYSTOLIC BLOOD PRESSURE: 108 MMHG | DIASTOLIC BLOOD PRESSURE: 62 MMHG | TEMPERATURE: 97.6 F | HEIGHT: 65 IN | WEIGHT: 175 LBS

## 2017-12-19 DIAGNOSIS — K57.30 DIVERTICULOSIS OF LARGE INTESTINE WITHOUT HEMORRHAGE: ICD-10-CM

## 2017-12-19 DIAGNOSIS — R82.2 BILIRUBINURIA: ICD-10-CM

## 2017-12-19 DIAGNOSIS — R10.32 ABDOMINAL PAIN, LEFT LOWER QUADRANT: Primary | ICD-10-CM

## 2017-12-19 LAB
ALBUMIN UR-MCNC: 30 MG/DL
APPEARANCE UR: ABNORMAL
BACTERIA #/AREA URNS HPF: ABNORMAL /HPF
BASOPHILS # BLD AUTO: 0 10E9/L (ref 0–0.2)
BASOPHILS NFR BLD AUTO: 0.5 %
BILIRUB UR QL STRIP: ABNORMAL
COLOR UR AUTO: YELLOW
DIFFERENTIAL METHOD BLD: NORMAL
EOSINOPHIL # BLD AUTO: 0.1 10E9/L (ref 0–0.7)
EOSINOPHIL NFR BLD AUTO: 1.4 %
ERYTHROCYTE [DISTWIDTH] IN BLOOD BY AUTOMATED COUNT: 13.9 % (ref 10–15)
GLUCOSE UR STRIP-MCNC: NEGATIVE MG/DL
HCT VFR BLD AUTO: 44.9 % (ref 35–47)
HGB BLD-MCNC: 14.3 G/DL (ref 11.7–15.7)
HGB UR QL STRIP: ABNORMAL
KETONES UR STRIP-MCNC: ABNORMAL MG/DL
LEUKOCYTE ESTERASE UR QL STRIP: NEGATIVE
LYMPHOCYTES # BLD AUTO: 1.1 10E9/L (ref 0.8–5.3)
LYMPHOCYTES NFR BLD AUTO: 19.5 %
MCH RBC QN AUTO: 28.4 PG (ref 26.5–33)
MCHC RBC AUTO-ENTMCNC: 31.8 G/DL (ref 31.5–36.5)
MCV RBC AUTO: 89 FL (ref 78–100)
MONOCYTES # BLD AUTO: 0.6 10E9/L (ref 0–1.3)
MONOCYTES NFR BLD AUTO: 10.4 %
NEUTROPHILS # BLD AUTO: 3.9 10E9/L (ref 1.6–8.3)
NEUTROPHILS NFR BLD AUTO: 68.2 %
NITRATE UR QL: NEGATIVE
NON-SQ EPI CELLS #/AREA URNS LPF: ABNORMAL /LPF
PH UR STRIP: 5.5 PH (ref 5–7)
PLATELET # BLD AUTO: 151 10E9/L (ref 150–450)
RBC # BLD AUTO: 5.03 10E12/L (ref 3.8–5.2)
RBC #/AREA URNS AUTO: ABNORMAL /HPF
SOURCE: ABNORMAL
SP GR UR STRIP: >1.03 (ref 1–1.03)
UROBILINOGEN UR STRIP-ACNC: 1 EU/DL (ref 0.2–1)
WBC # BLD AUTO: 5.7 10E9/L (ref 4–11)
WBC #/AREA URNS AUTO: ABNORMAL /HPF

## 2017-12-19 PROCEDURE — 81001 URINALYSIS AUTO W/SCOPE: CPT | Performed by: FAMILY MEDICINE

## 2017-12-19 PROCEDURE — 36415 COLL VENOUS BLD VENIPUNCTURE: CPT | Performed by: FAMILY MEDICINE

## 2017-12-19 PROCEDURE — 99214 OFFICE O/P EST MOD 30 MIN: CPT | Performed by: FAMILY MEDICINE

## 2017-12-19 PROCEDURE — 80053 COMPREHEN METABOLIC PANEL: CPT | Performed by: FAMILY MEDICINE

## 2017-12-19 PROCEDURE — 85025 COMPLETE CBC W/AUTO DIFF WBC: CPT | Performed by: FAMILY MEDICINE

## 2017-12-19 PROCEDURE — 74020 XR ABDOMEN 2 VW: CPT

## 2017-12-19 NOTE — NURSING NOTE
"Chief Complaint   Patient presents with     GI Problem       Initial /62 (BP Location: Right arm, Patient Position: Sitting, Cuff Size: Adult Regular)  Pulse 60  Temp 97.6  F (36.4  C) (Tympanic)  Ht 5' 4.57\" (1.64 m)  Wt 175 lb (79.4 kg)  BMI 29.51 kg/m2 Estimated body mass index is 29.51 kg/(m^2) as calculated from the following:    Height as of this encounter: 5' 4.57\" (1.64 m).    Weight as of this encounter: 175 lb (79.4 kg).  Medication Reconciliation: complete   Kary Ash CMA  "

## 2017-12-19 NOTE — PATIENT INSTRUCTIONS
*Abdominal Pain, Unknown Cause (Female)    The exact cause of your abdominal (stomach) pain is not certain. This does not mean that this is something to worry about, or the right tests were not done. Everyone likes to know the exact cause of the problem, but sometimes with abdominal pain, there is no clear-cut cause, and this could be a good thing. The good news is that your symptoms can be treated, and you will feel better.   Your condition does not seem serious now; however, sometimes the signs of a serious problem may take more time to appear. For this reason, it is important for you to watch for any new symptoms, problems, or worsening of your condition.  Over the next few days, the abdominal pain may come and go, or be continuous. Other common symptoms can include nausea and vomiting. Sometimes it can be difficult to tell if you feel nauseous, you may just feel bad and not associate that feeling with nausea. Constipation, diarrhea, and a fever may go along with the pain.  The pain may continue even if treated correctly over the following days. Depending on how things go, sometimes the cause can become clear and may require further or different treatment. Additional evaluations, medications, or tests may be needed.  Home care  Your health care provider may prescribe medications for pain, symptoms, or an infection.  Follow the health care provider's instructions for taking these medications.  General care    Rest until your next exam. No strenuous activities.    Try to find positions that ease discomfort. A small pillow placed on the abdomen may help relieve pain.    Something warm on your abdomen (such as a heating pad) may help, but be careful not to burn yourself.  Diet    Do not force yourself to eat, especially if having cramps, vomiting, or diarrhea.    Water is important so you do not get dehydrated. Soup may also be good. Sports drinks may also help, especially if they are not too acidic. Make sure you  don't drink sugary drinks as this can make things worse. Take liquids in small amounts. Do not guzzle them.    Caffeine sometimes makes the pain and cramping worse.    Avoid dairy products if you have vomiting or diarrhea.    Don't eat large amounts at a time. Wait a few minutes between bites.    Eat a diet low in fiber (called a low-residue diet). Foods allowed include refined breads, white rice, fruit and vegetable juices without pulp, tender meats. These foods will pass more easily through the intestine.    Avoid fried or fatty foods, dairy, alcohol and spicy foods until your symptoms go away.  Follow-up care  Follow up with your health care provider as instructed, or if your pain does not begin to improve in the next 24 hours.  When to seek medical care  Seek prompt medical care if any of the following occur:    Pain gets worse or moves to the right lower abdomen    New or worsening vomiting or diarrhea    Swelling of the abdomen    Unable to pass stool for more than three days    New fever over 101  F (38.3 C), or rising fever    Blood in vomit or bowel movements (dark red or black color)    Jaundice (yellow color of eyes and skin)    Weakness, dizziness    Chest, arm, back, neck or jaw pain    Unexpected vaginal bleeding or missed period  Call 911  Call emergency services if any of the following occur:    Trouble breathing    Confusion    Fainting or loss of consciousness    Rapid heart rate    Seizure    [id number] [facility], [address] [place] [address], [place] [address]. All rights reserved. This information is not intended as a substitute for professional medical care. Always follow your healthcare professional's instructions.        Diverticulosis    Diverticulosis means that small pouches have formed in the wall of your large intestine (colon). Most often, this problem causes no symptoms and is common as people age. But the pouches in the colon are at risk of becoming infected. When this happens, the  condition is called diverticulitis. Although most people with diverticulosis never develop diverticulitis, it is still not uncommon. Rectal bleeding can also occur and in less common situations, a type of colon inflammation called colitis.  While most people do not have symptoms, some people with diverticulosis may have:    Abdominal cramps and pain    Bloating    Constipation    Change in bowel habits  Causes  The exact cause of diverticulosis (and diverticulitis) has not been proved, but a few things are associated with the condition:    Low-fiber diet    Constipation    Lack of exercise  Your healthcare provider will talk with you about how to manage your condition. Diet changes may be all that are needed to help control diverticulosis and prevent progression to diverticulitis. If you develop diverticulitis, you will likely need other treatments.  Home care  You may be told to take fiber supplements daily. Fiber adds bulk to the stool so that it passes through the colon more easily. Stool softeners may be recommended. You may also be given medications for pain relief. Be sure to take all medications as directed.  In the past, people were told to avoid corn, nuts, and seeds. This is no longer necessary.  Follow these guidelines when caring for yourself at home:    Eat unprocessed foods that are high in fiber. Whole grains, fruits, and vegetables are good choices.    Drink 6 to 8 glasses of water every day unless your healthcare provider has you limit how much fluid you should have.    Watch for changes in your bowel movements. Tell your provider if you notice any changes.    Begin an exercise program. Ask your provider how to get started. Generally, walking is the best.    Get plenty of rest and sleep.  Follow-up care  Follow up with your healthcare provider, or as advised. Regular visits may be needed to check on your health. Sometimes special procedures such as colonoscopy, are needed after an episode of  diverticulitis or blooding. Be sure to keep all your appointments.  If a stool sample was taken, or cultures were done, you should be told if they are positive, or if your treatment needs to be changed. You can call as directed for the results.  If X-rays were done, a radiologist will look at them. You will be told if there is a change in your treatment.  If antibiotics were prescribed, be sure to finish them all.  When to seek medical advice  Call your healthcare provider right away if any of these occur:    Fever of 100.4 F (38 C) or higher, or as directed by your healthcare provider    Severe cramps in the lower left side of the abdomen or pain that is getting worse    Tenderness in the lower left side of the abdomen or worsening pain throughout the abdomen    Diarrhea or constipation that doesn't get better within 24 hours    Nausea and vomiting    Bleeding from the rectum  Call 911  Call emergency services if any of the following occur:    Trouble breathing    Confusion    Very drowsy or trouble awakening    Fainting or loss of consciousness    Rapid heart rate    Chest pain  Date Last Reviewed: 12/30/2015 2000-2017 [employer]. [address] [place] [address], [place] [address]. All rights reserved. This information is not intended as a substitute for professional medical care. Always follow your healthcare professional's instructions.

## 2017-12-19 NOTE — PROGRESS NOTES
SUBJECTIVE:   Guera Peters is a 82 year old female who presents to clinic today for the following health issues:      ABDOMINAL   PAIN     Onset: a week    Description:   Character: Fullness and bloating  Location: left lower quadrant  Radiation: None    Intensity: moderate and constant    Progression of Symptoms:  improving    Accompanying Signs & Symptoms:  Fever/Chills?: no   Gas/Bloating: YES  Nausea: no   Vomitting: no   Diarrhea?: YES- a little this morning  Constipation:no   Dysuria or Hematuria: no    History:   Trauma: no   Previous similar pain: YES- Did have an episode of this spring 2017 and came in. Nothing was found.    Previous tests done: CT    Precipitating factors:   Does the pain change with:     Food: no      BM: no     Urination: no     Alleviating factors:  none    Therapies Tried and outcome: none.    LMP:  not applicable         In addition to the above patient's history is provided by herself and her daughter Sienna who accompanies her today include left lower quadrant abdominal pain for the last week or so.  Came on gradually.  Mild she had a similar episode in April 2017 that was worked up with CT scans that showed diverticulosis but no diverticulitis.  Things subsequently improved over the course of about 2 weeks.  He was seen by a internist as well as a urologist.  No specific etiology was found.  Pain now is generally in the left lower quadrant.  It has just been there for the last week or so.  Appetite slightly decreased.  It is a 5 out of 10.  It is fairly constant.  No stool changes.  No new medications or travel.  No urinary symptoms.  No trauma.  No skin changes.  She is has slightly decreased energy level.  There is no change in stool caliber.  No changes to her diet.  Her daughter is clearly worried about this as well.  Patient lives independently at home with her  in [place].  She has not tried anything for this at home.  She denies any dysuria or hematuria  urgency or frequency.  There is no fevers or chills or night sweats.  The above information obtained by my medical assistant was reviewed and updated.  Patient denies depressed mood or anhedonia.  She is generally still active though less so since this started.    Problem list and histories reviewed & adjusted, as indicated.  Additional history: as documented    Patient Active Problem List   Diagnosis     Thrombocytopenia (H)     NSTEMI (non-ST elevated myocardial infarction) (H)     S/P mitral valve repair     Pacemaker     Chronic atrial fibrillation (H)     Essential hypertension with goal blood pressure less than 140/90     Hyperlipidemia LDL goal <100     History of TIA (transient ischemic attack) and stroke     Carotid stenosis, bilateral     Acquired hypothyroidism      (normal spontaneous vaginal delivery)     Alzheimer's dementia without behavioral disturbance, unspecified timing of dementia onset     Microalbuminuria     Long-term (current) use of anticoagulants [Z79.01]     Dermatophytosis of nail     Elevated glucose     Hypovitaminosis D     Osteoporosis without current pathological fracture, unspecified osteoporosis type     Diverticulosis of large intestine without hemorrhage     Past Surgical History:   Procedure Laterality Date     ANGIOPLASTY      right leg     APPENDECTOMY       C PARTIAL EXCISION THYROID,UNILAT       EMBOLIZATION OF ANGIOMYOLIPOMA  2010     HC PTCA SINGLE VESSEL  90     see Care Everywhere for cardiac medical records     HC PTCA SINGLE VESSEL  2006    PTCA/Taxus Stents of Proximal and Mid RCA [place]     HC PTCA SINGLE VESSEL  2011    Successful complex percutaneous coronary intervention of the LAD using  Promus drug-eluting stents     HC REMOVAL OF BREAST IMPLANT       IMPLANT PACEMAKER       REPAIR PATENT FORAMEN OVALE  2007    mitral valve repair and PFO closure (07)       REPAIR VALVE MITRAL  2007    mitral valve repair and PFO closure (07)    "      Social History   Substance Use Topics     Smoking status: Never Smoker     Smokeless tobacco: Never Used     Alcohol use Yes      Comment: occ, monthly      History reviewed. No pertinent family history.          Reviewed and updated as needed this visit by clinical staff  Tobacco  Allergies  Meds  Problems  Med Hx  Surg Hx  Fam Hx  Soc Hx        Reviewed and updated as needed this visit by Provider  Allergies  Meds  Problems         ROS:  As above.    OBJECTIVE:     /62 (BP Location: Right arm, Patient Position: Sitting, Cuff Size: Adult Regular)  Pulse 60  Temp 97.6  F (36.4  C) (Tympanic)  Ht 5' 4.57\" (1.64 m)  Wt 175 lb (79.4 kg)  BMI 29.51 kg/m2  Body mass index is 29.51 kg/(m^2).  GENERAL: healthy, alert and no distress  NECK: no adenopathy, no asymmetry, masses, or scars and thyroid normal to palpation  RESP: lungs clear to auscultation - no rales, rhonchi or wheezes  CV: regular rate and rhythm, normal S1 S2, no S3 or S4, no murmur, click or rub, no peripheral edema and peripheral pulses strong  MS: no gross musculoskeletal defects noted, no edema  SKIN: no suspicious lesions or rashes  PSYCH: mentation appears normal, affect normal/bright  Abdomen is soft with normoactive bowel sounds.  No hepatosplenomegaly detected.  There is tenderness to palpation deeply in both right and lower left quadrants.  No rebound no guarding or palpable masses.  Oropharynx is normal.  Neck is supple.  No adenopathy noted.    Diagnostic Test Results:  CBC, UA UC, complete metabolic panel and abdominal x-ray are pending.    ASSESSMENT/PLAN:         ICD-10-CM    1. Abdominal pain, left lower quadrant R10.32 CBC with platelets differential     Comprehensive metabolic panel     UA reflex to Microscopic and Culture     XR Abdomen 2 Views     Urine Microscopic   2. Diverticulosis of large intestine without hemorrhage K57.30    3. 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  axr negative except some air " fluid levels in right lower quadrant.  No other signs of obstruction.  Advised increased fiber.  Watch symptoms.  Follow-up 7 days if not better, sooner if worse.   May need CT afterwards if symptoms do not resolve.      Will need recheck of urine also.      Vicky Sparrow MD  [facility]

## 2017-12-19 NOTE — MR AVS SNAPSHOT
After Visit Summary   12/19/2017    Guera Peters    MRN: [id number]           Patient Information     Date Of Birth          1935        Visit Information        Provider Department      12/19/2017 1:40 PM Vicky Sparrow MD [facility]        Today's Diagnoses     Abdominal pain, left lower quadrant    -  1    Diverticulosis of large intestine without hemorrhage          Care Instructions      *Abdominal Pain, Unknown Cause (Female)    The exact cause of your abdominal (stomach) pain is not certain. This does not mean that this is something to worry about, or the right tests were not done. Everyone likes to know the exact cause of the problem, but sometimes with abdominal pain, there is no clear-cut cause, and this could be a good thing. The good news is that your symptoms can be treated, and you will feel better.   Your condition does not seem serious now; however, sometimes the signs of a serious problem may take more time to appear. For this reason, it is important for you to watch for any new symptoms, problems, or worsening of your condition.  Over the next few days, the abdominal pain may come and go, or be continuous. Other common symptoms can include nausea and vomiting. Sometimes it can be difficult to tell if you feel nauseous, you may just feel bad and not associate that feeling with nausea. Constipation, diarrhea, and a fever may go along with the pain.  The pain may continue even if treated correctly over the following days. Depending on how things go, sometimes the cause can become clear and may require further or different treatment. Additional evaluations, medications, or tests may be needed.  Home care  Your health care provider may prescribe medications for pain, symptoms, or an infection.  Follow the health care provider's instructions for taking these medications.  General care    Rest until your next exam. No strenuous activities.    Try to find positions  that ease discomfort. A small pillow placed on the abdomen may help relieve pain.    Something warm on your abdomen (such as a heating pad) may help, but be careful not to burn yourself.  Diet    Do not force yourself to eat, especially if having cramps, vomiting, or diarrhea.    Water is important so you do not get dehydrated. Soup may also be good. Sports drinks may also help, especially if they are not too acidic. Make sure you don't drink sugary drinks as this can make things worse. Take liquids in small amounts. Do not guzzle them.    Caffeine sometimes makes the pain and cramping worse.    Avoid dairy products if you have vomiting or diarrhea.    Don't eat large amounts at a time. Wait a few minutes between bites.    Eat a diet low in fiber (called a low-residue diet). Foods allowed include refined breads, white rice, fruit and vegetable juices without pulp, tender meats. These foods will pass more easily through the intestine.    Avoid fried or fatty foods, dairy, alcohol and spicy foods until your symptoms go away.  Follow-up care  Follow up with your health care provider as instructed, or if your pain does not begin to improve in the next 24 hours.  When to seek medical care  Seek prompt medical care if any of the following occur:    Pain gets worse or moves to the right lower abdomen    New or worsening vomiting or diarrhea    Swelling of the abdomen    Unable to pass stool for more than three days    New fever over 101  F (38.3 C), or rising fever    Blood in vomit or bowel movements (dark red or black color)    Jaundice (yellow color of eyes and skin)    Weakness, dizziness    Chest, arm, back, neck or jaw pain    Unexpected vaginal bleeding or missed period  Call 911  Call emergency services if any of the following occur:    Trouble breathing    Confusion    Fainting or loss of consciousness    Rapid heart rate    Seizure    [id number] Timothy Saldana, [address] [facility], [place] [address]. All  rights reserved. This information is not intended as a substitute for professional medical care. Always follow your healthcare professional's instructions.        Diverticulosis    Diverticulosis means that small pouches have formed in the wall of your large intestine (colon). Most often, this problem causes no symptoms and is common as people age. But the pouches in the colon are at risk of becoming infected. When this happens, the condition is called diverticulitis. Although most people with diverticulosis never develop diverticulitis, it is still not uncommon. Rectal bleeding can also occur and in less common situations, a type of colon inflammation called colitis.  While most people do not have symptoms, some people with diverticulosis may have:    Abdominal cramps and pain    Bloating    Constipation    Change in bowel habits  Causes  The exact cause of diverticulosis (and diverticulitis) has not been proved, but a few things are associated with the condition:    Low-fiber diet    Constipation    Lack of exercise  Your healthcare provider will talk with you about how to manage your condition. Diet changes may be all that are needed to help control diverticulosis and prevent progression to diverticulitis. If you develop diverticulitis, you will likely need other treatments.  Home care  You may be told to take fiber supplements daily. Fiber adds bulk to the stool so that it passes through the colon more easily. Stool softeners may be recommended. You may also be given medications for pain relief. Be sure to take all medications as directed.  In the past, people were told to avoid corn, nuts, and seeds. This is no longer necessary.  Follow these guidelines when caring for yourself at home:    Eat unprocessed foods that are high in fiber. Whole grains, fruits, and vegetables are good choices.    Drink 6 to 8 glasses of water every day unless your healthcare provider has you limit how much fluid you should  have.    Watch for changes in your bowel movements. Tell your provider if you notice any changes.    Begin an exercise program. Ask your provider how to get started. Generally, walking is the best.    Get plenty of rest and sleep.  Follow-up care  Follow up with your healthcare provider, or as advised. Regular visits may be needed to check on your health. Sometimes special procedures such as colonoscopy, are needed after an episode of diverticulitis or blooding. Be sure to keep all your appointments.  If a stool sample was taken, or cultures were done, you should be told if they are positive, or if your treatment needs to be changed. You can call as directed for the results.  If X-rays were done, a radiologist will look at them. You will be told if there is a change in your treatment.  If antibiotics were prescribed, be sure to finish them all.  When to seek medical advice  Call your healthcare provider right away if any of these occur:    Fever of 100.4 F (38 C) or higher, or as directed by your healthcare provider    Severe cramps in the lower left side of the abdomen or pain that is getting worse    Tenderness in the lower left side of the abdomen or worsening pain throughout the abdomen    Diarrhea or constipation that doesn't get better within 24 hours    Nausea and vomiting    Bleeding from the rectum  Call 911  Call emergency services if any of the following occur:    Trouble breathing    Confusion    Very drowsy or trouble awakening    Fainting or loss of consciousness    Rapid heart rate    Chest pain  Date Last Reviewed: 12/30/2015 2000-2017 [employer] [address] [place] [id number]. All rights reserved. This information is not intended as a substitute for professional medical care. Always follow your healthcare professional's instructions.                Follow-ups after your visit        Your next 10 appointments already scheduled     Jan 11, 2018 10:30 AM CST  "  Anticoagulation Visit with MELE ANTI COAG   [facility] ([facility])    [address]  [place] [address] [phone]            May 17, 2018 10:30 AM CDT   Office Visit with Brett Babb MD   [facility] ([facility])    [address] [place] [address] [phone]           Bring a current list of meds and any records pertaining to this visit. For Physicals, please bring immunization records and any forms needing to be filled out. Please arrive 10 minutes early to complete paperwork.              Who to contact     Normal or non-critical lab and imaging results will be communicated to you by [employer], letter or phone within 4 business days after the clinic has received the results. If you do not hear from us within 7 days, please contact the clinic through [employer] or phone. If you have a critical or abnormal lab result, we will notify you by phone as soon as possible.  Submit refill requests through [employer] or call your pharmacy and they will forward the refill request to us. Please allow 3 business days for your refill to be completed.          If you need to speak with a  for additional information , please call: [phone]           Additional Information About Your Visit        [employer] Information     [employer] gives you secure access to your electronic health record. If you see a primary care provider, you can also send messages to your care team and make appointments. If you have questions, please call your primary care clinic.  If you do not have a primary care provider, please call [phone] and they will assist you.        Care EveryWhere ID     This is your Care EveryWhere ID. This could be used by other organizations to access your [place] medical records  [id number]        Your Vitals Were     Pulse Temperature Height BMI (Body Mass Index)          60 97.6  F (36.4  C) (Tympanic) 5' 4.57\" (1.64 m) " 29.51 kg/m2         Blood Pressure from Last 3 Encounters:   12/19/17 108/62   11/16/17 128/78   04/24/17 150/78    Weight from Last 3 Encounters:   12/19/17 175 lb (79.4 kg)   11/16/17 174 lb 6.4 oz (79.1 kg)   04/24/17 181 lb 9.6 oz (82.4 kg)              We Performed the Following     CBC with platelets differential     Comprehensive metabolic panel     UA reflex to Microscopic and Culture     Urine Microscopic     XR Abdomen 2 Views        Primary Care Provider Office Phone # Fax #    Brett Babb [phone] [address] [facility] [address]        Equal Access to Services     BRIAN TOUSSAINT : Hadii heidy lobo hadasho Soyumi, waaxda luqadaha, qaybta kaalmada adeegyada, danny newell . So [facility] [phone].    ATENCIÓN: Si habla español, tiene a shane disposición servicios gratuitos de asistencia lingüística. Llame al [phone].    We comply with applicable federal civil rights laws and [place] laws. We do not discriminate on the basis of race, color, national origin, age, disability, sex, sexual orientation, or gender identity.            Thank you!     Thank you for choosing [facility]  for your care. Our goal is always to provide you with excellent care. Hearing back from our patients is one way we can continue to improve our services. Please take a few minutes to complete the written survey that you may receive in the mail after your visit with us. Thank you!             Your Updated Medication List - Protect others around you: Learn how to safely use, store and throw away your medicines at www.disposemymeds.org.          This list is accurate as of: 12/19/17  2:45 PM.  Always use your most recent med list.                   Brand Name Dispense Instructions for use Diagnosis    alendronate 70 MG tablet    FOSAMAX    12 tablet    TAKE 1 TABLET BY MOUTH EVERY 7 DAYS BEFORE BREAKFAST. Plan on discontinuing this in August 2018 unless Dexa  scan shows otherwise    Osteoporosis without current pathological fracture, unspecified osteoporosis type       aspirin EC 81 MG EC tablet      Take 81 mg by mouth        carvedilol 12.5 MG tablet    COREG    180 tablet    Take 1 tablet (12.5 mg) by mouth 2 times daily (with meals)    Essential hypertension with goal blood pressure less than 140/90       levothyroxine 100 MCG tablet    SYNTHROID/LEVOTHROID    90 tablet    Take 1 tablet (100 mcg) by mouth daily    Acquired hypothyroidism       lisinopril 20 MG tablet    PRINIVIL/ZESTRIL    90 tablet    TAKE 1 TABLET BY MOUTH DAILY    Essential hypertension with goal blood pressure less than 140/90       nitroGLYcerin 0.4 MG sublingual tablet    NITROSTAT    25 tablet    Place 1 tablet (0.4 mg) under the tongue every 5 minutes as needed for chest pain    NSTEMI (non-ST elevated myocardial infarction) (H)       rivastigmine 3 MG capsule    EXELON    180 capsule    Take 1 capsule (3 mg) by mouth 2 times daily    Alzheimer's dementia without behavioral disturbance, unspecified timing of dementia onset       rosuvastatin 20 MG tablet    CRESTOR    90 tablet    TAKE 1 TABLET BY MOUTH DAILY AT BEDTIME    Hyperlipidemia LDL goal <100       VITAMIN D-3 PO       Hypovitaminosis D, Osteoporosis without current pathological fracture, unspecified osteoporosis type       warfarin 2.5 MG tablet    COUMADIN    90 tablet    Take 1 tablet (2.5 mg) by mouth daily    History of TIA (transient ischemic attack) and stroke, Chronic atrial fibrillation (H)

## 2017-12-20 LAB
ALBUMIN SERPL-MCNC: 3.9 G/DL (ref 3.4–5)
ALP SERPL-CCNC: 105 U/L (ref 40–150)
ALT SERPL W P-5'-P-CCNC: 24 U/L (ref 0–50)
ANION GAP SERPL CALCULATED.3IONS-SCNC: 5 MMOL/L (ref 3–14)
AST SERPL W P-5'-P-CCNC: 18 U/L (ref 0–45)
BILIRUB SERPL-MCNC: 1.2 MG/DL (ref 0.2–1.3)
BUN SERPL-MCNC: 22 MG/DL (ref 7–30)
CALCIUM SERPL-MCNC: 9.4 MG/DL (ref 8.5–10.1)
CHLORIDE SERPL-SCNC: 106 MMOL/L (ref 94–109)
CO2 SERPL-SCNC: 29 MMOL/L (ref 20–32)
CREAT SERPL-MCNC: 0.78 MG/DL (ref 0.52–1.04)
GFR SERPL CREATININE-BSD FRML MDRD: 71 ML/MIN/1.7M2
GLUCOSE SERPL-MCNC: 90 MG/DL (ref 70–99)
POTASSIUM SERPL-SCNC: 4 MMOL/L (ref 3.4–5.3)
PROT SERPL-MCNC: 7.4 G/DL (ref 6.8–8.8)
SODIUM SERPL-SCNC: 140 MMOL/L (ref 133–144)

## 2017-12-26 ENCOUNTER — COMMUNICATION - HEALTHEAST (OUTPATIENT)
Dept: FAMILY MEDICINE | Facility: CLINIC | Age: 82
End: 2017-12-26

## 2017-12-27 ENCOUNTER — NURSE TRIAGE (OUTPATIENT)
Dept: NURSING | Facility: CLINIC | Age: 82
End: 2017-12-27

## 2017-12-27 NOTE — TELEPHONE ENCOUNTER
"  Reason for Disposition    Age > 60 years    Additional Information    Negative: [1] SEVERE pain (e.g., excruciating) AND [2] present > 1 hour    Negative: [1] SEVERE pain AND [2] age > 60    Negative: [1] Vomiting AND [2] contains red blood or black (\"coffee ground\") material  (Exception: few red streaks in vomit that only happened once)    Negative: Blood in bowel movements   (Exception: blood on surface of BM with constipation)    Negative: Black or tarry bowel movements  (Exception: chronic-unchanged  black-grey bowel movements AND is taking iron pills or Pepto-bismol)    Protocols used: ABDOMINAL PAIN - FEMALE-ADULT-    "

## 2017-12-30 DIAGNOSIS — Z86.73 HISTORY OF TIA (TRANSIENT ISCHEMIC ATTACK) AND STROKE: ICD-10-CM

## 2018-01-02 RX ORDER — WARFARIN SODIUM 2.5 MG/1
TABLET ORAL
Qty: 90 TABLET | Refills: 0 | Status: SHIPPED | OUTPATIENT
Start: 2018-01-02 | End: 2022-05-12 | Stop reason: DRUGHIGH

## 2018-01-02 NOTE — TELEPHONE ENCOUNTER
Prescription approved per [facility] Refill Protocol.    Marva López RN  [facility]

## 2018-01-09 ENCOUNTER — ANTICOAGULATION THERAPY VISIT (OUTPATIENT)
Dept: NURSING | Facility: CLINIC | Age: 83
End: 2018-01-09

## 2018-01-09 ENCOUNTER — OFFICE VISIT (OUTPATIENT)
Dept: INTERNAL MEDICINE | Facility: CLINIC | Age: 83
End: 2018-01-09
Payer: MEDICARE

## 2018-01-09 VITALS
TEMPERATURE: 96.4 F | DIASTOLIC BLOOD PRESSURE: 62 MMHG | SYSTOLIC BLOOD PRESSURE: 112 MMHG | WEIGHT: 169 LBS | BODY MASS INDEX: 28.5 KG/M2 | HEART RATE: 70 BPM | OXYGEN SATURATION: 96 %

## 2018-01-09 DIAGNOSIS — R10.9 ABDOMINAL DISCOMFORT: ICD-10-CM

## 2018-01-09 DIAGNOSIS — Z79.01 LONG-TERM (CURRENT) USE OF ANTICOAGULANTS: ICD-10-CM

## 2018-01-09 DIAGNOSIS — I48.20 CHRONIC ATRIAL FIBRILLATION (H): ICD-10-CM

## 2018-01-09 DIAGNOSIS — R63.4 WEIGHT LOSS: Primary | ICD-10-CM

## 2018-01-09 LAB
CRP SERPL-MCNC: <2.9 MG/L (ref 0–8)
ERYTHROCYTE [SEDIMENTATION RATE] IN BLOOD BY WESTERGREN METHOD: 10 MM/H (ref 0–30)
INR PPP: 3.7 (ref 0.86–1.14)

## 2018-01-09 PROCEDURE — 36415 COLL VENOUS BLD VENIPUNCTURE: CPT | Performed by: INTERNAL MEDICINE

## 2018-01-09 PROCEDURE — 99214 OFFICE O/P EST MOD 30 MIN: CPT | Performed by: INTERNAL MEDICINE

## 2018-01-09 PROCEDURE — 85610 PROTHROMBIN TIME: CPT | Performed by: INTERNAL MEDICINE

## 2018-01-09 PROCEDURE — 86140 C-REACTIVE PROTEIN: CPT | Performed by: INTERNAL MEDICINE

## 2018-01-09 PROCEDURE — 85652 RBC SED RATE AUTOMATED: CPT | Performed by: INTERNAL MEDICINE

## 2018-01-09 NOTE — MR AVS SNAPSHOT
Guera Peters   1/9/2018   Anticoagulation Therapy Visit    Description:  82 year old female   Provider:  Brett Babb MD   Department:  [facility]           INR as of 1/9/2018     Today's INR 3.70!      Anticoagulation Summary as of 1/9/2018     INR goal 2.0-3.0   Today's INR 3.70!   Full instructions 1/9: Hold; 1/11: 2.5 mg; 1/15: 2.5 mg; Otherwise 1.25 mg on Mon, Thu; 2.5 mg all other days   Next INR check 1/16/2018    Indications   Long-term (current) use of anticoagulants [Z79.01] [Z79.01]  Chronic atrial fibrillation (H) [I48.2]         Your next Anticoagulation Clinic appointment(s)     Jan 11, 2018 10:30 AM CST   Anticoagulation Visit with MELE ANTI COAG   [facility] ([facility])    [address] [place] [address] [phone]            Jan 16, 2018 12:00 PM CST   Anticoagulation Visit with MELE ANTI COAG   [facility] ([facility])    [address] [place], [place] [address] [address] [phone]              Contact Numbers     [facility]  Please call [phone] to cancel and/or reschedule your appointment   Please call [phone] with any problems or questions regarding your therapy.        January 2018 Details    Sun Mon Tue Wed Thu Fri Sat      1               2               3               4               5               6                 7               8               9      Hold   See details      10      2.5 mg         11      2.5 mg         12      2.5 mg         13      2.5 mg           14      2.5 mg         15      2.5 mg         16            17               18               19               20                 21               22               23               24               25               26               27                 28               29               30               31                   Date Details   01/09 This INR check       Date of next INR:  1/16/2018         How to take your warfarin dose      To take:  2.5 mg Take 1 of the 2.5 mg tablets.    Hold Do not take your warfarin dose. See the Details table to the right for additional instructions.

## 2018-01-09 NOTE — MR AVS SNAPSHOT
After Visit Summary   1/9/2018    Guera Peters    MRN: [id number]           Patient Information     Date Of Birth          1935        Visit Information        Provider Department      1/9/2018 2:50 PM Brett Babb MD [facility]        Today's Diagnoses     Weight loss    -  1    Abdominal discomfort        Long-term (current) use of anticoagulants [Z79.01]          Care Instructions    - Watch this over the next month and let me know.    - I will follow up with you about lab results.     [facility]    If you have any questions regarding to your visit please contact your care team:     Team Pink:   Clinic Hours Telephone Number   Internal Medicine:  Dr. Yani Braga NP       7am-7pm  Monday - Thursday   7am-5pm  Fridays  [phone]  (Appointment scheduling available 24/7)    Questions about your visit?  Team Line  [phone]   Urgent Care - [place] - 11am-9pm Monday-Friday Saturday-Sunday- 9am-5pm   [place] - 5pm-9pm Monday-Friday Saturday-Sunday- 9am-5pm  [phone] - [place]   [phone] - [place]       What options do I have for visits at the clinic other than the traditional office visit?  To expand how we care for you, many of our providers are utilizing electronic visits (e-visits) and telephone visits, when medically appropriate, for interactions with their patients rather than a visit in the clinic.   We also offer nurse visits for many medical concerns. Just like any other service, we will bill your insurance company for this type of visit based on time spent on the phone with your provider. Not all insurance companies cover these visits. Please check with your medical insurance if this type of visit is covered. You will be responsible for any charges that are not paid by your insurance.      E-visits via [employer]he:  generally incur a $35.00 fee.  Telephone visits:  Time  spent on the phone: *charged based on time that is spent on the phone in increments of 10 minutes. Estimated cost:   5-10 mins $30.00   11-20 mins. $59.00   21-30 mins. $85.00   Use PlanetHShart (secure email communication and access to your chart) to send your primary care provider a message or make an appointment. Ask someone on your Team how to sign up for OnForce.    For a Price Quote for your services, please call our [employer] Price Line at [phone].    As always, Thank you for trusting us with your health care needs!  Discharged By:  SAADIA LOZOYA            Follow-ups after your visit        Your next 10 appointments already scheduled     Jan 11, 2018 10:30 AM CST   Anticoagulation Visit with FZ ANTI COASARAI   [facility] ([facility])    [address] [place] [id number] [phone]            May 17, 2018 10:30 AM CDT   Office Visit with Brett Babb MD   [facility] ([address] [address]   [phone]           Bring a current list of meds and any records pertaining to this visit. For Physicals, please bring immunization records and any forms needing to be filled out. Please arrive 10 minutes early to complete paperwork.              Who to contact     If you have questions or need follow up information about today's clinic visit or your schedule please contact [facility] directly at [phone].  Normal or non-critical lab and imaging results will be communicated to you by MyChart, letter or phone within 4 business days after the clinic has received the results. If you do not hear from us within 7 days, please contact the clinic through PlanetHShart or phone. If you have a critical or abnormal lab result, we will notify you by phone as soon as possible.  Submit refill requests through OnForce or call your pharmacy and they will forward the refill request to us. Please allow 3 business days for your  refill to be completed.          Additional Information About Your Visit        MyChart Information     [employer] and technology gives you secure access to your electronic health record. If you see a primary care provider, you can also send messages to your care team and make appointments. If you have questions, please call your primary care clinic.  If you do not have a primary care provider, please call [phone] and they will assist you.        Care EveryWhere ID     This is your Care EveryWhere ID. This could be used by other organizations to access your [facility] medical records  [id number]        Your Vitals Were     Pulse Temperature Pulse Oximetry BMI (Body Mass Index)          70 96.4  F (35.8  C) (Oral) 96% 28.5 kg/m2         Blood Pressure from Last 3 Encounters:   01/09/18 112/62   12/19/17 108/62   11/16/17 128/78    Weight from Last 3 Encounters:   01/09/18 169 lb (76.7 kg)   12/19/17 175 lb (79.4 kg)   11/16/17 174 lb 6.4 oz (79.1 kg)              We Performed the Following     CRP inflammation     Erythrocyte sedimentation rate auto     INR        Primary Care Provider Office Phone # Fax #    Brett Babb [phone]       [address] [facility] [address]        Equal Access to Services     BRIAN TOUSSAINT : Hadii heidy ku hadasho Soomaali, waaxda luqadaha, qaybta kaalmada adeegyada, waxay idiin haylakeshian yemi marshall. So [facility] [phone].    ATENCIÓN: Si habla español, tiene a shane disposición servicios gratuitos de asistencia lingüística. Llame al [phone].    We comply with applicable federal civil rights laws and [place] laws. We do not discriminate on the basis of race, color, national origin, age, disability, sex, sexual orientation, or gender identity.            Thank you!     Thank you for choosing [facility]  for your care. Our goal is always to provide you with excellent care. Hearing back from our patients is one way we can continue to improve our  services. Please take a few minutes to complete the written survey that you may receive in the mail after your visit with us. Thank you!             Your Updated Medication List - Protect others around you: Learn how to safely use, store and throw away your medicines at www.disposemymeds.org.          This list is accurate as of: 1/9/18  3:30 PM.  Always use your most recent med list.                   Brand Name Dispense Instructions for use Diagnosis    alendronate 70 MG tablet    FOSAMAX    12 tablet    TAKE 1 TABLET BY MOUTH EVERY 7 DAYS BEFORE BREAKFAST. Plan on discontinuing this in August 2018 unless Dexa scan shows otherwise    Osteoporosis without current pathological fracture, unspecified osteoporosis type       aspirin EC 81 MG EC tablet      Take 81 mg by mouth        carvedilol 12.5 MG tablet    COREG    180 tablet    Take 1 tablet (12.5 mg) by mouth 2 times daily (with meals)    Essential hypertension with goal blood pressure less than 140/90       levothyroxine 100 MCG tablet    SYNTHROID/LEVOTHROID    90 tablet    Take 1 tablet (100 mcg) by mouth daily    Acquired hypothyroidism       lisinopril 20 MG tablet    PRINIVIL/ZESTRIL    90 tablet    TAKE 1 TABLET BY MOUTH DAILY    Essential hypertension with goal blood pressure less than 140/90       nitroGLYcerin 0.4 MG sublingual tablet    NITROSTAT    25 tablet    Place 1 tablet (0.4 mg) under the tongue every 5 minutes as needed for chest pain    NSTEMI (non-ST elevated myocardial infarction) (H)       rivastigmine 3 MG capsule    EXELON    180 capsule    Take 1 capsule (3 mg) by mouth 2 times daily    Alzheimer's dementia without behavioral disturbance, unspecified timing of dementia onset       rosuvastatin 20 MG tablet    CRESTOR    90 tablet    TAKE 1 TABLET BY MOUTH DAILY AT BEDTIME    Hyperlipidemia LDL goal <100       VITAMIN D-3 PO       Hypovitaminosis D, Osteoporosis without current pathological fracture, unspecified osteoporosis type       *  warfarin 2.5 MG tablet    COUMADIN    90 tablet    Take 1 tablet (2.5 mg) by mouth daily    History of TIA (transient ischemic attack) and stroke, Chronic atrial fibrillation (H)       * warfarin 2.5 MG tablet    COUMADIN    90 tablet    Take 1.25mg (1/2 tab) Mondays and Thursdays and 2.5mg (1 tab) all other days    History of TIA (transient ischemic attack) and stroke       * Notice:  This list has 2 medication(s) that are the same as other medications prescribed for you. Read the directions carefully, and ask your doctor or other care provider to review them with you.

## 2018-01-09 NOTE — PATIENT INSTRUCTIONS
- Watch this over the next month and let me know.    - I will follow up with you about lab results.     [facility]    If you have any questions regarding to your visit please contact your care team:     Team Pink:   Clinic Hours Telephone Number   Internal Medicine:  Dr. Yani Braga, NP       7am-7pm  Monday - Thursday   7am-5pm  Fridays  [phone]  (Appointment scheduling available 24/7)    Questions about your visit?  Team Line  [phone]   Urgent Care - [place] - 11am-9pm Monday-Friday Saturday-Sunday- 9am-5pm   [place] - 5pm-9pm Monday-Friday Saturday-Sunday- 9am-5pm  [phone] - [place]   [phone] - [place]       What options do I have for visits at the clinic other than the traditional office visit?  To expand how we care for you, many of our providers are utilizing electronic visits (e-visits) and telephone visits, when medically appropriate, for interactions with their patients rather than a visit in the clinic.   We also offer nurse visits for many medical concerns. Just like any other service, we will bill your insurance company for this type of visit based on time spent on the phone with your provider. Not all insurance companies cover these visits. Please check with your medical insurance if this type of visit is covered. You will be responsible for any charges that are not paid by your insurance.      E-visits via American Board of Addiction Medicine (ABAM):  generally incur a $35.00 fee.  Telephone visits:  Time spent on the phone: *charged based on time that is spent on the phone in increments of 10 minutes. Estimated cost:   5-10 mins $30.00   11-20 mins. $59.00   21-30 mins. $85.00   Use [email or web address] (secure email communication and access to your chart) to send your primary care provider a message or make an appointment. Ask someone on your Team how to sign up for American Board of Addiction Medicine (ABAM).    For a Price Quote for your services, please call our Consumer  [employer] Line at [phone].    As always, Thank you for trusting us with your health care needs!  Discharged By:  SAADIA LOZOYA

## 2018-01-09 NOTE — NURSING NOTE
"Chief Complaint   Patient presents with     RECHECK       Initial /62  Pulse 70  Temp 96.4  F (35.8  C) (Oral)  Wt 169 lb (76.7 kg)  SpO2 96%  BMI 28.5 kg/m2 Estimated body mass index is 28.5 kg/(m^2) as calculated from the following:    Height as of 12/19/17: 5' 4.57\" (1.64 m).    Weight as of this encounter: 169 lb (76.7 kg).  Medication Reconciliation: complete   Sraah MONROY MA      "

## 2018-01-09 NOTE — PROGRESS NOTES
SUBJECTIVE:   Guera Peters is a 82 year old female who presents to clinic today with her  and daughter for the following health issues:       Weight loss  Abdominal discomfort  Long-term (current) use of anticoagulants     Abdominal Pain -- today is essentially a follow up appointment for patient who continues to have somewhat vague and unfocused complaints, that focus around the abdomen generally. Patient notes they went to see Dr. Vicky Sparrow, [facility], and was diagnosed with diverticulitis on clinical grounds only. She had about a week for abdominal discomfort prior to this appointment. Currently she has mild loss of appetite actually for a few months, abdominal pain, and malaise. Describes the pain as mild and constant. Stools have been normal. An episode of abdominal pain happened about a year ago as well and nothing was found. Has history of cholecystectomy. Only recent change in medications was adding a Vitamin D3. Denies coughing, gagging, dysphagia, or falling. Unfortunately this patient has moderately severe Alzhemiers dementia and has a very poor ability with giving a coherent history.     XR Abdomen 2017 --   IMPRESSION: Small amount of scattered gas and stool in the colon. No  abnormal small bowel gas. Vascular calcifications. No free  intraperitoneal air. Degenerative changes in the lumbar spine.    Problem list and histories reviewed & adjusted, as indicated.  Additional history: as documented    Patient Active Problem List   Diagnosis     Thrombocytopenia (H)     NSTEMI (non-ST elevated myocardial infarction) (H)     S/P mitral valve repair     Pacemaker     Chronic atrial fibrillation (H)     Essential hypertension with goal blood pressure less than 140/90     Hyperlipidemia LDL goal <100     History of TIA (transient ischemic attack) and stroke     Carotid stenosis, bilateral     Acquired hypothyroidism      (normal spontaneous vaginal delivery)     Alzheimer's dementia  without behavioral disturbance, unspecified timing of dementia onset     Microalbuminuria     Long-term (current) use of anticoagulants [Z79.01]     Dermatophytosis of nail     Elevated glucose     Hypovitaminosis D     Osteoporosis without current pathological fracture, unspecified osteoporosis type     Diverticulosis of large intestine without hemorrhage     Past Surgical History:   Procedure Laterality Date     ANGIOPLASTY      right leg     APPENDECTOMY       C PARTIAL EXCISION THYROID,UNILAT       EMBOLIZATION OF ANGIOMYOLIPOMA  9/2010      PTCA SINGLE VESSEL  4/6/90     see Care Everywhere for cardiac medical records      PTCA SINGLE VESSEL  5/8/2006    PTCA/Taxus Stents of Proximal and Mid RCA [facility] PTCA SINGLE VESSEL  9/1/2011    Successful complex percutaneous coronary intervention of the LAD using  Promus drug-eluting stents      REMOVAL OF BREAST IMPLANT       IMPLANT PACEMAKER       REPAIR PATENT FORAMEN OVALE  5/4/2007    mitral valve repair and PFO closure (5/4/07)       REPAIR VALVE MITRAL  5/4/2007    mitral valve repair and PFO closure (5/4/07)         Social History   Substance Use Topics     Smoking status: Never Smoker     Smokeless tobacco: Never Used     Alcohol use Yes      Comment: occ, monthly      History reviewed. No pertinent family history.      Current Outpatient Prescriptions   Medication Sig Dispense Refill     warfarin (COUMADIN) 2.5 MG tablet Take 1.25mg (1/2 tab) Mondays and Thursdays and 2.5mg (1 tab) all other days 90 tablet 0     alendronate (FOSAMAX) 70 MG tablet TAKE 1 TABLET BY MOUTH EVERY 7 DAYS BEFORE BREAKFAST. Plan on discontinuing this in August 2018 unless Dexa scan shows otherwise 12 tablet 1     Cholecalciferol (VITAMIN D-3 PO)        lisinopril (PRINIVIL/ZESTRIL) 20 MG tablet TAKE 1 TABLET BY MOUTH DAILY 90 tablet 1     levothyroxine (SYNTHROID/LEVOTHROID) 100 MCG tablet Take 1 tablet (100 mcg) by mouth daily 90 tablet 1     warfarin (COUMADIN) 2.5 MG  tablet Take 1 tablet (2.5 mg) by mouth daily 90 tablet 1     rivastigmine (EXELON) 3 MG capsule Take 1 capsule (3 mg) by mouth 2 times daily 180 capsule 1     carvedilol (COREG) 12.5 MG tablet Take 1 tablet (12.5 mg) by mouth 2 times daily (with meals) 180 tablet 1     nitroGLYcerin (NITROSTAT) 0.4 MG sublingual tablet Place 1 tablet (0.4 mg) under the tongue every 5 minutes as needed for chest pain 25 tablet 1     rosuvastatin (CRESTOR) 20 MG tablet TAKE 1 TABLET BY MOUTH DAILY AT BEDTIME 90 tablet 3     aspirin EC 81 MG tablet Take 81 mg by mouth       Labs reviewed in EPIC      Reviewed and updated as needed this visit by clinical staff  Tobacco  Allergies  Meds  Med Hx  Surg Hx  Fam Hx  Soc Hx      Reviewed and updated as needed this visit by Provider         ROS:  Constitutional, HEENT, cardiovascular, pulmonary, GI, , musculoskeletal, neuro, skin, endocrine and psych systems are negative, except as otherwise noted.    This document serves as a record of the services and decisions personally performed and made by Brett Babb MD. It was created on their behalf by Nico Gonzalez, a trained medical scribe. The creation of this document is based the provider's statements to the medical scribe.  Nico Gonzalez January 9, 2018 3:27 PM    OBJECTIVE:   /62  Pulse 70  Temp 96.4  F (35.8  C) (Oral)  Wt 76.7 kg (169 lb)  SpO2 96%  BMI 28.5 kg/m2  Body mass index is 28.5 kg/(m^2).  GENERAL: healthy, alert and no distress  EYES: Eyes grossly normal to inspection, conjunctivae and sclerae normal  RESP: lungs clear to auscultation - no rales, rhonchi or wheezes  CV: irregularly irregular rhythm, normal S1 S2, no S3 or S4, no murmur, click or rub, no peripheral edema and peripheral pulses strong  ABDOMEN: soft, mild generalized tenderness, no hepatosplenomegaly, no masses and bowel sounds normal, no peritoneal signs  MS: no gross musculoskeletal defects noted, trace bilateral physiologic edema  SKIN: no  suspicious lesions or rashes to visible skin   NEURO: mentation intact and speech normal  PSYCH: mentation appears normal, affect normal/bright    Diagnostic Test Results:  Results for orders placed or performed in visit on 12/19/17   XR Abdomen 2 Views    Narrative    ABDOMEN TWO VIEWS  12/19/2017 2:38 PM     HISTORY: Left lower quadrant pain x 7 days.     COMPARISON: None.      Impression    IMPRESSION: Small amount of scattered gas and stool in the colon. No  abnormal small bowel gas. Vascular calcifications. No free  intraperitoneal air. Degenerative changes in the lumbar spine.    ASH HARRINGTON MD   CBC with platelets differential   Result Value Ref Range    WBC 5.7 4.0 - 11.0 10e9/L    RBC Count 5.03 3.8 - 5.2 10e12/L    Hemoglobin 14.3 11.7 - 15.7 g/dL    Hematocrit 44.9 35.0 - 47.0 %    MCV 89 78 - 100 fl    MCH 28.4 26.5 - 33.0 pg    MCHC 31.8 31.5 - 36.5 g/dL    RDW 13.9 10.0 - 15.0 %    Platelet Count 151 150 - 450 10e9/L    Diff Method Automated Method     % Neutrophils 68.2 %    % Lymphocytes 19.5 %    % Monocytes 10.4 %    % Eosinophils 1.4 %    % Basophils 0.5 %    Absolute Neutrophil 3.9 1.6 - 8.3 10e9/L    Absolute Lymphocytes 1.1 0.8 - 5.3 10e9/L    Absolute Monocytes 0.6 0.0 - 1.3 10e9/L    Absolute Eosinophils 0.1 0.0 - 0.7 10e9/L    Absolute Basophils 0.0 0.0 - 0.2 10e9/L   Comprehensive metabolic panel   Result Value Ref Range    Sodium 140 133 - 144 mmol/L    Potassium 4.0 3.4 - 5.3 mmol/L    Chloride 106 94 - 109 mmol/L    Carbon Dioxide 29 20 - 32 mmol/L    Anion Gap 5 3 - 14 mmol/L    Glucose 90 70 - 99 mg/dL    Urea Nitrogen 22 7 - 30 mg/dL    Creatinine 0.78 0.52 - 1.04 mg/dL    GFR Estimate 71 >60 mL/min/1.7m2    GFR Estimate If Black 86 >60 mL/min/1.7m2    Calcium 9.4 8.5 - 10.1 mg/dL    Bilirubin Total 1.2 0.2 - 1.3 mg/dL    Albumin 3.9 3.4 - 5.0 g/dL    Protein Total 7.4 6.8 - 8.8 g/dL    Alkaline Phosphatase 105 40 - 150 U/L    ALT 24 0 - 50 U/L    AST 18 0 - 45 U/L   UA reflex to  Microscopic and Culture   Result Value Ref Range    Color Urine Yellow     Appearance Urine Slightly Cloudy     Glucose Urine Negative NEG^Negative mg/dL    Bilirubin Urine Moderate (A) NEG^Negative    Ketones Urine Trace (A) NEG^Negative mg/dL    Specific Gravity Urine >1.030 1.003 - 1.035    Blood Urine Trace (A) NEG^Negative    pH Urine 5.5 5.0 - 7.0 pH    Protein Albumin Urine 30 (A) NEG^Negative mg/dL    Urobilinogen Urine 1.0 0.2 - 1.0 EU/dL    Nitrite Urine Negative NEG^Negative    Leukocyte Esterase Urine Negative NEG^Negative    Source Midstream Urine    Urine Microscopic   Result Value Ref Range    WBC Urine 2-5 (A) OTO2^O - 2 /HPF    RBC Urine O - 2 OTO2^O - 2 /HPF    Squamous Epithelial /LPF Urine Many (A) FEW^Few /LPF    Bacteria Urine Moderate (A) NEG^Negative /HPF       ASSESSMENT/PLAN:     (R63.4) Weight loss  (primary encounter diagnosis)  Comment: this is a quite modest weight loss and I don't have a high index of suspicion that there's a pathological process here. It's all just to  Benign and there are no convincing evidences to allow a pursuit further of her symptoms. We spent a good bit of time on her case , discussing things in greater detail with daughter and .  Plan: INR, Erythrocyte sedimentation rate auto, CRP         inflammation        And assume a posture of observation . We discussed what to be on the watch for  . Update me if significant changes have taken place . Recheck office visit between 1-3 months, depending on how things go     (R10.9) Abdominal discomfort  Comment: as above   Plan: INR, Erythrocyte sedimentation rate auto, CRP         inflammation            (Z79.01) Long-term (current) use of anticoagulants [Z79.01]  Comment: patient has chronic atrial fibrillation  And requested we do her INR today   Plan: INR        Follow up as indicated on results with coumadin dosing directions or may route to the inr clinic       Patient Instructions   - Watch this over the next  month and let me know.    - I will follow up with you about lab results.     The information in this document, created by the medical scribe for me, accurately reflects the services I personally performed and the decisions made by me. I have reviewed and approved this document for accuracy.   MD Brett Galeano MD  [facility]

## 2018-01-09 NOTE — PROGRESS NOTES
ANTICOAGULATION FOLLOW-UP CLINIC VISIT    Patient Name:  Guera Peters  Date:  1/9/2018  Contact Type:  Telephone    SUBJECTIVE:     Patient Findings     Positives Inflammation (diverticulitis, stomach issues)    Comments Pt states her bottle said to take 2.5mg daily, so that is what she has taken for atleast the past 20 days.            OBJECTIVE    INR   Date Value Ref Range Status   01/09/2018 3.70 (H) 0.86 - 1.14 Final       ASSESSMENT / PLAN  INR assessment SUPRA    Recheck INR In: 1 WEEK    INR Location Outside lab      Anticoagulation Summary as of 1/9/2018     INR goal 2.0-3.0   Today's INR    Maintenance plan 1.25 mg (2.5 mg x 0.5) on Mon, Thu; 2.5 mg (2.5 mg x 1) all other days   Full instructions 1/9: Hold; 1/11: 2.5 mg; 1/15: 2.5 mg; Otherwise 1.25 mg on Mon, Thu; 2.5 mg all other days   Weekly total 15 mg   Plan last modified Teri Burgess RN (11/1/2017)   Next INR check 1/16/2018   Priority INR   Target end date Indefinite    Indications   Long-term (current) use of anticoagulants [Z79.01] [Z79.01]  Chronic atrial fibrillation (H) [I48.2]         Anticoagulation Episode Summary     INR check location     Preferred lab     Send INR reminders to [facility]    Comments       Anticoagulation Care Providers     Provider Role Specialty Phone number    Brett Babb MD [facility] Internal Medicine [phone]            See the Encounter Report to view Anticoagulation Flowsheet and Dosing Calendar (Go to Encounters tab in chart review, and find the Anticoagulation Therapy Visit)    Dosage adjustment made based on physician directed care plan.    Marva López RN

## 2018-01-10 NOTE — PROGRESS NOTES
Dear Guera,    Your recent test results are attached.      Normal markers of inflammation.  Brett Babb MD is currently out of office and may contact you with further instructions regarding your labs when he returns to clinic.      If you have any questions please feel free to contact [phone] or myself via [employer].    Sincerely,  Ilana Braga, CNP

## 2018-01-11 ENCOUNTER — TELEPHONE (OUTPATIENT)
Dept: INTERNAL MEDICINE | Facility: CLINIC | Age: 83
End: 2018-01-11

## 2018-01-11 NOTE — TELEPHONE ENCOUNTER
Left voicemail for Sienna, patients daughter, advising that there is no consent to communicate signed in the chart so unable to share information but advised to call back with Guera to obtain verbal consent over the phone to give advise on warfarin dosing from the 9th.    Teri Burgess RN - BC

## 2018-01-11 NOTE — TELEPHONE ENCOUNTER
Reason for Call:  Other call back    Detailed comments: Patient was in on 01/09/18 for her INR and it was pretty out of range. Patients daughter wondering if her schedule should be adjusted. Please call. Thank you.    Phone Number Patient can be reached at: Other phone number:  [phone]     Best Time: ASAP    Can we leave a detailed message on this number? YES    Call taken on 1/11/2018 at 1:54 PM by Amanda Latham

## 2018-01-11 NOTE — TELEPHONE ENCOUNTER
Received phone call from Guera giving permission to talk with Sienna. Called Sienna and gave dosing instructions from 1/9/18 visit.    Teri Burgess RN - BC

## 2018-01-16 ENCOUNTER — ANTICOAGULATION THERAPY VISIT (OUTPATIENT)
Dept: NURSING | Facility: CLINIC | Age: 83
End: 2018-01-16
Payer: MEDICARE

## 2018-01-16 DIAGNOSIS — Z79.01 LONG-TERM (CURRENT) USE OF ANTICOAGULANTS: ICD-10-CM

## 2018-01-16 DIAGNOSIS — I48.20 CHRONIC ATRIAL FIBRILLATION (H): ICD-10-CM

## 2018-01-16 LAB — INR POINT OF CARE: 4.2 (ref 0.86–1.14)

## 2018-01-16 PROCEDURE — 99207 ZZC NO CHARGE NURSE ONLY: CPT

## 2018-01-16 PROCEDURE — 36416 COLLJ CAPILLARY BLOOD SPEC: CPT

## 2018-01-16 PROCEDURE — 85610 PROTHROMBIN TIME: CPT | Mod: QW

## 2018-01-16 NOTE — MR AVS SNAPSHOT
Guera Peters   1/16/2018 12:00 PM   Anticoagulation Therapy Visit    Description:  82 year old female   Provider:  [facility]   Department:  [place] [facility]           INR as of 1/16/2018     Today's INR 4.2!      Anticoagulation Summary as of 1/16/2018     INR goal 2.0-3.0   Today's INR 4.2!   Full instructions 1/16: Hold; Otherwise 1.25 mg on Mon, Thu; 2.5 mg all other days   Next INR check 1/26/2018    Indications   Long-term (current) use of anticoagulants [Z79.01] [Z79.01]  Chronic atrial fibrillation (H) [I48.2]         Your next Anticoagulation Clinic appointment(s)     Jan 26, 2018 11:15 AM CST   Anticoagulation Visit with [facility] ANTI COAG   [facility] ([address] [phone]              Contact Numbers     [facility]  Please call [phone] to cancel and/or reschedule your appointment   Please call [phone] with any problems or questions regarding your therapy.        January 2018 Details    Sun Mon Tue Wed Thu Fri Sat      1               2               3               4               5               6                 7               8               9               10               11               12               13                 14               15               16      Hold   See details      17      2.5 mg         18      1.25 mg         19      2.5 mg         20      2.5 mg           21      2.5 mg         22      1.25 mg         23      2.5 mg         24      2.5 mg         25      1.25 mg         26            27                 28               29               30               31                   Date Details   01/16 This INR check       Date of next INR:  1/26/2018         How to take your warfarin dose     To take:  1.25 mg Take 0.5 of a 2.5 mg tablet.    To take:  2.5 mg Take 1 of the 2.5 mg tablets.    Hold Do not take your warfarin dose. See the Details table to the right for additional  instructions.

## 2018-01-16 NOTE — PROGRESS NOTES
ANTICOAGULATION FOLLOW-UP CLINIC VISIT    Patient Name:  Guera Peters  Date:  1/16/2018  Contact Type:  Face to Face    SUBJECTIVE:     Patient Findings     Positives Other complaints (Recent diverticulitis infection), No Problem Findings    Comments Pt states she thinks she took the 2.5mg each day instead of the 1.25mg twice a week.           OBJECTIVE    INR Protime   Date Value Ref Range Status   01/16/2018 4.2 (A) 0.86 - 1.14 Final       ASSESSMENT / PLAN  INR assessment SUPRA    Recheck INR In: 10 DAYS    INR Location Clinic      Anticoagulation Summary as of 1/16/2018     INR goal 2.0-3.0   Today's INR 4.2!   Maintenance plan 1.25 mg (2.5 mg x 0.5) on Mon, Thu; 2.5 mg (2.5 mg x 1) all other days   Full instructions 1/16: Hold; Otherwise 1.25 mg on Mon, Thu; 2.5 mg all other days   Weekly total 15 mg   Plan last modified Teri Burgess RN (11/1/2017)   Next INR check 1/26/2018   Priority INR   Target end date Indefinite    Indications   Long-term (current) use of anticoagulants [Z79.01] [Z79.01]  Chronic atrial fibrillation (H) [I48.2]         Anticoagulation Episode Summary     INR check location     Preferred lab     Send INR reminders to [facility]    Comments       Anticoagulation Care Providers     Provider Role Specialty Phone number    Brett Babb MD [facility] Internal Medicine [phone]            See the Encounter Report to view Anticoagulation Flowsheet and Dosing Calendar (Go to Encounters tab in chart review, and find the Anticoagulation Therapy Visit)    Dosage adjustment made based on physician directed care plan.    Marva López RN

## 2018-01-18 ENCOUNTER — MYC MEDICAL ADVICE (OUTPATIENT)
Dept: INTERNAL MEDICINE | Facility: CLINIC | Age: 83
End: 2018-01-18

## 2018-01-26 ENCOUNTER — ANTICOAGULATION THERAPY VISIT (OUTPATIENT)
Dept: NURSING | Facility: CLINIC | Age: 83
End: 2018-01-26
Payer: MEDICARE

## 2018-01-26 DIAGNOSIS — I48.20 CHRONIC ATRIAL FIBRILLATION (H): ICD-10-CM

## 2018-01-26 DIAGNOSIS — Z79.01 LONG-TERM (CURRENT) USE OF ANTICOAGULANTS: ICD-10-CM

## 2018-01-26 LAB
INR POINT OF CARE: 3.1 (ref 0.86–1.14)
INR PPP: 3.1 (ref 0.9–1.1)

## 2018-01-26 PROCEDURE — 36416 COLLJ CAPILLARY BLOOD SPEC: CPT

## 2018-01-26 PROCEDURE — 85610 PROTHROMBIN TIME: CPT | Mod: QW

## 2018-01-26 PROCEDURE — 99207 ZZC NO CHARGE NURSE ONLY: CPT

## 2018-01-26 NOTE — PROGRESS NOTES
ANTICOAGULATION FOLLOW-UP CLINIC VISIT    Patient Name:  Guera Peters  Date:  1/26/2018  Contact Type:  Face to Face    SUBJECTIVE:     Patient Findings     Positives No Problem Findings           OBJECTIVE    INR Protime   Date Value Ref Range Status   01/26/2018 3.1 (A) 0.86 - 1.14 Final       ASSESSMENT / PLAN  INR assessment THER    Recheck INR In: 2 WEEKS    INR Location Clinic      Anticoagulation Summary as of 1/26/2018     INR goal 2.0-3.0   Today's INR 3.1!   Maintenance plan 1.25 mg (2.5 mg x 0.5) on Mon, Thu; 2.5 mg (2.5 mg x 1) all other days   Full instructions 1.25 mg on Mon, Thu; 2.5 mg all other days   Weekly total 15 mg   No change documented Teri Burgess RN   Plan last modified Teri Burgess RN (11/1/2017)   Next INR check 2/13/2018   Priority INR   Target end date Indefinite    Indications   Long-term (current) use of anticoagulants [Z79.01] [Z79.01]  Chronic atrial fibrillation (H) [I48.2]         Anticoagulation Episode Summary     INR check location     Preferred lab     Send INR reminders to [facility]    Comments       Anticoagulation Care Providers     Provider Role Specialty Phone number    Brett Babb MD [facility] Internal Medicine [phone]            See the Encounter Report to view Anticoagulation Flowsheet and Dosing Calendar (Go to Encounters tab in chart review, and find the Anticoagulation Therapy Visit)    Dosage adjustment made based on physician directed care plan.    Teri Burgess RN

## 2018-01-26 NOTE — MR AVS SNAPSHOT
Guera Peters   1/26/2018 11:15 AM   Anticoagulation Therapy Visit    Description:  82 year old female   Provider:  [facility]   Department:  [place] Nurse           INR as of 1/26/2018     Today's INR 3.1!      Anticoagulation Summary as of 1/26/2018     INR goal 2.0-3.0   Today's INR 3.1!   Full instructions 1.25 mg on Mon, Thu; 2.5 mg all other days   Next INR check 2/13/2018    Indications   Long-term (current) use of anticoagulants [Z79.01] [Z79.01]  Chronic atrial fibrillation (H) [I48.2]         Your next Anticoagulation Clinic appointment(s)     Feb 13, 2018 11:45 AM CST   Anticoagulation Visit with [place] ANTI [facility]EL[facility]   [facility]    [address] [place] [id number] [phone]              Contact Numbers     [facility]  Please call [phone] to cancel and/or reschedule your appointment   Please call [phone] with any problems or questions regarding your therapy.        January 2018 Details    Sun Mon Tue Wed Thu Fri Sat      1               2               3               4               5               6                 7               8               9               10               11               12               13                 14               15               16               17               18               19               20                 21               22               23               24               25               26      2.5 mg   See details      27      2.5 mg           28      2.5 mg         29      1.25 mg         30      2.5 mg         31      2.5 mg             Date Details   01/26 This INR check               How to take your warfarin dose     To take:  1.25 mg Take 0.5 of a 2.5 mg tablet.    To take:  2.5 mg Take 1 of the 2.5 mg tablets.           February 2018 Details    Sun Mon Tue Wed Thu Fri Sat         1      1.25 mg         2      2.5 mg         3      2.5 mg           4      2.5 mg         5      1.25  mg         6      2.5 mg         7      2.5 mg         8      1.25 mg         9      2.5 mg         10      2.5 mg           11      2.5 mg         12      1.25 mg         13            14               15               16               17                 18               19               20               21               22               23               24                 25               26               27               28                   Date Details   No additional details    Date of next INR:  2/13/2018         How to take your warfarin dose     To take:  1.25 mg Take 0.5 of a 2.5 mg tablet.    To take:  2.5 mg Take 1 of the 2.5 mg tablets.

## 2018-02-02 ENCOUNTER — RECORDS - HEALTHEAST (OUTPATIENT)
Dept: ADMINISTRATIVE | Facility: OTHER | Age: 83
End: 2018-02-02

## 2018-02-06 ENCOUNTER — COMMUNICATION - HEALTHEAST (OUTPATIENT)
Dept: NURSING | Facility: CLINIC | Age: 83
End: 2018-02-06

## 2018-02-06 ENCOUNTER — OFFICE VISIT - HEALTHEAST (OUTPATIENT)
Dept: FAMILY MEDICINE | Facility: CLINIC | Age: 83
End: 2018-02-06

## 2018-02-06 DIAGNOSIS — I48.20 CHRONIC ATRIAL FIBRILLATION (H): ICD-10-CM

## 2018-02-06 DIAGNOSIS — Z79.01 LONG-TERM (CURRENT) USE OF ANTICOAGULANTS: ICD-10-CM

## 2018-02-06 DIAGNOSIS — I10 ESSENTIAL HYPERTENSION WITH GOAL BLOOD PRESSURE LESS THAN 140/90: ICD-10-CM

## 2018-02-06 DIAGNOSIS — Z95.0 CARDIAC PACEMAKER IN SITU: ICD-10-CM

## 2018-02-06 DIAGNOSIS — I48.20 CHRONIC A-FIB (H): ICD-10-CM

## 2018-02-06 DIAGNOSIS — Z76.89 ESTABLISHING CARE WITH NEW DOCTOR, ENCOUNTER FOR: ICD-10-CM

## 2018-02-06 DIAGNOSIS — K57.30 DIVERTICULOSIS OF LARGE INTESTINE WITHOUT HEMORRHAGE: ICD-10-CM

## 2018-02-06 DIAGNOSIS — I25.10 CORONARY ARTERY DISEASE: ICD-10-CM

## 2018-02-06 DIAGNOSIS — E03.9 ACQUIRED HYPOTHYROIDISM: ICD-10-CM

## 2018-02-06 DIAGNOSIS — I25.10 ARTERIOSCLEROTIC HEART DISEASE (ASHD): ICD-10-CM

## 2018-02-06 DIAGNOSIS — G30.9 ALZHEIMER'S DEMENTIA WITHOUT BEHAVIORAL DISTURBANCE, UNSPECIFIED TIMING OF DEMENTIA ONSET: ICD-10-CM

## 2018-02-06 DIAGNOSIS — E78.5 HYPERLIPIDEMIA LDL GOAL <100: ICD-10-CM

## 2018-02-06 DIAGNOSIS — M81.0 OSTEOPOROSIS: ICD-10-CM

## 2018-02-06 DIAGNOSIS — F02.80 ALZHEIMER'S DEMENTIA WITHOUT BEHAVIORAL DISTURBANCE, UNSPECIFIED TIMING OF DEMENTIA ONSET: ICD-10-CM

## 2018-02-06 LAB — INR PPP: 5.1 (ref 0.9–1.1)

## 2018-02-06 ASSESSMENT — MIFFLIN-ST. JEOR: SCORE: 1195.24

## 2018-02-09 ENCOUNTER — COMMUNICATION - HEALTHEAST (OUTPATIENT)
Dept: NURSING | Facility: CLINIC | Age: 83
End: 2018-02-09

## 2018-02-09 ENCOUNTER — HOSPITAL ENCOUNTER (OUTPATIENT)
Dept: CT IMAGING | Facility: HOSPITAL | Age: 83
Discharge: HOME | End: 2018-02-09
Attending: INTERNAL MEDICINE

## 2018-02-09 ENCOUNTER — AMBULATORY - HEALTHEAST (OUTPATIENT)
Dept: LAB | Facility: CLINIC | Age: 83
End: 2018-02-09

## 2018-02-09 DIAGNOSIS — R10.32 LLQ PAIN: ICD-10-CM

## 2018-02-09 DIAGNOSIS — R63.4 WEIGHT LOSS: ICD-10-CM

## 2018-02-09 DIAGNOSIS — I48.20 CHRONIC ATRIAL FIBRILLATION (H): ICD-10-CM

## 2018-02-09 DIAGNOSIS — Z79.01 LONG-TERM (CURRENT) USE OF ANTICOAGULANTS: ICD-10-CM

## 2018-02-09 DIAGNOSIS — I48.20 CHRONIC A-FIB (H): ICD-10-CM

## 2018-02-09 LAB
CREAT BLD-MCNC: 0.8 MG/DL
INR PPP: 2.4 (ref 0.9–1.1)
POC GFR AMER AF HE - HISTORICAL: >60 ML/MIN/1.73M2
POC GFR NON AMER AF HE - HISTORICAL: >60 ML/MIN/1.73M2

## 2018-02-12 ENCOUNTER — TRANSFERRED RECORDS (OUTPATIENT)
Dept: HEALTH INFORMATION MANAGEMENT | Facility: CLINIC | Age: 83
End: 2018-02-12

## 2018-02-13 ENCOUNTER — AMBULATORY - HEALTHEAST (OUTPATIENT)
Dept: SCHEDULING | Facility: CLINIC | Age: 83
End: 2018-02-13

## 2018-02-13 DIAGNOSIS — M81.0 OSTEOPOROSIS: ICD-10-CM

## 2018-02-15 ENCOUNTER — AMBULATORY - HEALTHEAST (OUTPATIENT)
Dept: LAB | Facility: CLINIC | Age: 83
End: 2018-02-15

## 2018-02-15 ENCOUNTER — COMMUNICATION - HEALTHEAST (OUTPATIENT)
Dept: LAB | Facility: CLINIC | Age: 83
End: 2018-02-15

## 2018-02-15 DIAGNOSIS — I48.20 CHRONIC A-FIB (H): ICD-10-CM

## 2018-02-15 DIAGNOSIS — I48.20 CHRONIC ATRIAL FIBRILLATION (H): ICD-10-CM

## 2018-02-15 DIAGNOSIS — Z79.01 LONG-TERM (CURRENT) USE OF ANTICOAGULANTS: ICD-10-CM

## 2018-02-15 LAB — INR PPP: 4.6 (ref 0.9–1.1)

## 2018-02-19 ENCOUNTER — AMBULATORY - HEALTHEAST (OUTPATIENT)
Dept: LAB | Facility: CLINIC | Age: 83
End: 2018-02-19

## 2018-02-19 ENCOUNTER — COMMUNICATION - HEALTHEAST (OUTPATIENT)
Dept: NURSING | Facility: CLINIC | Age: 83
End: 2018-02-19

## 2018-02-19 DIAGNOSIS — I48.20 CHRONIC A-FIB (H): ICD-10-CM

## 2018-02-19 DIAGNOSIS — I48.20 CHRONIC ATRIAL FIBRILLATION (H): ICD-10-CM

## 2018-02-19 DIAGNOSIS — Z79.01 LONG-TERM (CURRENT) USE OF ANTICOAGULANTS: ICD-10-CM

## 2018-02-19 LAB — INR PPP: 3.4 (ref 0.9–1.1)

## 2018-02-20 ENCOUNTER — COMMUNICATION - HEALTHEAST (OUTPATIENT)
Dept: NURSING | Facility: CLINIC | Age: 83
End: 2018-02-20

## 2018-02-20 ENCOUNTER — COMMUNICATION - HEALTHEAST (OUTPATIENT)
Dept: FAMILY MEDICINE | Facility: CLINIC | Age: 83
End: 2018-02-20

## 2018-02-20 DIAGNOSIS — I48.20 CHRONIC A-FIB (H): ICD-10-CM

## 2018-02-26 ENCOUNTER — COMMUNICATION - HEALTHEAST (OUTPATIENT)
Dept: NURSING | Facility: CLINIC | Age: 83
End: 2018-02-26

## 2018-02-26 ENCOUNTER — AMBULATORY - HEALTHEAST (OUTPATIENT)
Dept: LAB | Facility: CLINIC | Age: 83
End: 2018-02-26

## 2018-02-26 DIAGNOSIS — I48.20 CHRONIC A-FIB (H): ICD-10-CM

## 2018-02-26 DIAGNOSIS — I48.20 CHRONIC ATRIAL FIBRILLATION (H): ICD-10-CM

## 2018-02-26 DIAGNOSIS — Z79.01 LONG-TERM (CURRENT) USE OF ANTICOAGULANTS: ICD-10-CM

## 2018-02-26 LAB — INR PPP: 1.6 (ref 0.9–1.1)

## 2018-02-27 ENCOUNTER — ANTICOAGULATION THERAPY VISIT (OUTPATIENT)
Dept: NURSING | Facility: CLINIC | Age: 83
End: 2018-02-27

## 2018-02-27 DIAGNOSIS — Z79.01 LONG-TERM (CURRENT) USE OF ANTICOAGULANTS: ICD-10-CM

## 2018-02-27 DIAGNOSIS — I48.20 CHRONIC ATRIAL FIBRILLATION (H): ICD-10-CM

## 2018-03-05 ENCOUNTER — COMMUNICATION - HEALTHEAST (OUTPATIENT)
Dept: INTERNAL MEDICINE | Facility: CLINIC | Age: 83
End: 2018-03-05

## 2018-03-05 ENCOUNTER — AMBULATORY - HEALTHEAST (OUTPATIENT)
Dept: LAB | Facility: CLINIC | Age: 83
End: 2018-03-05

## 2018-03-05 DIAGNOSIS — I48.20 CHRONIC ATRIAL FIBRILLATION (H): ICD-10-CM

## 2018-03-05 DIAGNOSIS — I48.20 CHRONIC A-FIB (H): ICD-10-CM

## 2018-03-05 DIAGNOSIS — Z79.01 LONG-TERM (CURRENT) USE OF ANTICOAGULANTS: ICD-10-CM

## 2018-03-05 LAB — INR PPP: 1.2 (ref 0.9–1.1)

## 2018-03-09 ENCOUNTER — COMMUNICATION - HEALTHEAST (OUTPATIENT)
Dept: NURSING | Facility: CLINIC | Age: 83
End: 2018-03-09

## 2018-03-09 ENCOUNTER — AMBULATORY - HEALTHEAST (OUTPATIENT)
Dept: LAB | Facility: CLINIC | Age: 83
End: 2018-03-09

## 2018-03-09 DIAGNOSIS — I48.20 CHRONIC A-FIB (H): ICD-10-CM

## 2018-03-09 DIAGNOSIS — Z79.01 LONG-TERM (CURRENT) USE OF ANTICOAGULANTS: ICD-10-CM

## 2018-03-09 DIAGNOSIS — I48.20 CHRONIC ATRIAL FIBRILLATION (H): ICD-10-CM

## 2018-03-09 LAB — INR PPP: 1.5 (ref 0.9–1.1)

## 2018-03-14 ENCOUNTER — AMBULATORY - HEALTHEAST (OUTPATIENT)
Dept: LAB | Facility: CLINIC | Age: 83
End: 2018-03-14

## 2018-03-14 ENCOUNTER — COMMUNICATION - HEALTHEAST (OUTPATIENT)
Dept: NURSING | Facility: CLINIC | Age: 83
End: 2018-03-14

## 2018-03-14 DIAGNOSIS — Z79.01 LONG-TERM (CURRENT) USE OF ANTICOAGULANTS: ICD-10-CM

## 2018-03-14 DIAGNOSIS — I48.20 CHRONIC A-FIB (H): ICD-10-CM

## 2018-03-14 DIAGNOSIS — I48.20 CHRONIC ATRIAL FIBRILLATION (H): ICD-10-CM

## 2018-03-14 LAB — INR PPP: 1.9 (ref 0.9–1.1)

## 2018-03-22 ENCOUNTER — COMMUNICATION - HEALTHEAST (OUTPATIENT)
Dept: NURSING | Facility: CLINIC | Age: 83
End: 2018-03-22

## 2018-03-22 ENCOUNTER — AMBULATORY - HEALTHEAST (OUTPATIENT)
Dept: LAB | Facility: CLINIC | Age: 83
End: 2018-03-22

## 2018-03-22 DIAGNOSIS — I48.20 CHRONIC A-FIB (H): ICD-10-CM

## 2018-03-22 DIAGNOSIS — Z79.01 LONG-TERM (CURRENT) USE OF ANTICOAGULANTS: ICD-10-CM

## 2018-03-22 DIAGNOSIS — I48.20 CHRONIC ATRIAL FIBRILLATION (H): ICD-10-CM

## 2018-03-22 LAB — INR PPP: 1.9 (ref 0.9–1.1)

## 2018-03-29 ENCOUNTER — AMBULATORY - HEALTHEAST (OUTPATIENT)
Dept: LAB | Facility: CLINIC | Age: 83
End: 2018-03-29

## 2018-03-29 ENCOUNTER — COMMUNICATION - HEALTHEAST (OUTPATIENT)
Dept: ANTICOAGULATION | Facility: CLINIC | Age: 83
End: 2018-03-29

## 2018-03-29 DIAGNOSIS — I48.20 CHRONIC A-FIB (H): ICD-10-CM

## 2018-03-29 DIAGNOSIS — Z79.01 LONG-TERM (CURRENT) USE OF ANTICOAGULANTS: ICD-10-CM

## 2018-03-29 DIAGNOSIS — I48.20 CHRONIC ATRIAL FIBRILLATION (H): ICD-10-CM

## 2018-03-29 LAB — INR PPP: 1.9 (ref 0.9–1.1)

## 2018-04-09 ENCOUNTER — AMBULATORY - HEALTHEAST (OUTPATIENT)
Dept: LAB | Facility: CLINIC | Age: 83
End: 2018-04-09

## 2018-04-09 ENCOUNTER — COMMUNICATION - HEALTHEAST (OUTPATIENT)
Dept: ANTICOAGULATION | Facility: CLINIC | Age: 83
End: 2018-04-09

## 2018-04-09 DIAGNOSIS — I48.20 CHRONIC A-FIB (H): ICD-10-CM

## 2018-04-09 DIAGNOSIS — I48.20 CHRONIC ATRIAL FIBRILLATION (H): ICD-10-CM

## 2018-04-09 DIAGNOSIS — Z79.01 LONG-TERM (CURRENT) USE OF ANTICOAGULANTS: ICD-10-CM

## 2018-04-09 LAB — INR PPP: 2.2 (ref 0.9–1.1)

## 2018-04-19 ENCOUNTER — COMMUNICATION - HEALTHEAST (OUTPATIENT)
Dept: ANTICOAGULATION | Facility: CLINIC | Age: 83
End: 2018-04-19

## 2018-04-19 ENCOUNTER — AMBULATORY - HEALTHEAST (OUTPATIENT)
Dept: LAB | Facility: CLINIC | Age: 83
End: 2018-04-19

## 2018-04-19 DIAGNOSIS — I48.20 CHRONIC A-FIB (H): ICD-10-CM

## 2018-04-19 DIAGNOSIS — I48.20 CHRONIC ATRIAL FIBRILLATION (H): ICD-10-CM

## 2018-04-19 DIAGNOSIS — Z79.01 LONG-TERM (CURRENT) USE OF ANTICOAGULANTS: ICD-10-CM

## 2018-04-19 LAB — INR PPP: 2.6 (ref 0.9–1.1)

## 2018-05-03 ENCOUNTER — COMMUNICATION - HEALTHEAST (OUTPATIENT)
Dept: ANTICOAGULATION | Facility: CLINIC | Age: 83
End: 2018-05-03

## 2018-05-03 ENCOUNTER — OFFICE VISIT - HEALTHEAST (OUTPATIENT)
Dept: FAMILY MEDICINE | Facility: CLINIC | Age: 83
End: 2018-05-03

## 2018-05-03 DIAGNOSIS — Z79.01 LONG-TERM (CURRENT) USE OF ANTICOAGULANTS: ICD-10-CM

## 2018-05-03 DIAGNOSIS — I48.20 CHRONIC A-FIB (H): ICD-10-CM

## 2018-05-03 DIAGNOSIS — F02.80 ALZHEIMER'S DEMENTIA WITHOUT BEHAVIORAL DISTURBANCE, UNSPECIFIED TIMING OF DEMENTIA ONSET: ICD-10-CM

## 2018-05-03 DIAGNOSIS — K57.30 DIVERTICULOSIS OF LARGE INTESTINE WITHOUT HEMORRHAGE: ICD-10-CM

## 2018-05-03 DIAGNOSIS — G30.9 ALZHEIMER'S DEMENTIA WITHOUT BEHAVIORAL DISTURBANCE, UNSPECIFIED TIMING OF DEMENTIA ONSET: ICD-10-CM

## 2018-05-03 DIAGNOSIS — I48.20 CHRONIC ATRIAL FIBRILLATION (H): ICD-10-CM

## 2018-05-03 LAB — INR PPP: 2 (ref 0.9–1.1)

## 2018-05-05 DIAGNOSIS — M81.0 OSTEOPOROSIS WITHOUT CURRENT PATHOLOGICAL FRACTURE, UNSPECIFIED OSTEOPOROSIS TYPE: ICD-10-CM

## 2018-05-07 RX ORDER — ALENDRONATE SODIUM 70 MG/1
TABLET ORAL
Qty: 12 TABLET | Refills: 0 | Status: SHIPPED | OUTPATIENT
Start: 2018-05-07 | End: 2021-12-15

## 2018-05-07 NOTE — TELEPHONE ENCOUNTER
Medication is being filled for 1 time refill only due to:  Patient needs to be seen because needs recheck.   Pt has upcoming appointment scheduled for follow up..  Giovanna Reveles RN

## 2018-05-11 ENCOUNTER — COMMUNICATION - HEALTHEAST (OUTPATIENT)
Dept: SCHEDULING | Facility: CLINIC | Age: 83
End: 2018-05-11

## 2018-05-15 ENCOUNTER — OFFICE VISIT - HEALTHEAST (OUTPATIENT)
Dept: FAMILY MEDICINE | Facility: CLINIC | Age: 83
End: 2018-05-15

## 2018-05-15 DIAGNOSIS — M81.0 AGE-RELATED OSTEOPOROSIS WITHOUT CURRENT PATHOLOGICAL FRACTURE: ICD-10-CM

## 2018-05-15 DIAGNOSIS — F02.80 ALZHEIMER'S DEMENTIA WITHOUT BEHAVIORAL DISTURBANCE, UNSPECIFIED TIMING OF DEMENTIA ONSET: ICD-10-CM

## 2018-05-15 DIAGNOSIS — R10.30 LOWER ABDOMINAL PAIN: ICD-10-CM

## 2018-05-15 DIAGNOSIS — Z13.820 OSTEOPOROSIS SCREENING: ICD-10-CM

## 2018-05-15 DIAGNOSIS — G30.9 ALZHEIMER'S DEMENTIA WITHOUT BEHAVIORAL DISTURBANCE, UNSPECIFIED TIMING OF DEMENTIA ONSET: ICD-10-CM

## 2018-05-15 LAB
ALBUMIN UR-MCNC: NEGATIVE MG/DL
APPEARANCE UR: ABNORMAL
BILIRUB UR QL STRIP: NEGATIVE
COLOR UR AUTO: YELLOW
GLUCOSE UR STRIP-MCNC: NEGATIVE MG/DL
HGB UR QL STRIP: NEGATIVE
KETONES UR STRIP-MCNC: NEGATIVE MG/DL
LEUKOCYTE ESTERASE UR QL STRIP: NEGATIVE
NITRATE UR QL: NEGATIVE
PH UR STRIP: 5.5 [PH] (ref 5–8)
SP GR UR STRIP: 1.01 (ref 1–1.03)
UROBILINOGEN UR STRIP-ACNC: ABNORMAL

## 2018-05-16 ENCOUNTER — COMMUNICATION - HEALTHEAST (OUTPATIENT)
Dept: FAMILY MEDICINE | Facility: CLINIC | Age: 83
End: 2018-05-16

## 2018-05-17 ENCOUNTER — COMMUNICATION - HEALTHEAST (OUTPATIENT)
Dept: ANTICOAGULATION | Facility: CLINIC | Age: 83
End: 2018-05-17

## 2018-05-17 ENCOUNTER — AMBULATORY - HEALTHEAST (OUTPATIENT)
Dept: LAB | Facility: CLINIC | Age: 83
End: 2018-05-17

## 2018-05-17 DIAGNOSIS — I48.20 CHRONIC A-FIB (H): ICD-10-CM

## 2018-05-17 DIAGNOSIS — Z79.01 LONG-TERM (CURRENT) USE OF ANTICOAGULANTS: ICD-10-CM

## 2018-05-17 DIAGNOSIS — I48.20 CHRONIC ATRIAL FIBRILLATION (H): ICD-10-CM

## 2018-05-17 LAB — INR PPP: 2.5 (ref 0.9–1.1)

## 2018-05-23 ENCOUNTER — COMMUNICATION - HEALTHEAST (OUTPATIENT)
Dept: FAMILY MEDICINE | Facility: CLINIC | Age: 83
End: 2018-05-23

## 2018-05-23 DIAGNOSIS — I48.20 CHRONIC A-FIB (H): ICD-10-CM

## 2018-05-23 DIAGNOSIS — I48.20 CHRONIC ATRIAL FIBRILLATION (H): ICD-10-CM

## 2018-05-25 ENCOUNTER — COMMUNICATION - HEALTHEAST (OUTPATIENT)
Dept: SCHEDULING | Facility: CLINIC | Age: 83
End: 2018-05-25

## 2018-05-29 DIAGNOSIS — I10 ESSENTIAL HYPERTENSION WITH GOAL BLOOD PRESSURE LESS THAN 140/90: ICD-10-CM

## 2018-05-29 RX ORDER — CARVEDILOL 12.5 MG/1
TABLET ORAL
Qty: 180 TABLET | Refills: 1 | Status: SHIPPED | OUTPATIENT
Start: 2018-05-29 | End: 2022-01-10

## 2018-05-29 NOTE — TELEPHONE ENCOUNTER
Signed Prescriptions:                        Disp   Refills    carvedilol (COREG) 12.5 MG tablet          180 ta*1        Sig: TAKE 1 TABLET(12.5 MG) BY MOUTH TWICE DAILY WITH           MEALS  Authorizing Provider: KATHY BOWDEN  Ordering User: BRITNEY CONNORS RN refilled medication per [facility] Refill Protocol.     Britney Connors RN

## 2018-06-04 ENCOUNTER — OFFICE VISIT - HEALTHEAST (OUTPATIENT)
Dept: FAMILY MEDICINE | Facility: CLINIC | Age: 83
End: 2018-06-04

## 2018-06-04 ENCOUNTER — COMMUNICATION - HEALTHEAST (OUTPATIENT)
Dept: FAMILY MEDICINE | Facility: CLINIC | Age: 83
End: 2018-06-04

## 2018-06-04 DIAGNOSIS — F02.80 ALZHEIMER'S DEMENTIA WITHOUT BEHAVIORAL DISTURBANCE, UNSPECIFIED TIMING OF DEMENTIA ONSET: ICD-10-CM

## 2018-06-04 DIAGNOSIS — Z23 NEED FOR VACCINATION: ICD-10-CM

## 2018-06-04 DIAGNOSIS — R10.32 LLQ PAIN: ICD-10-CM

## 2018-06-04 DIAGNOSIS — I48.20 CHRONIC A-FIB (H): ICD-10-CM

## 2018-06-04 DIAGNOSIS — G30.9 ALZHEIMER'S DEMENTIA WITHOUT BEHAVIORAL DISTURBANCE, UNSPECIFIED TIMING OF DEMENTIA ONSET: ICD-10-CM

## 2018-06-04 DIAGNOSIS — E03.9 HYPOTHYROID: ICD-10-CM

## 2018-06-04 DIAGNOSIS — I10 ESSENTIAL HYPERTENSION WITH GOAL BLOOD PRESSURE LESS THAN 140/90: ICD-10-CM

## 2018-06-08 ENCOUNTER — AMBULATORY - HEALTHEAST (OUTPATIENT)
Dept: FAMILY MEDICINE | Facility: CLINIC | Age: 83
End: 2018-06-08

## 2018-06-08 ENCOUNTER — COMMUNICATION - HEALTHEAST (OUTPATIENT)
Dept: FAMILY MEDICINE | Facility: CLINIC | Age: 83
End: 2018-06-08

## 2018-06-08 DIAGNOSIS — N85.00 ENDOMETRIAL HYPERPLASIA: ICD-10-CM

## 2018-06-08 DIAGNOSIS — R10.32 LLQ PAIN: ICD-10-CM

## 2018-06-11 ENCOUNTER — COMMUNICATION - HEALTHEAST (OUTPATIENT)
Dept: ANTICOAGULATION | Facility: CLINIC | Age: 83
End: 2018-06-11

## 2018-06-11 DIAGNOSIS — I48.20 CHRONIC ATRIAL FIBRILLATION (H): ICD-10-CM

## 2018-06-14 ENCOUNTER — AMBULATORY - HEALTHEAST (OUTPATIENT)
Dept: LAB | Facility: CLINIC | Age: 83
End: 2018-06-14

## 2018-06-14 ENCOUNTER — COMMUNICATION - HEALTHEAST (OUTPATIENT)
Dept: ANTICOAGULATION | Facility: CLINIC | Age: 83
End: 2018-06-14

## 2018-06-14 DIAGNOSIS — R10.32 LLQ PAIN: ICD-10-CM

## 2018-06-14 DIAGNOSIS — E03.9 HYPOTHYROID: ICD-10-CM

## 2018-06-14 DIAGNOSIS — I48.20 CHRONIC ATRIAL FIBRILLATION (H): ICD-10-CM

## 2018-06-14 DIAGNOSIS — I48.20 CHRONIC A-FIB (H): ICD-10-CM

## 2018-06-14 LAB
ALBUMIN SERPL-MCNC: 3.9 G/DL (ref 3.5–5)
ALP SERPL-CCNC: 103 U/L (ref 45–120)
ALT SERPL W P-5'-P-CCNC: 15 U/L (ref 0–45)
ANION GAP SERPL CALCULATED.3IONS-SCNC: 8 MMOL/L (ref 5–18)
AST SERPL W P-5'-P-CCNC: 18 U/L (ref 0–40)
BILIRUB SERPL-MCNC: 1.4 MG/DL (ref 0–1)
BUN SERPL-MCNC: 17 MG/DL (ref 8–28)
CALCIUM SERPL-MCNC: 9.7 MG/DL (ref 8.5–10.5)
CHLORIDE BLD-SCNC: 105 MMOL/L (ref 98–107)
CO2 SERPL-SCNC: 29 MMOL/L (ref 22–31)
CREAT SERPL-MCNC: 0.75 MG/DL (ref 0.6–1.1)
GFR SERPL CREATININE-BSD FRML MDRD: >60 ML/MIN/1.73M2
GLUCOSE BLD-MCNC: 120 MG/DL (ref 70–125)
INR PPP: 1.9 (ref 0.9–1.1)
LACTATE SERPL-SCNC: 1.2 MMOL/L (ref 0.5–2.2)
POTASSIUM BLD-SCNC: 4.2 MMOL/L (ref 3.5–5)
PROT SERPL-MCNC: 6.9 G/DL (ref 6–8)
SODIUM SERPL-SCNC: 142 MMOL/L (ref 136–145)
TSH SERPL DL<=0.005 MIU/L-ACNC: 0.63 UIU/ML (ref 0.3–5)

## 2018-06-18 ENCOUNTER — RECORDS - HEALTHEAST (OUTPATIENT)
Dept: ADMINISTRATIVE | Facility: OTHER | Age: 83
End: 2018-06-18

## 2018-06-23 DIAGNOSIS — E03.9 ACQUIRED HYPOTHYROIDISM: ICD-10-CM

## 2018-06-25 RX ORDER — LEVOTHYROXINE SODIUM 100 UG/1
TABLET ORAL
Qty: 90 TABLET | Refills: 0 | Status: SHIPPED | OUTPATIENT
Start: 2018-06-25 | End: 2022-01-21

## 2018-06-25 NOTE — TELEPHONE ENCOUNTER
Signed Prescriptions:                        Disp   Refills    levothyroxine (SYNTHROID/LEVOTHROID) 100 M*90 tab*0        Sig: TAKE 1 TABLET(100 MCG) BY MOUTH DAILY  Authorizing Provider: KATHY BOWDEN  Ordering User: NAMRATA VALDOVINOS RN refilled medication per [facility] Refill Protocol.     Namrata Valdovinos RN

## 2018-06-29 ENCOUNTER — AMBULATORY - HEALTHEAST (OUTPATIENT)
Dept: LAB | Facility: CLINIC | Age: 83
End: 2018-06-29

## 2018-06-29 ENCOUNTER — COMMUNICATION - HEALTHEAST (OUTPATIENT)
Dept: ANTICOAGULATION | Facility: CLINIC | Age: 83
End: 2018-06-29

## 2018-06-29 DIAGNOSIS — I48.20 CHRONIC A-FIB (H): ICD-10-CM

## 2018-06-29 DIAGNOSIS — I48.20 CHRONIC ATRIAL FIBRILLATION (H): ICD-10-CM

## 2018-06-29 LAB — INR PPP: 2.8 (ref 0.9–1.1)

## 2018-07-13 ENCOUNTER — COMMUNICATION - HEALTHEAST (OUTPATIENT)
Dept: ANTICOAGULATION | Facility: CLINIC | Age: 83
End: 2018-07-13

## 2018-07-13 ENCOUNTER — AMBULATORY - HEALTHEAST (OUTPATIENT)
Dept: LAB | Facility: CLINIC | Age: 83
End: 2018-07-13

## 2018-07-13 DIAGNOSIS — I48.20 CHRONIC ATRIAL FIBRILLATION (H): ICD-10-CM

## 2018-07-13 DIAGNOSIS — I48.20 CHRONIC A-FIB (H): ICD-10-CM

## 2018-07-13 LAB — INR PPP: 2.6 (ref 0.9–1.1)

## 2018-08-10 ENCOUNTER — AMBULATORY - HEALTHEAST (OUTPATIENT)
Dept: LAB | Facility: CLINIC | Age: 83
End: 2018-08-10

## 2018-08-10 ENCOUNTER — COMMUNICATION - HEALTHEAST (OUTPATIENT)
Dept: ANTICOAGULATION | Facility: CLINIC | Age: 83
End: 2018-08-10

## 2018-08-10 DIAGNOSIS — I48.20 CHRONIC A-FIB (H): ICD-10-CM

## 2018-08-10 DIAGNOSIS — I48.20 CHRONIC ATRIAL FIBRILLATION (H): ICD-10-CM

## 2018-08-10 LAB — INR PPP: 1.4 (ref 0.9–1.1)

## 2018-08-16 ENCOUNTER — AMBULATORY - HEALTHEAST (OUTPATIENT)
Dept: LAB | Facility: CLINIC | Age: 83
End: 2018-08-16

## 2018-08-16 ENCOUNTER — COMMUNICATION - HEALTHEAST (OUTPATIENT)
Dept: ANTICOAGULATION | Facility: CLINIC | Age: 83
End: 2018-08-16

## 2018-08-16 DIAGNOSIS — I48.20 CHRONIC ATRIAL FIBRILLATION (H): ICD-10-CM

## 2018-08-16 DIAGNOSIS — I48.20 CHRONIC A-FIB (H): ICD-10-CM

## 2018-08-16 LAB — INR PPP: 2.1 (ref 0.9–1.1)

## 2018-08-30 ENCOUNTER — COMMUNICATION - HEALTHEAST (OUTPATIENT)
Dept: ANTICOAGULATION | Facility: CLINIC | Age: 83
End: 2018-08-30

## 2018-08-30 ENCOUNTER — AMBULATORY - HEALTHEAST (OUTPATIENT)
Dept: LAB | Facility: CLINIC | Age: 83
End: 2018-08-30

## 2018-08-30 DIAGNOSIS — I48.20 CHRONIC A-FIB (H): ICD-10-CM

## 2018-08-30 DIAGNOSIS — I48.20 CHRONIC ATRIAL FIBRILLATION (H): ICD-10-CM

## 2018-08-30 LAB — INR PPP: 2 (ref 0.9–1.1)

## 2018-09-20 ENCOUNTER — AMBULATORY - HEALTHEAST (OUTPATIENT)
Dept: LAB | Facility: CLINIC | Age: 83
End: 2018-09-20

## 2018-09-20 ENCOUNTER — COMMUNICATION - HEALTHEAST (OUTPATIENT)
Dept: ANTICOAGULATION | Facility: CLINIC | Age: 83
End: 2018-09-20

## 2018-09-20 DIAGNOSIS — I48.20 CHRONIC A-FIB (H): ICD-10-CM

## 2018-09-20 DIAGNOSIS — I48.20 CHRONIC ATRIAL FIBRILLATION (H): ICD-10-CM

## 2018-09-20 LAB — INR PPP: 1.9 (ref 0.9–1.1)

## 2018-09-27 ENCOUNTER — COMMUNICATION - HEALTHEAST (OUTPATIENT)
Dept: FAMILY MEDICINE | Facility: CLINIC | Age: 83
End: 2018-09-27

## 2018-09-27 DIAGNOSIS — I10 ESSENTIAL HYPERTENSION WITH GOAL BLOOD PRESSURE LESS THAN 140/90: ICD-10-CM

## 2018-09-27 RX ORDER — LISINOPRIL 20 MG/1
TABLET ORAL
Qty: 90 TABLET | Refills: 0 | Status: SHIPPED | OUTPATIENT
Start: 2018-09-27 | End: 2022-02-19

## 2018-10-04 ENCOUNTER — COMMUNICATION - HEALTHEAST (OUTPATIENT)
Dept: ANTICOAGULATION | Facility: CLINIC | Age: 83
End: 2018-10-04

## 2018-10-04 ENCOUNTER — AMBULATORY - HEALTHEAST (OUTPATIENT)
Dept: LAB | Facility: CLINIC | Age: 83
End: 2018-10-04

## 2018-10-04 DIAGNOSIS — I48.20 CHRONIC ATRIAL FIBRILLATION (H): ICD-10-CM

## 2018-10-04 DIAGNOSIS — I48.20 CHRONIC A-FIB (H): ICD-10-CM

## 2018-10-04 LAB — INR PPP: 2.2 (ref 0.9–1.1)

## 2018-10-18 ENCOUNTER — COMMUNICATION - HEALTHEAST (OUTPATIENT)
Dept: ANTICOAGULATION | Facility: CLINIC | Age: 83
End: 2018-10-18

## 2018-10-18 ENCOUNTER — AMBULATORY - HEALTHEAST (OUTPATIENT)
Dept: LAB | Facility: CLINIC | Age: 83
End: 2018-10-18

## 2018-10-18 DIAGNOSIS — I48.20 CHRONIC A-FIB (H): ICD-10-CM

## 2018-10-18 DIAGNOSIS — I48.20 CHRONIC ATRIAL FIBRILLATION (H): ICD-10-CM

## 2018-10-18 LAB — INR PPP: 2.1 (ref 0.9–1.1)

## 2018-10-19 ENCOUNTER — OFFICE VISIT - HEALTHEAST (OUTPATIENT)
Dept: FAMILY MEDICINE | Facility: CLINIC | Age: 83
End: 2018-10-19

## 2018-10-19 DIAGNOSIS — B02.9 SHINGLES: ICD-10-CM

## 2018-10-19 DIAGNOSIS — I10 ESSENTIAL HYPERTENSION WITH GOAL BLOOD PRESSURE LESS THAN 140/90: ICD-10-CM

## 2018-10-19 DIAGNOSIS — R51.9 HEADACHE: ICD-10-CM

## 2018-10-22 ENCOUNTER — RECORDS - HEALTHEAST (OUTPATIENT)
Dept: ADMINISTRATIVE | Facility: OTHER | Age: 83
End: 2018-10-22

## 2018-10-22 ENCOUNTER — COMMUNICATION - HEALTHEAST (OUTPATIENT)
Dept: FAMILY MEDICINE | Facility: CLINIC | Age: 83
End: 2018-10-22

## 2018-10-22 LAB — C REACTIVE PROTEIN LHE: <0.1 MG/DL (ref 0–0.8)

## 2018-10-23 ENCOUNTER — COMMUNICATION - HEALTHEAST (OUTPATIENT)
Dept: SCHEDULING | Facility: CLINIC | Age: 83
End: 2018-10-23

## 2018-10-24 ENCOUNTER — COMMUNICATION - HEALTHEAST (OUTPATIENT)
Dept: FAMILY MEDICINE | Facility: CLINIC | Age: 83
End: 2018-10-24

## 2018-10-24 DIAGNOSIS — I48.20 CHRONIC A-FIB (H): ICD-10-CM

## 2018-10-26 ENCOUNTER — COMMUNICATION - HEALTHEAST (OUTPATIENT)
Dept: FAMILY MEDICINE | Facility: CLINIC | Age: 83
End: 2018-10-26

## 2018-10-27 ENCOUNTER — COMMUNICATION - HEALTHEAST (OUTPATIENT)
Dept: SCHEDULING | Facility: CLINIC | Age: 83
End: 2018-10-27

## 2018-10-29 ENCOUNTER — HOME CARE/HOSPICE - HEALTHEAST (OUTPATIENT)
Dept: HOME HEALTH SERVICES | Facility: HOME HEALTH | Age: 83
End: 2018-10-29

## 2018-10-31 ENCOUNTER — COMMUNICATION - HEALTHEAST (OUTPATIENT)
Dept: CARE COORDINATION | Facility: CLINIC | Age: 83
End: 2018-10-31

## 2018-11-01 ENCOUNTER — HOME CARE/HOSPICE - HEALTHEAST (OUTPATIENT)
Dept: HOME HEALTH SERVICES | Facility: HOME HEALTH | Age: 83
End: 2018-11-01

## 2018-11-02 ENCOUNTER — COMMUNICATION - HEALTHEAST (OUTPATIENT)
Dept: FAMILY MEDICINE | Facility: CLINIC | Age: 83
End: 2018-11-02

## 2018-11-02 ENCOUNTER — HOME CARE/HOSPICE - HEALTHEAST (OUTPATIENT)
Dept: HOME HEALTH SERVICES | Facility: HOME HEALTH | Age: 83
End: 2018-11-02

## 2018-11-06 ENCOUNTER — OFFICE VISIT - HEALTHEAST (OUTPATIENT)
Dept: FAMILY MEDICINE | Facility: CLINIC | Age: 83
End: 2018-11-06

## 2018-11-06 ENCOUNTER — COMMUNICATION - HEALTHEAST (OUTPATIENT)
Dept: LAB | Facility: CLINIC | Age: 83
End: 2018-11-06

## 2018-11-06 DIAGNOSIS — Z95.0 CARDIAC PACEMAKER IN SITU: ICD-10-CM

## 2018-11-06 DIAGNOSIS — M81.0 OSTEOPOROSIS WITHOUT CURRENT PATHOLOGICAL FRACTURE, UNSPECIFIED OSTEOPOROSIS TYPE: ICD-10-CM

## 2018-11-06 DIAGNOSIS — B02.9 SHINGLES: ICD-10-CM

## 2018-11-06 DIAGNOSIS — I48.20 CHRONIC A-FIB (H): ICD-10-CM

## 2018-11-06 DIAGNOSIS — Z09 HOSPITAL DISCHARGE FOLLOW-UP: ICD-10-CM

## 2018-11-06 DIAGNOSIS — G30.9 ALZHEIMER'S DEMENTIA WITHOUT BEHAVIORAL DISTURBANCE, UNSPECIFIED TIMING OF DEMENTIA ONSET: ICD-10-CM

## 2018-11-06 DIAGNOSIS — F02.80 ALZHEIMER'S DEMENTIA WITHOUT BEHAVIORAL DISTURBANCE, UNSPECIFIED TIMING OF DEMENTIA ONSET: ICD-10-CM

## 2018-11-06 DIAGNOSIS — E03.9 ACQUIRED HYPOTHYROIDISM: ICD-10-CM

## 2018-11-06 DIAGNOSIS — N17.9 ACUTE KIDNEY FAILURE (H): ICD-10-CM

## 2018-11-06 DIAGNOSIS — I48.20 CHRONIC ATRIAL FIBRILLATION (H): ICD-10-CM

## 2018-11-06 LAB
ANION GAP SERPL CALCULATED.3IONS-SCNC: 13 MMOL/L (ref 5–18)
BUN SERPL-MCNC: 18 MG/DL (ref 8–28)
CALCIUM SERPL-MCNC: 9.9 MG/DL (ref 8.5–10.5)
CHLORIDE BLD-SCNC: 103 MMOL/L (ref 98–107)
CO2 SERPL-SCNC: 25 MMOL/L (ref 22–31)
CREAT SERPL-MCNC: 0.94 MG/DL (ref 0.6–1.1)
ERYTHROCYTE [DISTWIDTH] IN BLOOD BY AUTOMATED COUNT: 12.6 % (ref 11–14.5)
GFR SERPL CREATININE-BSD FRML MDRD: 57 ML/MIN/1.73M2
GLUCOSE BLD-MCNC: 103 MG/DL (ref 70–125)
HCT VFR BLD AUTO: 44.6 % (ref 35–47)
HGB BLD-MCNC: 14.9 G/DL (ref 12–16)
INR PPP: 4.08 (ref 0.9–1.1)
MCH RBC QN AUTO: 28.9 PG (ref 27–34)
MCHC RBC AUTO-ENTMCNC: 33.3 G/DL (ref 32–36)
MCV RBC AUTO: 87 FL (ref 80–100)
PLATELET # BLD AUTO: 161 THOU/UL (ref 140–440)
PMV BLD AUTO: 9.2 FL (ref 7–10)
POTASSIUM BLD-SCNC: 4.2 MMOL/L (ref 3.5–5)
RBC # BLD AUTO: 5.13 MILL/UL (ref 3.8–5.4)
SODIUM SERPL-SCNC: 141 MMOL/L (ref 136–145)
WBC: 6.2 THOU/UL (ref 4–11)

## 2018-11-06 ASSESSMENT — MIFFLIN-ST. JEOR: SCORE: 1233.35

## 2018-11-09 ENCOUNTER — COMMUNICATION - HEALTHEAST (OUTPATIENT)
Dept: ANTICOAGULATION | Facility: CLINIC | Age: 83
End: 2018-11-09

## 2018-11-09 ENCOUNTER — AMBULATORY - HEALTHEAST (OUTPATIENT)
Dept: LAB | Facility: CLINIC | Age: 83
End: 2018-11-09

## 2018-11-09 ENCOUNTER — HOME CARE/HOSPICE - HEALTHEAST (OUTPATIENT)
Dept: HOME HEALTH SERVICES | Facility: HOME HEALTH | Age: 83
End: 2018-11-09

## 2018-11-09 ENCOUNTER — COMMUNICATION - HEALTHEAST (OUTPATIENT)
Dept: FAMILY MEDICINE | Facility: CLINIC | Age: 83
End: 2018-11-09

## 2018-11-09 DIAGNOSIS — I48.20 CHRONIC A-FIB (H): ICD-10-CM

## 2018-11-09 DIAGNOSIS — I48.20 CHRONIC ATRIAL FIBRILLATION (H): ICD-10-CM

## 2018-11-09 LAB — INR PPP: 3.95 (ref 0.9–1.1)

## 2018-11-12 ENCOUNTER — HOME CARE/HOSPICE - HEALTHEAST (OUTPATIENT)
Dept: HOME HEALTH SERVICES | Facility: HOME HEALTH | Age: 83
End: 2018-11-12

## 2018-11-12 ENCOUNTER — COMMUNICATION - HEALTHEAST (OUTPATIENT)
Dept: ANTICOAGULATION | Facility: CLINIC | Age: 83
End: 2018-11-12

## 2018-11-12 ENCOUNTER — COMMUNICATION - HEALTHEAST (OUTPATIENT)
Dept: FAMILY MEDICINE | Facility: CLINIC | Age: 83
End: 2018-11-12

## 2018-11-12 DIAGNOSIS — I48.20 CHRONIC A-FIB (H): ICD-10-CM

## 2018-11-12 LAB
INR PPP: 3.3 (ref 0.9–1.1)
POC INR - HE - HISTORICAL: 3.3 (ref 0.9–1.1)

## 2018-11-13 ENCOUNTER — COMMUNICATION - HEALTHEAST (OUTPATIENT)
Dept: HOME HEALTH SERVICES | Facility: HOME HEALTH | Age: 83
End: 2018-11-13

## 2018-11-16 ENCOUNTER — COMMUNICATION - HEALTHEAST (OUTPATIENT)
Dept: LAB | Facility: CLINIC | Age: 83
End: 2018-11-16

## 2018-11-16 ENCOUNTER — COMMUNICATION - HEALTHEAST (OUTPATIENT)
Dept: FAMILY MEDICINE | Facility: CLINIC | Age: 83
End: 2018-11-16

## 2018-11-16 ENCOUNTER — HOME CARE/HOSPICE - HEALTHEAST (OUTPATIENT)
Dept: HOME HEALTH SERVICES | Facility: HOME HEALTH | Age: 83
End: 2018-11-16

## 2018-11-16 ENCOUNTER — COMMUNICATION - HEALTHEAST (OUTPATIENT)
Dept: ANTICOAGULATION | Facility: CLINIC | Age: 83
End: 2018-11-16

## 2018-11-16 DIAGNOSIS — I48.20 CHRONIC ATRIAL FIBRILLATION (H): ICD-10-CM

## 2018-11-16 DIAGNOSIS — I48.20 CHRONIC A-FIB (H): ICD-10-CM

## 2018-11-16 LAB — INR PPP: 3.4 (ref 0.9–1.1)

## 2018-11-19 ENCOUNTER — HOME CARE/HOSPICE - HEALTHEAST (OUTPATIENT)
Dept: HOME HEALTH SERVICES | Facility: HOME HEALTH | Age: 83
End: 2018-11-19

## 2018-11-19 ENCOUNTER — COMMUNICATION - HEALTHEAST (OUTPATIENT)
Dept: FAMILY MEDICINE | Facility: CLINIC | Age: 83
End: 2018-11-19

## 2018-11-19 ENCOUNTER — COMMUNICATION - HEALTHEAST (OUTPATIENT)
Dept: ANTICOAGULATION | Facility: CLINIC | Age: 83
End: 2018-11-19

## 2018-11-19 DIAGNOSIS — I48.20 CHRONIC A-FIB (H): ICD-10-CM

## 2018-11-19 LAB — INR PPP: 3.1 (ref 0.9–1.1)

## 2018-11-20 ENCOUNTER — COMMUNICATION - HEALTHEAST (OUTPATIENT)
Dept: FAMILY MEDICINE | Facility: CLINIC | Age: 83
End: 2018-11-20

## 2018-11-21 ENCOUNTER — HOME CARE/HOSPICE - HEALTHEAST (OUTPATIENT)
Dept: HOME HEALTH SERVICES | Facility: HOME HEALTH | Age: 83
End: 2018-11-21

## 2018-11-23 ENCOUNTER — HOME CARE/HOSPICE - HEALTHEAST (OUTPATIENT)
Dept: HOME HEALTH SERVICES | Facility: HOME HEALTH | Age: 83
End: 2018-11-23

## 2018-11-23 ENCOUNTER — COMMUNICATION - HEALTHEAST (OUTPATIENT)
Dept: ANTICOAGULATION | Facility: CLINIC | Age: 83
End: 2018-11-23

## 2018-11-23 ENCOUNTER — COMMUNICATION - HEALTHEAST (OUTPATIENT)
Dept: FAMILY MEDICINE | Facility: CLINIC | Age: 83
End: 2018-11-23

## 2018-11-23 DIAGNOSIS — I48.20 CHRONIC A-FIB (H): ICD-10-CM

## 2018-11-23 LAB
INR PPP: 2.9 (ref 0.9–1.1)
POC INR - HE - HISTORICAL: 2.9 (ref 0.9–1.1)

## 2018-11-25 ENCOUNTER — COMMUNICATION - HEALTHEAST (OUTPATIENT)
Dept: HOME HEALTH SERVICES | Facility: HOME HEALTH | Age: 83
End: 2018-11-25

## 2018-11-27 ENCOUNTER — COMMUNICATION - HEALTHEAST (OUTPATIENT)
Dept: SCHEDULING | Facility: CLINIC | Age: 83
End: 2018-11-27

## 2018-11-29 ENCOUNTER — HOME CARE/HOSPICE - HEALTHEAST (OUTPATIENT)
Dept: HOME HEALTH SERVICES | Facility: HOME HEALTH | Age: 83
End: 2018-11-29

## 2018-11-30 ENCOUNTER — HOME CARE/HOSPICE - HEALTHEAST (OUTPATIENT)
Dept: HOME HEALTH SERVICES | Facility: HOME HEALTH | Age: 83
End: 2018-11-30

## 2018-11-30 ENCOUNTER — COMMUNICATION - HEALTHEAST (OUTPATIENT)
Dept: FAMILY MEDICINE | Facility: CLINIC | Age: 83
End: 2018-11-30

## 2018-11-30 ENCOUNTER — COMMUNICATION - HEALTHEAST (OUTPATIENT)
Dept: SCHEDULING | Facility: CLINIC | Age: 83
End: 2018-11-30

## 2018-11-30 DIAGNOSIS — I48.20 CHRONIC A-FIB (H): ICD-10-CM

## 2018-11-30 DIAGNOSIS — G30.9 ALZHEIMER'S DEMENTIA WITHOUT BEHAVIORAL DISTURBANCE, UNSPECIFIED TIMING OF DEMENTIA ONSET: ICD-10-CM

## 2018-11-30 DIAGNOSIS — F02.80 ALZHEIMER'S DEMENTIA WITHOUT BEHAVIORAL DISTURBANCE, UNSPECIFIED TIMING OF DEMENTIA ONSET: ICD-10-CM

## 2018-11-30 LAB
INR PPP: 3.5 (ref 0.9–1.1)
POC INR - HE - HISTORICAL: 3.5 (ref 0.9–1.1)

## 2018-12-01 DIAGNOSIS — I10 ESSENTIAL HYPERTENSION WITH GOAL BLOOD PRESSURE LESS THAN 140/90: ICD-10-CM

## 2018-12-04 ENCOUNTER — COMMUNICATION - HEALTHEAST (OUTPATIENT)
Dept: HOME HEALTH SERVICES | Facility: HOME HEALTH | Age: 83
End: 2018-12-04

## 2018-12-04 DIAGNOSIS — E78.5 HYPERLIPIDEMIA LDL GOAL <100: ICD-10-CM

## 2018-12-04 NOTE — TELEPHONE ENCOUNTER
Please contact pt to schedule an appointment, then re-route to RN refill.    Marva López RN  [facility]

## 2018-12-04 NOTE — TELEPHONE ENCOUNTER
Left patient VM to call pink team number back. Please help schedule ap[pointment if patient return calls  Saulo Diggs CMA on 12/4/2018 at 2:57 PM

## 2018-12-05 ENCOUNTER — HOME CARE/HOSPICE - HEALTHEAST (OUTPATIENT)
Dept: HOME HEALTH SERVICES | Facility: HOME HEALTH | Age: 83
End: 2018-12-05

## 2018-12-05 RX ORDER — ROSUVASTATIN CALCIUM 20 MG/1
TABLET, COATED ORAL
Qty: 90 TABLET | Refills: 0 | OUTPATIENT
Start: 2018-12-05

## 2018-12-05 NOTE — TELEPHONE ENCOUNTER
"Routing refill request to provider for review/approval because:  Labs not current:  LDL    Requested Prescriptions   Pending Prescriptions Disp Refills     rosuvastatin (CRESTOR) 20 MG tablet [Pharmacy Med Name: ROSUVASTATIN 20MG TABLETS] 90 tablet 0     Sig: TAKE 1 TABLET BY MOUTH DAILY AT BEDTIME    Statins Protocol Failed    12/4/2018  1:24 PM       Failed - LDL on file in past 12 months    Recent Labs   Lab Test  06/03/16   1142   LDL  75            Passed - No abnormal creatine kinase in past 12 months    No lab results found.            Passed - Recent (12 mo) or future (30 days) visit within the authorizing provider's specialty    Patient had office visit in the last 12 months or has a visit in the next 30 days with authorizing provider or within the authorizing provider's specialty.  See \"Patient Info\" tab in inbasket, or \"Choose Columns\" in Meds & Orders section of the refill encounter.             Passed - Patient is age 18 or older       Passed - No active pregnancy on record       Passed - No positive pregnancy test in past 12 months        Jahaiar Gore RN    "

## 2018-12-05 NOTE — TELEPHONE ENCOUNTER
Spoke to patient and informed her of message. Patient has moved and no longer sees provider. MA called pharmacy and informed them as well.  Marva HERNANDEZ CMA ([facility])

## 2018-12-05 NOTE — TELEPHONE ENCOUNTER
We need office visit . It has been 11 months since our last office visit and patient is due for a follow up appointment. We can extend all refills but only for one month. Please help see to it that appointment is generated . We can't do this again. Further refill requests without appointment should be declined.    Brett Babb MD

## 2018-12-06 RX ORDER — CARVEDILOL 12.5 MG/1
TABLET ORAL
Qty: 180 TABLET | Refills: 0 | OUTPATIENT
Start: 2018-12-06

## 2018-12-06 NOTE — TELEPHONE ENCOUNTER
Called patient and she stated that they no longer see Dr. Babb and goes to a clinic closer to them. She does not need this medication refill  Saulo Diggs CMA on 12/6/2018 at 10:48 AM

## 2018-12-07 ENCOUNTER — COMMUNICATION - HEALTHEAST (OUTPATIENT)
Dept: FAMILY MEDICINE | Facility: CLINIC | Age: 83
End: 2018-12-07

## 2018-12-07 ENCOUNTER — HOME CARE/HOSPICE - HEALTHEAST (OUTPATIENT)
Dept: HOME HEALTH SERVICES | Facility: HOME HEALTH | Age: 83
End: 2018-12-07

## 2018-12-07 DIAGNOSIS — I48.20 CHRONIC A-FIB (H): ICD-10-CM

## 2018-12-07 LAB — INR PPP: 1.7 (ref 0.9–1.1)

## 2018-12-10 ENCOUNTER — COMMUNICATION - HEALTHEAST (OUTPATIENT)
Dept: FAMILY MEDICINE | Facility: CLINIC | Age: 83
End: 2018-12-10

## 2018-12-10 ENCOUNTER — COMMUNICATION - HEALTHEAST (OUTPATIENT)
Dept: HOME HEALTH SERVICES | Facility: HOME HEALTH | Age: 83
End: 2018-12-10

## 2018-12-11 ENCOUNTER — COMMUNICATION - HEALTHEAST (OUTPATIENT)
Dept: FAMILY MEDICINE | Facility: CLINIC | Age: 83
End: 2018-12-11

## 2018-12-11 ENCOUNTER — HOME CARE/HOSPICE - HEALTHEAST (OUTPATIENT)
Dept: HOME HEALTH SERVICES | Facility: HOME HEALTH | Age: 83
End: 2018-12-11

## 2018-12-11 ENCOUNTER — AMBULATORY - HEALTHEAST (OUTPATIENT)
Dept: FAMILY MEDICINE | Facility: CLINIC | Age: 83
End: 2018-12-11

## 2018-12-11 DIAGNOSIS — F02.80 ALZHEIMER'S DEMENTIA WITHOUT BEHAVIORAL DISTURBANCE, UNSPECIFIED TIMING OF DEMENTIA ONSET: ICD-10-CM

## 2018-12-11 DIAGNOSIS — G30.9 ALZHEIMER'S DEMENTIA WITHOUT BEHAVIORAL DISTURBANCE, UNSPECIFIED TIMING OF DEMENTIA ONSET: ICD-10-CM

## 2018-12-14 ENCOUNTER — HOME CARE/HOSPICE - HEALTHEAST (OUTPATIENT)
Dept: HOME HEALTH SERVICES | Facility: HOME HEALTH | Age: 83
End: 2018-12-14

## 2018-12-14 ENCOUNTER — COMMUNICATION - HEALTHEAST (OUTPATIENT)
Dept: ANTICOAGULATION | Facility: CLINIC | Age: 83
End: 2018-12-14

## 2018-12-14 ENCOUNTER — AMBULATORY - HEALTHEAST (OUTPATIENT)
Dept: LAB | Facility: CLINIC | Age: 83
End: 2018-12-14

## 2018-12-14 DIAGNOSIS — I48.20 CHRONIC A-FIB (H): ICD-10-CM

## 2018-12-14 DIAGNOSIS — I48.20 CHRONIC ATRIAL FIBRILLATION (H): ICD-10-CM

## 2018-12-14 LAB — INR PPP: 2.1 (ref 0.9–1.1)

## 2018-12-17 ENCOUNTER — COMMUNICATION - HEALTHEAST (OUTPATIENT)
Dept: FAMILY MEDICINE | Facility: CLINIC | Age: 83
End: 2018-12-17

## 2018-12-17 ENCOUNTER — COMMUNICATION - HEALTHEAST (OUTPATIENT)
Dept: ANTICOAGULATION | Facility: CLINIC | Age: 83
End: 2018-12-17

## 2018-12-17 DIAGNOSIS — I48.20 CHRONIC A-FIB (H): ICD-10-CM

## 2018-12-21 ENCOUNTER — HOME CARE/HOSPICE - HEALTHEAST (OUTPATIENT)
Dept: HOME HEALTH SERVICES | Facility: HOME HEALTH | Age: 83
End: 2018-12-21

## 2018-12-27 ENCOUNTER — COMMUNICATION - HEALTHEAST (OUTPATIENT)
Dept: ANTICOAGULATION | Facility: CLINIC | Age: 83
End: 2018-12-27

## 2018-12-27 ENCOUNTER — AMBULATORY - HEALTHEAST (OUTPATIENT)
Dept: LAB | Facility: CLINIC | Age: 83
End: 2018-12-27

## 2018-12-27 DIAGNOSIS — I48.20 CHRONIC A-FIB (H): ICD-10-CM

## 2018-12-27 DIAGNOSIS — I48.20 CHRONIC ATRIAL FIBRILLATION (H): ICD-10-CM

## 2018-12-27 LAB — INR PPP: 1.6 (ref 0.9–1.1)

## 2018-12-28 ENCOUNTER — COMMUNICATION - HEALTHEAST (OUTPATIENT)
Dept: FAMILY MEDICINE | Facility: CLINIC | Age: 83
End: 2018-12-28

## 2018-12-28 ENCOUNTER — HOME CARE/HOSPICE - HEALTHEAST (OUTPATIENT)
Dept: HOME HEALTH SERVICES | Facility: HOME HEALTH | Age: 83
End: 2018-12-28

## 2019-01-03 ENCOUNTER — COMMUNICATION - HEALTHEAST (OUTPATIENT)
Dept: ANTICOAGULATION | Facility: CLINIC | Age: 84
End: 2019-01-03

## 2019-01-03 ENCOUNTER — AMBULATORY - HEALTHEAST (OUTPATIENT)
Dept: LAB | Facility: CLINIC | Age: 84
End: 2019-01-03

## 2019-01-03 DIAGNOSIS — I48.20 CHRONIC A-FIB (H): ICD-10-CM

## 2019-01-03 DIAGNOSIS — I48.20 CHRONIC ATRIAL FIBRILLATION (H): ICD-10-CM

## 2019-01-03 LAB — INR PPP: 1.4 (ref 0.9–1.1)

## 2019-01-09 ENCOUNTER — AMBULATORY - HEALTHEAST (OUTPATIENT)
Dept: LAB | Facility: CLINIC | Age: 84
End: 2019-01-09

## 2019-01-09 ENCOUNTER — COMMUNICATION - HEALTHEAST (OUTPATIENT)
Dept: ANTICOAGULATION | Facility: CLINIC | Age: 84
End: 2019-01-09

## 2019-01-09 DIAGNOSIS — I48.20 CHRONIC A-FIB (H): ICD-10-CM

## 2019-01-09 DIAGNOSIS — I48.20 CHRONIC ATRIAL FIBRILLATION (H): ICD-10-CM

## 2019-01-09 LAB — INR PPP: 1.5 (ref 0.9–1.1)

## 2019-01-16 ENCOUNTER — COMMUNICATION - HEALTHEAST (OUTPATIENT)
Dept: ANTICOAGULATION | Facility: CLINIC | Age: 84
End: 2019-01-16

## 2019-01-16 ENCOUNTER — AMBULATORY - HEALTHEAST (OUTPATIENT)
Dept: LAB | Facility: CLINIC | Age: 84
End: 2019-01-16

## 2019-01-16 DIAGNOSIS — I48.20 CHRONIC A-FIB (H): ICD-10-CM

## 2019-01-16 DIAGNOSIS — I48.20 CHRONIC ATRIAL FIBRILLATION (H): ICD-10-CM

## 2019-01-16 LAB — INR PPP: 1.9 (ref 0.9–1.1)

## 2019-01-21 ENCOUNTER — COMMUNICATION - HEALTHEAST (OUTPATIENT)
Dept: FAMILY MEDICINE | Facility: CLINIC | Age: 84
End: 2019-01-21

## 2019-01-30 ENCOUNTER — COMMUNICATION - HEALTHEAST (OUTPATIENT)
Dept: FAMILY MEDICINE | Facility: CLINIC | Age: 84
End: 2019-01-30

## 2019-01-30 DIAGNOSIS — I48.20 CHRONIC A-FIB (H): ICD-10-CM

## 2019-01-31 ENCOUNTER — AMBULATORY - HEALTHEAST (OUTPATIENT)
Dept: LAB | Facility: CLINIC | Age: 84
End: 2019-01-31

## 2019-01-31 ENCOUNTER — COMMUNICATION - HEALTHEAST (OUTPATIENT)
Dept: ANTICOAGULATION | Facility: CLINIC | Age: 84
End: 2019-01-31

## 2019-01-31 DIAGNOSIS — I48.20 CHRONIC A-FIB (H): ICD-10-CM

## 2019-01-31 DIAGNOSIS — I48.20 CHRONIC ATRIAL FIBRILLATION (H): ICD-10-CM

## 2019-01-31 LAB — INR PPP: 1.8 (ref 0.9–1.1)

## 2019-02-08 ENCOUNTER — AMBULATORY - HEALTHEAST (OUTPATIENT)
Dept: LAB | Facility: CLINIC | Age: 84
End: 2019-02-08

## 2019-02-08 ENCOUNTER — COMMUNICATION - HEALTHEAST (OUTPATIENT)
Dept: ANTICOAGULATION | Facility: CLINIC | Age: 84
End: 2019-02-08

## 2019-02-08 DIAGNOSIS — I48.20 CHRONIC ATRIAL FIBRILLATION (H): ICD-10-CM

## 2019-02-08 DIAGNOSIS — I48.20 CHRONIC A-FIB (H): ICD-10-CM

## 2019-02-08 LAB — INR PPP: 2 (ref 0.9–1.1)

## 2019-02-15 ENCOUNTER — AMBULATORY - HEALTHEAST (OUTPATIENT)
Dept: LAB | Facility: CLINIC | Age: 84
End: 2019-02-15

## 2019-02-15 ENCOUNTER — COMMUNICATION - HEALTHEAST (OUTPATIENT)
Dept: ANTICOAGULATION | Facility: CLINIC | Age: 84
End: 2019-02-15

## 2019-02-15 DIAGNOSIS — I48.20 CHRONIC A-FIB (H): ICD-10-CM

## 2019-02-15 DIAGNOSIS — I48.20 CHRONIC ATRIAL FIBRILLATION (H): ICD-10-CM

## 2019-02-15 LAB — INR PPP: 1.9 (ref 0.9–1.1)

## 2019-03-01 ENCOUNTER — COMMUNICATION - HEALTHEAST (OUTPATIENT)
Dept: ANTICOAGULATION | Facility: CLINIC | Age: 84
End: 2019-03-01

## 2019-03-01 ENCOUNTER — AMBULATORY - HEALTHEAST (OUTPATIENT)
Dept: LAB | Facility: CLINIC | Age: 84
End: 2019-03-01

## 2019-03-01 DIAGNOSIS — I48.20 CHRONIC A-FIB (H): ICD-10-CM

## 2019-03-01 DIAGNOSIS — I48.20 CHRONIC ATRIAL FIBRILLATION (H): ICD-10-CM

## 2019-03-01 LAB — INR PPP: 2.2 (ref 0.9–1.1)

## 2019-03-15 ENCOUNTER — AMBULATORY - HEALTHEAST (OUTPATIENT)
Dept: LAB | Facility: CLINIC | Age: 84
End: 2019-03-15

## 2019-03-15 ENCOUNTER — COMMUNICATION - HEALTHEAST (OUTPATIENT)
Dept: ANTICOAGULATION | Facility: CLINIC | Age: 84
End: 2019-03-15

## 2019-03-15 DIAGNOSIS — I48.20 CHRONIC ATRIAL FIBRILLATION (H): ICD-10-CM

## 2019-03-15 DIAGNOSIS — I48.20 CHRONIC A-FIB (H): ICD-10-CM

## 2019-03-15 LAB — INR PPP: 2.1 (ref 0.9–1.1)

## 2019-04-15 ENCOUNTER — COMMUNICATION - HEALTHEAST (OUTPATIENT)
Dept: ANTICOAGULATION | Facility: CLINIC | Age: 84
End: 2019-04-15

## 2019-04-15 ENCOUNTER — AMBULATORY - HEALTHEAST (OUTPATIENT)
Dept: LAB | Facility: CLINIC | Age: 84
End: 2019-04-15

## 2019-04-15 DIAGNOSIS — I48.20 CHRONIC ATRIAL FIBRILLATION (H): ICD-10-CM

## 2019-04-15 DIAGNOSIS — I48.20 CHRONIC A-FIB (H): ICD-10-CM

## 2019-04-15 LAB — INR PPP: 2.3 (ref 0.9–1.1)

## 2019-04-29 ENCOUNTER — COMMUNICATION - HEALTHEAST (OUTPATIENT)
Dept: FAMILY MEDICINE | Facility: CLINIC | Age: 84
End: 2019-04-29

## 2019-04-29 DIAGNOSIS — I48.20 CHRONIC ATRIAL FIBRILLATION (H): ICD-10-CM

## 2019-05-06 ENCOUNTER — COMMUNICATION - HEALTHEAST (OUTPATIENT)
Dept: ANTICOAGULATION | Facility: CLINIC | Age: 84
End: 2019-05-06

## 2019-05-06 DIAGNOSIS — I48.20 CHRONIC ATRIAL FIBRILLATION (H): ICD-10-CM

## 2019-05-13 ENCOUNTER — AMBULATORY - HEALTHEAST (OUTPATIENT)
Dept: LAB | Facility: CLINIC | Age: 84
End: 2019-05-13

## 2019-05-13 ENCOUNTER — COMMUNICATION - HEALTHEAST (OUTPATIENT)
Dept: ANTICOAGULATION | Facility: CLINIC | Age: 84
End: 2019-05-13

## 2019-05-13 DIAGNOSIS — I48.20 CHRONIC A-FIB (H): ICD-10-CM

## 2019-05-13 DIAGNOSIS — I48.20 CHRONIC ATRIAL FIBRILLATION (H): ICD-10-CM

## 2019-05-13 LAB — INR PPP: 2 (ref 0.9–1.1)

## 2019-06-10 ENCOUNTER — COMMUNICATION - HEALTHEAST (OUTPATIENT)
Dept: ANTICOAGULATION | Facility: CLINIC | Age: 84
End: 2019-06-10

## 2019-06-10 ENCOUNTER — AMBULATORY - HEALTHEAST (OUTPATIENT)
Dept: LAB | Facility: CLINIC | Age: 84
End: 2019-06-10

## 2019-06-10 DIAGNOSIS — I48.20 CHRONIC ATRIAL FIBRILLATION (H): ICD-10-CM

## 2019-06-10 DIAGNOSIS — I48.20 CHRONIC A-FIB (H): ICD-10-CM

## 2019-06-10 LAB — INR PPP: 2.5 (ref 0.9–1.1)

## 2019-07-09 ENCOUNTER — COMMUNICATION - HEALTHEAST (OUTPATIENT)
Dept: FAMILY MEDICINE | Facility: CLINIC | Age: 84
End: 2019-07-09

## 2019-07-09 ENCOUNTER — OFFICE VISIT - HEALTHEAST (OUTPATIENT)
Dept: FAMILY MEDICINE | Facility: CLINIC | Age: 84
End: 2019-07-09

## 2019-07-09 ENCOUNTER — COMMUNICATION - HEALTHEAST (OUTPATIENT)
Dept: ANTICOAGULATION | Facility: CLINIC | Age: 84
End: 2019-07-09

## 2019-07-09 ENCOUNTER — HOSPITAL ENCOUNTER (OUTPATIENT)
Dept: LAB | Age: 84
Setting detail: SPECIMEN
Discharge: HOME | End: 2019-07-09

## 2019-07-09 DIAGNOSIS — I25.10 CORONARY ARTERY DISEASE INVOLVING NATIVE HEART WITHOUT ANGINA PECTORIS, UNSPECIFIED VESSEL OR LESION TYPE: ICD-10-CM

## 2019-07-09 DIAGNOSIS — G30.9 ALZHEIMER'S DEMENTIA WITHOUT BEHAVIORAL DISTURBANCE, UNSPECIFIED TIMING OF DEMENTIA ONSET: ICD-10-CM

## 2019-07-09 DIAGNOSIS — I48.20 CHRONIC ATRIAL FIBRILLATION (H): ICD-10-CM

## 2019-07-09 DIAGNOSIS — I48.20 CHRONIC A-FIB (H): ICD-10-CM

## 2019-07-09 DIAGNOSIS — E03.9 ACQUIRED HYPOTHYROIDISM: ICD-10-CM

## 2019-07-09 DIAGNOSIS — E03.9 HYPOTHYROIDISM, UNSPECIFIED TYPE: ICD-10-CM

## 2019-07-09 DIAGNOSIS — Z00.00 ENCOUNTER FOR MEDICARE ANNUAL WELLNESS EXAM: ICD-10-CM

## 2019-07-09 DIAGNOSIS — F02.80 ALZHEIMER'S DEMENTIA WITHOUT BEHAVIORAL DISTURBANCE, UNSPECIFIED TIMING OF DEMENTIA ONSET: ICD-10-CM

## 2019-07-09 DIAGNOSIS — H61.22 IMPACTED CERUMEN OF LEFT EAR: ICD-10-CM

## 2019-07-09 LAB
ALBUMIN SERPL-MCNC: 4 G/DL (ref 3.5–5)
ALP SERPL-CCNC: 106 U/L (ref 45–120)
ALT SERPL W P-5'-P-CCNC: 29 U/L (ref 0–45)
ANION GAP SERPL CALCULATED.3IONS-SCNC: 9 MMOL/L (ref 5–18)
AST SERPL W P-5'-P-CCNC: 27 U/L (ref 0–40)
BILIRUB SERPL-MCNC: 1.4 MG/DL (ref 0–1)
BUN SERPL-MCNC: 15 MG/DL (ref 8–28)
CALCIUM SERPL-MCNC: 10.4 MG/DL (ref 8.5–10.5)
CHLORIDE BLD-SCNC: 105 MMOL/L (ref 98–107)
CO2 SERPL-SCNC: 27 MMOL/L (ref 22–31)
CREAT SERPL-MCNC: 0.75 MG/DL (ref 0.6–1.1)
GFR SERPL CREATININE-BSD FRML MDRD: >60 ML/MIN/1.73M2
GLUCOSE BLD-MCNC: 80 MG/DL (ref 70–125)
INR PPP: 1.8 (ref 0.9–1.1)
POTASSIUM BLD-SCNC: 4.3 MMOL/L (ref 3.5–5)
PROT SERPL-MCNC: 7 G/DL (ref 6–8)
SODIUM SERPL-SCNC: 141 MMOL/L (ref 136–145)
TSH SERPL DL<=0.005 MIU/L-ACNC: 0.64 UIU/ML (ref 0.3–5)

## 2019-07-09 ASSESSMENT — MIFFLIN-ST. JEOR: SCORE: 1268.95

## 2019-07-23 ENCOUNTER — COMMUNICATION - HEALTHEAST (OUTPATIENT)
Dept: ANTICOAGULATION | Facility: CLINIC | Age: 84
End: 2019-07-23

## 2019-07-23 ENCOUNTER — AMBULATORY - HEALTHEAST (OUTPATIENT)
Dept: LAB | Facility: CLINIC | Age: 84
End: 2019-07-23

## 2019-07-23 DIAGNOSIS — I48.20 CHRONIC ATRIAL FIBRILLATION (H): ICD-10-CM

## 2019-07-23 DIAGNOSIS — I48.20 CHRONIC A-FIB (H): ICD-10-CM

## 2019-07-23 LAB — INR PPP: 2.3 (ref 0.9–1.1)

## 2019-07-30 ENCOUNTER — COMMUNICATION - HEALTHEAST (OUTPATIENT)
Dept: SCHEDULING | Facility: CLINIC | Age: 84
End: 2019-07-30

## 2019-08-06 ENCOUNTER — AMBULATORY - HEALTHEAST (OUTPATIENT)
Dept: LAB | Facility: CLINIC | Age: 84
End: 2019-08-06

## 2019-08-06 ENCOUNTER — COMMUNICATION - HEALTHEAST (OUTPATIENT)
Dept: ANTICOAGULATION | Facility: CLINIC | Age: 84
End: 2019-08-06

## 2019-08-06 DIAGNOSIS — I48.20 CHRONIC ATRIAL FIBRILLATION (H): ICD-10-CM

## 2019-08-06 DIAGNOSIS — I48.20 CHRONIC A-FIB (H): ICD-10-CM

## 2019-08-06 LAB — INR PPP: 1.7 (ref 0.9–1.1)

## 2019-08-07 ENCOUNTER — COMMUNICATION - HEALTHEAST (OUTPATIENT)
Dept: SCHEDULING | Facility: CLINIC | Age: 84
End: 2019-08-07

## 2019-08-08 ENCOUNTER — COMMUNICATION - HEALTHEAST (OUTPATIENT)
Dept: FAMILY MEDICINE | Facility: CLINIC | Age: 84
End: 2019-08-08

## 2019-08-20 ENCOUNTER — AMBULATORY - HEALTHEAST (OUTPATIENT)
Dept: LAB | Facility: CLINIC | Age: 84
End: 2019-08-20

## 2019-08-20 ENCOUNTER — COMMUNICATION - HEALTHEAST (OUTPATIENT)
Dept: ANTICOAGULATION | Facility: CLINIC | Age: 84
End: 2019-08-20

## 2019-08-20 ENCOUNTER — COMMUNICATION - HEALTHEAST (OUTPATIENT)
Dept: FAMILY MEDICINE | Facility: CLINIC | Age: 84
End: 2019-08-20

## 2019-08-20 DIAGNOSIS — I48.20 CHRONIC A-FIB (H): ICD-10-CM

## 2019-08-20 DIAGNOSIS — I48.20 CHRONIC ATRIAL FIBRILLATION (H): ICD-10-CM

## 2019-08-20 LAB — INR PPP: 2.7 (ref 0.9–1.1)

## 2019-08-27 ENCOUNTER — OFFICE VISIT - HEALTHEAST (OUTPATIENT)
Dept: FAMILY MEDICINE | Facility: CLINIC | Age: 84
End: 2019-08-27

## 2019-08-27 ENCOUNTER — COMMUNICATION - HEALTHEAST (OUTPATIENT)
Dept: ANTICOAGULATION | Facility: CLINIC | Age: 84
End: 2019-08-27

## 2019-08-27 DIAGNOSIS — G30.9 ALZHEIMER'S DEMENTIA WITHOUT BEHAVIORAL DISTURBANCE, UNSPECIFIED TIMING OF DEMENTIA ONSET: ICD-10-CM

## 2019-08-27 DIAGNOSIS — R10.32 LLQ ABDOMINAL PAIN: ICD-10-CM

## 2019-08-27 DIAGNOSIS — I48.20 CHRONIC ATRIAL FIBRILLATION (H): ICD-10-CM

## 2019-08-27 DIAGNOSIS — R93.89 THICKENED ENDOMETRIUM: ICD-10-CM

## 2019-08-27 DIAGNOSIS — F02.80 ALZHEIMER'S DEMENTIA WITHOUT BEHAVIORAL DISTURBANCE, UNSPECIFIED TIMING OF DEMENTIA ONSET: ICD-10-CM

## 2019-08-27 DIAGNOSIS — K57.30 DIVERTICULOSIS OF LARGE INTESTINE WITHOUT HEMORRHAGE: ICD-10-CM

## 2019-08-27 DIAGNOSIS — I48.20 CHRONIC A-FIB (H): ICD-10-CM

## 2019-08-27 LAB
ALBUMIN UR-MCNC: NEGATIVE MG/DL
APPEARANCE UR: CLEAR
BACTERIA #/AREA URNS HPF: ABNORMAL HPF
BILIRUB UR QL STRIP: NEGATIVE
COLOR UR AUTO: YELLOW
GLUCOSE UR STRIP-MCNC: NEGATIVE MG/DL
HGB UR QL STRIP: ABNORMAL
INR PPP: 2.6 (ref 0.9–1.1)
KETONES UR STRIP-MCNC: NEGATIVE MG/DL
LEUKOCYTE ESTERASE UR QL STRIP: ABNORMAL
NITRATE UR QL: NEGATIVE
PH UR STRIP: 5 [PH] (ref 5–8)
RBC #/AREA URNS AUTO: ABNORMAL HPF
SP GR UR STRIP: 1.02 (ref 1–1.03)
SQUAMOUS #/AREA URNS AUTO: ABNORMAL LPF
UROBILINOGEN UR STRIP-ACNC: ABNORMAL
WBC #/AREA URNS AUTO: ABNORMAL HPF
YEAST #/AREA URNS HPF: ABNORMAL HPF

## 2019-08-28 LAB — BACTERIA SPEC CULT: NO GROWTH

## 2019-09-04 ENCOUNTER — HOSPITAL ENCOUNTER (OUTPATIENT)
Dept: ULTRASOUND IMAGING | Facility: HOSPITAL | Age: 84
Discharge: HOME | End: 2019-09-04
Attending: FAMILY MEDICINE

## 2019-09-04 DIAGNOSIS — R93.89 THICKENED ENDOMETRIUM: ICD-10-CM

## 2019-09-04 DIAGNOSIS — R10.32 LLQ ABDOMINAL PAIN: ICD-10-CM

## 2019-09-09 ENCOUNTER — COMMUNICATION - HEALTHEAST (OUTPATIENT)
Dept: FAMILY MEDICINE | Facility: CLINIC | Age: 84
End: 2019-09-09

## 2019-09-10 ENCOUNTER — AMBULATORY - HEALTHEAST (OUTPATIENT)
Dept: CARE COORDINATION | Facility: CLINIC | Age: 84
End: 2019-09-10

## 2019-09-10 DIAGNOSIS — G30.9 ALZHEIMER'S DEMENTIA WITHOUT BEHAVIORAL DISTURBANCE, UNSPECIFIED TIMING OF DEMENTIA ONSET: ICD-10-CM

## 2019-09-10 DIAGNOSIS — I10 ESSENTIAL HYPERTENSION, BENIGN: ICD-10-CM

## 2019-09-10 DIAGNOSIS — I48.20 CHRONIC ATRIAL FIBRILLATION (H): ICD-10-CM

## 2019-09-10 DIAGNOSIS — F02.80 ALZHEIMER'S DEMENTIA WITHOUT BEHAVIORAL DISTURBANCE, UNSPECIFIED TIMING OF DEMENTIA ONSET: ICD-10-CM

## 2019-09-12 ENCOUNTER — COMMUNICATION - HEALTHEAST (OUTPATIENT)
Dept: NURSING | Facility: CLINIC | Age: 84
End: 2019-09-12

## 2019-09-16 ENCOUNTER — COMMUNICATION - HEALTHEAST (OUTPATIENT)
Dept: NURSING | Facility: CLINIC | Age: 84
End: 2019-09-16

## 2019-09-24 ENCOUNTER — COMMUNICATION - HEALTHEAST (OUTPATIENT)
Dept: ANTICOAGULATION | Facility: CLINIC | Age: 84
End: 2019-09-24

## 2019-09-24 ENCOUNTER — AMBULATORY - HEALTHEAST (OUTPATIENT)
Dept: LAB | Facility: CLINIC | Age: 84
End: 2019-09-24

## 2019-09-24 DIAGNOSIS — I48.20 CHRONIC A-FIB (H): ICD-10-CM

## 2019-09-24 DIAGNOSIS — I48.20 CHRONIC ATRIAL FIBRILLATION (H): ICD-10-CM

## 2019-09-24 LAB — INR PPP: 2.4 (ref 0.9–1.1)

## 2019-09-25 ENCOUNTER — COMMUNICATION - HEALTHEAST (OUTPATIENT)
Dept: NURSING | Facility: CLINIC | Age: 84
End: 2019-09-25

## 2019-10-04 ENCOUNTER — COMMUNICATION - HEALTHEAST (OUTPATIENT)
Dept: NURSING | Facility: CLINIC | Age: 84
End: 2019-10-04

## 2019-10-07 ENCOUNTER — COMMUNICATION - HEALTHEAST (OUTPATIENT)
Dept: NURSING | Facility: CLINIC | Age: 84
End: 2019-10-07

## 2019-10-21 ENCOUNTER — COMMUNICATION - HEALTHEAST (OUTPATIENT)
Dept: FAMILY MEDICINE | Facility: CLINIC | Age: 84
End: 2019-10-21

## 2019-10-21 DIAGNOSIS — I48.20 CHRONIC ATRIAL FIBRILLATION (H): ICD-10-CM

## 2019-10-22 ENCOUNTER — COMMUNICATION - HEALTHEAST (OUTPATIENT)
Dept: SCHEDULING | Facility: CLINIC | Age: 84
End: 2019-10-22

## 2019-10-22 DIAGNOSIS — I48.20 CHRONIC A-FIB (H): ICD-10-CM

## 2019-10-23 ENCOUNTER — COMMUNICATION - HEALTHEAST (OUTPATIENT)
Dept: FAMILY MEDICINE | Facility: CLINIC | Age: 84
End: 2019-10-23

## 2019-10-25 ENCOUNTER — AMBULATORY - HEALTHEAST (OUTPATIENT)
Dept: NURSING | Facility: CLINIC | Age: 84
End: 2019-10-25

## 2019-10-25 ASSESSMENT — ACTIVITIES OF DAILY LIVING (ADL): DEPENDENT_IADLS:: INDEPENDENT;TRANSPORTATION

## 2019-10-28 ENCOUNTER — COMMUNICATION - HEALTHEAST (OUTPATIENT)
Dept: FAMILY MEDICINE | Facility: CLINIC | Age: 84
End: 2019-10-28

## 2019-10-30 ENCOUNTER — COMMUNICATION - HEALTHEAST (OUTPATIENT)
Dept: FAMILY MEDICINE | Facility: CLINIC | Age: 84
End: 2019-10-30

## 2019-10-30 DIAGNOSIS — I48.20 CHRONIC A-FIB (H): ICD-10-CM

## 2019-11-04 ENCOUNTER — AMBULATORY - HEALTHEAST (OUTPATIENT)
Dept: NURSING | Facility: CLINIC | Age: 84
End: 2019-11-04

## 2019-11-04 ENCOUNTER — AMBULATORY - HEALTHEAST (OUTPATIENT)
Dept: LAB | Facility: CLINIC | Age: 84
End: 2019-11-04

## 2019-11-04 ENCOUNTER — COMMUNICATION - HEALTHEAST (OUTPATIENT)
Dept: ANTICOAGULATION | Facility: CLINIC | Age: 84
End: 2019-11-04

## 2019-11-04 DIAGNOSIS — I48.20 CHRONIC ATRIAL FIBRILLATION (H): ICD-10-CM

## 2019-11-04 DIAGNOSIS — I48.20 CHRONIC A-FIB (H): ICD-10-CM

## 2019-11-04 DIAGNOSIS — Z23 NEED FOR VACCINATION: ICD-10-CM

## 2019-11-04 LAB — INR PPP: 1.9 (ref 0.9–1.1)

## 2019-11-05 ENCOUNTER — AMBULATORY - HEALTHEAST (OUTPATIENT)
Dept: OTHER | Facility: CLINIC | Age: 84
End: 2019-11-05

## 2019-11-05 ENCOUNTER — DOCUMENTATION ONLY (OUTPATIENT)
Dept: OTHER | Facility: CLINIC | Age: 84
End: 2019-11-05

## 2019-11-18 ENCOUNTER — COMMUNICATION - HEALTHEAST (OUTPATIENT)
Dept: ANTICOAGULATION | Facility: CLINIC | Age: 84
End: 2019-11-18

## 2019-11-18 ENCOUNTER — AMBULATORY - HEALTHEAST (OUTPATIENT)
Dept: LAB | Facility: CLINIC | Age: 84
End: 2019-11-18

## 2019-11-18 DIAGNOSIS — I48.20 CHRONIC ATRIAL FIBRILLATION (H): ICD-10-CM

## 2019-11-18 DIAGNOSIS — I48.20 CHRONIC A-FIB (H): ICD-10-CM

## 2019-11-18 LAB — INR PPP: 3.1 (ref 0.9–1.1)

## 2019-11-25 ENCOUNTER — COMMUNICATION - HEALTHEAST (OUTPATIENT)
Dept: NURSING | Facility: CLINIC | Age: 84
End: 2019-11-25

## 2019-11-25 ENCOUNTER — COMMUNICATION - HEALTHEAST (OUTPATIENT)
Dept: CARE COORDINATION | Facility: CLINIC | Age: 84
End: 2019-11-25

## 2019-11-27 ENCOUNTER — COMMUNICATION - HEALTHEAST (OUTPATIENT)
Dept: FAMILY MEDICINE | Facility: CLINIC | Age: 84
End: 2019-11-27

## 2019-11-27 DIAGNOSIS — Z95.0 PACEMAKER: ICD-10-CM

## 2019-11-27 DIAGNOSIS — I10 ESSENTIAL HYPERTENSION WITH GOAL BLOOD PRESSURE LESS THAN 140/90: ICD-10-CM

## 2019-11-27 DIAGNOSIS — I10 ESSENTIAL HYPERTENSION, BENIGN: ICD-10-CM

## 2019-11-27 DIAGNOSIS — I48.20 CHRONIC ATRIAL FIBRILLATION (H): ICD-10-CM

## 2019-11-27 DIAGNOSIS — I48.20 CHRONIC A-FIB (H): ICD-10-CM

## 2019-11-27 DIAGNOSIS — Z98.890 S/P MVR (MITRAL VALVE REPAIR): ICD-10-CM

## 2019-11-27 DIAGNOSIS — I05.9 MITRAL VALVE DISORDER: ICD-10-CM

## 2019-11-27 DIAGNOSIS — I25.10 ARTERIOSCLEROTIC HEART DISEASE (ASHD): ICD-10-CM

## 2019-11-27 DIAGNOSIS — I21.4 NSTEMI (NON-ST ELEVATED MYOCARDIAL INFARCTION) (H): ICD-10-CM

## 2019-11-27 DIAGNOSIS — I25.10 CORONARY ARTERY DISEASE INVOLVING NATIVE HEART WITHOUT ANGINA PECTORIS, UNSPECIFIED VESSEL OR LESION TYPE: ICD-10-CM

## 2019-12-02 ENCOUNTER — AMBULATORY - HEALTHEAST (OUTPATIENT)
Dept: LAB | Facility: CLINIC | Age: 84
End: 2019-12-02

## 2019-12-02 ENCOUNTER — COMMUNICATION - HEALTHEAST (OUTPATIENT)
Dept: ANTICOAGULATION | Facility: CLINIC | Age: 84
End: 2019-12-02

## 2019-12-02 DIAGNOSIS — I48.20 CHRONIC ATRIAL FIBRILLATION (H): ICD-10-CM

## 2019-12-02 DIAGNOSIS — I48.20 CHRONIC A-FIB (H): ICD-10-CM

## 2019-12-02 LAB — INR PPP: 2.7 (ref 0.9–1.1)

## 2019-12-04 ENCOUNTER — COMMUNICATION - HEALTHEAST (OUTPATIENT)
Dept: NURSING | Facility: CLINIC | Age: 84
End: 2019-12-04

## 2019-12-05 ENCOUNTER — COMMUNICATION - HEALTHEAST (OUTPATIENT)
Dept: FAMILY MEDICINE | Facility: CLINIC | Age: 84
End: 2019-12-05

## 2019-12-05 DIAGNOSIS — F02.80 ALZHEIMER'S DEMENTIA WITHOUT BEHAVIORAL DISTURBANCE, UNSPECIFIED TIMING OF DEMENTIA ONSET: ICD-10-CM

## 2019-12-05 DIAGNOSIS — G30.9 ALZHEIMER'S DEMENTIA WITHOUT BEHAVIORAL DISTURBANCE, UNSPECIFIED TIMING OF DEMENTIA ONSET: ICD-10-CM

## 2019-12-07 ENCOUNTER — COMMUNICATION - HEALTHEAST (OUTPATIENT)
Dept: FAMILY MEDICINE | Facility: CLINIC | Age: 84
End: 2019-12-07

## 2019-12-07 DIAGNOSIS — M81.0 OSTEOPOROSIS WITHOUT CURRENT PATHOLOGICAL FRACTURE, UNSPECIFIED OSTEOPOROSIS TYPE: ICD-10-CM

## 2019-12-11 ENCOUNTER — COMMUNICATION - HEALTHEAST (OUTPATIENT)
Dept: FAMILY MEDICINE | Facility: CLINIC | Age: 84
End: 2019-12-11

## 2019-12-11 DIAGNOSIS — G30.9 ALZHEIMER'S DEMENTIA WITHOUT BEHAVIORAL DISTURBANCE, UNSPECIFIED TIMING OF DEMENTIA ONSET: ICD-10-CM

## 2019-12-11 DIAGNOSIS — F02.80 ALZHEIMER'S DEMENTIA WITHOUT BEHAVIORAL DISTURBANCE, UNSPECIFIED TIMING OF DEMENTIA ONSET: ICD-10-CM

## 2019-12-16 ENCOUNTER — AMBULATORY - HEALTHEAST (OUTPATIENT)
Dept: LAB | Facility: CLINIC | Age: 84
End: 2019-12-16

## 2019-12-16 ENCOUNTER — COMMUNICATION - HEALTHEAST (OUTPATIENT)
Dept: ANTICOAGULATION | Facility: CLINIC | Age: 84
End: 2019-12-16

## 2019-12-16 DIAGNOSIS — I48.20 CHRONIC ATRIAL FIBRILLATION (H): ICD-10-CM

## 2019-12-16 DIAGNOSIS — I48.20 CHRONIC A-FIB (H): ICD-10-CM

## 2019-12-16 LAB — INR PPP: 3.4 (ref 0.9–1.1)

## 2019-12-30 ENCOUNTER — AMBULATORY - HEALTHEAST (OUTPATIENT)
Dept: LAB | Facility: CLINIC | Age: 84
End: 2019-12-30

## 2019-12-31 ENCOUNTER — COMMUNICATION - HEALTHEAST (OUTPATIENT)
Dept: ANTICOAGULATION | Facility: CLINIC | Age: 84
End: 2019-12-31

## 2019-12-31 ENCOUNTER — AMBULATORY - HEALTHEAST (OUTPATIENT)
Dept: LAB | Facility: CLINIC | Age: 84
End: 2019-12-31

## 2019-12-31 DIAGNOSIS — I48.20 CHRONIC ATRIAL FIBRILLATION (H): ICD-10-CM

## 2019-12-31 DIAGNOSIS — I48.20 CHRONIC A-FIB (H): ICD-10-CM

## 2019-12-31 LAB — INR PPP: 3.7 (ref 0.9–1.1)

## 2020-01-03 ENCOUNTER — COMMUNICATION - HEALTHEAST (OUTPATIENT)
Dept: FAMILY MEDICINE | Facility: CLINIC | Age: 85
End: 2020-01-03

## 2020-01-03 DIAGNOSIS — E03.9 ACQUIRED HYPOTHYROIDISM: ICD-10-CM

## 2020-01-08 ENCOUNTER — COMMUNICATION - HEALTHEAST (OUTPATIENT)
Dept: SCHEDULING | Facility: CLINIC | Age: 85
End: 2020-01-08

## 2020-01-14 ENCOUNTER — COMMUNICATION - HEALTHEAST (OUTPATIENT)
Dept: ANTICOAGULATION | Facility: CLINIC | Age: 85
End: 2020-01-14

## 2020-01-14 ENCOUNTER — AMBULATORY - HEALTHEAST (OUTPATIENT)
Dept: LAB | Facility: CLINIC | Age: 85
End: 2020-01-14

## 2020-01-14 DIAGNOSIS — I48.20 CHRONIC A-FIB (H): ICD-10-CM

## 2020-01-14 DIAGNOSIS — I48.20 CHRONIC ATRIAL FIBRILLATION (H): ICD-10-CM

## 2020-01-14 LAB — INR PPP: 2.5 (ref 0.9–1.1)

## 2020-01-16 ENCOUNTER — COMMUNICATION - HEALTHEAST (OUTPATIENT)
Dept: FAMILY MEDICINE | Facility: CLINIC | Age: 85
End: 2020-01-16

## 2020-01-16 DIAGNOSIS — I48.20 CHRONIC ATRIAL FIBRILLATION (H): ICD-10-CM

## 2020-01-20 ENCOUNTER — COMMUNICATION - HEALTHEAST (OUTPATIENT)
Dept: FAMILY MEDICINE | Facility: CLINIC | Age: 85
End: 2020-01-20

## 2020-01-20 DIAGNOSIS — I48.20 CHRONIC ATRIAL FIBRILLATION (H): ICD-10-CM

## 2020-01-21 ENCOUNTER — COMMUNICATION - HEALTHEAST (OUTPATIENT)
Dept: FAMILY MEDICINE | Facility: CLINIC | Age: 85
End: 2020-01-21

## 2020-01-21 DIAGNOSIS — I25.10 ARTERIOSCLEROTIC HEART DISEASE (ASHD): ICD-10-CM

## 2020-01-27 ENCOUNTER — COMMUNICATION - HEALTHEAST (OUTPATIENT)
Dept: NURSING | Facility: CLINIC | Age: 85
End: 2020-01-27

## 2020-01-28 ENCOUNTER — COMMUNICATION - HEALTHEAST (OUTPATIENT)
Dept: ANTICOAGULATION | Facility: CLINIC | Age: 85
End: 2020-01-28

## 2020-01-28 ENCOUNTER — AMBULATORY - HEALTHEAST (OUTPATIENT)
Dept: LAB | Facility: CLINIC | Age: 85
End: 2020-01-28

## 2020-01-28 DIAGNOSIS — I48.20 CHRONIC A-FIB (H): ICD-10-CM

## 2020-01-28 DIAGNOSIS — I48.20 CHRONIC ATRIAL FIBRILLATION (H): ICD-10-CM

## 2020-01-28 LAB — INR PPP: 3 (ref 0.9–1.1)

## 2020-02-10 ENCOUNTER — COMMUNICATION - HEALTHEAST (OUTPATIENT)
Dept: FAMILY MEDICINE | Facility: CLINIC | Age: 85
End: 2020-02-10

## 2020-02-10 DIAGNOSIS — F02.80 ALZHEIMER'S DEMENTIA WITHOUT BEHAVIORAL DISTURBANCE, UNSPECIFIED TIMING OF DEMENTIA ONSET: ICD-10-CM

## 2020-02-10 DIAGNOSIS — G30.9 ALZHEIMER'S DEMENTIA WITHOUT BEHAVIORAL DISTURBANCE, UNSPECIFIED TIMING OF DEMENTIA ONSET: ICD-10-CM

## 2020-02-18 ENCOUNTER — COMMUNICATION - HEALTHEAST (OUTPATIENT)
Dept: ANTICOAGULATION | Facility: CLINIC | Age: 85
End: 2020-02-18

## 2020-02-18 ENCOUNTER — OFFICE VISIT - HEALTHEAST (OUTPATIENT)
Dept: FAMILY MEDICINE | Facility: CLINIC | Age: 85
End: 2020-02-18

## 2020-02-18 DIAGNOSIS — I48.20 CHRONIC A-FIB (H): ICD-10-CM

## 2020-02-18 DIAGNOSIS — I05.9 MITRAL VALVE DISORDER: ICD-10-CM

## 2020-02-18 DIAGNOSIS — E03.9 ACQUIRED HYPOTHYROIDISM: ICD-10-CM

## 2020-02-18 DIAGNOSIS — M81.0 OSTEOPOROSIS WITHOUT CURRENT PATHOLOGICAL FRACTURE, UNSPECIFIED OSTEOPOROSIS TYPE: ICD-10-CM

## 2020-02-18 DIAGNOSIS — I25.10 CORONARY ARTERY DISEASE INVOLVING NATIVE HEART WITHOUT ANGINA PECTORIS, UNSPECIFIED VESSEL OR LESION TYPE: ICD-10-CM

## 2020-02-18 DIAGNOSIS — I48.20 CHRONIC ATRIAL FIBRILLATION (H): ICD-10-CM

## 2020-02-18 DIAGNOSIS — Z98.890 S/P MVR (MITRAL VALVE REPAIR): ICD-10-CM

## 2020-02-18 DIAGNOSIS — E78.5 HYPERLIPIDEMIA LDL GOAL <100: ICD-10-CM

## 2020-02-18 DIAGNOSIS — I10 ESSENTIAL HYPERTENSION WITH GOAL BLOOD PRESSURE LESS THAN 140/90: ICD-10-CM

## 2020-02-18 DIAGNOSIS — G30.9 ALZHEIMER'S DEMENTIA WITHOUT BEHAVIORAL DISTURBANCE, UNSPECIFIED TIMING OF DEMENTIA ONSET: ICD-10-CM

## 2020-02-18 DIAGNOSIS — F02.80 ALZHEIMER'S DEMENTIA WITHOUT BEHAVIORAL DISTURBANCE, UNSPECIFIED TIMING OF DEMENTIA ONSET: ICD-10-CM

## 2020-02-18 LAB
ALBUMIN SERPL-MCNC: 3.7 G/DL (ref 3.5–5)
ALP SERPL-CCNC: 102 U/L (ref 45–120)
ALT SERPL W P-5'-P-CCNC: 30 U/L (ref 0–45)
ANION GAP SERPL CALCULATED.3IONS-SCNC: 9 MMOL/L (ref 5–18)
AST SERPL W P-5'-P-CCNC: 30 U/L (ref 0–40)
BILIRUB SERPL-MCNC: 1.6 MG/DL (ref 0–1)
BUN SERPL-MCNC: 18 MG/DL (ref 8–28)
CALCIUM SERPL-MCNC: 9.3 MG/DL (ref 8.5–10.5)
CHLORIDE BLD-SCNC: 107 MMOL/L (ref 98–107)
CO2 SERPL-SCNC: 28 MMOL/L (ref 22–31)
CREAT SERPL-MCNC: 0.74 MG/DL (ref 0.6–1.1)
GFR SERPL CREATININE-BSD FRML MDRD: >60 ML/MIN/1.73M2
GLUCOSE BLD-MCNC: 119 MG/DL (ref 70–125)
INR PPP: 2.7 (ref 0.9–1.1)
POTASSIUM BLD-SCNC: 4.3 MMOL/L (ref 3.5–5)
PROT SERPL-MCNC: 6.6 G/DL (ref 6–8)
SODIUM SERPL-SCNC: 144 MMOL/L (ref 136–145)

## 2020-02-20 ENCOUNTER — COMMUNICATION - HEALTHEAST (OUTPATIENT)
Dept: FAMILY MEDICINE | Facility: CLINIC | Age: 85
End: 2020-02-20

## 2020-02-24 ENCOUNTER — HEALTH MAINTENANCE LETTER (OUTPATIENT)
Age: 85
End: 2020-02-24

## 2020-02-25 ENCOUNTER — COMMUNICATION - HEALTHEAST (OUTPATIENT)
Dept: NURSING | Facility: CLINIC | Age: 85
End: 2020-02-25

## 2020-03-07 ENCOUNTER — COMMUNICATION - HEALTHEAST (OUTPATIENT)
Dept: FAMILY MEDICINE | Facility: CLINIC | Age: 85
End: 2020-03-07

## 2020-03-07 DIAGNOSIS — M81.0 OSTEOPOROSIS WITHOUT CURRENT PATHOLOGICAL FRACTURE, UNSPECIFIED OSTEOPOROSIS TYPE: ICD-10-CM

## 2020-03-10 ENCOUNTER — HOSPITAL ENCOUNTER (OUTPATIENT)
Dept: CARDIOLOGY | Facility: HOSPITAL | Age: 85
Discharge: HOME | End: 2020-03-10
Attending: FAMILY MEDICINE

## 2020-03-10 DIAGNOSIS — I48.20 CHRONIC A-FIB (H): ICD-10-CM

## 2020-03-10 LAB
AORTIC ROOT: 2.7 CM
AORTIC VALVE MEAN VELOCITY: 81.9 CM/S
ASCENDING AORTA: 3.5 CM
AV DIMENSIONLESS INDEX VTI: 0.7
AV MEAN GRADIENT: 3 MMHG
AV PEAK GRADIENT: 5.1 MMHG
AV VALVE AREA: 2 CM2
AV VELOCITY RATIO: 0.7
BSA FOR ECHO PROCEDURE: 1.94 M2
CV BLOOD PRESSURE: ABNORMAL MMHG
CV ECHO HEIGHT: 64.5 IN
CV ECHO WEIGHT: 183 LBS
DOP CALC AO PEAK VEL: 113 CM/S
DOP CALC AO VTI: 27.6 CM
DOP CALC LVOT AREA: 2.83 CM2
DOP CALC LVOT DIAMETER: 1.9 CM
DOP CALC LVOT PEAK VEL: 84.2 CM/S
DOP CALC LVOT STROKE VOLUME: 54.1 CM3
DOP CALC MV VTI: 50.1 CM
DOP CALCLVOT PEAK VEL VTI: 19.1 CM
EJECTION FRACTION: 57 % (ref 55–75)
FRACTIONAL SHORTENING: 51.8 % (ref 28–44)
INTERVENTRICULAR SEPTUM IN END DIASTOLE: 1.01 CM (ref 0.6–0.9)
IVS/PW RATIO: 1
LA AREA 1: 20 CM2
LA AREA 2: 22.5 CM2
LEFT ATRIUM LENGTH: 6 CM
LEFT ATRIUM SIZE: 3.7 CM
LEFT ATRIUM TO AORTIC ROOT RATIO: 1.37 NO UNITS
LEFT ATRIUM VOLUME INDEX: 32.9 ML/M2
LEFT ATRIUM VOLUME: 63.8 ML
LEFT VENTRICLE CARDIAC INDEX: 1.6 L/MIN/M2
LEFT VENTRICLE CARDIAC OUTPUT: 3.2 L/MIN
LEFT VENTRICLE DIASTOLIC VOLUME INDEX: 29.5 CM3/M2 (ref 29–61)
LEFT VENTRICLE DIASTOLIC VOLUME: 57.3 CM3 (ref 46–106)
LEFT VENTRICLE HEART RATE: 59 BPM
LEFT VENTRICLE MASS INDEX: 76.2 G/M2
LEFT VENTRICLE SYSTOLIC VOLUME INDEX: 12.6 CM3/M2 (ref 8–24)
LEFT VENTRICLE SYSTOLIC VOLUME: 24.5 CM3 (ref 14–42)
LEFT VENTRICULAR INTERNAL DIMENSION IN DIASTOLE: 4.42 CM (ref 3.8–5.2)
LEFT VENTRICULAR INTERNAL DIMENSION IN SYSTOLE: 2.13 CM (ref 2.2–3.5)
LEFT VENTRICULAR MASS: 147.8 G
LEFT VENTRICULAR OUTFLOW TRACT MEAN GRADIENT: 2 MMHG
LEFT VENTRICULAR OUTFLOW TRACT MEAN VELOCITY: 63.2 CM/S
LEFT VENTRICULAR OUTFLOW TRACT PEAK GRADIENT: 3 MMHG
LEFT VENTRICULAR POSTERIOR WALL IN END DIASTOLE: 0.98 CM (ref 0.6–0.9)
LV STROKE VOLUME INDEX: 27.9 ML/M2
MITRAL VALVE DECELERATION SLOPE: 4470 MM/S2
MITRAL VALVE MEAN INFLOW VELOCITY: 79.7 CM/S
MITRAL VALVE PEAK VELOCITY: 166 CM/S
MITRAL VALVE PRESSURE HALF-TIME: 110 MS
MV AREA VTI: 1.08 CM2
MV AVERAGE E/E' RATIO: 23.1 CM/S
MV DECELERATION TIME: 377 MS
MV E'TISSUE VEL-LAT: 8.4 CM/S
MV E'TISSUE VEL-MED: 6.14 CM/S
MV LATERAL E/E' RATIO: 20
MV MEAN GRADIENT: 3 MMHG
MV MEDIAL E/E' RATIO: 27.4
MV PEAK E VELOCITY: 168 CM/S
MV PEAK GRADIENT: 11 MMHG
MV VALVE AREA BY CONTINUITY EQUATION: 1.1 CM2
MV VALVE AREA PRESSURE 1/2 METHOD: 2 CM2
NUC REST DIASTOLIC VOLUME INDEX: 2934.4 LBS
NUC REST SYSTOLIC VOLUME INDEX: 64.5 IN
PR MAX PG: 2 MMHG
PR PEAK VELOCITY: 76 CM/S
TRICUSPID REGURGITATION PEAK PRESSURE GRADIENT: 13.1 MMHG
TRICUSPID VALVE ANULAR PLANE SYSTOLIC EXCURSION: 1.4 CM
TRICUSPID VALVE PEAK REGURGITANT VELOCITY: 181 CM/S

## 2020-03-10 ASSESSMENT — MIFFLIN-ST. JEOR: SCORE: 1279.84

## 2020-03-17 ENCOUNTER — COMMUNICATION - HEALTHEAST (OUTPATIENT)
Dept: SCHEDULING | Facility: CLINIC | Age: 85
End: 2020-03-17

## 2020-03-27 ENCOUNTER — COMMUNICATION - HEALTHEAST (OUTPATIENT)
Dept: ANTICOAGULATION | Facility: CLINIC | Age: 85
End: 2020-03-27

## 2020-03-27 ENCOUNTER — COMMUNICATION - HEALTHEAST (OUTPATIENT)
Dept: FAMILY MEDICINE | Facility: CLINIC | Age: 85
End: 2020-03-27

## 2020-03-31 ENCOUNTER — COMMUNICATION - HEALTHEAST (OUTPATIENT)
Dept: FAMILY MEDICINE | Facility: CLINIC | Age: 85
End: 2020-03-31

## 2020-03-31 DIAGNOSIS — I25.10 CORONARY ARTERY DISEASE INVOLVING NATIVE HEART WITHOUT ANGINA PECTORIS, UNSPECIFIED VESSEL OR LESION TYPE: ICD-10-CM

## 2020-04-10 ENCOUNTER — COMMUNICATION - HEALTHEAST (OUTPATIENT)
Dept: ANTICOAGULATION | Facility: CLINIC | Age: 85
End: 2020-04-10

## 2020-04-10 DIAGNOSIS — I48.20 CHRONIC A-FIB (H): ICD-10-CM

## 2020-04-17 ENCOUNTER — COMMUNICATION - HEALTHEAST (OUTPATIENT)
Dept: ANTICOAGULATION | Facility: CLINIC | Age: 85
End: 2020-04-17

## 2020-04-17 ENCOUNTER — AMBULATORY - HEALTHEAST (OUTPATIENT)
Dept: LAB | Facility: CLINIC | Age: 85
End: 2020-04-17

## 2020-04-17 DIAGNOSIS — I48.20 CHRONIC A-FIB (H): ICD-10-CM

## 2020-04-17 LAB — INR PPP: 2.8 (ref 0.9–1.1)

## 2020-05-06 ENCOUNTER — COMMUNICATION - HEALTHEAST (OUTPATIENT)
Dept: SCHEDULING | Facility: CLINIC | Age: 85
End: 2020-05-06

## 2020-05-11 ENCOUNTER — COMMUNICATION - HEALTHEAST (OUTPATIENT)
Dept: SCHEDULING | Facility: CLINIC | Age: 85
End: 2020-05-11

## 2020-05-29 ENCOUNTER — COMMUNICATION - HEALTHEAST (OUTPATIENT)
Dept: ANTICOAGULATION | Facility: CLINIC | Age: 85
End: 2020-05-29

## 2020-05-29 ENCOUNTER — AMBULATORY - HEALTHEAST (OUTPATIENT)
Dept: LAB | Facility: CLINIC | Age: 85
End: 2020-05-29

## 2020-05-29 DIAGNOSIS — I48.20 CHRONIC A-FIB (H): ICD-10-CM

## 2020-05-29 LAB — INR PPP: 2.8 (ref 0.9–1.1)

## 2020-06-12 ENCOUNTER — COMMUNICATION - HEALTHEAST (OUTPATIENT)
Dept: SCHEDULING | Facility: CLINIC | Age: 85
End: 2020-06-12

## 2020-06-12 DIAGNOSIS — I25.10 ARTERIOSCLEROTIC HEART DISEASE (ASHD): ICD-10-CM

## 2020-06-13 ENCOUNTER — COMMUNICATION - HEALTHEAST (OUTPATIENT)
Dept: FAMILY MEDICINE | Facility: CLINIC | Age: 85
End: 2020-06-13

## 2020-07-10 ENCOUNTER — COMMUNICATION - HEALTHEAST (OUTPATIENT)
Dept: ANTICOAGULATION | Facility: CLINIC | Age: 85
End: 2020-07-10

## 2020-07-10 ENCOUNTER — AMBULATORY - HEALTHEAST (OUTPATIENT)
Dept: LAB | Facility: CLINIC | Age: 85
End: 2020-07-10

## 2020-07-10 DIAGNOSIS — I48.20 CHRONIC A-FIB (H): ICD-10-CM

## 2020-07-10 LAB — INR PPP: 3.5 (ref 0.9–1.1)

## 2020-07-24 ENCOUNTER — AMBULATORY - HEALTHEAST (OUTPATIENT)
Dept: LAB | Facility: CLINIC | Age: 85
End: 2020-07-24

## 2020-07-24 ENCOUNTER — COMMUNICATION - HEALTHEAST (OUTPATIENT)
Dept: ANTICOAGULATION | Facility: CLINIC | Age: 85
End: 2020-07-24

## 2020-07-24 DIAGNOSIS — I48.20 CHRONIC A-FIB (H): ICD-10-CM

## 2020-07-24 LAB — INR PPP: 3.1 (ref 0.9–1.1)

## 2020-07-29 ENCOUNTER — COMMUNICATION - HEALTHEAST (OUTPATIENT)
Dept: FAMILY MEDICINE | Facility: CLINIC | Age: 85
End: 2020-07-29

## 2020-08-07 ENCOUNTER — AMBULATORY - HEALTHEAST (OUTPATIENT)
Dept: LAB | Facility: CLINIC | Age: 85
End: 2020-08-07

## 2020-08-07 ENCOUNTER — COMMUNICATION - HEALTHEAST (OUTPATIENT)
Dept: ANTICOAGULATION | Facility: CLINIC | Age: 85
End: 2020-08-07

## 2020-08-07 DIAGNOSIS — I48.20 CHRONIC A-FIB (H): ICD-10-CM

## 2020-08-07 LAB — INR PPP: 3.5 (ref 0.9–1.1)

## 2020-08-14 ENCOUNTER — AMBULATORY - HEALTHEAST (OUTPATIENT)
Dept: LAB | Facility: CLINIC | Age: 85
End: 2020-08-14

## 2020-08-14 ENCOUNTER — COMMUNICATION - HEALTHEAST (OUTPATIENT)
Dept: ANTICOAGULATION | Facility: CLINIC | Age: 85
End: 2020-08-14

## 2020-08-14 DIAGNOSIS — I48.20 CHRONIC A-FIB (H): ICD-10-CM

## 2020-08-14 LAB — INR PPP: 3.3 (ref 0.9–1.1)

## 2020-08-21 ENCOUNTER — COMMUNICATION - HEALTHEAST (OUTPATIENT)
Dept: ANTICOAGULATION | Facility: CLINIC | Age: 85
End: 2020-08-21

## 2020-08-21 ENCOUNTER — AMBULATORY - HEALTHEAST (OUTPATIENT)
Dept: LAB | Facility: CLINIC | Age: 85
End: 2020-08-21

## 2020-08-21 DIAGNOSIS — I48.20 CHRONIC A-FIB (H): ICD-10-CM

## 2020-08-21 LAB — INR PPP: 2.3 (ref 0.9–1.1)

## 2020-08-25 ENCOUNTER — COMMUNICATION - HEALTHEAST (OUTPATIENT)
Dept: FAMILY MEDICINE | Facility: CLINIC | Age: 85
End: 2020-08-25

## 2020-08-25 DIAGNOSIS — G30.9 ALZHEIMER'S DEMENTIA WITHOUT BEHAVIORAL DISTURBANCE, UNSPECIFIED TIMING OF DEMENTIA ONSET: ICD-10-CM

## 2020-08-25 DIAGNOSIS — F02.80 ALZHEIMER'S DEMENTIA WITHOUT BEHAVIORAL DISTURBANCE, UNSPECIFIED TIMING OF DEMENTIA ONSET: ICD-10-CM

## 2020-09-01 ENCOUNTER — COMMUNICATION - HEALTHEAST (OUTPATIENT)
Dept: ANTICOAGULATION | Facility: CLINIC | Age: 85
End: 2020-09-01

## 2020-09-01 ENCOUNTER — AMBULATORY - HEALTHEAST (OUTPATIENT)
Dept: LAB | Facility: CLINIC | Age: 85
End: 2020-09-01

## 2020-09-01 DIAGNOSIS — I48.20 CHRONIC A-FIB (H): ICD-10-CM

## 2020-09-01 LAB — INR PPP: 3.4 (ref 0.9–1.1)

## 2020-09-15 ENCOUNTER — AMBULATORY - HEALTHEAST (OUTPATIENT)
Dept: LAB | Facility: CLINIC | Age: 85
End: 2020-09-15

## 2020-09-15 ENCOUNTER — COMMUNICATION - HEALTHEAST (OUTPATIENT)
Dept: ANTICOAGULATION | Facility: CLINIC | Age: 85
End: 2020-09-15

## 2020-09-15 DIAGNOSIS — I48.20 CHRONIC A-FIB (H): ICD-10-CM

## 2020-09-15 LAB — INR PPP: 2.8 (ref 0.9–1.1)

## 2020-10-06 ENCOUNTER — COMMUNICATION - HEALTHEAST (OUTPATIENT)
Dept: FAMILY MEDICINE | Facility: CLINIC | Age: 85
End: 2020-10-06

## 2020-10-06 ENCOUNTER — OFFICE VISIT - HEALTHEAST (OUTPATIENT)
Dept: FAMILY MEDICINE | Facility: CLINIC | Age: 85
End: 2020-10-06

## 2020-10-06 ENCOUNTER — COMMUNICATION - HEALTHEAST (OUTPATIENT)
Dept: ANTICOAGULATION | Facility: CLINIC | Age: 85
End: 2020-10-06

## 2020-10-06 DIAGNOSIS — I25.10 CORONARY ARTERY DISEASE INVOLVING NATIVE HEART WITHOUT ANGINA PECTORIS, UNSPECIFIED VESSEL OR LESION TYPE: ICD-10-CM

## 2020-10-06 DIAGNOSIS — R73.01 ELEVATED FASTING GLUCOSE: ICD-10-CM

## 2020-10-06 DIAGNOSIS — I48.20 CHRONIC ATRIAL FIBRILLATION (H): ICD-10-CM

## 2020-10-06 DIAGNOSIS — I48.20 CHRONIC A-FIB (H): ICD-10-CM

## 2020-10-06 DIAGNOSIS — G30.9 ALZHEIMER'S DEMENTIA WITHOUT BEHAVIORAL DISTURBANCE, UNSPECIFIED TIMING OF DEMENTIA ONSET: ICD-10-CM

## 2020-10-06 DIAGNOSIS — I10 ESSENTIAL HYPERTENSION WITH GOAL BLOOD PRESSURE LESS THAN 140/90: ICD-10-CM

## 2020-10-06 DIAGNOSIS — R10.32 LEFT LOWER QUADRANT PAIN: ICD-10-CM

## 2020-10-06 DIAGNOSIS — Z00.00 ENCOUNTER FOR ANNUAL WELLNESS EXAM IN MEDICARE PATIENT: ICD-10-CM

## 2020-10-06 DIAGNOSIS — F02.80 ALZHEIMER'S DEMENTIA WITHOUT BEHAVIORAL DISTURBANCE, UNSPECIFIED TIMING OF DEMENTIA ONSET: ICD-10-CM

## 2020-10-06 DIAGNOSIS — M81.0 OSTEOPOROSIS WITHOUT CURRENT PATHOLOGICAL FRACTURE, UNSPECIFIED OSTEOPOROSIS TYPE: ICD-10-CM

## 2020-10-06 DIAGNOSIS — Z98.890 S/P MVR (MITRAL VALVE REPAIR): ICD-10-CM

## 2020-10-06 DIAGNOSIS — L81.9 HYPOPIGMENTATION: ICD-10-CM

## 2020-10-06 DIAGNOSIS — E78.5 HYPERLIPIDEMIA LDL GOAL <100: ICD-10-CM

## 2020-10-06 DIAGNOSIS — L81.9 ATYPICAL PIGMENTED SKIN LESION: ICD-10-CM

## 2020-10-06 DIAGNOSIS — E03.9 ACQUIRED HYPOTHYROIDISM: ICD-10-CM

## 2020-10-06 LAB
ALBUMIN SERPL-MCNC: 4 G/DL (ref 3.5–5)
ALBUMIN UR-MCNC: NEGATIVE MG/DL
ALP SERPL-CCNC: 100 U/L (ref 45–120)
ALT SERPL W P-5'-P-CCNC: 23 U/L (ref 0–45)
ANION GAP SERPL CALCULATED.3IONS-SCNC: 9 MMOL/L (ref 5–18)
APPEARANCE UR: CLEAR
AST SERPL W P-5'-P-CCNC: 30 U/L (ref 0–40)
BILIRUB SERPL-MCNC: 1.6 MG/DL (ref 0–1)
BILIRUB UR QL STRIP: NEGATIVE
BUN SERPL-MCNC: 13 MG/DL (ref 8–28)
CALCIUM SERPL-MCNC: 9.3 MG/DL (ref 8.5–10.5)
CHLORIDE BLD-SCNC: 106 MMOL/L (ref 98–107)
CHOLEST SERPL-MCNC: 118 MG/DL
CO2 SERPL-SCNC: 27 MMOL/L (ref 22–31)
COLOR UR AUTO: YELLOW
CREAT SERPL-MCNC: 0.81 MG/DL (ref 0.6–1.1)
ERYTHROCYTE [DISTWIDTH] IN BLOOD BY AUTOMATED COUNT: 13.2 % (ref 11–14.5)
FASTING STATUS PATIENT QL REPORTED: YES
GFR SERPL CREATININE-BSD FRML MDRD: >60 ML/MIN/1.73M2
GLUCOSE BLD-MCNC: 140 MG/DL (ref 70–125)
GLUCOSE UR STRIP-MCNC: NEGATIVE MG/DL
HCT VFR BLD AUTO: 45.2 % (ref 35–47)
HDLC SERPL-MCNC: 35 MG/DL
HGB BLD-MCNC: 15 G/DL (ref 12–16)
HGB UR QL STRIP: NEGATIVE
INR PPP: 3 (ref 0.9–1.1)
KETONES UR STRIP-MCNC: NEGATIVE MG/DL
LDLC SERPL CALC-MCNC: 63 MG/DL
LEUKOCYTE ESTERASE UR QL STRIP: NEGATIVE
MCH RBC QN AUTO: 29 PG (ref 27–34)
MCHC RBC AUTO-ENTMCNC: 33.2 G/DL (ref 32–36)
MCV RBC AUTO: 87 FL (ref 80–100)
NITRATE UR QL: NEGATIVE
PH UR STRIP: 5.5 [PH] (ref 5–8)
PLATELET # BLD AUTO: 135 THOU/UL (ref 140–440)
PMV BLD AUTO: 9 FL (ref 7–10)
POTASSIUM BLD-SCNC: 4.2 MMOL/L (ref 3.5–5)
PROT SERPL-MCNC: 6.9 G/DL (ref 6–8)
RBC # BLD AUTO: 5.18 MILL/UL (ref 3.8–5.4)
SODIUM SERPL-SCNC: 142 MMOL/L (ref 136–145)
SP GR UR STRIP: 1.01 (ref 1–1.03)
T4 FREE SERPL-MCNC: 1.4 NG/DL (ref 0.7–1.8)
TRIGL SERPL-MCNC: 100 MG/DL
TSH SERPL DL<=0.005 MIU/L-ACNC: 0.2 UIU/ML (ref 0.3–5)
UROBILINOGEN UR STRIP-ACNC: NORMAL
WBC: 5.4 THOU/UL (ref 4–11)

## 2020-10-06 ASSESSMENT — MIFFLIN-ST. JEOR: SCORE: 1298.66

## 2020-10-07 LAB
25(OH)D3 SERPL-MCNC: 17.3 NG/ML (ref 30–80)
25(OH)D3 SERPL-MCNC: 17.3 NG/ML (ref 30–80)
HBA1C MFR BLD: 5.9 %

## 2020-10-08 ENCOUNTER — RECORDS - HEALTHEAST (OUTPATIENT)
Dept: ADMINISTRATIVE | Facility: OTHER | Age: 85
End: 2020-10-08

## 2020-10-14 ENCOUNTER — AMBULATORY - HEALTHEAST (OUTPATIENT)
Dept: LAB | Facility: CLINIC | Age: 85
End: 2020-10-14

## 2020-10-14 ENCOUNTER — COMMUNICATION - HEALTHEAST (OUTPATIENT)
Dept: ANTICOAGULATION | Facility: CLINIC | Age: 85
End: 2020-10-14

## 2020-10-14 DIAGNOSIS — I48.20 CHRONIC A-FIB (H): ICD-10-CM

## 2020-10-14 LAB — INR PPP: 3.3 (ref 0.9–1.1)

## 2020-10-28 ENCOUNTER — COMMUNICATION - HEALTHEAST (OUTPATIENT)
Dept: ANTICOAGULATION | Facility: CLINIC | Age: 85
End: 2020-10-28

## 2020-10-28 ENCOUNTER — AMBULATORY - HEALTHEAST (OUTPATIENT)
Dept: LAB | Facility: CLINIC | Age: 85
End: 2020-10-28

## 2020-10-28 DIAGNOSIS — I48.20 CHRONIC A-FIB (H): ICD-10-CM

## 2020-10-28 LAB — INR PPP: 2.7 (ref 0.9–1.1)

## 2020-11-10 ENCOUNTER — COMMUNICATION - HEALTHEAST (OUTPATIENT)
Dept: FAMILY MEDICINE | Facility: CLINIC | Age: 85
End: 2020-11-10

## 2020-11-10 DIAGNOSIS — Z79.01 LONG TERM (CURRENT) USE OF ANTICOAGULANTS: ICD-10-CM

## 2020-11-10 DIAGNOSIS — I48.20 CHRONIC ATRIAL FIBRILLATION (H): ICD-10-CM

## 2020-11-12 ENCOUNTER — COMMUNICATION - HEALTHEAST (OUTPATIENT)
Dept: ANTICOAGULATION | Facility: CLINIC | Age: 85
End: 2020-11-12

## 2020-11-12 ENCOUNTER — AMBULATORY - HEALTHEAST (OUTPATIENT)
Dept: LAB | Facility: CLINIC | Age: 85
End: 2020-11-12

## 2020-11-12 DIAGNOSIS — I48.20 CHRONIC A-FIB (H): ICD-10-CM

## 2020-11-12 LAB — INR PPP: 2.3 (ref 0.9–1.1)

## 2020-11-24 ENCOUNTER — COMMUNICATION - HEALTHEAST (OUTPATIENT)
Dept: SCHEDULING | Facility: CLINIC | Age: 85
End: 2020-11-24

## 2020-11-24 DIAGNOSIS — I48.20 CHRONIC ATRIAL FIBRILLATION (H): ICD-10-CM

## 2020-11-24 DIAGNOSIS — R10.13 ABDOMINAL PAIN, EPIGASTRIC: ICD-10-CM

## 2020-11-30 ENCOUNTER — COMMUNICATION - HEALTHEAST (OUTPATIENT)
Dept: FAMILY MEDICINE | Facility: CLINIC | Age: 85
End: 2020-11-30

## 2020-11-30 DIAGNOSIS — I05.9 MITRAL VALVE DISORDER: ICD-10-CM

## 2020-11-30 DIAGNOSIS — I48.20 CHRONIC ATRIAL FIBRILLATION (H): ICD-10-CM

## 2020-11-30 DIAGNOSIS — I25.10 ARTERIOSCLEROTIC HEART DISEASE (ASHD): ICD-10-CM

## 2020-11-30 DIAGNOSIS — I21.4 NSTEMI (NON-ST ELEVATED MYOCARDIAL INFARCTION) (H): ICD-10-CM

## 2020-11-30 DIAGNOSIS — I48.20 CHRONIC A-FIB (H): ICD-10-CM

## 2020-11-30 DIAGNOSIS — I10 ESSENTIAL HYPERTENSION WITH GOAL BLOOD PRESSURE LESS THAN 140/90: ICD-10-CM

## 2020-11-30 DIAGNOSIS — I10 ESSENTIAL HYPERTENSION, BENIGN: ICD-10-CM

## 2020-11-30 DIAGNOSIS — Z95.0 PACEMAKER: ICD-10-CM

## 2020-11-30 DIAGNOSIS — Z98.890 S/P MVR (MITRAL VALVE REPAIR): ICD-10-CM

## 2020-11-30 DIAGNOSIS — I25.10 CORONARY ARTERY DISEASE INVOLVING NATIVE HEART WITHOUT ANGINA PECTORIS, UNSPECIFIED VESSEL OR LESION TYPE: ICD-10-CM

## 2020-12-01 ENCOUNTER — COMMUNICATION - HEALTHEAST (OUTPATIENT)
Dept: ANTICOAGULATION | Facility: CLINIC | Age: 85
End: 2020-12-01

## 2020-12-01 ENCOUNTER — AMBULATORY - HEALTHEAST (OUTPATIENT)
Dept: LAB | Facility: CLINIC | Age: 85
End: 2020-12-01

## 2020-12-01 DIAGNOSIS — R10.13 ABDOMINAL PAIN, EPIGASTRIC: ICD-10-CM

## 2020-12-01 DIAGNOSIS — I48.20 CHRONIC A-FIB (H): ICD-10-CM

## 2020-12-01 LAB
ALBUMIN SERPL-MCNC: 3.9 G/DL (ref 3.5–5)
ALBUMIN UR-MCNC: ABNORMAL MG/DL
ALP SERPL-CCNC: 100 U/L (ref 45–120)
ALT SERPL W P-5'-P-CCNC: 18 U/L (ref 0–45)
ANION GAP SERPL CALCULATED.3IONS-SCNC: 10 MMOL/L (ref 5–18)
APPEARANCE UR: CLEAR
AST SERPL W P-5'-P-CCNC: 21 U/L (ref 0–40)
BASOPHILS # BLD AUTO: 0 THOU/UL (ref 0–0.2)
BASOPHILS NFR BLD AUTO: 0 % (ref 0–2)
BILIRUB SERPL-MCNC: 1.8 MG/DL (ref 0–1)
BILIRUB UR QL STRIP: NEGATIVE
BUN SERPL-MCNC: 10 MG/DL (ref 8–28)
CALCIUM SERPL-MCNC: 9.5 MG/DL (ref 8.5–10.5)
CHLORIDE BLD-SCNC: 105 MMOL/L (ref 98–107)
CO2 SERPL-SCNC: 28 MMOL/L (ref 22–31)
COLOR UR AUTO: YELLOW
CREAT SERPL-MCNC: 0.85 MG/DL (ref 0.6–1.1)
EOSINOPHIL # BLD AUTO: 0.1 THOU/UL (ref 0–0.4)
EOSINOPHIL NFR BLD AUTO: 2 % (ref 0–6)
ERYTHROCYTE [DISTWIDTH] IN BLOOD BY AUTOMATED COUNT: 13.1 % (ref 11–14.5)
GFR SERPL CREATININE-BSD FRML MDRD: >60 ML/MIN/1.73M2
GLUCOSE BLD-MCNC: 140 MG/DL (ref 70–125)
GLUCOSE UR STRIP-MCNC: NEGATIVE MG/DL
HCT VFR BLD AUTO: 44.3 % (ref 35–47)
HGB BLD-MCNC: 14.5 G/DL (ref 12–16)
HGB UR QL STRIP: ABNORMAL
INR PPP: 2.5 (ref 0.9–1.1)
KETONES UR STRIP-MCNC: NEGATIVE MG/DL
LEUKOCYTE ESTERASE UR QL STRIP: NEGATIVE
LYMPHOCYTES # BLD AUTO: 1.2 THOU/UL (ref 0.8–4.4)
LYMPHOCYTES NFR BLD AUTO: 23 % (ref 20–40)
MCH RBC QN AUTO: 28.9 PG (ref 27–34)
MCHC RBC AUTO-ENTMCNC: 32.8 G/DL (ref 32–36)
MCV RBC AUTO: 88 FL (ref 80–100)
MONOCYTES # BLD AUTO: 0.4 THOU/UL (ref 0–0.9)
MONOCYTES NFR BLD AUTO: 7 % (ref 2–10)
NEUTROPHILS # BLD AUTO: 3.7 THOU/UL (ref 2–7.7)
NEUTROPHILS NFR BLD AUTO: 68 % (ref 50–70)
NITRATE UR QL: NEGATIVE
PH UR STRIP: 5 [PH] (ref 5–8)
PLATELET # BLD AUTO: 122 THOU/UL (ref 140–440)
PMV BLD AUTO: 9 FL (ref 7–10)
POTASSIUM BLD-SCNC: 4.2 MMOL/L (ref 3.5–5)
PROT SERPL-MCNC: 6.8 G/DL (ref 6–8)
RBC # BLD AUTO: 5.03 MILL/UL (ref 3.8–5.4)
SODIUM SERPL-SCNC: 143 MMOL/L (ref 136–145)
SP GR UR STRIP: 1.01 (ref 1–1.03)
UROBILINOGEN UR STRIP-ACNC: ABNORMAL
WBC: 5.5 THOU/UL (ref 4–11)

## 2020-12-02 LAB — BACTERIA SPEC CULT: NORMAL

## 2020-12-13 ENCOUNTER — HEALTH MAINTENANCE LETTER (OUTPATIENT)
Age: 85
End: 2020-12-13

## 2020-12-18 ENCOUNTER — COMMUNICATION - HEALTHEAST (OUTPATIENT)
Dept: FAMILY MEDICINE | Facility: CLINIC | Age: 85
End: 2020-12-18

## 2020-12-22 ENCOUNTER — COMMUNICATION - HEALTHEAST (OUTPATIENT)
Dept: FAMILY MEDICINE | Facility: CLINIC | Age: 85
End: 2020-12-22

## 2020-12-22 DIAGNOSIS — G30.9 ALZHEIMER'S DEMENTIA WITHOUT BEHAVIORAL DISTURBANCE, UNSPECIFIED TIMING OF DEMENTIA ONSET: ICD-10-CM

## 2020-12-22 DIAGNOSIS — F02.80 ALZHEIMER'S DEMENTIA WITHOUT BEHAVIORAL DISTURBANCE, UNSPECIFIED TIMING OF DEMENTIA ONSET: ICD-10-CM

## 2020-12-29 ENCOUNTER — COMMUNICATION - HEALTHEAST (OUTPATIENT)
Dept: ANTICOAGULATION | Facility: CLINIC | Age: 85
End: 2020-12-29

## 2020-12-29 ENCOUNTER — AMBULATORY - HEALTHEAST (OUTPATIENT)
Dept: LAB | Facility: CLINIC | Age: 85
End: 2020-12-29

## 2020-12-29 DIAGNOSIS — I48.20 CHRONIC A-FIB (H): ICD-10-CM

## 2020-12-29 LAB — INR PPP: 2.3 (ref 0.9–1.1)

## 2021-01-01 NOTE — LETTER
[facility]  [address] [place] [address]    November 20, 2017    Guera Peters  [address]  [place]  [place] [address]          Dear Guera,    Every single one of these tests looks great. Good job! See you in 6-12 months.    Results for orders placed or performed in visit on 11/16/17   Vitamin D Deficiency   Result Value Ref Range    Vitamin D Deficiency screening 58 20 - 75 ug/L   TSH with free T4 reflex   Result Value Ref Range    TSH 0.43 0.40 - 4.00 mU/L   Basic metabolic panel   Result Value Ref Range    Sodium 142 133 - 144 mmol/L    Potassium 4.3 3.4 - 5.3 mmol/L    Chloride 105 94 - 109 mmol/L    Carbon Dioxide 26 20 - 32 mmol/L    Anion Gap 11 3 - 14 mmol/L    Glucose 100 (H) 70 - 99 mg/dL    Urea Nitrogen 17 7 - 30 mg/dL    Creatinine 0.71 0.52 - 1.04 mg/dL    GFR Estimate 79 >60 mL/min/1.7m2    GFR Estimate If Black >90 >60 mL/min/1.7m2    Calcium 9.4 8.5 - 10.1 mg/dL   Hemoglobin A1c   Result Value Ref Range    Hemoglobin A1C 6.0 4.3 - 6.0 %       If you have any questions or concerns, please me or my clinic team at [phone].      Sincerely,        Brett Babb MD/bt            
0

## 2021-01-12 ENCOUNTER — COMMUNICATION - HEALTHEAST (OUTPATIENT)
Dept: FAMILY MEDICINE | Facility: CLINIC | Age: 86
End: 2021-01-12

## 2021-01-12 DIAGNOSIS — E03.9 ACQUIRED HYPOTHYROIDISM: ICD-10-CM

## 2021-01-27 NOTE — TELEPHONE ENCOUNTER
NURSE NOTES:



Dr Lemos at bedside assessing pt. Updated him on pt's current condition. No new orders 
given at this time. This is very confusing. We can draw an INR sure, but where is she getting her INRs drawn usually ? She has chronic atrial fibrillation  And needs to be enrolled with an inr clinic . Lets please clarify this with patient [ we need to talk with  or with daughter as patient has Alzhemiers dementia ]    Brett Babb MD

## 2021-02-01 ENCOUNTER — COMMUNICATION - HEALTHEAST (OUTPATIENT)
Dept: FAMILY MEDICINE | Facility: CLINIC | Age: 86
End: 2021-02-01

## 2021-02-01 ENCOUNTER — AMBULATORY - HEALTHEAST (OUTPATIENT)
Dept: NURSING | Facility: CLINIC | Age: 86
End: 2021-02-01

## 2021-02-01 DIAGNOSIS — Z95.0 CARDIAC PACEMAKER IN SITU: ICD-10-CM

## 2021-02-09 ENCOUNTER — RECORDS - HEALTHEAST (OUTPATIENT)
Dept: ADMINISTRATIVE | Facility: OTHER | Age: 86
End: 2021-02-09

## 2021-02-09 ENCOUNTER — AMBULATORY - HEALTHEAST (OUTPATIENT)
Dept: LAB | Facility: CLINIC | Age: 86
End: 2021-02-09

## 2021-02-09 ENCOUNTER — COMMUNICATION - HEALTHEAST (OUTPATIENT)
Dept: ANTICOAGULATION | Facility: CLINIC | Age: 86
End: 2021-02-09

## 2021-02-09 DIAGNOSIS — I48.20 CHRONIC A-FIB (H): ICD-10-CM

## 2021-02-09 LAB — INR PPP: 1.9 (ref 0.9–1.1)

## 2021-02-12 ENCOUNTER — COMMUNICATION - HEALTHEAST (OUTPATIENT)
Dept: FAMILY MEDICINE | Facility: CLINIC | Age: 86
End: 2021-02-12

## 2021-02-22 ENCOUNTER — AMBULATORY - HEALTHEAST (OUTPATIENT)
Dept: NURSING | Facility: CLINIC | Age: 86
End: 2021-02-22

## 2021-02-24 ENCOUNTER — COMMUNICATION - HEALTHEAST (OUTPATIENT)
Dept: FAMILY MEDICINE | Facility: CLINIC | Age: 86
End: 2021-02-24

## 2021-02-24 DIAGNOSIS — M81.0 OSTEOPOROSIS WITHOUT CURRENT PATHOLOGICAL FRACTURE, UNSPECIFIED OSTEOPOROSIS TYPE: ICD-10-CM

## 2021-02-26 ENCOUNTER — COMMUNICATION - HEALTHEAST (OUTPATIENT)
Dept: FAMILY MEDICINE | Facility: CLINIC | Age: 86
End: 2021-02-26

## 2021-02-26 DIAGNOSIS — Z95.0 PACEMAKER: ICD-10-CM

## 2021-02-26 DIAGNOSIS — I10 ESSENTIAL HYPERTENSION WITH GOAL BLOOD PRESSURE LESS THAN 140/90: ICD-10-CM

## 2021-02-26 DIAGNOSIS — I05.9 MITRAL VALVE DISORDER: ICD-10-CM

## 2021-02-26 DIAGNOSIS — I25.10 CORONARY ARTERY DISEASE INVOLVING NATIVE HEART WITHOUT ANGINA PECTORIS, UNSPECIFIED VESSEL OR LESION TYPE: ICD-10-CM

## 2021-02-26 DIAGNOSIS — I48.20 CHRONIC ATRIAL FIBRILLATION (H): ICD-10-CM

## 2021-02-26 DIAGNOSIS — Z98.890 S/P MVR (MITRAL VALVE REPAIR): ICD-10-CM

## 2021-02-26 DIAGNOSIS — I48.20 CHRONIC A-FIB (H): ICD-10-CM

## 2021-02-26 DIAGNOSIS — I21.4 NSTEMI (NON-ST ELEVATED MYOCARDIAL INFARCTION) (H): ICD-10-CM

## 2021-02-26 DIAGNOSIS — I25.10 ARTERIOSCLEROTIC HEART DISEASE (ASHD): ICD-10-CM

## 2021-02-26 DIAGNOSIS — I10 ESSENTIAL HYPERTENSION, BENIGN: ICD-10-CM

## 2021-03-02 ENCOUNTER — COMMUNICATION - HEALTHEAST (OUTPATIENT)
Dept: ANTICOAGULATION | Facility: CLINIC | Age: 86
End: 2021-03-02

## 2021-03-02 ENCOUNTER — AMBULATORY - HEALTHEAST (OUTPATIENT)
Dept: LAB | Facility: CLINIC | Age: 86
End: 2021-03-02

## 2021-03-02 DIAGNOSIS — I48.20 CHRONIC A-FIB (H): ICD-10-CM

## 2021-03-02 LAB — INR PPP: 2 (ref 0.9–1.1)

## 2021-03-16 ENCOUNTER — COMMUNICATION - HEALTHEAST (OUTPATIENT)
Dept: ANTICOAGULATION | Facility: CLINIC | Age: 86
End: 2021-03-16

## 2021-03-16 ENCOUNTER — AMBULATORY - HEALTHEAST (OUTPATIENT)
Dept: LAB | Facility: CLINIC | Age: 86
End: 2021-03-16

## 2021-03-16 DIAGNOSIS — I48.20 CHRONIC A-FIB (H): ICD-10-CM

## 2021-03-16 LAB — INR PPP: 2.1 (ref 0.9–1.1)

## 2021-03-31 ENCOUNTER — COMMUNICATION - HEALTHEAST (OUTPATIENT)
Dept: ADMINISTRATIVE | Facility: CLINIC | Age: 86
End: 2021-03-31

## 2021-04-05 ENCOUNTER — COMMUNICATION - HEALTHEAST (OUTPATIENT)
Dept: FAMILY MEDICINE | Facility: CLINIC | Age: 86
End: 2021-04-05

## 2021-04-05 DIAGNOSIS — I25.10 ARTERIOSCLEROTIC HEART DISEASE (ASHD): ICD-10-CM

## 2021-04-12 ENCOUNTER — AMBULATORY - HEALTHEAST (OUTPATIENT)
Dept: ANTICOAGULATION | Facility: CLINIC | Age: 86
End: 2021-04-12

## 2021-04-12 ENCOUNTER — COMMUNICATION - HEALTHEAST (OUTPATIENT)
Dept: ANTICOAGULATION | Facility: CLINIC | Age: 86
End: 2021-04-12

## 2021-04-12 ENCOUNTER — OFFICE VISIT - HEALTHEAST (OUTPATIENT)
Dept: FAMILY MEDICINE | Facility: CLINIC | Age: 86
End: 2021-04-12

## 2021-04-12 DIAGNOSIS — R10.32 LLQ ABDOMINAL PAIN: ICD-10-CM

## 2021-04-12 DIAGNOSIS — I25.10 ARTERIOSCLEROTIC HEART DISEASE (ASHD): ICD-10-CM

## 2021-04-12 DIAGNOSIS — Z95.0 CARDIAC PACEMAKER IN SITU: ICD-10-CM

## 2021-04-12 DIAGNOSIS — D69.6 THROMBOCYTOPENIA (H): ICD-10-CM

## 2021-04-12 DIAGNOSIS — E78.5 HYPERLIPIDEMIA LDL GOAL <100: ICD-10-CM

## 2021-04-12 DIAGNOSIS — K57.30 DIVERTICULOSIS OF LARGE INTESTINE WITHOUT HEMORRHAGE: ICD-10-CM

## 2021-04-12 DIAGNOSIS — H61.22 IMPACTED CERUMEN OF LEFT EAR: ICD-10-CM

## 2021-04-12 DIAGNOSIS — F02.80 ALZHEIMER'S DEMENTIA WITHOUT BEHAVIORAL DISTURBANCE, UNSPECIFIED TIMING OF DEMENTIA ONSET: ICD-10-CM

## 2021-04-12 DIAGNOSIS — E03.9 ACQUIRED HYPOTHYROIDISM: ICD-10-CM

## 2021-04-12 DIAGNOSIS — R73.03 PREDIABETES: ICD-10-CM

## 2021-04-12 DIAGNOSIS — E55.9 HYPOVITAMINOSIS D: ICD-10-CM

## 2021-04-12 DIAGNOSIS — G30.9 ALZHEIMER'S DEMENTIA WITHOUT BEHAVIORAL DISTURBANCE, UNSPECIFIED TIMING OF DEMENTIA ONSET: ICD-10-CM

## 2021-04-12 DIAGNOSIS — I48.20 CHRONIC ATRIAL FIBRILLATION (H): ICD-10-CM

## 2021-04-12 DIAGNOSIS — R77.9 ELEVATED BLOOD PROTEIN: ICD-10-CM

## 2021-04-12 DIAGNOSIS — I10 ESSENTIAL HYPERTENSION WITH GOAL BLOOD PRESSURE LESS THAN 140/90: ICD-10-CM

## 2021-04-12 DIAGNOSIS — M81.0 OSTEOPOROSIS WITHOUT CURRENT PATHOLOGICAL FRACTURE, UNSPECIFIED OSTEOPOROSIS TYPE: ICD-10-CM

## 2021-04-12 DIAGNOSIS — R17 ELEVATED BILIRUBIN: ICD-10-CM

## 2021-04-12 LAB
ALBUMIN SERPL-MCNC: 3.8 G/DL (ref 3.5–5)
ALBUMIN UR-MCNC: ABNORMAL G/DL
ALP SERPL-CCNC: 99 U/L (ref 45–120)
ALT SERPL W P-5'-P-CCNC: 15 U/L (ref 0–45)
ANION GAP SERPL CALCULATED.3IONS-SCNC: 9 MMOL/L (ref 5–18)
APPEARANCE UR: CLEAR
AST SERPL W P-5'-P-CCNC: 21 U/L (ref 0–40)
BACTERIA #/AREA URNS HPF: ABNORMAL /[HPF]
BILIRUB SERPL-MCNC: 1.4 MG/DL (ref 0–1)
BILIRUB UR QL STRIP: NEGATIVE
BUN SERPL-MCNC: 9 MG/DL (ref 8–28)
CALCIUM SERPL-MCNC: 8.9 MG/DL (ref 8.5–10.5)
CHLORIDE BLD-SCNC: 106 MMOL/L (ref 98–107)
CO2 SERPL-SCNC: 26 MMOL/L (ref 22–31)
COLOR UR AUTO: YELLOW
CREAT SERPL-MCNC: 0.79 MG/DL (ref 0.6–1.1)
ERYTHROCYTE [DISTWIDTH] IN BLOOD BY AUTOMATED COUNT: 12.7 % (ref 11–14.5)
GFR SERPL CREATININE-BSD FRML MDRD: >60 ML/MIN/1.73M2
GLUCOSE BLD-MCNC: 126 MG/DL (ref 70–125)
GLUCOSE UR STRIP-MCNC: NEGATIVE MG/DL
HBA1C MFR BLD: 6 %
HCT VFR BLD AUTO: 44.1 % (ref 35–47)
HGB BLD-MCNC: 13.9 G/DL (ref 12–16)
HGB UR QL STRIP: ABNORMAL
INR PPP: 2.3 (ref 0.9–1.1)
KETONES UR STRIP-MCNC: NEGATIVE MG/DL
LEUKOCYTE ESTERASE UR QL STRIP: NEGATIVE
MCH RBC QN AUTO: 28.1 PG (ref 27–34)
MCHC RBC AUTO-ENTMCNC: 31.5 G/DL (ref 32–36)
MCV RBC AUTO: 89 FL (ref 80–100)
NITRATE UR QL: NEGATIVE
PH UR STRIP: 5.5 [PH] (ref 5–8)
PLATELET # BLD AUTO: 138 THOU/UL (ref 140–440)
PMV BLD AUTO: 10.7 FL (ref 7–10)
POTASSIUM BLD-SCNC: 4.4 MMOL/L (ref 3.5–5)
PROT SERPL-MCNC: 6.4 G/DL (ref 6–8)
RBC # BLD AUTO: 4.95 MILL/UL (ref 3.8–5.4)
RBC #/AREA URNS AUTO: ABNORMAL HPF
SODIUM SERPL-SCNC: 141 MMOL/L (ref 136–145)
SP GR UR STRIP: 1.01 (ref 1–1.03)
SQUAMOUS #/AREA URNS AUTO: ABNORMAL LPF
TSH SERPL DL<=0.005 MIU/L-ACNC: 0.39 UIU/ML (ref 0.3–5)
UROBILINOGEN UR STRIP-ACNC: ABNORMAL
WBC #/AREA URNS AUTO: ABNORMAL HPF
WBC: 6.2 THOU/UL (ref 4–11)

## 2021-04-13 ENCOUNTER — COMMUNICATION - HEALTHEAST (OUTPATIENT)
Dept: ADMINISTRATIVE | Facility: CLINIC | Age: 86
End: 2021-04-13

## 2021-04-13 ENCOUNTER — AMBULATORY - HEALTHEAST (OUTPATIENT)
Dept: SCHEDULING | Facility: CLINIC | Age: 86
End: 2021-04-13

## 2021-04-13 DIAGNOSIS — M81.0 OSTEOPOROSIS WITHOUT CURRENT PATHOLOGICAL FRACTURE, UNSPECIFIED OSTEOPOROSIS TYPE: ICD-10-CM

## 2021-04-13 LAB
25(OH)D3 SERPL-MCNC: 13 NG/ML (ref 30–80)
25(OH)D3 SERPL-MCNC: 13 NG/ML (ref 30–80)
ALBUMIN PERCENT: 63.9 % (ref 51–67)
ALBUMIN SERPL ELPH-MCNC: 4.2 G/DL (ref 3.2–4.7)
ALPHA 1 PERCENT: 3.1 % (ref 2–4)
ALPHA 2 PERCENT: 9.6 % (ref 5–13)
ALPHA1 GLOB SERPL ELPH-MCNC: 0.2 G/DL (ref 0.1–0.3)
ALPHA2 GLOB SERPL ELPH-MCNC: 0.6 G/DL (ref 0.4–0.9)
B-GLOBULIN SERPL ELPH-MCNC: 0.8 G/DL (ref 0.7–1.2)
BETA PERCENT: 12.6 % (ref 10–17)
GAMMA GLOB SERPL ELPH-MCNC: 0.7 G/DL (ref 0.6–1.4)
GAMMA GLOBULIN PERCENT: 10.8 % (ref 9–20)
GGT SERPL-CCNC: 23 U/L (ref 0–50)
PATH ICD:: NORMAL
PROT PATTERN SERPL ELPH-IMP: NORMAL
PROT SERPL-MCNC: 6.5 G/DL (ref 6–8)
REVIEWING PATHOLOGIST: NORMAL

## 2021-04-16 ENCOUNTER — COMMUNICATION - HEALTHEAST (OUTPATIENT)
Dept: ANTICOAGULATION | Facility: CLINIC | Age: 86
End: 2021-04-16

## 2021-04-16 ENCOUNTER — COMMUNICATION - HEALTHEAST (OUTPATIENT)
Dept: FAMILY MEDICINE | Facility: CLINIC | Age: 86
End: 2021-04-16

## 2021-04-16 DIAGNOSIS — Z79.01 LONG TERM (CURRENT) USE OF ANTICOAGULANTS: ICD-10-CM

## 2021-04-16 DIAGNOSIS — I65.23 CAROTID STENOSIS, BILATERAL: ICD-10-CM

## 2021-04-16 DIAGNOSIS — Z98.890 S/P MVR (MITRAL VALVE REPAIR): ICD-10-CM

## 2021-04-16 DIAGNOSIS — I48.20 CHRONIC ATRIAL FIBRILLATION (H): ICD-10-CM

## 2021-04-17 ENCOUNTER — HEALTH MAINTENANCE LETTER (OUTPATIENT)
Age: 86
End: 2021-04-17

## 2021-05-03 ENCOUNTER — COMMUNICATION - HEALTHEAST (OUTPATIENT)
Dept: FAMILY MEDICINE | Facility: CLINIC | Age: 86
End: 2021-05-03

## 2021-05-10 ENCOUNTER — AMBULATORY - HEALTHEAST (OUTPATIENT)
Dept: LAB | Facility: CLINIC | Age: 86
End: 2021-05-10

## 2021-05-10 ENCOUNTER — COMMUNICATION - HEALTHEAST (OUTPATIENT)
Dept: ANTICOAGULATION | Facility: CLINIC | Age: 86
End: 2021-05-10

## 2021-05-10 ENCOUNTER — COMMUNICATION - HEALTHEAST (OUTPATIENT)
Dept: SCHEDULING | Facility: CLINIC | Age: 86
End: 2021-05-10

## 2021-05-10 DIAGNOSIS — Z79.01 LONG TERM (CURRENT) USE OF ANTICOAGULANTS: ICD-10-CM

## 2021-05-10 DIAGNOSIS — Z98.890 S/P MVR (MITRAL VALVE REPAIR): ICD-10-CM

## 2021-05-10 DIAGNOSIS — I48.20 CHRONIC ATRIAL FIBRILLATION (H): ICD-10-CM

## 2021-05-10 LAB — INR PPP: 2.1 (ref 0.9–1.1)

## 2021-05-23 ENCOUNTER — COMMUNICATION - HEALTHEAST (OUTPATIENT)
Dept: FAMILY MEDICINE | Facility: CLINIC | Age: 86
End: 2021-05-23

## 2021-05-23 DIAGNOSIS — I25.10 CORONARY ARTERY DISEASE INVOLVING NATIVE HEART WITHOUT ANGINA PECTORIS, UNSPECIFIED VESSEL OR LESION TYPE: ICD-10-CM

## 2021-05-26 VITALS — SYSTOLIC BLOOD PRESSURE: 127 MMHG | HEART RATE: 71 BPM | DIASTOLIC BLOOD PRESSURE: 62 MMHG

## 2021-05-26 NOTE — TELEPHONE ENCOUNTER
Routing refill request to provider for review/approval because:  Elevated BP      Teri Burgess RN - BC       other

## 2021-05-27 ENCOUNTER — COMMUNICATION - HEALTHEAST (OUTPATIENT)
Dept: FAMILY MEDICINE | Facility: CLINIC | Age: 86
End: 2021-05-27

## 2021-05-27 DIAGNOSIS — G30.9 ALZHEIMER'S DEMENTIA WITHOUT BEHAVIORAL DISTURBANCE, UNSPECIFIED TIMING OF DEMENTIA ONSET: ICD-10-CM

## 2021-05-27 DIAGNOSIS — F02.80 ALZHEIMER'S DEMENTIA WITHOUT BEHAVIORAL DISTURBANCE, UNSPECIFIED TIMING OF DEMENTIA ONSET: ICD-10-CM

## 2021-05-27 NOTE — TELEPHONE ENCOUNTER
ANTICOAGULATION  MANAGEMENT    Assessment     Today's INR result of 2.3 is Therapeutic (goal INR of 2.0-3.0)        Warfarin taken as previously instructed    No new diet changes affecting INR    No new medication/supplements affecting INR    Continues to tolerate warfarin with no reported s/s of bleeding or thromboembolism     Previous INR was Therapeutic    Plan:     Spoke with patient's daughter Sienna regarding INR result and instructed:     Warfarin Dosing Instructions:  Continue current warfarin dose    3 mg every Mon, Wed, Fri; 2 mg all other days     (0 % change)    Instructed patient to follow up no later than: 4 weeks, appointment made.    Education provided: importance of therapeutic range and target INR goal and significance of current INR result    Sienna verbalizes understanding and agrees to warfarin dosing plan.    Instructed to call the [facility] for any changes, questions or concerns. (#[phone])   ?   Lauren Willard RN    Subjective/Objective:      Guera SHANTI Peters, a 83 y.o. female is on warfarin.     Guera reports:     Home warfarin dose: verbally confirmed home dose with Sienna and updated on anticoagulation calendar     Missed doses: No     Medication changes:  No     S/S of bleeding or thromboembolism:  No     New Injury or illness:  No     Changes in diet or alcohol consumption:  No     Upcoming surgery, procedure or cardioversion:  No    Anticoagulation Episode Summary     Current INR goal:   2.0-3.0   TTR:   54.2 % (1.1 y)   Next INR check:   5/13/2019   INR from last check:   2.30 (4/15/2019)   Weekly max warfarin dose:      Target end date:      INR check location:      Preferred lab:      Send INR reminders to:   ANTICOAGULATION POOL C (DTN,VAD,CGR,GAV)    Indications    Chronic a-fib (H) [I48.2]           Comments:            Anticoagulation Care Providers     Provider Role Specialty Phone number    Dora Castillo DO Referring Family Medicine [phone]

## 2021-05-28 NOTE — TELEPHONE ENCOUNTER
Lab Results   Component Value Date    INR 2.30 (H) 04/15/2019    INR 2.10 (H) 03/15/2019    INR 2.20 (H) 03/01/2019       Patient's current Warfarin doses:    3 mg every Mon, Wed, Fri; 2 mg all other days        Next INR check is on 5/13/19      Patient's last OV with PCP was on 11/6/18    Warfarin prescription 3 month supply and one refill sent to patient's pharmacy today.    Lauren Willard RN

## 2021-05-28 NOTE — TELEPHONE ENCOUNTER
RN cannot approve Refill Request    RN can NOT refill this medication med is not covered by policy/route to provider.       Sania Salazar, [facility] Triage/Med Refill 4/29/2019    Requested Prescriptions   Pending Prescriptions Disp Refills     warfarin (COUMADIN/JANTOVEN) 1 MG tablet [Pharmacy Med Name: WARFARIN SOD 1MG TABLETS (PINK)] 168 tablet 0     Sig: TAKE 1-2 TABLETS BY MOUTH AS INSTRUCTED PER INR RESULTS       Warfarin Refill Protocol  Failed - 4/29/2019  6:05 PM        Failed -  Route to appropriate pool/provider     Last Anticoagulation Summary:   Anticoagulation Episode Summary     Current INR goal:   2.0-3.0   TTR:   54.2 % (1.1 y)   Next INR check:   5/13/2019   INR from last check:   2.30 (4/15/2019)   Weekly max warfarin dose:      Target end date:      INR check location:      Preferred lab:      Send INR reminders to:   ANTICOAGULATION POOL C (DTN,VAD,CGR,GAV)    Indications    Chronic a-fib (H) [I48.2]           Comments:            Anticoagulation Care Providers     Provider Role Specialty Phone number    Dora Castillo DO Referring Family Medicine [phone]                Passed - Provider visit in last year     Last office visit with prescriber/PCP: 11/6/2018 Dora Castillo DO OR same dept: 11/6/2018 Dora Castillo DO OR same specialty: 11/6/2018 Dora Castillo DO  Last physical: Visit date not found Last MTM visit: Visit date not found    Next appt within 3 mo: Visit date not found Next physical within 3 mo: Visit date not found  Prescriber OR PCP: Dora Castillo DO  Last diagnosis associated with med order: 1. Chronic atrial fibrillation (H)  - warfarin (COUMADIN/JANTOVEN) 1 MG tablet [Pharmacy Med Name: WARFARIN SOD 1MG TABLETS (PINK)]; TAKE 1-2 TABLETS BY MOUTH AS INSTRUCTED PER INR RESULTS  Dispense: 168 tablet; Refill: 0    If protocol passes may refill for 6 months if within 3 months of last provider visit (or a total of 9 months).

## 2021-05-28 NOTE — TELEPHONE ENCOUNTER
"Anticoagulation Annual Referral Renewal Review    Guera Peters's chart reviewed for annual renewal of referral to anticoagulation monitoring.        Criteria for anticoagulation nurse and/or pharmacist renewal met   Warfarin indication: Atrial Fibrillation Yes, per indication   Current with INR monitoring/compliant Yes Yes   Date of last office visit 11/6/18 Yes, had office visit within last year   Time in Therapeutic Range (TTR) 41 % No, TTR < 60 %       Guera Peters did NOT meet all criteria for anticoagulation management program initiated renewal and requires provider review. Using dot phrase, \".acmrenewalprovider\", please advise if Rylees anticoagulation management referral should be renewed or if patient should be seen in office to review anticoagulation therapy      Lauren Willard RN  1:52 PM      "

## 2021-05-28 NOTE — TELEPHONE ENCOUNTER
ANTICOAGULATION  MANAGEMENT    Assessment     Today's INR result of 2.0 is Therapeutic (goal INR of 2.0-3.0)        Warfarin taken as previously instructed    No new diet changes affecting INR    No new medication/supplements affecting INR    Continues to tolerate warfarin with no reported s/s of bleeding or thromboembolism     Previous INR was Therapeutic    Plan:     Spoke with Sienna regarding INR result and instructed:     Warfarin Dosing Instructions:  Continue current warfarin dose    3 mg every Mon, Wed, Fri; 2 mg all other days      (0 % change)    Instructed patient to follow up no later than: 4 weeks.Appointment made.    Education provided: importance of therapeutic range and target INR goal and significance of current INR result    Sienna verbalizes understanding and agrees to warfarin dosing plan.    Instructed to call the [facility] for any changes, questions or concerns. (#[phone])   ?   Lauren Willard RN    Subjective/Objective:      Guera Peters, a 83 y.o. female is on warfarin.     Guera reports:     Home warfarin dose: verbally confirmed home dose with Sienna and updated on anticoagulation calendar     Missed doses: No     Medication changes:  No     S/S of bleeding or thromboembolism:  No     New Injury or illness:  No     Changes in diet or alcohol consumption:  No     Upcoming surgery, procedure or cardioversion:  No    Anticoagulation Episode Summary     Current INR goal:   2.0-3.0   TTR:   57.2 % (1.2 y)   Next INR check:   6/10/2019   INR from last check:   2.00 (5/13/2019)   Weekly max warfarin dose:      Target end date:      INR check location:      Preferred lab:      Send INR reminders to:   ANTICOAGULATION POOL C (DTN,VAD,CGR,GAV)    Indications    Chronic a-fib (H) [I48.2]           Comments:            Anticoagulation Care Providers     Provider Role Specialty Phone number    Dora Castillo DO Referring Family Medicine [phone]

## 2021-05-28 NOTE — TELEPHONE ENCOUNTER
Provider Review: Anticoagulation Annual Referral Renewal    ACM Renewal Decision:  Renew ACM warfarin management      INR Range:   Change INR goal range to 2.0-3.0   Anticipated Duration of Therapy (from today):  Long-term anticoagulation      Dora Castillo DO  5:46 PM

## 2021-05-29 NOTE — TELEPHONE ENCOUNTER
ANTICOAGULATION  MANAGEMENT    Assessment     Today's INR result of 2.5 is Therapeutic (goal INR of 2.0-3.0)        Warfarin taken as previously instructed    No new diet changes affecting INR    No new medication/supplements affecting INR    Continues to tolerate warfarin with no reported s/s of bleeding or thromboembolism     Previous INR was Therapeutic    Plan:     Spoke with daughter Sienna regarding INR result and instructed:     Warfarin Dosing Instructions:  Continue current warfarin dose 3 mg daily on mon/wed/fri; and 2 mg daily rest of week     Instructed patient to follow up no later than: one month with Dr Castillo  ov 7/9      Sienna verbalizes understanding and agrees to warfarin dosing plan.    Instructed to call the [facility] for any changes, questions or concerns. (#[phone])   ?   Tommie Mccoy RN    Subjective/Objective:      Guera Peters, a 83 y.o. female is on warfarin.     Guera reports:     Home warfarin dose: verbally confirmed home dose with Sienna and updated on anticoagulation calendar     Missed doses: No     Medication changes:  No     S/S of bleeding or thromboembolism:  No     New Injury or illness:  No     Changes in diet or alcohol consumption:  No     Upcoming surgery, procedure or cardioversion:  No    Anticoagulation Episode Summary     Current INR goal:   2.0-3.0   TTR:   59.7 % (1.3 y)   Next INR check:   7/9/2019   INR from last check:   2.50 (6/10/2019)   Weekly max warfarin dose:      Target end date:      INR check location:      Preferred lab:      [facility]    Indications    Chronic a-fib (H) [I48.2]           Comments:            Anticoagulation Care Providers     Provider Role Specialty Phone number    Dora Castillo DO Referring Family Medicine [phone]

## 2021-05-30 NOTE — TELEPHONE ENCOUNTER
ANTICOAGULATION  MANAGEMENT    Assessment     Today's INR result of 2.3 is Therapeutic (goal INR of 2.0-3.0)        Warfarin taken as previously instructed    No new diet changes affecting INR    No new medication/supplements affecting INR    Continues to tolerate warfarin with no reported s/s of bleeding or thromboembolism     Previous INR was Therapeutic    Plan:     Left a detailed message for constantin El regarding INR result and instructed:     Warfarin Dosing Instructions:  Continue current warfarin dose 3 mg daily on mon/wed/fri; and 2 mg daily rest of week  (0 % change)    Instructed patient to follow up no later than: two weeks, scheduled        Instructed to call the [facility] for any changes, questions or concerns. (#[phone])   ?   Tommie Mccoy RN    Subjective/Objective:      Guera Peters, a 84 y.o. female is on warfarin.     Guera reports:      Missed doses: No     Medication changes:  No     S/S of bleeding or thromboembolism:  No     New Injury or illness:  No     Changes in diet or alcohol consumption:  No     Upcoming surgery, procedure or cardioversion:  No    Anticoagulation Episode Summary     Current INR goal:   2.0-3.0   TTR:   60.4 % (1.4 y)   Next INR check:   8/6/2019   INR from last check:   2.30 (7/23/2019)   Weekly max warfarin dose:      Target end date:      INR check location:      Preferred lab:      Send INR reminders to:   [facility]    Indications    Chronic a-fib (H) [I48.2]           Comments:            Anticoagulation Care Providers     Provider Role Specialty Phone number    Dora Castillo DO Referring Family Medicine [phone]

## 2021-05-30 NOTE — TELEPHONE ENCOUNTER
Medication Request  Medication name:     nitroglycerin (NITROSTAT) 0.4 MG SL tablet        Sig - Route: Place 0.4 mg under the tongue every 5 (five) minutes as needed for chest pain. - Sublingual    Class: Historical Med      Pharmacy Name and Location: [id number] - [place], [place] - [address] [place] [address] [address] [facility] [address] & [facility]   Reason for request: The patient out of the medication and is requesting to have this filled high priority.   When did you use medication last?:  Unknown  Patient offered appointment:  No  Okay to leave a detailed message: no

## 2021-05-30 NOTE — PROGRESS NOTES
Assessment and Plan:   Guera was seen today for annual wellness visit.    Diagnoses and all orders for this visit:    Encounter for Medicare annual wellness exam-health maintenance updated at this time.  Patient has appropriate resources in place and good support from external family for living in her home with Alzheimer's.  Does have an advanced directive on file.  Will update bone density scan next year.  Can always consider drug holiday  -     Comprehensive Metabolic Panel    Coronary artery disease involving native heart without angina pectoris, unspecified vessel or lesion type-she will continue for now on rosuvastatin and aspirin therapy.  When the risk of medication outweighs the benefit will consider discontinuing these medications.  Blood pressures been stable  -     Comprehensive Metabolic Panel    Acquired hypothyroidism-continue levothyroxine 100 mcg daily    Alzheimer's dementia without behavioral disturbance, unspecified timing of dementia onset-slow progression.  Has been stable for the most part.  Will go up on the dose of her Namenda slowly.  Reviewed potential side effects with the daughter and patient.  See instructions below  -     memantine (NAMENDA) 10 MG tablet; Take 1 tablet (10 mg total) by mouth 2 (two) times a day.    Chronic a-fib (H)    Hypothyroidism, unspecified type  -     Thyroid Cascade    Chronic atrial fibrillation (H)  -     INR    Cerumen impaction left ear-procedure: Using a plastic curette I spent approximately 4 minutes removing soft cerumen out of her left ear.  Patient tolerated procedure well and was fully removed without any complication.  Normal-appearing eardrum.    Patient instructions:  Resume vitamin D -2000u daily  - increase the mematadine for memory and I would have you take 10mg in the morning and 5mg at night for 1 week  Then go up to 10mg twice daily and there is a new prescription called in for 10mg tablets.     The patient's current medical problems were  reviewed.    I have had an Advance Directives discussion with the patient.  The following health maintenance schedule was reviewed with the patient and provided in printed form in the after visit summary:   Health Maintenance   Topic Date Due     ZOSTER VACCINES (1 of 2) 06/15/1987     FALL RISK ASSESSMENT  05/03/2019     INFLUENZA VACCINE RULE BASED (1) 08/01/2019     DXA SCAN  09/10/2020     ADVANCE DIRECTIVES DISCUSSED WITH PATIENT  02/06/2023     TD 18+ HE  04/07/2027     PNEUMOCOCCAL POLYSACCHARIDE VACCINE AGE 65 AND OVER  Completed     PNEUMOCOCCAL CONJUGATE VACCINE FOR ADULTS (PCV13 OR PREVNAR)  Completed        Subjective:   Chief Complaint: Guera Peters is an 84 y.o. female here for an Annual Wellness visit.   HPI: here for annual wellness visit with daughter angela . havent seen since hospitalization for shingles in November.  Had significant hospitalization that included acute kidney insufficiency due to her pain and poor oral intake.  This is completely resolved.  They are wondering when they can get the shingles vaccine.  She continues to do well in her home with her .  Has been sets out the medications.  Daughter Angela constantly looking in on them.  Overall abdominal discomfort has been nonexistent although did have slight abdominal pain yesterday. fluids helps-notices if does not drink enough water she complains a little more.  Otherwise she is tolerating the Santos stick mean well and last time I saw her we did add in Namenda.  Not sure if noticing a difference but no negative effect  -Continues to be on the warfarin and carvedilol for history of paroxysmal atrial fibrillation.  INR has been stable.  No abnormal bleeding.  She is also on aspirin 81 mg.  -Continues to take her levothyroxine on an empty stomach.  TSH has been stable but we will reevaluate.  Osteoporosis on fosamax x 5 years or so and tolerating. Last bone scan stable 2018 but no comparison. Not exercising     Review of  Systems:     Please see above.  The rest of the review of systems are negative for all systems.    Patient Care Team:  Dora Castillo DO as PCP - General (Family Medicine)  Dora Castillo DO (Family Medicine)     Patient Active Problem List   Diagnosis     Acquired hypothyroidism     Alzheimer's dementia without behavioral disturbance, unspecified timing of dementia onset     Angiomyolipoma     Arteriosclerotic heart disease (ASHD)     Carotid stenosis, bilateral     Chronic atrial fibrillation (H)     Coronary artery disease     Cardiac pacemaker in situ     Diverticulosis of large intestine without hemorrhage     Essential hypertension with goal blood pressure less than 140/90     Transient cerebral ischemia     Hyperlipidemia LDL goal <100     Hypovitaminosis D     Long term current use of anticoagulant therapy     Mitral valve disorder     NSTEMI (non-ST elevated myocardial infarction) (H)     Osteoporosis without current pathological fracture, unspecified osteoporosis type     Pacemaker     S/P MVR (mitral valve repair)     Chronic a-fib (H)     Shingles     Acute encephalopathy     VERNON (acute kidney injury) (H)     Herpes zoster complication     Essential hypertension, benign     Past Medical History:   Diagnosis Date     A-fib (H)      CKD (chronic kidney disease) stage 3, GFR 30-59 ml/min (H)      Disease of thyroid gland     hypothryoid     Heart attack (H) 2011     HLD (hyperlipidemia)      Hypertension      Hypothyroid      Osteoporosis      Shingles 11/6/2018     Stroke (H)     Hx TIA     Thrombocytopenia (H)       Past Surgical History:   Procedure Laterality Date     ANGIOPLASTY       APPENDECTOMY       BREAST SURGERY      removal breast implant     CARDIAC PACEMAKER PLACEMENT       embolization of angiomyolipoma       MITRAL VALVE REPAIR       partial excision of thyroid       PATENT FORAMEN OVALE CLOSURE        Family History   Problem Relation Age of Onset     Heart disease Mother       Heart disease Father       Social History     Socioeconomic History     Marital status:      Spouse name: Not on file     Number of children: Not on file     Years of education: Not on file     Highest education level: Not on file   Occupational History     Not on file   Social Needs     Financial resource strain: Not on file     Food insecurity:     Worry: Not on file     Inability: Not on file     Transportation needs:     Medical: Not on file     Non-medical: Not on file   Tobacco Use     Smoking status: Former Smoker     Smokeless tobacco: Never Used     Tobacco comment: quit >20 yrs ago   Substance and Sexual Activity     Alcohol use: Yes     Comment: once a month     Drug use: No     Sexual activity: Never   Lifestyle     Physical activity:     Days per week: Not on file     Minutes per session: Not on file     Stress: Not on file   Relationships     Social connections:     Talks on phone: Not on file     Gets together: Not on file     Attends [employer] service: Not on file     Active member of club or organization: Not on file     Attends meetings of clubs or organizations: Not on file     Relationship status: Not on file     Intimate partner violence:     Fear of current or ex partner: Not on file     Emotionally abused: Not on file     Physically abused: Not on file     Forced sexual activity: Not on file   Other Topics Concern     Not on file   Social History Narrative    ** Merged History Encounter **         Lives in [place] with her       Current Outpatient Medications   Medication Sig Dispense Refill     alendronate (FOSAMAX) 70 MG tablet Take 1 tablet (70 mg total) by mouth every 7 days. Do not lie down for 1 hr after taking, take with full glass water 12 tablet 11     aspirin 81 mg chewable tablet Chew 81 mg daily.       carvedilol (COREG) 12.5 MG tablet Take 1 tablet (12.5 mg total) by mouth 2 (two) times a day with meals. 180 tablet 1     levothyroxine (SYNTHROID, LEVOTHROID)  100 MCG tablet TAKE 1 TABLET(100 MCG) BY MOUTH DAILY. 90 tablet 12     lisinopril (PRINIVIL,ZESTRIL) 20 MG tablet Take 1 tablet (20 mg total) by mouth daily. 90 tablet 12     memantine (NAMENDA) 5 MG tablet Take 1 tablet (5 mg total) by mouth 2 (two) times a day. 180 tablet 3     nitroglycerin (NITROSTAT) 0.4 MG SL tablet Place 0.4 mg under the tongue every 5 (five) minutes as needed for chest pain.       rivastigmine tartrate (EXELON) 3 MG capsule TAKE 1 CAPSULE(3 MG) BY MOUTH TWICE DAILY. 180 capsule 12     rosuvastatin (CRESTOR) 20 MG tablet Take 1 tablet (20 mg total) by mouth at bedtime. 90 tablet 1     warfarin (COUMADIN/JANTOVEN) 1 MG tablet Take 2-3 tablets (2-3 mg total) by mouth daily. Adjust dose per INR results as instructed. 210 tablet 1     alendronate (FOSAMAX) 70 MG tablet Take 70 mg by mouth every 7 days. Take in the morning on an empty stomach with a full glass of water 30 minutes before food       aspirin 81 MG EC tablet Take 1 tablet (81 mg total) by mouth daily. 100 tablet 11     capsaicin (ZOSTRIX) 0.025 % cream Apply thin layer to forehead topically 2 (two) times a day. 60 g 0     carvedilol (COREG) 12.5 MG tablet Take 12.5 mg by mouth 2 (two) times a day with meals.       erythromycin ophthalmic ointment APPLY TO THE AFFECTED AREA TWICE DAILY UNTIL HEALED  0     FLUZONE HIGH-DOSE 2018-19, PF, 180 mcg/0.5 mL Syrg injection ADM 0.5ML IM UTD.  0     gabapentin (NEURONTIN) 100 MG capsule Take 2  capsules by mouth 3x daily as needed for pain 60 capsule 0     gabapentin (NEURONTIN) 100 MG capsule Take 100 mg by mouth 3 (three) times a day. (Patient taking differently: Take 100 mg by mouth 3 (three) times a day.)  0     levothyroxine (SYNTHROID, LEVOTHROID) 100 MCG tablet Take 100 mcg by mouth daily.       lisinopril (PRINIVIL,ZESTRIL) 20 MG tablet Take 20 mg by mouth daily.       nitroglycerin (NITROSTAT) 0.4 MG SL tablet Place 0.4 mg under the tongue every 5 (five) minutes as needed for chest  "pain.       rivastigmine tartrate (EXELON) 3 MG capsule Take 3 mg by mouth 2 (two) times a day.       rosuvastatin (CRESTOR) 20 MG tablet Take 20 mg by mouth at bedtime.       warfarin (COUMADIN/JANTOVEN) 1 MG tablet Take 1 mg by mouth See Admin Instructions. Take 2 tablets (2 mg) by mouth daily on Monday/Wednesday/Friday then take 3 tablets (3 mg) on all other days. Adjust dose based on INR results as directed.       warfarin (COUMADIN/JANTOVEN) 1 MG tablet TAKE 1-2 TABLETS BY MOUTH AS INSTRUCTED PER INR RESULTS 180 tablet 1     No current facility-administered medications for this visit.       Objective:   Vital Signs:   Visit Vitals  /79   Pulse 67   Temp 97.7  F (36.5  C) (Oral)   Resp 20   Ht 5' 4.5\" (1.638 m)   Wt 181 lb (82.1 kg)   SpO2 97%   BMI 30.59 kg/m         VisionScreening:  No exam data present     PHYSICAL EXAM  Physical Exam:  General Appearance: Alert, cooperative, no distress, appears stated age  Head: Normocephalic, without obvious abnormality, atraumatic  Eyes: PERRL, conjunctiva/corneas clear, EOM's intact  Ears: Significant cerumen impaction left ear soft cerumen, right ear normal.  Following removal of cerumen with plastic curette left ear normal.    Spoke with Sienna. Rescheduled INR for 1/31/19. Instructed patient to continue current Warfarin dose and take 3 mg tonight. Sienna verbalized understanding and agreed to plan.  normal, septum midline,mucosa normal, no drainage  Throat: Lips, mucosa, and tongue normal; teeth and gums normal  Neck: Supple, symmetrical, trachea midline, no adenopathy;  thyroid: not enlarged, symmetric, no tenderness/mass/nodules; no carotid bruit or JVD  Back: Symmetric, no curvature, ROM normal, no CVA tenderness  Lungs: Clear to auscultation bilaterally, respirations unlabored  Breasts: No breast masses, tenderness, asymmetry, or nipple discharge.  Heart: Regular rate and rhythm, S1 and S2 normal, no murmur, rub, or gallop, Abdomen: Soft, non-tender, bowel " sounds active all four quadrants,  no masses, no organomegaly  Extremities: Extremities normal, atraumatic, no cyanosis or edema  Skin: Skin color, texture, turgor normal, no rashes or lesions  Lymph nodes: Cervical, supraclavicular, and axillary nodes normal  Neurologic: Cognitive insufficiency, pleasant dementia    Assessment Results 7/9/2019   Activities of Daily Living No help needed   Instrumental Activities of Daily Living 1 - Full function   Mini Cog Total Score 1   Some recent data might be hidden     A Mini-Cog score of 0-2 suggests the possibility of dementia, score of 3-5 suggests no dementia    Identified Health Risks:     She is at risk for lack of exercise and has been provided with information to increase physical activity for the benefit of her well-being.  The patient reports that she does not have all recommended working emergency equipment available. She was provided with information about emergency preparedness, including smoke detectors.  The patient reports that she has difficulty with instrumental activities of daily living.  She was provided with a list of local organizations that provide support services and advised to make a follow up appointment in 3 to 6 months to address this further.   Patient's advanced directive was discussed and I am comfortable with the patient's wishes.    Total time approximately 30 minutes with 4 minutes spent removing cerumen    The patient was provided with appropriate referrals to address her memory problem.

## 2021-05-30 NOTE — TELEPHONE ENCOUNTER
Left detailed message for patient's daughter Sienna that Dr. Castillo has sent Nitrostat to pharmacy.  Sienna to call back with any further questions or concerns.

## 2021-05-30 NOTE — TELEPHONE ENCOUNTER
"\"I am having a problem with my stomach again, same old thing every so often. I have pain in the bottom left. (below umbilicus)  It began again this morning. Has been seen by Dr Castillo for this in the past.Constant. Currently pain =\"5\". Dull pain. She has not taken anything for the pain. No constipation: she had a BM today, normal. No fever or vomiting.     Reason for Disposition    [1] MILD-MODERATE pain AND [2] constant AND [3] present > 2 hours    Protocols used: ABDOMINAL PAIN - FEMALE-A-AH    Triaged to a disposition of See provider within 4 hrs. Discussed options: WIC, UC, or ER.  She does not want to be seen at this time. She thinks the pain may go away on its own, like it has in the past. She will call back if she decides she would like a clinic appointment for tomorrow am, or if sx worsen.     Aracelis Carlisle RN [facility] Triage Nurse  "

## 2021-05-30 NOTE — TELEPHONE ENCOUNTER
ANTICOAGULATION  MANAGEMENT    Assessment     Today's INR result of 1.8 is Subtherapeutic (goal INR of 2.0-3.0)        Warfarin taken as previously instructed    No new diet changes affecting INR    No new medication/supplements affecting INR    Continues to tolerate warfarin with no reported s/s of bleeding or thromboembolism     Previous INR was Therapeutic  2+ times in a row    Plan:     Spoke with Sienna regarding INR result and instructed:     Warfarin Dosing Instructions: one time booster of 3mg tonight then  Continue current warfarin dose 3 mg daily on Mon/Wed/Fri; and 2 mg daily rest of week  (0 % change)    Instructed patient to follow up no later than: two weeks    Education provided: importance of therapeutic range    Sienna verbalizes understanding and agrees to warfarin dosing plan.    Instructed to call the [facility] for any changes, questions or concerns. (#[phone])   ?   Grecia Staples RN    Subjective/Objective:      Guera Peters, a 84 y.o. female is on warfarin.     Guera reports:     Home warfarin dose: as updated on anticoagulation calendar per template     Missed doses: No     Medication changes:  No     S/S of bleeding or thromboembolism:  No     New Injury or illness:  No     Changes in diet or alcohol consumption:  No     Upcoming surgery, procedure or cardioversion:  No    Anticoagulation Episode Summary     Current INR goal:   2.0-3.0   TTR:   60.4 % (1.4 y)   Next INR check:   7/23/2019   INR from last check:   1.80! (7/9/2019)   Weekly max warfarin dose:      Target end date:      INR check location:      Preferred lab:      Send INR reminders to:   [facility]    Indications    Chronic a-fib (H) [I48.2]           Comments:            Anticoagulation Care Providers     Provider Role Specialty Phone number    Dora Castillo DO Referring Family Medicine [phone]

## 2021-05-31 NOTE — TELEPHONE ENCOUNTER
"\"I am having stomach problems. I have had them several times. Another one now. Sometimes it goes away in a day or 2.\" She wants to know if there something she can do for it?  \"It feels like something is heavy sitting on my stomach\". No nausea. It is lasting longer today. She is able to eat and drink.  No problems with bladder or bowel. She is not bloated. \"My stomach just does not feel good\". She has discussed with Dr Castillo in the past. She cannot recall what Dr told her to do for it.  Recommended to be seen: call transferred to .  (see also previous triage note of 7/30/2019).    Aracelis Carlisle RN [facility] Triage Nurse  "

## 2021-05-31 NOTE — PROGRESS NOTES
ASSESSMENT & PLAN:  Guera was seen today for abdominal pain.    Diagnoses and all orders for this visit:    LLQ abdominal pain  -     US Pelvis With Transvaginal Non OB; Future  -     Urinalysis-UC if Indicated  -     Culture, Urine    Thickened endometrium  -     US Pelvis With Transvaginal Non OB; Future    Chronic atrial fibrillation (H)  -     INR    Diverticulosis of large intestine without hemorrhage    Alzheimer's dementia without behavioral disturbance, unspecified timing of dementia onset      -Patient return for evaluation of left lower quadrant abdominal pain that has been ongoing for the last year or so, is intermittent and more of a nagging nuisance.  History is difficult to obtain due to her dementia because she cannot explain it well.  In the past we have had her increase her fluids it seemed to have resolved.  We had also considered in the past whether it was secondary to her medications for her dementia and we had even trialed stopping her Exelon patch or adjusting dosages.  More recently she started to complain of this again and does not seem to be constipated and has been doing Metamucil.  We had recently gone up on her Namenda to see if that helps slow the progression of her dementia and she is taking 10 mg twice daily but since symptoms have reoccurred I informed her daughter Sienna to decrease the dose to 5 mg twice daily.  This does not seem to have made a difference so we discussed if she wants to she could try going back up again.  She does have a history of diverticulosis but her symptoms do not sound consistent with diverticulitis.  She is afebrile no nausea or vomiting and we reviewed previous CT imaging that showed no concerns of the left lower quadrant.  We had an ultrasound imaging in the past year or so that showed thickened endometrium and she did have an endometrial biopsy done at [facility].  She denies any postmenopausal bleeding but we discussed may be reevaluating the  endometrium with another ultrasound to further evaluate and daughter was in agreement to the plan.    Patient Instructions   Ultrasound and urine ordered. If ultrasound stable then will plan to just monitor for now. - heating pad     -ok to go back to namenda 10mg twice daily if you want but if increased complaints of abdominal pain , then lets go back to 5mg twice daily        Orders Placed This Encounter   Procedures     Culture, Urine     US Pelvis With Transvaginal Non OB     Standing Status:   Future     Standing Expiration Date:   8/27/2020     Order Specific Question:   Reason for Exam (Describe Symptoms):     Answer:   reevaluate endometrial thickening. ongoing LLQ pain     Order Specific Question:   Can the procedure be changed per Radiologist protocol?     Answer:   Yes     Urinalysis-UC if Indicated     There are no discontinued medications.    Return in about 6 months (around 2/27/2020) for med check .     CHIEF COMPLAINT:  Chief Complaint   Patient presents with     Abdominal Pain     intermittent abdominal pressure LLQ, denies N/V x 3 weeks        HISTORY OF PRESENT ILLNESS:  Guera is a 84 y.o. female presenting to the clinic today abdominal pain.  She has severe dementia and is here with her daughter Sienna    Abdominal Pain: Patient has been having recurrence of her left lower quadrant abdominal pain that she has complained about intermittently off and on for the last year or so.  I noticed she called into our nurse triage and I contacted her daughter Sienna.  We tried to identify a trigger and more recently at her physical we had gone up on the dose of her Namenda she tried relieving the pain by increasing water and fiber intake, taking a walk, and takes a dose of Metamucil once a day. They decreased the Namenda dosage to half a tablet and it did not improve nor worsen the abdomen pain. Patient has bowel movement every day and it does not relieve the symptoms when it passes. Pain intermittently  "worsens when laying down. She describes the pain as a nuisance, achy, there is a \"rock\" in her stomach and it waxes and wanes, and one side of the abdomen pain is different from the other. Her last bowel movement was this morning.     REVIEW OF SYSTEMS:   she denies any nausea, vomiting, abnormal stools, and fever. All other systems are negative.     PFSH:  Past Surgical History:   Procedure Laterality Date     ANGIOPLASTY       APPENDECTOMY       BREAST SURGERY      removal breast implant     CARDIAC PACEMAKER PLACEMENT       embolization of angiomyolipoma       MITRAL VALVE REPAIR       partial excision of thyroid       PATENT FORAMEN OVALE CLOSURE       Her family history includes Heart disease in her father and mother.  She  reports that she has quit smoking. She has never used smokeless tobacco. She reports that she drinks alcohol. She reports that she does not use drugs.    TOBACCO USE:  Social History     Tobacco Use   Smoking Status Former Smoker   Smokeless Tobacco Never Used   Tobacco Comment    quit >20 yrs ago        VITALS:  Vitals:    08/27/19 1005   BP: 131/60   Patient Site: Left Arm   Patient Position: Sitting   Cuff Size: Adult Large   Pulse: 73   Resp: 24   Temp: 97.2  F (36.2  C)   TempSrc: Oral   Weight: 180 lb 12.8 oz (82 kg)     Wt Readings from Last 3 Encounters:   08/27/19 180 lb 12.8 oz (82 kg)   07/09/19 181 lb (82.1 kg)   11/06/18 171 lb 6.4 oz (77.7 kg)     Body mass index is 30.55 kg/m .  No LMP recorded. Patient is postmenopausal.       MEDICATIONS:  Current Outpatient Medications   Medication Sig Dispense Refill     alendronate (FOSAMAX) 70 MG tablet Take 1 tablet (70 mg total) by mouth every 7 days. Do not lie down for 1 hr after taking, take with full glass water 12 tablet 11     aspirin 81 mg chewable tablet Chew 81 mg daily.       carvedilol (COREG) 12.5 MG tablet Take 1 tablet (12.5 mg total) by mouth 2 (two) times a day with meals. 180 tablet 1     levothyroxine (SYNTHROID, " LEVOTHROID) 100 MCG tablet TAKE 1 TABLET(100 MCG) BY MOUTH DAILY. 90 tablet 12     lisinopril (PRINIVIL,ZESTRIL) 20 MG tablet Take 1 tablet (20 mg total) by mouth daily. 90 tablet 12     memantine (NAMENDA) 10 MG tablet Take 1 tablet (10 mg total) by mouth 2 (two) times a day. (Patient taking differently: Take 5 mg by mouth 2 (two) times a day.       ) 180 tablet 3     nitroglycerin (NITROSTAT) 0.4 MG SL tablet Place 1 tablet (0.4 mg total) under the tongue every 5 (five) minutes as needed for chest pain. Max 3 doses. Call 911 100 tablet 1     rivastigmine tartrate (EXELON) 3 MG capsule TAKE 1 CAPSULE(3 MG) BY MOUTH TWICE DAILY. 180 capsule 12     rosuvastatin (CRESTOR) 20 MG tablet Take 1 tablet (20 mg total) by mouth at bedtime. 90 tablet 1     warfarin (COUMADIN/JANTOVEN) 1 MG tablet Take 2-3 tablets (2-3 mg total) by mouth daily. Adjust dose per INR results as instructed. 210 tablet 1     No current facility-administered medications for this visit.        PHYSICAL EXAM:  GENERAL:  Reveals an alert 84 y.o. female in NAD.  She is pleasant and smiling.  Vitals:  Per nursing notes.  EYES: PERRLA. Extraocular movements intact. Normal conjunctiva and lids.   ENT:  Hearing grossly normal.  Normal appearance to ears and nose.  oropharynx normal. Normal lips, gums and teeth.  Normal nasal mucosa, septum and turbinates.  NECK:  Supple, thyroid not palpable.  CARDIAC:  Regular rate and rhythm without murmurs, rubs, or gallops. Legs without edema. Carotids without bruits.   LUNGS: Clear.  Respiratory effort normal.  ABDOMEN:   No palpable hepatosplenomegaly.  No guarding or rebound tenderness.  Patient winces slightly with palpation to all quadrants especially right and left lower quadrant  SKIN:   Without rash, bruise, or palpable lesions.  NEURO:  Cranial nerves II-XII intact.     PSYCH: Patient is pleasant and well-appearing and well dressed.  Memory is not intact.  Speech is fluent.    DATA REVIEWED:  ADDITIONAL HISTORY  SUMMARIZED (2): Review pathology report for biopsy for endometrial biopsy June 2018.  Reviewed notes from [facility]  DECISION TO OBTAIN EXTRA INFORMATION (1): None.   RADIOLOGY TESTS (1): Reviewed previous ultrasound and CT imaging.  Ultrasound ordered to reevaluate the endometrial lining  LABS (1): Ordered UA today to ensure no UTI that is causing symptoms..   MEDICINE TESTS (1): None.  INDEPENDENT REVIEW (2 each): None.     The visit lasted a total of 21 minutes face to face with the patient. Over 50% of the time was spent counseling and educating the patient about abdominal pain.     Whitney GERBER, am scribing for and in the presence of Dr. Castillo.  IDr. Castillo, personally performed the services described in this documentation, as scribed by Whitney Diggs in my presence, and it is both accurate and complete.          Total data points: 4

## 2021-05-31 NOTE — TELEPHONE ENCOUNTER
Who is calling:  Patient daughter   Reason for Call:  Caller requesting Diverticulosis Handout for patient what she can eat what not to eat . Patient is coming to clinic for INR check today please print out and give at , when patient comes please hand it to her.  Date of last appointment with primary care: 7/9/19  Okay to leave a detailed message: No

## 2021-05-31 NOTE — TELEPHONE ENCOUNTER
ANTICOAGULATION  MANAGEMENT    Assessment     Today's INR result of 1.7 is Subtherapeutic (goal INR of 2.0-3.0)        Warfarin taken as previously instructed    No new diet changes affecting INR    No new medication/supplements affecting INR    Continues to tolerate warfarin with no reported s/s of bleeding or thromboembolism     Previous INR was Therapeutic    Plan:     Spoke with Sienna regarding INR result and instructed:     Warfarin Dosing Instructions:  Change warfarin dose to 2 mg daily on Mon/Thurs/Sat; and 3 mg daily rest of week  (6 % change)    Instructed patient to follow up no later than: two weeks    Education provided: importance of therapeutic range and target INR goal and significance of current INR result    Sienna verbalizes understanding and agrees to warfarin dosing plan.    Instructed to call the [facility] for any changes, questions or concerns. (#[phone])   ?   Grecia Staples RN    Subjective/Objective:      Guera Peters, a 84 y.o. female is on warfarin.     Guera reports:     Home warfarin dose: template incorrect; verbally confirmed home dose with Sienna and updated on anticoagulation calendar     Missed doses: No     Medication changes:  No     S/S of bleeding or thromboembolism:  No     New Injury or illness:  No     Changes in diet or alcohol consumption:  No     Upcoming surgery, procedure or cardioversion:  No    Anticoagulation Episode Summary     Current INR goal:   2.0-3.0   TTR:   60.1 % (1.4 y)   Next INR check:   8/20/2019   INR from last check:   1.70! (8/6/2019)   Weekly max warfarin dose:      Target end date:      INR check location:      Preferred lab:      Send INR reminders to:   [facility]    Indications    Chronic a-fib (H) [I48.2]           Comments:            Anticoagulation Care Providers     Provider Role Specialty Phone number    Dora Castillo DO Referring Family Medicine [phone]

## 2021-05-31 NOTE — TELEPHONE ENCOUNTER
ANTICOAGULATION  MANAGEMENT    Assessment     Today's INR result of 2.7 is Therapeutic (goal INR of 2.0-3.0)        Warfarin taken as previously instructed    No new diet changes affecting INR    No new medication/supplements affecting INR    Continues to tolerate warfarin with no reported s/s of bleeding or thromboembolism     Previous INR was Subtherapeutic    Plan:     Spoke with Sienna regarding INR result and instructed:     Warfarin Dosing Instructions:  Continue current warfarin dose 2 mg daily on Mon/Thurs/Sat; and 3 mg daily rest of week  (0 % change)    Instructed patient to follow up no later than: two weeks    Education provided: importance of therapeutic range    Sienna verbalizes understanding and agrees to warfarin dosing plan.    Instructed to call the [facility] for any changes, questions or concerns. (#[phone])   ?   Grecia Staples RN    Subjective/Objective:      Guera Peters, a 84 y.o. female is on warfarin.     Guera reports:     Home warfarin dose: as updated on anticoagulation calendar per template     Missed doses: No     Medication changes:  No     S/S of bleeding or thromboembolism:  No     New Injury or illness:  No     Changes in diet or alcohol consumption:  No     Upcoming surgery, procedure or cardioversion:  No    Anticoagulation Episode Summary     Current INR goal:   2.0-3.0   TTR:   60.4 % (1.5 y)   Next INR check:   9/3/2019   INR from last check:   2.70 (8/20/2019)   Weekly max warfarin dose:      Target end date:      INR check location:      Preferred lab:      Send INR reminders to:   [facility]    Indications    Chronic a-fib (H) [I48.2]           Comments:            Anticoagulation Care Providers     Provider Role Specialty Phone number    Dora Castillo DO Referring Family Medicine [phone]

## 2021-05-31 NOTE — TELEPHONE ENCOUNTER
ANTICOAGULATION  MANAGEMENT    Assessment     Today's INR result of 2.6 is Therapeutic (goal INR of 2.0-3.0)        Warfarin taken as previously instructed    No new diet changes affecting INR    No interaction expected between Namenda and warfarin    Continues to tolerate warfarin with no reported s/s of bleeding or thromboembolism     Previous INR was Therapeutic    Plan:     Spoke with Sienna regarding INR result and instructed:     Warfarin Dosing Instructions:  Continue current warfarin dose    2 mg every Mon, Thu, Sat; 3 mg all other days       Instructed patient to follow up no later than: 4 weeks    Education provided: importance of therapeutic range and target INR goal and significance of current INR result    Sienna verbalizes understanding and agrees to warfarin dosing plan.    Instructed to call the [facility] for any changes, questions or concerns. (#[phone])   ?   Grecia Staples RN    Subjective/Objective:      Guear Peters, a 84 y.o. female is on warfarin.     Guera reports:     Home warfarin dose: as updated on anticoagulation calendar per template     Missed doses: No     Medication changes:  Yes Namenda     S/S of bleeding or thromboembolism:  No     New Injury or illness:  No     Changes in diet or alcohol consumption:  No     Upcoming surgery, procedure or cardioversion:  No    Anticoagulation Episode Summary     Current INR goal:   2.0-3.0   TTR:   60.9 % (1.5 y)   Next INR check:   9/24/2019   INR from last check:   2.60 (8/27/2019)   Weekly max warfarin dose:      Target end date:      INR check location:      Preferred lab:      Send INR reminders to:   [facility]    Indications    Chronic a-fib (H) [I48.2]           Comments:            Anticoagulation Care Providers     Provider Role Specialty Phone number    Dora Castillo DO Referring Family Medicine [phone]

## 2021-05-31 NOTE — PATIENT INSTRUCTIONS - HE
Ultrasound and urine ordered. If ultrasound stable then will plan to just monitor for now. - heating pad     -ok to go back to namenda 10mg twice daily if you want but if increased complaints of abdominal pain , then lets go back to 5mg twice daily

## 2021-05-31 NOTE — TELEPHONE ENCOUNTER
Call pt and make sure she is drinking plenty of water. Usually this is what daughter angela suggests and it improves.

## 2021-06-01 VITALS — BODY MASS INDEX: 28.72 KG/M2 | WEIGHT: 172.6 LBS

## 2021-06-01 VITALS — WEIGHT: 163 LBS | HEIGHT: 65 IN | BODY MASS INDEX: 27.16 KG/M2

## 2021-06-01 VITALS — WEIGHT: 171 LBS | BODY MASS INDEX: 28.46 KG/M2

## 2021-06-01 VITALS — BODY MASS INDEX: 28.29 KG/M2 | WEIGHT: 170 LBS

## 2021-06-01 NOTE — PROGRESS NOTES
"Scheduled Follow Up Call: Attempt 1 Community Health Worker called patient's daughter Sienna and left a message for the patient. If the patient is returning my call, please transfer the patient to Moira at ext. [phone].    LMTCB: [facility] referral  \"Family called with safety concerns. SW appointment. May need to also see PCP. Please reach out to daughter. SHEY on file\"    Next attempt: 9/16/19  "

## 2021-06-01 NOTE — PROGRESS NOTES
CHW received voicemail from patients daughter Sienna on 9/13, CHW  Returned call today- LMCTB regarding setting up meeting with [facility] SW 9/27/19    Next attempt: 9/19/19

## 2021-06-01 NOTE — TELEPHONE ENCOUNTER
ANTICOAGULATION  MANAGEMENT    Assessment     Today's INR result of 2.4 is Therapeutic (goal INR of 2.0-3.0)        Warfarin taken as previously instructed    No new diet changes affecting INR    No new medication/supplements affecting INR    Continues to tolerate warfarin with no reported s/s of bleeding or thromboembolism     Previous INR was Therapeutic    Plan:     Spoke with Sienna regarding INR result and instructed:     Warfarin Dosing Instructions:  Continue current warfarin dose 2 mg daily on Mon/thurs/Sat; and 3 mg daily rest of week  (0 % change)    Instructed patient to follow up no later than: 4-6 weeks    Education provided: importance of therapeutic range    Sienna verbalizes understanding and agrees to warfarin dosing plan.    Instructed to call the [facility] for any changes, questions or concerns. (#[phone])   ?   Grecia Staples RN    Subjective/Objective:      Guera Peters, a 84 y.o. female is on warfarin.     Guera reports:     Home warfarin dose: template incorrect; verbally confirmed home dose with Sienna and updated on anticoagulation calendar     Missed doses: No     Medication changes:  No     S/S of bleeding or thromboembolism:  No     New Injury or illness:  No     Changes in diet or alcohol consumption:  No     Upcoming surgery, procedure or cardioversion:  No    Anticoagulation Episode Summary     Current INR goal:   2.0-3.0   TTR:   65.4 %   Next INR check:   11/5/2019   INR from last check:   2.40 (9/24/2019)   Weekly max warfarin dose:      Target end date:      INR check location:      Preferred lab:      Send INR reminders to:   [facility]    Indications    Chronic a-fib (H) [I48.2]           Comments:            Anticoagulation Care Providers     Provider Role Specialty Phone number    Dora Castillo DO Referring Family Medicine [phone]

## 2021-06-01 NOTE — TELEPHONE ENCOUNTER
Who is calling:  Dali El (consent signed)  Reason for Call: My siblings and I  would like to meet up with the clinic .  I have safety questions regarding my mom in the home and would like addional resources that may be available to her.    Date of last appointment with primary care: 8/27/19  Okay to leave a detailed message: Yes

## 2021-06-01 NOTE — PROGRESS NOTES
CHW called back patient's daughter Sienna today after receiving a voicemail.  CHW and daughter have been playing phone tag for several days.Referjc El has requested a meeting with her and her three siblings. Sienna is power of  for her mother Guera and will bring paperwork with to appointments. Family is concerned about patients safety and would liek to discuss placement options/resources.  Set up [facility] SW meeting for 10/4/19 @9am at [facility].  Sienna did call back and asked if Thursday was an option as well. CHW informed her SW was in clinic on Fridays only,Sienna did offer meeting at [facility] as well if that was an option to make things easier.

## 2021-06-02 VITALS — WEIGHT: 178.2 LBS | BODY MASS INDEX: 29.65 KG/M2

## 2021-06-02 VITALS — BODY MASS INDEX: 28.56 KG/M2 | HEIGHT: 65 IN | WEIGHT: 171.4 LBS

## 2021-06-02 NOTE — TELEPHONE ENCOUNTER
Who is calling:  Patient's daughter, Sienna  Reason for Call:    Sienna states patient had 2 teeth extracted today.  The Dentist told her to have patient take ibuprofen for pain and inflammation.  Patient is on an anticoagulant/warfarin.  Sienna is questioning if it is ok to take the ibuprofen or keep taking the tylenol.  Please reach out to Sienna and advise.  Date of last appointment with primary care: 8/27/19  Okay to leave a detailed message: Yes      Please have covering Provider advise.  Patient's novocain will be wearing off in a couple of hours.

## 2021-06-02 NOTE — TELEPHONE ENCOUNTER
"GIOVANI-PROCEDURAL ANTICOAGULATION  MANAGEMENT    Assessment     Warfarin interruption plan for Dental Extraction, 2 molars on 10/28/19.    Original request per daughter was for 5 day hold of warfarin and aspirin.    Spoke to [facility] dental// [place] office.  Requests management to ensure clotting for 10/28 extractions. No specific hold duration. No specified INR goal. Agreeable to continuing aspirin through procedure if warfarin hold started now (4-5 day hold)    Anticoagulation indication: Atrial fibrillation      Hx significant for MI/sents, TIA, mitral valve repair       [id number] = 7 (Hypertension, Age x 2, TIA, Female, Vascular)    \"2019 AHA/ACC For patients with AF without mechanical heart valves who require interruption of warfarin for procedures,decisions about bridging therapy  shouldbalance the risks of stroke and bleeding and the duration of time a patient will not be anticoagulated\"    Supportive information: Citing BRIDGE Trial. \"Bridging anticoagulation may be appropriate only in patients (on warfarin) with a very high thromboembolic risk.\"      Recommendation:      Favor continuing aspirin without interruption with cardiac stents; dentist agreeable      Reasonable to hold warfarin given more than single extraction (guideance varies wether 2+ or 3+ teeth increases bleeding risk), extraction is of molars- larger teeth likely increases bleeding risk as does being onmultiple antithrombotics. Would favor 3-4 day hold. 4 day hold seems to be agreeable with dental office      Guera certainly high risk with  [id number] = 7 (Hypertension, Age x 2, TIA, Female, Vascular) and hx of TIA, but difficult to say if she is \"very high risk\".    Her TIA is fairly distant at this point > 3 years.  Also on concurrent Aspirin that will not be stopped that increases provides some thrombotic protection and increases bleeding risk of bridging. Patient with Alzheimer's and would be unable to give self Lovenox " per daughter Sienna. Patient still lives independently but does have local family support available. Discuss bridging decision with Dr. Castillo      Recommend warfarin restart night of procedure vs. Delayed restart as initially suggested by dental office due to slow onset of warfairn.       Subjective/Objective:      Guera Peters, a 84 y.o. female     Spoke to Daughter Sienna to review history.    Reason for Anticoagulation: Atrial Fibrillation    Goal INR Range: 2-3    Patient bridged in past: Sienna does not recall any warfarin interruption in last 3 or so years. None noted on HE flowsheet    Pertinent History: Alzheimer's, CAD-S/p PCI (most recent 2015), Carotid stenosis,  HTN, TIA (> 3 years ago per Sienna), Mitral valve repair and PFO closure (2007)    Wt Readings from Last 3 Encounters:   08/27/19 180 lb 12.8 oz (82 kg)   07/09/19 181 lb (82.1 kg)   11/06/18 171 lb 6.4 oz (77.7 kg)        Ideal body weight: 55.8 kg (123 lb 2 oz)  Adjusted ideal body weight: 66.3 kg (146 lb 3.1 oz)     Lab Results   Component Value Date    INR 2.40 (H) 09/24/2019    INR 2.60 (H) 08/27/2019    INR 2.70 (H) 08/20/2019     Lab Results   Component Value Date    HGB 14.9 11/06/2018    HCT 44.6 11/06/2018     11/06/2018     Lab Results   Component Value Date    CREATININE 0.75 07/09/2019    CREATININE 0.94 11/06/2018    CREATININE 0.92 10/30/2018     Estimated CrCl:  56 ml/min using adjusted weight 66 kg, creatinine rounded to 0.8 for age.

## 2021-06-02 NOTE — TELEPHONE ENCOUNTER
Who is calling:  Patients daughter  Reason for Call:  Sienna would like to verify what the patients daily dosing should be. She thinks the patient may be taking the wrong doses each day. Please advise.   Date of last appointment with primary care: n/a  Okay to leave a detailed message: Yes

## 2021-06-02 NOTE — TELEPHONE ENCOUNTER
Thank you Areli I might have you call the daughter Sienna since you had already spoken with her and I agree with your plan to hold warfarin starting tonight and continue aspirin and resume warfarin the evening of dental extraction.  I really appreciate your help so very much

## 2021-06-02 NOTE — TELEPHONE ENCOUNTER
Message from yesterday was forwarded to pharmacist for assistance. Please advise on holding orders.

## 2021-06-02 NOTE — TELEPHONE ENCOUNTER
Spoke with Sienna and advised Sienna of Dr. Justice advice to avoid ibuprofen. Advised Sienna no more than 3,000 mg in 24 hours.    Sienna verbalizes understanding and agrees to plan.

## 2021-06-02 NOTE — TELEPHONE ENCOUNTER
Reviewed patient's chart, because she is having less than 3 teeth extracted this is perceived to be a low bleeding risk procedure, however she does have chronic atrial fibrillation, previous MI, and transient cerebral ischemia, suggesting a GNMVN5Bluc score of 7.  Would like to double check with anticoagulation pharmacist to ensure that she feels comfortable with a 5-day hold from warfarin for this procedure.

## 2021-06-02 NOTE — PROGRESS NOTES
[facility] SW visit was cancelled due to not all siblings available for the appointment 10/4/19.  Family is suggesting 10/18/19 for SW assmt, after CHW consulted with Dayana KHAN- SW is unavailable on 10/18/19.    CHW left  for Sienna today to arrange and schedule [facility] SW for a later date and time that would work for all members of her family.    CHW f/u call 10/8/19

## 2021-06-02 NOTE — TELEPHONE ENCOUNTER
Patient Returning Call  Reason for call:  Return call  Information relayed to patient:  n/a  Patient has additional questions:  Yes  If YES, what are your questions/concerns:  Caller stated that the patient does not need to be seen, only a nurse visit for the flu shot.  Okay to leave a detailed message?: No call back needed

## 2021-06-02 NOTE — PROGRESS NOTES
Rescheduled 10/4  visit to 10/25/19 @ 9am  Daughter Sienna had originally   requested 10/18 however [facility] is not available this day.  Sienna will inform her siblings for this SW visit on 10/25/19.  Requesting resources and options for care for patient.

## 2021-06-02 NOTE — TELEPHONE ENCOUNTER
Patient is scheduled for flu vaccine on 11/04 at 11am.     Informed patient's daughter of appt for flu vaccine

## 2021-06-02 NOTE — TELEPHONE ENCOUNTER
Who is calling:  Patients Sienna killian  Reason for Call:  Patients daughter calling regarding holding medication for the patient. She is having 2 teeth extracted on 10/28/19.  Date of last appointment with primary care: 8/27/19  Okay to leave a detailed message: Yes

## 2021-06-02 NOTE — TELEPHONE ENCOUNTER
Left message to call back for: appt needed  Information to relay to patient:  Left detailed message ask if patient just needs an appt for a flu shot or if she has to see PCP for other concerns.

## 2021-06-02 NOTE — TELEPHONE ENCOUNTER
Daughter calling in on behalf of the patient. The patient needs to have 2  Molar teeth extracted and it's scheduled for Monday 10/28/19. Per the daughter, the dentist stated the patient needs to go off warfarin and a baby aspirin 5 days prior to extractions.     Please advise on this process. Call Daughter Sienna with instructions. Cell number in contacts.     Please advise.     Sarika Bolaños RN, BSN [facility] Triage Nurse

## 2021-06-02 NOTE — TELEPHONE ENCOUNTER
brian Baugh sorry you took care of this (I forgot to respond yesterday )- did you call the dentist or family? I appreciate the help

## 2021-06-02 NOTE — TELEPHONE ENCOUNTER
Spoke with Sienna and reviewed dosing instructions given after last INR on 9/24. Sienna stated patient took 3mg on Monday and 2mg on Tuesday. Will start correct dose tonight.Next INR on 11/4

## 2021-06-02 NOTE — TELEPHONE ENCOUNTER
Anticoagulation    Spoke to Sienna and relayed conversation with dental office and plan made with DR. Castillo:       Hold warfarin for 4 days starting today and resume warfarin night of procedure if okay with dentist after procedure.      Continue aspirin without stopping     Discussed holding warfarin will result in low INR to held reduce bleeding with extraction. Encouraged Sienna to discuss bleeding expectations and management with dentist on day of procedure. Also discussed holding warfarin may increase risk of stroke and to monitor for s/sx of stroke  and seek immediate medical attention if developed. Discussed bridge injections were not being recommended at this time due to continuation of aspirin    Rescheduled INR check to 1 week post procedure    Areli Rondon, PharmD

## 2021-06-02 NOTE — TELEPHONE ENCOUNTER
New Appointment Needed  What is the reason for the visit:    Same Date/Next Day Appt Request  What is the reason for your visit?: would like a flu shot    Provider Preference: PCP only  How soon do you need to be seen?: would like to be seen Monday at approximately 10:30 when she is coming in for her INR at [facility].  Ok to schedule and leave message on voicemail   Waitlist offered?: No  Okay to leave a detailed message:  Yes

## 2021-06-03 VITALS — WEIGHT: 180.8 LBS | BODY MASS INDEX: 30.55 KG/M2

## 2021-06-03 VITALS — WEIGHT: 181 LBS | BODY MASS INDEX: 30.16 KG/M2 | HEIGHT: 65 IN

## 2021-06-03 NOTE — PROGRESS NOTES
"Clinic Care Coordination Contact    Follow Up Progress Note      BILL spoke to pts daughter Sienna.  Sienna says pt and spouse have agreed to cleaning services which is a big step.  The family has also installed grab bars and shower chair in bathroom.  Sienna and BILL discussed progress as \"baby steps.\"  Family discussed pt and spouses wishes to remain at home and the potential need for community services/programs to assist in the future. The family is able to manage pt and spouses transportation needs, and is glad to have resources provided by BILL as a backup.    BILL completed goals below.  BILL discussed how CC can best help family going forward.  Sienna is agreeable to call from CHW after beginning of the year and if no new needs pt can be gradated.  Family aware they can reach out to PCP or CC as needed in the future if situation changes.       Goals addressed this encounter:   Goals Addressed                 This Visit's Progress       Patient Stated      COMPLETED: Healthy Eating (pt-stated)        Goal Statement- My family has resources for Meals on Wheels and Mom's Meals and will discuss trying these services in the new year.  Action steps to achieve this goal  1.  My family will help set up a meal service for me and my spouse    Date goal set: 10/25/19  Completed: 11/25/19        COMPLETED: Reducing Risks (pt-stated)        Goal Statement- My family will installed grab bars and shower chair in bathroom.  My family has the information for Life Alert    Action steps to achieve this goal  1.  My family will help install grab bars in the bathroom  2.  My family will look into a Life Alert      Date goal set:  10/25/19  Completed: 11/25/19        COMPLETED: Transportation (pt-stated)        Goal Statement- My family will continue to drive me to appts but has other transportation resources provided by BILL if needed    Action steps to achieve this goal  1.  My family will help me and my  schedule a ride      Date goal " set:  10/25/19  Completed: 11/25/19             Intervention/Education provided during outreach: See above       Plan:   RN please review for maintenance    Sienna is agreeable to call from CHW after beginning of the year and if no new needs pt can be gradated.  Family aware they can reach out to PCP or CC as needed in the future if situation changes.       CHW to follow up in 1-2 months, after New Year

## 2021-06-03 NOTE — PROGRESS NOTES
Patient's daughter Sienna is agreeable to call from CHW after beginning of the year and if no new needs pt can be gradated.  Family aware they can reach out to PCP or CC as needed in the future if situation changes.        CHW to follow up in 1-2 months, after New Year    Next Outreach: 1/20/20      This Maintenance Wellness Plan provides private information in regard to the work I have done with my Care Team at my Primary Care Clinic.  This document provides insight on the goals I have worked hard to achieve.  My Care Team congratulates me on my journey to become well.  With the assistance of the Clinic Care Coordination Team and my Primary Care Provider, I have succeeded in improving areas of my health that I identified as barriers to becoming well.  I will continue to seek wellness and use the skills I have obtained to further my journey.  My Community Health Worker will follow up with me in 2 months.  In the meantime, if I should have any questions or concerns I will contact my Community Health Worker.    My Clinic Care Coordination Wellness Plan    [facility]  [address] [place], [place]  [id number] [phone]    My Preferred Method of Contact:  Phone: [phone] Sienna    My Primary/Preferred Language:  English         Emergency Contact: Extended Emergency Contact Information  Primary Emergency Contact: Saurav Peters  Address: [address] [place] [address] [facility]  Home Phone: [phone]  Mobile Phone: [phone]  Relation: Spouse  Secondary Emergency Contact: Sienna Yanez  Address: [address] [place] [address] [facility]  Home Phone: [phone]  Mobile Phone: [phone]  Relation: Child     PCP:  Dora Castillo DO     Care Team            Dora Castillo DO   [phone] PCP - General, Family Medicine    Merged    Dora Castillo DO   [phone] Family Medicine    Merged     Dora Castillo, DO   [phone] Assigned PCP    Marva Munoz CHW   [phone] Community Health Worker, Primary Care - CC    Dah, Irina, RN Lead Care Coordinator, Primary Care - CC        Accomplishments:  Goals        Patient Stated      COMPLETED: Healthy Eating (pt-stated)      Goal Statement- My family has resources for Meals on Wheels and Mom's Meals and will discuss trying these services in the new year.  Action steps to achieve this goal  1.  My family will help set up a meal service for me and my spouse    Date goal set: 10/25/19  Completed: 11/25/19        COMPLETED: Psychosocial (pt-stated)      Goal:  I will complete a healthcare directive in the next 30-60 days;  Completed- copy made and to be scanned into chart  Action Steps to achieve this goal  1) I will have healthcare directive notarized or signed by two witnesses  2) I will give a copy to my health care agent, primary care clinic, and keep original safe at home          COMPLETED: Reducing Risks (pt-stated)      Goal Statement- My family will installed grab bars and shower chair in bathroom.  My family has the information for Life Alert    Action steps to achieve this goal  1.  My family will help install grab bars in the bathroom  2.  My family will look into a Life Alert      Date goal set:  10/25/19  Completed: 11/25/19        COMPLETED: Transportation (pt-stated)      Goal Statement- My family will continue to drive me to appts but has other transportation resources provided by  if needed    Action steps to achieve this goal  1.  My family will help me and my  schedule a ride      Date goal set:  10/25/19  Completed: 11/25/19            Advanced Directive/Living Will: On file with the [facility]    Clinical Emergency Plan  Emergency Plan Recommendation:     When to Use the Emergency Department (ED)  An emergency means you could die if you don t get care quickly. Or you could be hurt permanently (disabled). Read below to know  when to use -- and when not to use -- an emergency department (also called ED).     Dangers to your life  Here are examples of emergencies. These need immediate care:  A hard time breathing  Severe chest pain  Choking  Severe bleeding  Suddenly not able to move or speak  Blacking out (fainting)`  Poisoning     Dangers of permanent injuries  Here are other emergencies. These also need immediate care:  Deep cuts or severe burns  Broken bones, or sudden severe pain and swelling in a joint     When it s an emergency  If you have an emergency, follow these steps:     1. Go to the nearest emergency department  If you can, go to the hospital ED closest to you right away.  If you cannot get there right away, or if it is not safe to take yourself, call 911 or your police emergency number.  2. Call your primary care doctor  Tell your doctor about the emergency. Call within 24 hours of going to the ED.  If you cannot call, have someone call for you.  Go to your doctor (not the ED) for any follow-up care.     When it s not an emergency  If a problem is not an emergency, follow these steps:     1. Call your primary care doctor  If you don t know the name of your doctor, call your health plan.  If you cannot call, have someone call for you.  2. Follow instructions  Your doctor will tell you what you should do.  You may be told to see your doctor right away. You may be told to go to the ED. Or you may be told to go to an urgent care center.  Follow your doctor s advice.  Goal:   I would like support to get the Medical Waiver [id number] complete by PCP or psychotherapist ([facility]) within the 3 months.     Action Steps:  1. I will inform Care Guide when Medical waiver is completed to make copies and give to PCP.  2. I will send the original Medical Waiver to my .     Alzheimer Disease  Alzheimer disease is a brain illness that can happen usually in older adults, but it can also happen as early as age 40. It is the  most common cause of dementia. It is a progressive disease. This means it gets worse over time.  What is Alzheimer disease?  Alzheimer disease causes a series of changes to nerves of the brain. Some nerves form into clumps and tangles, and lose some of their connections to other nerves.  Healthcare providers don t fully understand what causes Alzheimer disease. But they think these may be some of the causes:    Age and family history    Certain genes    Abnormal protein deposits in the brain    Environmental factors    Problems with a person s immune system    Possibly infections  Symptoms of Alzheimer disease  The disease causes changes in behavior and thinking known as dementia. The symptoms include:    Memory loss    Confusion    Restlessness    Personality and behavior changes    Problems with judgment    Problems communicating with others    Inability to follow directions    Lack of emotion  Diagnosing Alzheimer disease  No single test is able to diagnose Alzheimer disease. Instead healthcare providers use a series of tests to rule out other health conditions. The tests may include:    A complete medical history. This may include questions about overall health and past health problems. The healthcare provider may ask how well the person can do daily tasks. The healthcare provider may ask family or close friends about any changes in behavior or personality.    Mental status test. This is a test of memory, problem solving, attention, counting, and language.     Standard medical tests. These may include blood and urine tests to find possible causes for the problem.    Brain imaging tests. CT, MRI, or positron emission tomography (PET) may be used to rule out other causes of the problem.  Treating Alzheimer disease  Alzheimer disease has no cure. Instead healthcare providers can help ease some symptoms. This can make a person with Alzheimer more comfortable. Treatment can also make it easier for their caregivers to  take care of them.  Some medicines may help slow the decline of a person s memory, thinking, and language skills. They may help with problems of behavior, such as aggression. They can lessen hallucinations and delusions. These medicines can work for some but not all people. And they may help for only a limited time. Medicines include:    Cholinesterase inhibitors    Donepezil    Galantamine    Rivastigmine    Memantine  In some cases, behavior problems can be caused by medicine side effects. Talk with the person s healthcare provider about all medicines he or she is taking.  Keeping healthy  For a person with Alzheimer, it s important to stay healthy. Good nutrition and physical and social activity are vital. A calm and well-structured environment will help. Make sure to keep up with healthcare appointments and managing other health conditions, such as diabetes. Some people benefit from having a nutritionist help to prevent weight loss.   Caring for someone with Alzheimer  A person with Alzheimer will need more caregiving over time. Talk with your healthcare provider about caregiving resources.                      All [facility] clinic patients have access to a Nurse 24 hours a day, 7 days a week.  If you have questions or want advice from a Nurse, please know [facility] is here for you.  You can call your clinic and they will connect you or you can call [facility] at [phone].  [facility] also has Walk In Care clinics in multiple locations.  Call the number listed above for more information about our Walk In Care clinics or visit the [facility] website at [email or web address].         Sienna is agreeable to call from CHW after beginning of the year and if no new needs pt can be gradated.  Family aware they can reach out to PCP or CC as needed in the future if situation changes.        CHW to follow up in 1-2 months, after New Year    Next Outreach: 1/20/20

## 2021-06-03 NOTE — PROGRESS NOTES
Follow Up Blood Pressure Check    Guera Peters is a 84 y.o. female recommended to follow up for blood pressure check by Dora Castillo DO. Anihypertensive medications and adherence were verified: Yes.     Reason for visit: Patient here for flu shot.  I checked her blood pressure, then she told me she was here for a flu shot.     Medication change at last visit:  None    Today's Vitals:   Vitals:    11/04/19 1027 11/04/19 1037   BP: 144/66 127/62   Pulse: 71 71       Home blood pressure readings brought in today:   None    Lowest blood pressure today is less than 140/90 and they deny signs or symptoms of new onset    Giovanna Armstrong    Current Outpatient Medications   Medication Sig Dispense Refill     alendronate (FOSAMAX) 70 MG tablet Take 1 tablet (70 mg total) by mouth every 7 days. Do not lie down for 1 hr after taking, take with full glass water 12 tablet 11     aspirin 81 mg chewable tablet Chew 81 mg daily.       carvedilol (COREG) 12.5 MG tablet Take 1 tablet (12.5 mg total) by mouth 2 (two) times a day with meals. 180 tablet 1     levothyroxine (SYNTHROID, LEVOTHROID) 100 MCG tablet TAKE 1 TABLET(100 MCG) BY MOUTH DAILY. 90 tablet 12     lisinopril (PRINIVIL,ZESTRIL) 20 MG tablet Take 1 tablet (20 mg total) by mouth daily. 90 tablet 12     memantine (NAMENDA) 10 MG tablet Take 1 tablet (10 mg total) by mouth 2 (two) times a day. (Patient taking differently: Take 5 mg by mouth 2 (two) times a day.       ) 180 tablet 3     nitroglycerin (NITROSTAT) 0.4 MG SL tablet Place 1 tablet (0.4 mg total) under the tongue every 5 (five) minutes as needed for chest pain. Max 3 doses. Call 911 100 tablet 1     rivastigmine tartrate (EXELON) 3 MG capsule TAKE 1 CAPSULE(3 MG) BY MOUTH TWICE DAILY. 180 capsule 12     rosuvastatin (CRESTOR) 20 MG tablet Take 1 tablet (20 mg total) by mouth at bedtime. 90 tablet 1     warfarin (COUMADIN/JANTOVEN) 1 MG tablet TAKE 2 TO 3 TABLETS BY MOUTH DAILY; ADJUST DOSE PER INR  RESULTS AS DIRECTED 210 tablet 0     No current facility-administered medications for this visit.

## 2021-06-03 NOTE — TELEPHONE ENCOUNTER
Refill Approved    Rx renewed per Medication Renewal Policy. Medication was last renewed on 11/6/18.    Corinne Gan, [facility] Triage/Med Refill 11/27/2019     Requested Prescriptions   Pending Prescriptions Disp Refills     lisinopril (PRINIVIL,ZESTRIL) 20 MG tablet [Pharmacy Med Name: LISINOPRIL 20MG TABLETS] 90 tablet 0     Sig: TAKE 1 TABLET BY MOUTH DAILY       Ace Inhibitors Refill Protocol Passed - 11/27/2019  9:37 AM        Passed - PCP or prescribing provider visit in past 12 months       Last office visit with prescriber/PCP: 8/27/2019 Dora Castillo DO OR same dept: 8/27/2019 Dora Castillo DO OR same specialty: 8/27/2019 Dora Castillo DO  Last physical: 7/9/2019 Last MTM visit: Visit date not found   Next visit within 3 mo: Visit date not found  Next physical within 3 mo: Visit date not found  Prescriber OR PCP: Dora Castillo DO  Last diagnosis associated with med order: There are no diagnoses linked to this encounter.  If protocol passes may refill for 12 months if within 3 months of last provider visit (or a total of 15 months).             Passed - Serum Potassium in past 12 months     Lab Results   Component Value Date    Potassium 4.3 07/09/2019             Passed - Blood pressure filed in past 12 months     BP Readings from Last 1 Encounters:   11/04/19 127/62             Passed - Serum Creatinine in past 12 months     Creatinine   Date Value Ref Range Status   07/09/2019 0.75 0.60 - 1.10 mg/dL Final

## 2021-06-03 NOTE — TELEPHONE ENCOUNTER
ANTICOAGULATION  MANAGEMENT    Assessment     Today's INR result of 2.7 is Therapeutic (goal INR of 2.0-3.0)        Warfarin taken as previously instructed    No new diet changes affecting INR    No new medication/supplements affecting INR    Continues to tolerate warfarin with no reported s/s of bleeding or thromboembolism     Previous INR was Supratherapeutic    Plan:     Spoke with Cyn regarding INR result and instructed:     Warfarin Dosing Instructions:  Continue current warfarin dose 2 mg daily on Mon/thurs/Sat; and 3 mg daily rest of week  (0 % change)    Instructed patient to follow up no later than: two weeks    Education provided: importance of therapeutic range    Cyn verbalizes understanding and agrees to warfarin dosing plan.    Instructed to call the [facility] for any changes, questions or concerns. (#[phone])   ?   Grecia Staples RN    Subjective/Objective:      Guera Peters, a 84 y.o. female is on warfarin.     Guera reports:     Home warfarin dose: confirmed correct dose taken with Cyn     Missed doses: No     Medication changes:  No     S/S of bleeding or thromboembolism:  No     New Injury or illness:  No     Changes in diet or alcohol consumption:  No     Upcoming surgery, procedure or cardioversion:  No    Anticoagulation Episode Summary     Current INR goal:   2.0-3.0   TTR:   69.6 % (1 y)   Next INR check:   12/16/2019   INR from last check:   2.70 (12/2/2019)   Weekly max warfarin dose:      Target end date:      INR check location:      Preferred lab:      Send INR reminders to:   [facility]    Indications    Chronic a-fib [I48.20]           Comments:            Anticoagulation Care Providers     Provider Role Specialty Phone number    Dora Castillo DO Referring Family Medicine [phone]

## 2021-06-03 NOTE — TELEPHONE ENCOUNTER
ANTICOAGULATION  MANAGEMENT    Assessment     Today's INR result of 1.9 is Subtherapeutic (goal INR of 2.0-3.0)        Warfarin taken differently than instructed, but no impact to total weekly dose    No new diet changes affecting INR    No new medication/supplements affecting INR    Continues to tolerate warfarin with no reported s/s of bleeding or thromboembolism     Previous INR was Therapeutic    Plan:     Spoke with Sienna regarding INR result and instructed:     Warfarin Dosing Instructions:  one time booster of 4mg tonight then continue current warfarin dose 2 mg daily on Mon/Thurs/Sat; and 3 mg daily rest of week  (0 % change)    Instructed patient to follow up no later than: two weeks    Education provided: importance of therapeutic range and target INR goal and significance of current INR result    Sienna verbalizes understanding and agrees to warfarin dosing plan.    Instructed to call the [facility] for any changes, questions or concerns. (#[phone])   ?   Grecia Staples RN    Subjective/Objective:      Guera Peters, a 84 y.o. female is on warfarin.     Guera reports:     Home warfarin dose: verbally confirmed home dose with Sienna and updated on anticoagulation calendar     Missed doses: No     Medication changes:  No     S/S of bleeding or thromboembolism:  No     New Injury or illness:  No     Changes in diet or alcohol consumption:  No     Upcoming surgery, procedure or cardioversion:  No    Anticoagulation Episode Summary     Current INR goal:   2.0-3.0   TTR:   64.9 %   Next INR check:   11/18/2019   INR from last check:   1.90! (11/4/2019)   Weekly max warfarin dose:      Target end date:      INR check location:      Preferred lab:      Send INR reminders to:   [facility]    Indications    Chronic a-fib [I48.20]           Comments:            Anticoagulation Care Providers     Provider Role Specialty Phone number    Dora Castillo DO Referring Family Medicine  [phone]

## 2021-06-03 NOTE — TELEPHONE ENCOUNTER
ANTICOAGULATION  MANAGEMENT    Assessment     Today's INR result of 3.1 is Supratherapeutic (goal INR of 2.0-3.0)        Warfarin taken as previously instructed    No new diet changes affecting INR    No new medication/supplements affecting INR    Continues to tolerate warfarin with no reported s/s of bleeding or thromboembolism     Previous INR was Subtherapeutic    Plan:     Spoke with June regarding INR result and instructed:     Warfarin Dosing Instructions:  Continue current warfarin dose 2 mg daily on mon/thu/sat; and 3 mg daily rest of week  (0 % change) will add one salad weekly to her diet. Currently eats no green    Instructed patient to follow up no later than: two weeks    Sienna and Guera verbalizes understanding and agrees to warfarin dosing plan.    Instructed to call the [facility] for any changes, questions or concerns. (#[phone])   ?   Tommie Mccoy RN    Subjective/Objective:      Guera MOSER Lorraine, a 84 y.o. female is on warfarin.     Guera reports:     Home warfarin dose: as updated on anticoagulation calendar per template     Missed doses: No     Medication changes:  No     S/S of bleeding or thromboembolism:  No     New Injury or illness:  No     Changes in diet or alcohol consumption:  No     Upcoming surgery, procedure or cardioversion:  No    Anticoagulation Episode Summary     Current INR goal:   2.0-3.0   TTR:   67.7 %   Next INR check:   12/2/2019   INR from last check:   3.10! (11/18/2019)   Weekly max warfarin dose:      Target end date:      INR check location:      Preferred lab:      Send INR reminders to:   [facility]    Indications    Chronic a-fib [I48.20]           Comments:            Anticoagulation Care Providers     Provider Role Specialty Phone number    Dora Castillo DO Referring Family Medicine [phone]

## 2021-06-04 VITALS
DIASTOLIC BLOOD PRESSURE: 58 MMHG | WEIGHT: 183.4 LBS | HEART RATE: 67 BPM | BODY MASS INDEX: 30.99 KG/M2 | RESPIRATION RATE: 18 BRPM | SYSTOLIC BLOOD PRESSURE: 125 MMHG | TEMPERATURE: 97.8 F

## 2021-06-04 VITALS — WEIGHT: 183.4 LBS | BODY MASS INDEX: 30.56 KG/M2 | HEIGHT: 65 IN

## 2021-06-04 NOTE — TELEPHONE ENCOUNTER
Refill Approved    Rx renewed per Medication Renewal Policy. Medication was last renewed on 12/11/18.    Sania Salazar, [facility] Triage/Med Refill 12/6/2019     Requested Prescriptions   Pending Prescriptions Disp Refills     rivastigmine tartrate (EXELON) 3 MG capsule [Pharmacy Med Name: RIVASTIGMINE 3MG CAPSULES] 180 capsule 0     Sig: TAKE 1 CAPSULE BY MOUTH TWICE DAILY       Cholinesterase Inhibitor/Anti-Alzheimer Agent Refill Protocol Passed - 12/5/2019  3:27 AM        Passed - Visit with PCP or prescribing provider visit in last 6 months or next 3 months     Last office visit with prescriber/PCP: 8/27/2019 OR same dept: 8/27/2019 Dora Castillo DO OR same specialty: 8/27/2019 Dora Castillo DO Last physical: 7/9/2019 Last MTM visit: Visit date not found     Next appt within 3 mo: Visit date not found  Next physical within 3 mo: Visit date not found  Prescriber OR PCP: Dora Castillo DO  Last diagnosis associated with med order: 1. Alzheimer's dementia without behavioral disturbance, unspecified timing of dementia onset (H)  - rivastigmine tartrate (EXELON) 3 MG capsule [Pharmacy Med Name: RIVASTIGMINE 3MG CAPSULES]; TAKE 1 CAPSULE BY MOUTH TWICE DAILY  Dispense: 180 capsule; Refill: 0    If protocol passes may refill for 6 months if within 3 months of last provider visit (or a total of 9 months).

## 2021-06-04 NOTE — TELEPHONE ENCOUNTER
GIOVANI-PROCEDURAL ANTICOAGULATION  MANAGEMENT    Assessment     Warfarin interruption plan for Single Dental Extraction on 1/7/19 per dental office request.     Anticoagulation indication: Atrial fibrillation     ? Hx significant for MI/sents, TIA, mitral valve repair  ? [id number] = 7 (Hypertension, Age x 2, TIA, Female, Vascular)    Recently had 2 molars extracted and anticoagulation hold plan created with Dr. Castillo for 4 day hold with continuation of aspirin.      Supra therapeutic INR today of 3.7      DENTAL:  Chest 2012 guidelines suggest patients who require a minor dental procedure, continuing warfarin with coadministration of an oral prohemostatic agent or stopping VKAs 2 to 3 days before the procedure instead of alternative strategies        Plan       Suggest holding dose today if able with supratherapeutic INR 3.7      Recommend 3 day hold (1/4-1/6) prior to dental extraction of single tooth on 1/7      Continue aspirin without interruption      Resume warfarin night of procedure at dose advised by ACN after further assessment of today's INR. Recheck INR ~ 1 week post procedure.  ?   Areli Rondon, PharmD

## 2021-06-04 NOTE — TELEPHONE ENCOUNTER
ANTICOAGULATION  MANAGEMENT    Assessment     Today's INR result of 3.7 is Supratherapeutic (goal INR of 2.0-3.0)        Daughter is unsure what dose patient has taken. Patient was confused as she had 3 different doses written down    No new diet changes affecting INR    No new medication/supplements affecting INR    Continues to tolerate warfarin with no reported s/s of bleeding or thromboembolism     Previous INR was Supratherapeutic    Plan:     Spoke with daughter Sienna regarding INR result and instructed:     Warfarin Dosing Instructions:  hold warfarin tonight then continue current warfarin dose 3 mg daily on Sun/Wed/Fri; and 2 mg daily rest of week  (0 % change)  Per instructions from Lesli Rondon below for tooth extraction from 1/7:   -Recommend 3 day hold (1/4-1/6) prior to dental extraction of single tooth on 1/7  -Resume warfarin night of procedure at dose advised by ACN after further assessment of today's INR. Recheck INR ~ 1 week post procedure.    Instructed patient to follow up no later than: 1/14    Education provided: importance of therapeutic range and target INR goal and significance of current INR result    Sienna verbalizes understanding and agrees to warfarin dosing plan.    Instructed to call the [facility] for any changes, questions or concerns. (#[phone])   ?   Grecia Staples RN    Subjective/Objective:      Guera Peters, a 84 y.o. female is on warfarin.     Guera reports:     Home warfarin dose: unsure what dose was taken      Missed doses: No     Medication changes:  No     S/S of bleeding or thromboembolism:  No     New Injury or illness:  No     Changes in diet or alcohol consumption:  No     Upcoming surgery, procedure or cardioversion:  Yes tooth extraction 1/7    Anticoagulation Episode Summary     Current INR goal:   2.0-3.0   TTR:   69.0 % (1 y)   Next INR check:   1/14/2020   INR from last check:   3.70! (12/31/2019)   Weekly max warfarin dose:      Target end date:       INR check location:      Preferred lab:      [facility]    Indications    Chronic a-fib [I48.20]           Comments:            Anticoagulation Care Providers     Provider Role Specialty Phone number    Dora Castillo DO [facility] [phone]

## 2021-06-04 NOTE — TELEPHONE ENCOUNTER
RN cannot approve Refill Request    RN can NOT refill this medication Refilled 12/6/2019 . Last office visit: 8/27/2019 Dora Castillo DO Last Physical: 7/9/2019 Last MTM visit: Visit date not found Last visit same specialty: 8/27/2019 Dora Castillo DO.  Next visit within 3 mo: Visit date not found  Next physical within 3 mo: Visit date not found      Rod Danielson, [facility] Connection Triage/Med Refill 12/13/2019    Requested Prescriptions   Pending Prescriptions Disp Refills     rivastigmine tartrate (EXELON) 3 MG capsule [Pharmacy Med Name: RIVASTIGMINE 3MG CAPSULES] 180 capsule 0     Sig: TAKE 1 CAPSULE BY MOUTH TWICE DAILY       Cholinesterase Inhibitor/Anti-Alzheimer Agent Refill Protocol Passed - 12/11/2019  1:35 PM        Passed - Visit with PCP or prescribing provider visit in last 6 months or next 3 months     Last office visit with prescriber/PCP: 8/27/2019 OR same dept: 8/27/2019 Dora Castillo DO OR same specialty: 8/27/2019 Dora Castillo DO Last physical: 7/9/2019 Last MTM visit: Visit date not found     Next appt within 3 mo: Visit date not found  Next physical within 3 mo: Visit date not found  Prescriber OR PCP: Dora Castillo DO  Last diagnosis associated with med order: 1. Alzheimer's dementia without behavioral disturbance, unspecified timing of dementia onset (H)  - rivastigmine tartrate (EXELON) 3 MG capsule [Pharmacy Med Name: RIVASTIGMINE 3MG CAPSULES]; TAKE 1 CAPSULE BY MOUTH TWICE DAILY  Dispense: 180 capsule; Refill: 0    If protocol passes may refill for 6 months if within 3 months of last provider visit (or a total of 9 months).

## 2021-06-04 NOTE — TELEPHONE ENCOUNTER
ANTICOAGULATION  MANAGEMENT    Assessment     Today's INR result of 3.4 is Supratherapeutic (goal INR of 2.0-3.0)        Warfarin taken as previously instructed    No new diet changes affecting INR    No new medication/supplements affecting INR    Continues to tolerate warfarin with no reported s/s of bleeding or thromboembolism     Previous INR was Therapeutic    Plan:     Spoke with daughter Sienna regarding INR result and instructed:     Warfarin Dosing Instructions:  Change warfarin dose to 3 mg daily on Sun/Wed/Fri; and 2 mg daily rest of week  (6 % change)    Instructed patient to follow up no later than: two weeks    Education provided: importance of therapeutic range and target INR goal and significance of current INR result    Sienna verbalizes understanding and agrees to warfarin dosing plan.    Instructed to call the [facility] for any changes, questions or concerns. (#[phone])   ?   Grecia Staples RN    Subjective/Objective:      Guera Peters, a 84 y.o. female is on warfarin.     Guera reports:     Home warfarin dose: template incorrect; verbally confirmed home dose with Sienna and updated on anticoagulation calendar     Missed doses: No     Medication changes:  No     S/S of bleeding or thromboembolism:  No     New Injury or illness:  No     Changes in diet or alcohol consumption:  No     Upcoming surgery, procedure or cardioversion:  No    Anticoagulation Episode Summary     Current INR goal:   2.0-3.0   TTR:   69.3 % (1 y)   Next INR check:   12/30/2019   INR from last check:   3.40! (12/16/2019)   Weekly max warfarin dose:      Target end date:      INR check location:      Preferred lab:      Send INR reminders to:   [facility]    Indications    Chronic a-fib [I48.20]           Comments:            Anticoagulation Care Providers     Provider Role Specialty Phone number    Dora Castillo DO Referring Family Medicine [phone]

## 2021-06-04 NOTE — TELEPHONE ENCOUNTER
Refill Approved    Rx renewed per Medication Renewal Policy. Medication was last renewed on 11/6/18, last OV 8/27/19.    Aparna Fernandez, [facility] Triage/Med Refill 12/8/2019     Requested Prescriptions   Pending Prescriptions Disp Refills     alendronate (FOSAMAX) 70 MG tablet [Pharmacy Med Name: ALENDRONATE 70MG TABLETS] 12 tablet 0     Sig: TAKE 1 TABLET BY MOUTH ONCE A WEEK. DO NOT LIE DOWN FOR 1 HOURS AFTER TAKING. TAKE WITH FULL GLASS OF WATER       Biphosphonates Refill Protocol Passed - 12/7/2019  7:47 AM        Passed - PCP or prescribing provider visit in last 12 months     Last office visit with prescriber/PCP: 8/27/2019 Dora Castillo DO OR same dept: 8/27/2019 Dora Castillo DO OR same specialty: 8/27/2019 Dora Castillo DO  Last physical: 7/9/2019 Last MTM visit: Visit date not found   Next visit within 3 mo: Visit date not found  Next physical within 3 mo: Visit date not found  Prescriber OR PCP: Dora Castillo DO  Last diagnosis associated with med order: There are no diagnoses linked to this encounter.  If protocol passes may refill for 12 months if within 3 months of last provider visit (or a total of 15 months).             Passed - Serum creatinine in last 12 months     Creatinine   Date Value Ref Range Status   07/09/2019 0.75 0.60 - 1.10 mg/dL Final

## 2021-06-04 NOTE — PROGRESS NOTES
Noted. Thank you    patient case reviewed, vitals, meds, labs and imaging reviewed. Assessment and plan discussed with team.

## 2021-06-04 NOTE — TELEPHONE ENCOUNTER
Patient is having one tooth extracted on 1/7. Per daughter angela, dentist would like warfarin held prior to procedure. INR is elevated today and ACN instructed for patient to take a lower dose of warfarin (1mg) today then continue same dose for now (daughter is unsure if patient took dose correctly in the last week since she had it written down 3 different ways at home).  Routing to pharmacist for hold orders.

## 2021-06-04 NOTE — TELEPHONE ENCOUNTER
Refill Approved    Rx renewed per Medication Renewal Policy. Medication was last renewed on 12/11/2018 for 90/12.  Last OV 8/27/2019     RTC about 2/27/2020 for med check  Romi Perez, [facility] Triage/Med Refill 1/5/2020     Requested Prescriptions   Pending Prescriptions Disp Refills     levothyroxine (SYNTHROID, LEVOTHROID) 100 MCG tablet [Pharmacy Med Name: LEVOTHYROXINE 0.100MG (100MCG) TAB] 90 tablet 12     Sig: TAKE 1 TABLET BY MOUTH DAILY       Thyroid Hormones Protocol Passed - 1/3/2020  3:27 AM        Passed - Provider visit in past 12 months or next 3 months     Last office visit with prescriber/PCP: 8/27/2019 Dora Castillo DO OR same dept: 8/27/2019 Dora Castillo DO OR henry specialty: 8/27/2019 Dora Castillo DO  Last physical: 7/9/2019 Last MTM visit: Visit date not found   Next visit within 3 mo: Visit date not found  Next physical within 3 mo: Visit date not found  Prescriber OR PCP: Dora Castillo DO  Last diagnosis associated with med order: There are no diagnoses linked to this encounter.  If protocol passes may refill for 12 months if within 3 months of last provider visit (or a total of 15 months).             Passed - TSH on file in past 12 months for patient age 12 & older     TSH   Date Value Ref Range Status   07/09/2019 0.64 0.30 - 5.00 uIU/mL Final

## 2021-06-05 VITALS
HEART RATE: 74 BPM | TEMPERATURE: 97.8 F | SYSTOLIC BLOOD PRESSURE: 114 MMHG | RESPIRATION RATE: 24 BRPM | HEIGHT: 65 IN | DIASTOLIC BLOOD PRESSURE: 54 MMHG | BODY MASS INDEX: 30.96 KG/M2 | WEIGHT: 185.8 LBS | OXYGEN SATURATION: 96 %

## 2021-06-05 VITALS
HEART RATE: 63 BPM | TEMPERATURE: 97.7 F | SYSTOLIC BLOOD PRESSURE: 138 MMHG | WEIGHT: 182.4 LBS | BODY MASS INDEX: 30.35 KG/M2 | DIASTOLIC BLOOD PRESSURE: 65 MMHG | RESPIRATION RATE: 24 BRPM

## 2021-06-05 NOTE — TELEPHONE ENCOUNTER
Daughter Sienna calling - consent on file. Asking for clarification of warfarin dosing instructions given 12/31/19.  Reviewed with caller:    Warfarin Dosing Instructions:  hold warfarin tonight then continue current warfarin dose 3 mg daily on Sun/Wed/Fri; and 2 mg daily rest of week  (0 % change)  Per instructions from Lesli Rondon below for tooth extraction from 1/7:   -Recommend 3 day hold (1/4-1/6) prior to dental extraction of single tooth on 1/7  -Resume warfarin night of procedure at dose advised by ACN after further assessment of today's INR. Recheck INR ~ 1 week post procedure.   Instructed patient to follow up no later than: 1/14    No further questions.    Christina De Anda, BELIA  Triage Nurse Advisor

## 2021-06-05 NOTE — TELEPHONE ENCOUNTER
Reason for Disposition    Caller has medication question only, adult not sick, and triager answers question    Protocols used: MEDICATION QUESTION CALL-A-AH

## 2021-06-05 NOTE — TELEPHONE ENCOUNTER
ANTICOAGULATION  MANAGEMENT    Assessment     Today's INR result of 2.5 is Therapeutic (goal INR of 2.0-3.0)        Warfarin taken as previously instructed    No new diet changes affecting INR    No new medication/supplements affecting INR    Continues to tolerate warfarin with no reported s/s of bleeding or thromboembolism     Previous INR was Supratherapeutic    Plan:     Spoke with Sienna regarding INR result and instructed:     Warfarin Dosing Instructions:  Continue current warfarin dose 3 mg daily on Sun/Wed/Fri; and 2 mg daily rest of week  (0 % change)    Instructed patient to follow up no later than: two weeks    Education provided: importance of therapeutic range    Sienna verbalizes understanding and agrees to warfarin dosing plan.    Instructed to call the [facility] for any changes, questions or concerns. (#[phone])   ?   Grecia Staples RN    Subjective/Objective:      Guera Peters, a 84 y.o. female is on warfarin.     Guera reports:     Home warfarin dose: as updated on anticoagulation calendar per template     Missed doses: No     Medication changes:  No     S/S of bleeding or thromboembolism:  No     New Injury or illness:  No     Changes in diet or alcohol consumption:  No     Upcoming surgery, procedure or cardioversion:  No    Anticoagulation Episode Summary     Current INR goal:   2.0-3.0   TTR:   70.6 % (1 y)   Next INR check:   1/28/2020   INR from last check:   2.50 (1/14/2020)   Weekly max warfarin dose:      Target end date:      INR check location:      Preferred lab:      Send INR reminders to:   [facility]    Indications    Chronic a-fib [I48.20]           Comments:            Anticoagulation Care Providers     Provider Role Specialty Phone number    Dora Castillo DO Referring Family Medicine [phone]

## 2021-06-05 NOTE — TELEPHONE ENCOUNTER
Lab Results   Component Value Date    INR 2.50 (H) 01/14/2020    INR 3.70 (H) 12/31/2019    INR 3.40 (H) 12/16/2019         Next INR advised: 1/28/20    Current warfarin dose per anticoagulation flowsheet:   Outpatient Anticoagulation Plan Latest Ref Rng & Units 1/14/2020   ANTICOAG FULL INSTRUCTIONS  3 mg every Sun, Wed, Fri; 2 mg all other days       Last health maintenance OV/physical exam: 8/27/19    Patient is up to date.  Warfarin refilled per protocol.

## 2021-06-05 NOTE — PROGRESS NOTES
Scheduled Follow Up Call: Attempt 1 Community Health Worker called and left a message for the patient's daughter Sienna. If the patient is returning my call, please transfer the patient to Moira at ext. [phone].    Patient does have Lab appt 1/28/2020 at [facility].  Last Outreach 12/4/2019  Patient's daughter Sienna is agreeable to call from CHW after beginning of the year and if no new needs pt can be gradated.  Family aware they can reach out to PCP or CC as needed in the future if situation changes.      CHW to follow up in 1-2 months, after New Year    Next Outreach: 2/6/2020

## 2021-06-05 NOTE — TELEPHONE ENCOUNTER
RN cannot approve Refill Request    RN can NOT refill this medication historical medication requested. Last office visit: 8/27/2019 Dora Castillo DO Last Physical: 7/9/2019 Last MTM visit: Visit date not found Last visit same specialty: 8/27/2019 Dora Castillo DO.  Next visit within 3 mo: Visit date not found  Next physical within 3 mo: Visit date not found      Corinne Gan, [facility] Triage/Med Refill 1/21/2020    Requested Prescriptions   Pending Prescriptions Disp Refills     aspirin 81 MG EC tablet [Pharmacy Med Name: ASPIRIN 81MG EC LOW DOSE D/F TABS] 100 tablet 11     Sig: TAKE 1 TABLET BY MOUTH DAILY       Aspirin/Dipyridamole Refill Protocol Passed - 1/21/2020  9:18 AM        Passed - PCP or prescribing provider visit in past 12 months       Last office visit with prescriber/PCP: 8/27/2019 Dora Castillo DO OR same dept: 8/27/2019 Dora Castillo DO OR same specialty: 8/27/2019 Dora Castillo DO  Last physical: 7/9/2019 Last MTM visit: Visit date not found    Next appt within 3 mo: Visit date not found Next physical within 3 mo: Visit date not found  Prescriber OR PCP: Dora Castillo DO  Last diagnosis associated with med order: There are no diagnoses linked to this encounter.  If protocol passes may refill for 6 months if within 3 months of last provider visit (or a total of 9 months).

## 2021-06-05 NOTE — TELEPHONE ENCOUNTER
RN cannot approve Refill Request    RN can NOT refill this medication Protocol failed and NO refill given.    Sania Salazar, [facility] Triage/Med Refill 1/17/2020    Requested Prescriptions   Pending Prescriptions Disp Refills     warfarin ANTICOAGULANT (COUMADIN/JANTOVEN) 1 MG tablet [Pharmacy Med Name: WARFARIN SOD 1MG TABLETS (PINK)] 210 tablet 0     Sig: TAKE 2 TO 3 TABLETS BY MOUTH DAILY; ADJUST DOSE PER INR RESULTS AS DIRECTED       Warfarin Refill Protocol  Failed - 1/17/2020  4:37 AM        Failed -  Route to appropriate pool/provider     Last Anticoagulation Summary:   Anticoagulation Episode Summary     Current INR goal:   2.0-3.0   TTR:   71.2 % (1 y)   Next INR check:   1/28/2020   INR from last check:   2.50 (1/14/2020)   Weekly max warfarin dose:      Target end date:      INR check location:      Preferred lab:      Send INR reminders to:   [facility]    Indications    Chronic a-fib [I48.20]           Comments:            Anticoagulation Care Providers     Provider Role Specialty Phone number    Dora Castillo DO Referring Family Medicine [phone]                Passed - Provider visit in last year     Last office visit with prescriber/PCP: 8/27/2019 Dora Castillo DO OR same dept: 8/27/2019 Dora Castillo DO OR same specialty: 8/27/2019 Dora Castillo DO  Last physical: 7/9/2019 Last MTM visit: Visit date not found    Next appt within 3 mo: Visit date not found Next physical within 3 mo: Visit date not found  Prescriber OR PCP: Dora Castillo DO  Last diagnosis associated with med order: 1. Chronic atrial fibrillation  - warfarin ANTICOAGULANT (COUMADIN/JANTOVEN) 1 MG tablet [Pharmacy Med Name: WARFARIN SOD 1MG TABLETS (PINK)]; TAKE 2 TO 3 TABLETS BY MOUTH DAILY; ADJUST DOSE PER INR RESULTS AS DIRECTED  Dispense: 210 tablet; Refill: 0    If protocol passes may refill for 6 months if within 3 months of last provider visit (or a total of 9 months).

## 2021-06-05 NOTE — TELEPHONE ENCOUNTER
RN cannot approve Refill Request    RN can NOT refill this medication Protocol failed and NO refill given.       Sania Salazar, [facility] Triage/Med Refill 1/21/2020    Requested Prescriptions   Pending Prescriptions Disp Refills     warfarin ANTICOAGULANT (COUMADIN/JANTOVEN) 1 MG tablet [Pharmacy Med Name: WARFARIN SOD 1MG TABLETS (PINK)] 270 tablet 0     Sig: TAKE 2 TO 3 TABLETS BY MOUTH DAILY; ADJUST DOSE PER INR RESULTS AS DIRECTED       Warfarin Refill Protocol  Failed - 1/20/2020 11:18 AM        Failed -  Route to appropriate pool/provider     Last Anticoagulation Summary:   Anticoagulation Episode Summary     Current INR goal:   2.0-3.0   TTR:   72.0 % (11.9 mo)   Next INR check:   1/28/2020   INR from last check:   2.50 (1/14/2020)   Weekly max warfarin dose:      Target end date:      INR check location:      Preferred lab:      Send INR reminders to:   [facility]    Indications    Chronic a-fib [I48.20]           Comments:            Anticoagulation Care Providers     Provider Role Specialty Phone number    Dora Castillo DO Referring Family Medicine [phone]                Passed - Provider visit in last year     Last office visit with prescriber/PCP: 8/27/2019 Dora Castillo DO OR same dept: 8/27/2019 Dora Castillo DO OR same specialty: 8/27/2019 Dora Castillo DO  Last physical: 7/9/2019 Last MTM visit: Visit date not found    Next appt within 3 mo: Visit date not found Next physical within 3 mo: Visit date not found  Prescriber OR PCP: Dora Castillo DO  Last diagnosis associated with med order: 1. Chronic atrial fibrillation  - warfarin ANTICOAGULANT (COUMADIN/JANTOVEN) 1 MG tablet [Pharmacy Med Name: WARFARIN SOD 1MG TABLETS (PINK)]; TAKE 2 TO 3 TABLETS BY MOUTH DAILY; ADJUST DOSE PER INR RESULTS AS DIRECTED  Dispense: 270 tablet; Refill: 0    If protocol passes may refill for 6 months if within 3 months of last provider visit (or a total of 9  months).

## 2021-06-06 NOTE — TELEPHONE ENCOUNTER
Informed patient's daughter Sienna of Dr. Castillo's recommendations below.  Sienna voiced understanding will inform patient.

## 2021-06-06 NOTE — PROGRESS NOTES
CHW spoke to patient's daughter Sienna today, we have discussed the patient and her current needs. No concerns at this time, patient was into the clinic and had visit with PCP on 2/18/2020  Patient and daughter has selected to un-enroll from Clinic Care Coordination today at patients needs have been met. No further outreach will be done. CHW has reminded Sienna that services/resources are available if they should need any thing in the future.  Patient's CCC episode has been resolved today, care team updated and PCP along with CCC RN/SW have been notified of the change.

## 2021-06-06 NOTE — TELEPHONE ENCOUNTER
ANTICOAGULATION  MANAGEMENT    Assessment     Today's INR result of 2.7 is Therapeutic (goal INR of 2.0-3.0)        Warfarin taken as previously instructed    No new diet changes affecting INR    No interaction expected between RIVASTIGMINE and warfarin    Continues to tolerate warfarin with no reported s/s of bleeding or thromboembolism     Previous INR was Therapeutic    Plan:     Spoke with daughter Sienna regarding INR result and instructed:     Warfarin Dosing Instructions:  Continue current warfarin dose 3 mg daily on Sun/wed/fri; and 2 mg daily rest of week  (0 % change)    Instructed patient to follow up no later than: 4 weeks    Education provided: importance of therapeutic range    Sienna verbalizes understanding and agrees to warfarin dosing plan.    Instructed to call the [facility] for any changes, questions or concerns. (#[phone])   ?   Grecia Staples RN    Subjective/Objective:      Guera Peters, a 84 y.o. female is on warfarin.     Guera reports:     Home warfarin dose: as updated on anticoagulation calendar per template     Missed doses: No     Medication changes:  Yes RIVASTIGMINE restarted     S/S of bleeding or thromboembolism:  No     New Injury or illness:  No     Changes in diet or alcohol consumption:  No     Upcoming surgery, procedure or cardioversion:  No    Anticoagulation Episode Summary     Current INR goal:   2.0-3.0   TTR:   80.2 % (1 y)   Next INR check:   3/17/2020   INR from last check:   2.70 (2/18/2020)   Weekly max warfarin dose:      Target end date:      INR check location:      Preferred lab:      Send INR reminders to:   [facility]    Indications    Chronic a-fib [I48.20]           Comments:            Anticoagulation Care Providers     Provider Role Specialty Phone number    Dora Castillo DO Referring Family Medicine [phone]

## 2021-06-06 NOTE — PATIENT INSTRUCTIONS - HE
Continue the warfarin but Im going to look into whether you qualify for the newer agents to see if you qualify - ie eliquis- then you dont need to do INR checks. I will message angela    -restart the rivastigmine 1 tablet daily for a couple weeks, if no stomach upset , go back to 2 daily.   Let me know if insurance doesn't cover    -labs today     -we can consider a drug holiday from alendronate but would do bone density first. - we can consider doing a scan within the year.     -echo ordered for [facility]

## 2021-06-06 NOTE — TELEPHONE ENCOUNTER
Refill Approved    Rx renewed per Medication Renewal Policy. Medication was last renewed on 12/11/18.    Ov: 8/27/19    Marva Meier, [facility] Triage/Med Refill 2/15/2020     Requested Prescriptions   Pending Prescriptions Disp Refills     rivastigmine tartrate (EXELON) 3 MG capsule [Pharmacy Med Name: RIVASTIGMINE 3MG CAPSULES] 180 capsule 12     Sig: TAKE 1 CAPSULE BY MOUTH TWICE DAILY       Cholinesterase Inhibitor/Anti-Alzheimer Agent Refill Protocol Passed - 2/11/2020  4:24 PM        Passed - Visit with PCP or prescribing provider visit in last 6 months or next 3 months     Last office visit with prescriber/PCP: 8/27/2019 OR same dept: 8/27/2019 Dora Castillo DO OR same specialty: 8/27/2019 Dora Castillo DO Last physical: Visit date not found Last MTM visit: Visit date not found     Next appt within 3 mo: Visit date not found  Next physical within 3 mo: Visit date not found  Prescriber OR PCP: Dora Castillo DO  Last diagnosis associated with med order: 1. Alzheimer's dementia without behavioral disturbance, unspecified timing of dementia onset (H)  - rivastigmine tartrate (EXELON) 3 MG capsule [Pharmacy Med Name: RIVASTIGMINE 3MG CAPSULES]; TAKE 1 CAPSULE BY MOUTH TWICE DAILY  Dispense: 180 capsule; Refill: 12    If protocol passes may refill for 6 months if within 3 months of last provider visit (or a total of 9 months).

## 2021-06-06 NOTE — TELEPHONE ENCOUNTER
Pharmacy states patient is going to be out of medication requesting to process as soon as possible .  Refill Request  Did you contact pharmacy: Yes  Medication name:   Requested Prescriptions     Pending Prescriptions Disp Refills     rivastigmine tartrate (EXELON) 3 MG capsule [Pharmacy Med Name: RIVASTIGMINE 3MG CAPSULES] 180 capsule 12     Sig: TAKE 1 CAPSULE BY MOUTH TWICE DAILY   Who prescribed the medication: Dora Castillo DO  Requested Pharmacy: [facility] # [id number]  Is patient out of medication: Yes  Patient notified refills processed in 3 business days:  yes  Okay to leave a detailed message: no

## 2021-06-06 NOTE — PROGRESS NOTES
ASSESSMENT & PLAN:  Guera was seen today for medication management and inr check.    Diagnoses and all orders for this visit:    Alzheimer's dementia without behavioral disturbance, unspecified timing of dementia onset (H)  -     rivastigmine tartrate (EXELON) 3 MG capsule; TAKE 1 CAPSULE BY MOUTH TWICE DAILY    Osteoporosis without current pathological fracture, unspecified osteoporosis type    Acquired hypothyroidism    Chronic a-fib  -     Echo Complete; Future  -     Comprehensive Metabolic Panel    Coronary artery disease involving native heart without angina pectoris, unspecified vessel or lesion type    Essential hypertension with goal blood pressure less than 140/90  -     Comprehensive Metabolic Panel    Hyperlipidemia LDL goal <100    Chronic atrial fibrillation  -     INR    Mitral valve disorder    S/P MVR (mitral valve repair)        Patient was seen today for follow-up of chronic medical issues with her daughter Sienna.  She has cognitive insufficiency and still is getting along well at home with her .  She is able to manage her own medications still at this time and daughter has no concerns.  I did bring up the possibility of switching her warfarin to one of the DOACS   but has made come through her previous records from [place] it appears that she had mitral valve repaired and does not qualify.  Continue on warfarin for anticoagulation.  I do not believe she has a mechanical valve and therefore goal INR should remain between 2-3.  She is not having complications from this medication but has been supratherapeutic at times  -Blood pressure is well controlled and she should continue on her current medication regimen.  They inquire whether or not they have to see cardiology.  Her atrial fibrillation has been controlled and it appeared in 2017 that she was supposed to come back for an echocardiogram the following year.  We will order this just for monitoring but patient is asymptomatic.  I do not feel  she needs to see cardiology unless any increase in symptoms or an adverse event.  -Reviewed her last bone density scan in 2018 they did not have comparison.  It appears she has been on the bisphosphonate for at least 5 years.  Probably within a year we will recheck a bone density scan and consider a drug holiday if there is stability.  -She is not having symptoms of hypo-or hyperthyroidism.  We will recheck levels to ensure good range  -There was some concern about the realistic mean that patient is on and whether or not it is going to be covered by her insurance.  In the past she has had intermittent abdominal pain and we cannot say for sure if it is related to this medication but very well could be because of the GI side effects related to it.  She is tolerating the Namenda at 10 mg twice daily which we will continue.  I will touch base with Angela and if the realistic mean is not covered by insurance we can switch to donepezil.  I reviewed studies and does not appear that there is one that is superior to the other.  Follow-up in approximately 6 months time.     Patient Instructions   Continue the warfarin but Im going to look into whether you qualify for the newer agents to see if you qualify - ie eliquis- then you dont need to do INR checks. I will message angela    -restart the rivastigmine 1 tablet daily for a couple weeks, if no stomach upset , go back to 2 daily.   Let me know if insurance doesn't cover    -labs today     -we can consider a drug holiday from alendronate but would do bone density first. - we can consider doing a scan within the year.     -echo ordered for [facility]        Orders Placed This Encounter   Procedures     Comprehensive Metabolic Panel     Echo Complete     Standing Status:   Future     Standing Expiration Date:   2/18/2021     Medications Discontinued During This Encounter   Medication Reason     aspirin 81 mg chewable tablet      rivastigmine tartrate (EXELON) 3 MG capsule       rivastigmine tartrate (EXELON) 3 MG capsule Reorder       Return in about 6 months (around 8/18/2020) for Annual physical.     CHIEF COMPLAINT:  Chief Complaint   Patient presents with     Medication Management     Rivastigmine     INR Check        HISTORY OF PRESENT ILLNESS:  Guera is a 84 y.o. female presenting to the clinic today with her daughter Sienna for medication management.     Alzheimer's Dementia: She continues to take Namenda 20 mg daily and denies experiencing any abdominal pain. A couple of weeks ago she ran out of Rivastigmine 3 mg and she does not know if insurance will cover it.  Dementia does not seem to have progressed significantly.    Health Maintenance: Her daughter checks the patient's pacemaker every month with her phone. Her last cardiology visit was in 2017 and they are wondering if they still need to follow-up with them since they do not do anything. They want to consider new atrial fibrillation medication.  Has not had any symptoms.  No changes in her overall health history.    REVIEW OF SYSTEMS:   All other systems are negative.     PFSH:  She does not do any cooking. She manages her own medication.     Past Surgical History:   Procedure Laterality Date     ANGIOPLASTY       APPENDECTOMY       BREAST SURGERY      removal breast implant     CARDIAC PACEMAKER PLACEMENT       embolization of angiomyolipoma       MITRAL VALVE REPAIR       partial excision of thyroid       PATENT FORAMEN OVALE CLOSURE       Her family history includes Heart disease in her father and mother.  She  reports that she has quit smoking. She has never used smokeless tobacco. She reports current alcohol use. She reports that she does not use drugs.    TOBACCO USE:  Social History     Tobacco Use   Smoking Status Former Smoker   Smokeless Tobacco Never Used   Tobacco Comment    quit >20 yrs ago        VITALS:  Vitals:    02/18/20 1211 02/18/20 1317   BP: 140/65 125/58   Patient Site: Left Arm    Patient Position:  Sitting    Cuff Size: Adult Regular    Pulse: 67    Resp: 18    Temp: 97.8  F (36.6  C)    TempSrc: Oral    Weight: 183 lb 6.4 oz (83.2 kg)      Wt Readings from Last 3 Encounters:   02/18/20 183 lb 6.4 oz (83.2 kg)   08/27/19 180 lb 12.8 oz (82 kg)   07/09/19 181 lb (82.1 kg)     Body mass index is 30.99 kg/m .  No LMP recorded. Patient is postmenopausal.      MEDICATIONS:  Current Outpatient Medications   Medication Sig Dispense Refill     alendronate (FOSAMAX) 70 MG tablet TAKE 1 TABLET BY MOUTH ONCE A WEEK. DO NOT LIE DOWN FOR 1 HOURS AFTER TAKING. TAKE WITH FULL GLASS OF WATER 12 tablet 0     aspirin 81 MG EC tablet TAKE 1 TABLET BY MOUTH DAILY 100 tablet 11     carvedilol (COREG) 12.5 MG tablet Take 1 tablet (12.5 mg total) by mouth 2 (two) times a day with meals. 180 tablet 1     levothyroxine (SYNTHROID, LEVOTHROID) 100 MCG tablet TAKE 1 TABLET BY MOUTH DAILY 90 tablet 3     lisinopril (PRINIVIL,ZESTRIL) 20 MG tablet Take 1 tablet (20 mg total) by mouth daily. 90 tablet 3     memantine (NAMENDA) 10 MG tablet Take 1 tablet (10 mg total) by mouth 2 (two) times a day. (Patient taking differently: Take 5 mg by mouth 2 (two) times a day.       ) 180 tablet 3     nitroglycerin (NITROSTAT) 0.4 MG SL tablet Place 1 tablet (0.4 mg total) under the tongue every 5 (five) minutes as needed for chest pain. Max 3 doses. Call 911 100 tablet 1     rosuvastatin (CRESTOR) 20 MG tablet Take 1 tablet (20 mg total) by mouth at bedtime. 90 tablet 1     warfarin ANTICOAGULANT (COUMADIN/JANTOVEN) 1 MG tablet Take 2 to 3mg (2 or 3 tabs) by mouth daily as directed.  Adjust dose based on INR. 225 tablet 1     rivastigmine tartrate (EXELON) 3 MG capsule TAKE 1 CAPSULE BY MOUTH TWICE DAILY 180 capsule 1     No current facility-administered medications for this visit.        PHYSICAL EXAM:  GENERAL:  Reveals an alert 84 y.o. female in NAD.  Vitals:  Per nursing notes.  EYES: PERRLA. Extraocular movements intact. Normal conjunctiva and  lids.   ENT:  Hearing grossly normal.  Normal appearance to ears and nose.  Bilateral TM s, external canals, oropharynx normal. Normal lips, gums and teeth.  Normal nasal mucosa, septum and turbinates.  NECK:  Supple, thyroid not palpable.  CARDIAC:  Regular rate and rhythm without murmurs, rubs, or gallops. Legs without edema. Carotids without bruits.   LUNGS: Clear.  Respiratory effort normal.  ABDOMEN:  Soft, non-tender, without hepatosplenomegaly, masses, or hernias.   SKIN:   Without rash, bruise, or palpable lesions.  NEURO:  Cranial nerves II-XII intact.     PSYCH:  Mood appropriate, cognitive insufficiency- pleasant    DATA REVIEWED:  ADDITIONAL HISTORY SUMMARIZED (2): Reviewed 10/22/2017 note. Reviewed 10/27/2018 note. Additional information from daughter.  Reviewed office notes from cardiology and care everywhere as well as primary care  DECISION TO OBTAIN EXTRA INFORMATION (1): Accessed Care Everywhere.   RADIOLOGY TESTS (1): Reviewed 2/10/2017 XR. Reviewed 9/04/2019 XR.  Reviewed bone density scans   LABS (1): Labs ordered.  MEDICINE TESTS (1): Ordered ECHO.  INDEPENDENT REVIEW (2 each): None.     The visit lasted a total of 26 minutes face to face with the patient. Over 50% of the time was spent counseling and educating the patient about Alzheimer's and atrial fibrillation.     IPerla, am scribing for and in the presence of Dr. Castillo.    Dora GERBER DO , personally performed the services described in this documentation, as scribed by Perla Thompson in my presence, and it is both accurate and complete.     Total data points: 6

## 2021-06-06 NOTE — TELEPHONE ENCOUNTER
Left message to call back Refill Approved    Rx renewed per Medication Renewal Policy. Medication was last renewed on 12/8/19.    Sania Salazar, [facility] Triage/Med Refill 3/10/2020     Requested Prescriptions   Pending Prescriptions Disp Refills     alendronate (FOSAMAX) 70 MG tablet [Pharmacy Med Name: ALENDRONATE 70MG TABLETS] 12 tablet 0     Sig: TAKE 1 TABLET BY MOUTH ONCE A WEEK. DO NOT LIE DOWN FOR 1 HOURS AFTER TAKING. TAKE WITH FULL GLASS OF WATER       Biphosphonates Refill Protocol Passed - 3/7/2020  7:52 AM        Passed - PCP or prescribing provider visit in last 12 months     Last office visit with prescriber/PCP: 2/18/2020 Dora Castillo DO OR same dept: 2/18/2020 Dora Castillo DO OR same specialty: 2/18/2020 Dora Castillo DO  Last physical: 7/9/2019 Last MTM visit: Visit date not found   Next visit within 3 mo: Visit date not found  Next physical within 3 mo: Visit date not found  Prescriber OR PCP: Dora Castillo DO  Last diagnosis associated with med order: 1. Osteoporosis without current pathological fracture, unspecified osteoporosis type  - alendronate (FOSAMAX) 70 MG tablet [Pharmacy Med Name: ALENDRONATE 70MG TABLETS]; TAKE 1 TABLET BY MOUTH ONCE A WEEK. DO NOT LIE DOWN FOR 1 HOURS AFTER TAKING. TAKE WITH FULL GLASS OF WATER  Dispense: 12 tablet; Refill: 0    If protocol passes may refill for 12 months if within 3 months of last provider visit (or a total of 15 months).             Passed - Serum creatinine in last 12 months     Creatinine   Date Value Ref Range Status   02/18/2020 0.74 0.60 - 1.10 mg/dL Final

## 2021-06-06 NOTE — TELEPHONE ENCOUNTER
Who is calling:  Daughter  Reason for Call:  Patient is to come in today for an INR.  The patient is very nervous about going out, and is asking if this can be safely delayed .  Please advise, her appointment is this AM for a lab only  Date of last appointment with primary care: 2/18/20  Okay to leave a detailed message: Yes

## 2021-06-07 NOTE — TELEPHONE ENCOUNTER
ANTICOAGULATION  MANAGEMENT PROGRAM    Guera T Peters is overdue for INR check.     Left message to call and schedule INR appointment as soon as possible.      Grecia Staples RN

## 2021-06-07 NOTE — TELEPHONE ENCOUNTER
Spoke with daughter Sienna and reviewed voicemail sent by [facility] earlier today. Sienna verbalizes understanding to plan for patient to have INR drawn as scheduled

## 2021-06-07 NOTE — TELEPHONE ENCOUNTER
Who is calling:  Patient's Daughter Sienna  Reason for Call:  Returning call -- see below  Date of last appointment with primary care:   Okay to leave a detailed message: Yes    Patient told Sienna that she got a call regarding missing an INR appointment. Patient had an INR scheduled on 3/17 that was rescheduled to 4/17 because provider didn't want patient coming in due to the COVID 19 virus.     Is the 4/17 appointment ok or does patient need to come in sooner.     Please call Sienna and advise.

## 2021-06-08 NOTE — TELEPHONE ENCOUNTER
Left ankle and top of left foot is swollen.  Has heart disease.  No pain.  No reddness.  No shortness of breath.  INR has been within range for her valve repair.  Advised to elevated leg, and to call back if symptoms worsen.  Daughter Sienna had called in for patient, and was not able to visualize.  Per Sienna, her mother has some dementia.  Advised to ask her father if anything looks out of ordinary, and to check her blood pressure.  Advised to elevate leg.  Sienna will call back if her father is alarmed.    Reason for Disposition    [1] Small area of LOCALIZED swelling AND [2] itchy    Protocols used: LEG SWELLING AND EDEMA-A-AH

## 2021-06-08 NOTE — TELEPHONE ENCOUNTER
Daughter  Sienna calling.  She is reporting that her Mom slightly injured her   Left ankle, and some bruising appeared a day later.  She reports her mom is on Coumadin.  The bruising has eventually shifted down to her toes slightly her daughter reports.  She reports, there is no swelling, or pain, and her mom is using the ankle, bearing weight , and has no other problems   With ambulation,  She reports she has been sitting more lately because of this, (she has Alzheimers) because elder being a littlle fearful.  She ws advised to have her walk a little more, and it will help disperse the blood in ankle.  She also reports, she is more constipated, and she asked if she could take a colace, and she was advised , yes.    He last INR  Was April 17th, and was within nl. Limits, and she is due for INR on May 17th.      Please advise if this is ok.    Annie Mullins RN  [facility] Triage/refill nurse      Reason for Disposition    Minor bruise    Protocols used: BRUISES-A-AH

## 2021-06-08 NOTE — TELEPHONE ENCOUNTER
Pt's daughter Sienna called stating pt is out of her rosuvastatin 20 mg bedtime, and requesting a refill. Writer asked if pt has a dose for tonight and she said no. Writer called [facility] in [place], and asked if they can provide her emergency supply until monday and they said okay, just tell them to came in and ask emergency supply. Writer called Sienna and told her pharmacy is able to provide emergency supply, and someone should pick it up. Sienna said my dad will pick it up. Refill request was send to Dr mills .    Alcides Renee,RN    COVID 19 Nurse Triage Plan/Patient Instructions    Please be aware that novel coronavirus (COVID-19) may be circulating in the community. If you develop symptoms such as fever, cough, or SOB or if you have concerns about the presence of another infection including coronavirus (COVID-19), please contact your health care provider or visit [email or web address].     Disposition/Instructions    Patient to stay at home and follow home care protocol based instructions.    Thank you for taking steps to prevent the spread of this virus.  o Limit your contact with others.  o Wear a simple mask to cover your cough.  o Wash your hands well and often.    Resources    [facility]: About COVID-19: [email or web address]    CDC: What to Do If You're Sick: www.cdc.gov/coronavirus/2019-ncov/about/steps-when-sick.html    CDC: Ending Home Isolation: www.cdc.gov/coronavirus/2019-ncov/hcp/disposition-in-home-patients.html     CDC: Caring for Someone: www.cdc.gov/coronavirus/2019-ncov/if-you-are-sick/care-for-someone.html     [facility]: Interim Guidance for Hospital Discharge to Home: www.health.[facility].[place]/diseases/coronavirus/hcp/hospdischarge.pdf    [facility] clinical trials (COVID-19 research studies): clinicalaffairs.[facility]/umn-clinical-trials     Below are the COVID-19 hotlines at the [place] Department of [facility] ([facility]). Interpreters are available.   o For health questions:  "Call 199-113-4888 or 1-627.964.9364 (7 a.m. to 7 p.m.)  o For questions about schools and childcare: Call 000-140-1512 or 1-543.734.8426 (7 a.m. to 7 p.m.)         Reason for Disposition    Caller requesting a NON-URGENT new prescription or refill and triager unable to refill per unit policy    Additional Information    Negative: Drug overdose and nurse unable to answer question    Negative: Caller requesting information not related to medicine    Negative: Caller requesting a prescription for Strep throat and has a positive culture result    Negative: Rash while taking a medication or within 3 days of stopping it    Negative: Immunization reaction suspected    Negative: [1] Asthma and [2] having symptoms of asthma (cough, wheezing, etc)    Negative: MORE THAN A DOUBLE DOSE of a prescription or over-the-counter (OTC) drug    Negative: [1] DOUBLE DOSE (an extra dose or lesser amount) of over-the-counter (OTC) drug AND [2] any symptoms (e.g., dizziness, nausea, pain, sleepiness)    Negative: [1] DOUBLE DOSE (an extra dose or lesser amount) of prescription drug AND [2] any symptoms (e.g., dizziness, nausea, pain, sleepiness)    Negative: Took another person's prescription drug    Negative: [1] DOUBLE DOSE (an extra dose or lesser amount) of prescription drug AND [2] NO symptoms (Exception: a double dose of antibiotics)    Negative: Diabetes drug error or overdose (e.g., insulin or extra dose)    Negative: [1] Request for URGENT new prescription or refill of \"essential\" medication (i.e., likelihood of harm to patient if not taken) AND [2] triager unable to fill per unit policy    Negative: [1] Prescription not at pharmacy AND [2] was prescribed today by PCP    Negative: Pharmacy calling with prescription questions and triager unable to answer question    Negative: Caller has URGENT medication question about med that PCP prescribed and triager unable to answer question    Negative: Caller has NON-URGENT medication " question about med that PCP prescribed and triager unable to answer question    Protocols used: MEDICATION QUESTION CALL-A-AH

## 2021-06-08 NOTE — TELEPHONE ENCOUNTER
I agree with your plan.  Tony is fine and follow-up not needed for the ankle due to the bruising which likely is related to Coumadin as long as she is walking without problems or pain with bearing weight.  If having issues with this then we can see her face-to-face for evaluation

## 2021-06-08 NOTE — TELEPHONE ENCOUNTER
ANTICOAGULATION  MANAGEMENT    Assessment     Today's INR result of 2.8 is Therapeutic (goal INR of 2.0-3.0)        Warfarin taken as previously instructed    No new diet changes affecting INR    No new medication/supplements affecting INR    Continues to tolerate warfarin with no reported s/s of bleeding or thromboembolism     Previous INR was Therapeutic     Sienna reports left ankle bruising has resolved, denies any complaints of pain/discomfort, confirmed patient is able to bear weight     Plan:     Spoke with Sienna (daughter)  regarding INR result and instructed:     Warfarin Dosing Instructions:  Continue current warfarin dose 3 mg daily on Sundays, Wednesdays, and Fridays; and 2 mg daily rest of week  (0 % change)    Instructed patient to follow up no later than: 6 weeks     Education provided: importance of therapeutic range, importance of following up for INR monitoring at instructed interval and importance of taking warfarin as instructed    Sienna verbalizes understanding and agrees to warfarin dosing plan.    Instructed to call the [facility] for any changes, questions or concerns. (#[phone])   ?   Katherine Sagastume RN    Subjective/Objective:      Guera Peters, a 84 y.o. female is on warfarin.     Guera reports:     Home warfarin dose: verbally confirmed home dose with Sienna  and updated on anticoagulation calendar     Missed doses: No     Medication changes:  No     S/S of bleeding or thromboembolism:  No     New Injury or illness:  No     Changes in diet or alcohol consumption:  No     Upcoming surgery, procedure or cardioversion:  No    Anticoagulation Episode Summary     Current INR goal:   2.0-3.0   TTR:   80.8 % (1 y)   Next INR check:   7/10/2020   INR from last check:   2.80 (5/29/2020)   Weekly max warfarin dose:      Target end date:      INR check location:      Preferred lab:      Send INR reminders to:   [facility]    Indications    Chronic a-fib (Resolved) [I48.20]            Comments:            Anticoagulation Care Providers     Provider Role Specialty Phone number    Dora Castillo DO Referring Family Medicine [phone]

## 2021-06-09 NOTE — TELEPHONE ENCOUNTER
ANTICOAGULATION  MANAGEMENT    Assessment     Today's INR result of 3.5 is Supratherapeutic (goal INR of 2.0-3.0)        Warfarin taken as previously instructed    No new diet changes affecting INR    No new medication/supplements affecting INR    Continues to tolerate warfarin with no reported s/s of bleeding or thromboembolism     Previous INR was Therapeutic     iSenna cannot think of any changes, reported that her dad is the one that sets up patient's medications. Made aware that dose change will be done if next INR is out of range.    Plan:     Spoke with patient's daughter Sienna regarding INR result and instructed:     Warfarin Dosing Instructions:  one time lower dose of 1.5 mg today then continue current warfarin dose    3 mg every Sun, Wed, Fri; 2 mg all other days     (0 % change)    Instructed patient to follow up no later than: 2 weeks - appointment made.    Education provided: importance of therapeutic range    Sienna verbalizes understanding and agrees to warfarin dosing plan.    Instructed to call the [facility] for any changes, questions or concerns. (#[phone])   ?   Lauren Willard RN    Subjective/Objective:      Guera Peters, a 85 y.o. female is on warfarin.     Guera reports:     Home warfarin dose: verbally confirmed home dose with Sienna and updated on anticoagulation calendar     Missed doses: No     Medication changes:  No     S/S of bleeding or thromboembolism:  No     New Injury or illness:  Discoloration on top of hands white skin patches - instructed Sienna to set up a virtual visit with PCP to discuss concern     Changes in diet or alcohol consumption:  No     Upcoming surgery, procedure or cardioversion:  No    Anticoagulation Episode Summary     Current INR goal:   2.0-3.0   TTR:   75.2 % (1 y)   Next INR check:   7/24/2020   INR from last check:   3.50! (7/10/2020)   Weekly max warfarin dose:      Target end date:      INR check location:      Preferred lab:      Send INR  reminders to:   [facility]    Indications    Chronic a-fib (H) (Resolved) [I48.20]           Comments:            Anticoagulation Care Providers     Provider Role Specialty Phone number    Dora Castillo DO Referring Family Medicine [phone]

## 2021-06-09 NOTE — TELEPHONE ENCOUNTER
Who is calling:  Dali El  Reason for Call:  Returning call   Date of last appointment with primary care:   Okay to leave a detailed message: Yes

## 2021-06-09 NOTE — TELEPHONE ENCOUNTER
ANTICOAGULATION  MANAGEMENT    Assessment     Today's INR result of 3.1 is Supratherapeutic (goal INR of 2.0-3.0)        Warfarin taken as previously instructed    No new diet changes affecting INR    No new medication/supplements affecting INR    Continues to tolerate warfarin with no reported s/s of bleeding or thromboembolism     Previous INR was Supratherapeutic    Plan:     Spoke with Sienna, daughter, regarding INR result and instructed:     Warfarin Dosing Instructions:  Change warfarin dose to 3 mg daily on Sun, Wed; and 2 mg daily rest of week  (5.9 % change)    Instructed patient to follow up no later than: 2 weeks    Education provided: target INR goal and significance of current INR result, importance of following up for INR monitoring at instructed interval, importance of taking warfarin as instructed, importance of notifying clinic for changes in medications and importance of notifying clinic for diarrhea, nausea/vomiting, reduced intake and/or illness    Sienna verbalizes understanding and agrees to warfarin dosing plan.    Instructed to call the [facility] for any changes, questions or concerns. (#[phone])   ?   Iza Romero RN    Subjective/Objective:      Guera Peters, a 85 y.o. female is on warfarin.     Guera reports:     Home warfarin dose: verbally confirmed home dose with Sienna and updated on anticoagulation calendar     Missed doses: No     Medication changes:  No     S/S of bleeding or thromboembolism:  No     New Injury or illness:  No     Changes in diet or alcohol consumption:  No     Upcoming surgery, procedure or cardioversion:  No    Anticoagulation Episode Summary     Current INR goal:   2.0-3.0   TTR:   72.5 % (1 y)   Next INR check:   8/7/2020   INR from last check:   3.10! (7/24/2020)   Weekly max warfarin dose:      Target end date:      INR check location:      Preferred lab:      Send INR reminders to:   [facility]    Indications    Chronic a-fib (H)  (Resolved) [I48.20]           Comments:            Anticoagulation Care Providers     Provider Role Specialty Phone number    Dora Castillo DO Referring Family Medicine [phone]

## 2021-06-10 NOTE — TELEPHONE ENCOUNTER
ANTICOAGULATION  MANAGEMENT    Assessment     Today's INR result of 3.3 is Supratherapeutic (goal INR of 2.0-3.0)        Warfarin taken as previously instructed    No new diet changes affecting INR    No new medication/supplements affecting INR    Continues to tolerate warfarin with no reported s/s of bleeding or thromboembolism     Previous INR was Supratherapeutic    Plan:     Spoke on phone with Sienna, daughter, regarding INR result and instructed:     Warfarin Dosing Instructions:  Take 1 mg today only then change warfarin dose to 2 mg daily  (12.5 % change)    Instructed patient to follow up no later than: 1 week    Education provided: target INR goal and significance of current INR result, importance of following up for INR monitoring at instructed interval, importance of taking warfarin as instructed, importance of notifying clinic for changes in medications and importance of notifying clinic for diarrhea, nausea/vomiting, reduced intake and/or illness    Sienna verbalizes understanding and agrees to warfarin dosing plan.    Instructed to call the [facility] for any changes, questions or concerns. (#[phone])   ?   Iza Romero RN    Subjective/Objective:      Guera Peters, a 85 y.o. female is on warfarin.     Guera reports:     Home warfarin dose: verbally confirmed home dose with Sienna and updated on anticoagulation calendar     Missed doses: No     Medication changes:  No     S/S of bleeding or thromboembolism:  No     New Injury or illness:  No     Changes in diet or alcohol consumption:  No     Upcoming surgery, procedure or cardioversion:  No    Anticoagulation Episode Summary     Current INR goal:   2.0-3.0   TTR:   69.8 % (1 y)   Next INR check:   8/21/2020   INR from last check:   3.30! (8/14/2020)   Weekly max warfarin dose:      Target end date:      INR check location:      Preferred lab:      Send INR reminders to:   [facility]    Indications    Chronic a-fib (H)  (Resolved) [I48.20]           Comments:            Anticoagulation Care Providers     Provider Role Specialty Phone number    Dora Castillo DO Referring Family Medicine [phone]

## 2021-06-10 NOTE — TELEPHONE ENCOUNTER
ANTICOAGULATION  MANAGEMENT    Assessment     Today's INR result of 2.3 is Therapeutic (goal INR of 2.0-3.0)        Warfarin taken as previously instructed    No new diet changes affecting INR    No new medication/supplements affecting INR    Continues to tolerate warfarin with no reported s/s of bleeding or thromboembolism     Previous INR was Supratherapeutic    Plan:     Spoke on phone with patient's daughter Sienna regarding INR result and instructed:     Warfarin Dosing Instructions:  Continue current warfarin dose    2 mg every day      (0 % change)    Instructed patient to follow up no later than: 1-2 weeks appointment made.    Education provided: importance of taking warfarin as instructed    Sienna verbalizes understanding and agrees to warfarin dosing plan.    Instructed to call the [facility] for any changes, questions or concerns. (#[phone])   ?   Lauren Willard RN    Subjective/Objective:      Guera Peters, a 85 y.o. female is on warfarin.     Guera reports:     Home warfarin dose: verbally confirmed home dose with Sienna and updated on anticoagulation calendar     Missed doses: No     Medication changes:  No     S/S of bleeding or thromboembolism:  No     New Injury or illness:  No     Changes in diet or alcohol consumption:  No     Upcoming surgery, procedure or cardioversion:  No    Anticoagulation Episode Summary     Current INR goal:   2.0-3.0   TTR:   69.2 % (1 y)   Next INR check:   9/4/2020   INR from last check:   2.30 (8/21/2020)   Weekly max warfarin dose:      Target end date:      INR check location:      Preferred lab:      Send INR reminders to:   [facility]    Indications    Chronic a-fib (H) (Resolved) [I48.20]           Comments:            Anticoagulation Care Providers     Provider Role Specialty Phone number    Dora Castillo DO Referring Family Medicine [phone]

## 2021-06-10 NOTE — TELEPHONE ENCOUNTER
RN cannot approve Refill Request    RN can NOT refill this medication Protocol failed and NO refill given. Last office visit: 2/18/2020 Dora Castillo DO Last Physical: 7/9/2019 Last MTM visit: Visit date not found Last visit same specialty: 2/18/2020 Dora Castillo DO.  Next visit within 3 mo: Visit date not found  Next physical within 3 mo: Visit date not found      Sania Salazar, [facility] Connection Triage/Med Refill 8/27/2020    Requested Prescriptions   Pending Prescriptions Disp Refills     memantine (NAMENDA) 10 MG tablet [Pharmacy Med Name: MEMANTINE 10MG TABLETS] 180 tablet 3     Sig: TAKE 1 TABLET BY MOUTH TWICE DAILY       Cholinesterase Inhibitor/Anti-Alzheimer Agent Refill Protocol Failed - 8/25/2020  1:02 PM        Failed - Visit with PCP or prescribing provider visit in last 6 months or next 3 months     Last office visit with prescriber/PCP: Visit date not found OR same dept: 2/18/2020 Dora Castillo DO OR same specialty: 2/18/2020 Dora Castillo DO Last physical: Visit date not found Last MTM visit: Visit date not found     Next appt within 3 mo: Visit date not found  Next physical within 3 mo: Visit date not found  Prescriber OR PCP: Dora Castillo DO  Last diagnosis associated with med order: 1. Alzheimer's dementia without behavioral disturbance, unspecified timing of dementia onset (H)  - memantine (NAMENDA) 10 MG tablet [Pharmacy Med Name: MEMANTINE 10MG TABLETS]; TAKE 1 TABLET BY MOUTH TWICE DAILY  Dispense: 180 tablet; Refill: 3    If protocol passes may refill for 6 months if within 3 months of last provider visit (or a total of 9 months).

## 2021-06-10 NOTE — TELEPHONE ENCOUNTER
ANTICOAGULATION  MANAGEMENT    Assessment     Today's INR result of 3.5 is Supratherapeutic (goal INR of 2.0-3.0)        More warfarin taken than instructed which may be affecting INR - Sienna stated that she will go to the patient's house to ensure that right doses will be taken moving forward.    No new diet changes affecting INR    No new medication/supplements affecting INR    Continues to tolerate warfarin with no reported s/s of bleeding or thromboembolism     Previous INR was Supratherapeutic    Plan:     Spoke on phone with patient's daughter Sienna regarding INR result and instructed:     Warfarin Dosing Instructions:  hold warfarin today then continue current warfarin dose    3 mg every Sun, Wed; 2 mg all other days      (0 % change)    Instructed patient to follow up no later than: 7-10 days    Education provided: importance of therapeutic range, target INR goal and significance of current INR result and importance of taking warfarin as instructed    Sienna verbalizes understanding and agrees to warfarin dosing plan.    Instructed to call the [facility] for any changes, questions or concerns. (#[phone])   ?   Lauren Willard RN    Subjective/Objective:      Guera Peters, a 85 y.o. female is on warfarin.     Guera reports:     Home warfarin dose: Sienna reported that the patient most likely reverted back to her old dose which is 3 mg on Sun, Wed, Fri then 2 mg all other days   Sienna stated that she will go to the patient's house today to ensure that right doses will be taken moving forward.     Missed doses: No     Medication changes:  No     S/S of bleeding or thromboembolism:  No     New Injury or illness:  No     Changes in diet or alcohol consumption:  No     Upcoming surgery, procedure or cardioversion:  No    Anticoagulation Episode Summary     Current INR goal:   2.0-3.0   TTR:   71.0 % (1 y)   Next INR check:   8/21/2020   INR from last check:   3.50! (8/7/2020)   Weekly max warfarin dose:       Target end date:      INR check location:      Preferred lab:      [facility]    Indications    Chronic a-fib (H) (Resolved) [I48.20]           Comments:            Anticoagulation Care Providers     Provider Role Specialty Phone number    Dora Castillo DO Referring Family Medicine [phone]

## 2021-06-11 NOTE — TELEPHONE ENCOUNTER
ANTICOAGULATION  MANAGEMENT    Assessment     Today's INR result of 3.4 is Supratherapeutic (goal INR of 2.0-3.0)        Warfarin taken as previously instructed    Diverticulitis flare up for 4 days may be affecting diet and INR    No new medication/supplements affecting INR    Continues to tolerate warfarin with no reported s/s of bleeding or thromboembolism     Previous INR was Therapeutic    Plan:     Spoke on phone with patient's daughter Sienna regarding INR result and instructed:     Warfarin Dosing Instructions:  1 mg lower dose today only then continue current warfarin dose    2 mg every day      (0 % change)    Instructed patient to follow up no later than: 1-2 weeks, appointment made.    Education provided: importance of therapeutic range    Sienna verbalizes understanding and agrees to warfarin dosing plan.    Instructed to call the [facility] for any changes, questions or concerns. (#[phone])   ?   Lauren Willard RN    Subjective/Objective:      Guera Peters, a 85 y.o. female is on warfarin.     Guera reports:     Home warfarin dose: verbally confirmed home dose with Sienna and updated on anticoagulation calendar     Missed doses: No     Medication changes:  No     S/S of bleeding or thromboembolism:  No     New Injury or illness:  Yes: diverticulitis flare up     Changes in diet or alcohol consumption:  Yes: due to diverticulitis flare up - per Sienna the patient reported that she is feeling better now.     Upcoming surgery, procedure or cardioversion:  No    Anticoagulation Episode Summary     Current INR goal:   2.0-3.0   TTR:   68.1 % (1 y)   Next INR check:   9/15/2020   INR from last check:   3.40! (9/1/2020)   Weekly max warfarin dose:      Target end date:      INR check location:      Preferred lab:      Send INR reminders to:   [facility]    Indications    Chronic a-fib (H) (Resolved) [I48.20]           Comments:            Anticoagulation Care Providers     Provider Role  Specialty Phone number    Dora Castillo DO Referring Family Medicine [phone]

## 2021-06-11 NOTE — TELEPHONE ENCOUNTER
Who is calling:  Sienna  Reason for Call:  Returning call  Date of last appointment with primary care:   Okay to leave a detailed message: Yes

## 2021-06-11 NOTE — TELEPHONE ENCOUNTER
ANTICOAGULATION  MANAGEMENT    Assessment     Today's INR result of 2.8 is Therapeutic (goal INR of 2.0-3.0)        Warfarin taken as previously instructed    No new diet changes affecting INR    No new medication/supplements affecting INR    Continues to tolerate warfarin with no reported s/s of bleeding or thromboembolism     Previous INR was Supratherapeutic most likely due to diverticulitis symptom flare up.    Plan:     Spoke on phone with patient's daughter Sienna regarding INR result and instructed:     Warfarin Dosing Instructions:  Continue current warfarin dose 2 mg daily (0 % change)    Instructed patient to follow up no later than: will check INR with scheduled OV with PCP on 10/6 - Sienna made aware to have INR check sooner if diverticulitis symptoms recurs.    Education provided: importance of therapeutic range    Sienna verbalizes understanding and agrees to warfarin dosing plan.    Instructed to call the [facility] for any changes, questions or concerns. (#[phone])   ?   Lauren Willard RN    Subjective/Objective:      Guera Zeemussen, a 85 y.o. female is on warfarin.     Guera reports:     Home warfarin dose: verbally confirmed home dose with Sienna and updated on anticoagulation calendar     Missed doses: No     Medication changes:  No     S/S of bleeding or thromboembolism:  No     New Injury or illness:  No     Changes in diet or alcohol consumption:  No     Upcoming surgery, procedure or cardioversion:  No    Anticoagulation Episode Summary     Current INR goal:   2.0-3.0   TTR:   65.6 % (1 y)   Next INR check:   10/6/2020   INR from last check:   2.80 (9/15/2020)   Weekly max warfarin dose:      Target end date:      INR check location:      Preferred lab:      Send INR reminders to:   [facility]    Indications    Chronic a-fib (H) (Resolved) [I48.20]           Comments:            Anticoagulation Care Providers     Provider Role Specialty Phone number    Dora Castillo,  DO Referring Family Medicine [phone]

## 2021-06-12 NOTE — TELEPHONE ENCOUNTER
ANTICOAGULATION  MANAGEMENT    Assessment     Today's INR result of 2.7 is Therapeutic (goal INR of 2.0-3.0)        Warfarin taken as previously instructed    No new diet changes affecting INR    No new medication/supplements affecting INR    Continues to tolerate warfarin with no reported s/s of bleeding or thromboembolism     Previous INR was Supratherapeutic    Plan:     Spoke on phone with cruz El regarding INR result and instructed:     Warfarin Dosing Instructions:  Continue current warfarin dose 2 mg daily.    Instructed patient to follow up no later than: 2 weeks.    Education provided: importance of following up for INR monitoring at instructed interval and importance of taking warfarin as instructed    Sienna verbalizes understanding and agrees to warfarin dosing plan.    Instructed to call the [facility] for any changes, questions or concerns. (#[phone])   ?   Kwesi Dick RN    Subjective/Objective:      Guera Peters, a 85 y.o. female is on warfarin.     Guera reports:     Home warfarin dose: verbally confirmed home dose with Sienna and updated on anticoagulation calendar     Missed doses: No     Medication changes:  No     S/S of bleeding or thromboembolism:  No     New Injury or illness:  No     Changes in diet or alcohol consumption:  No     Upcoming surgery, procedure or cardioversion:  No    Anticoagulation Episode Summary     Current INR goal:  2.0-3.0   TTR:  62.0 % (1 y)   Next INR check:  11/12/2020   INR from last check:  2.70 (10/28/2020)   Weekly max warfarin dose:     Target end date:     INR check location:     Preferred lab:     Send INR reminders to:  [facility]    Indications    Chronic a-fib (H) (Resolved) [I48.20]           Comments:           Anticoagulation Care Providers     Provider Role Specialty Phone number    Dora Castillo DO Referring Family Medicine [phone]

## 2021-06-12 NOTE — TELEPHONE ENCOUNTER
ANTICOAGULATION  MANAGEMENT    Assessment     Today's INR result of 3.0 is Therapeutic (goal INR of 2.0-3.0)        Warfarin taken as previously instructed    Flare up of diverticulitis on 10/1 and still complaining of pain today may be affecting diet and INR    No new medication/supplements affecting INR    Continues to tolerate warfarin with no reported s/s of bleeding or thromboembolism     Previous INR was Therapeutic    Plan:     Spoke on phone with patient daughter Sienna regarding INR result and instructed:     Warfarin Dosing Instructions:  Continue current warfarin dose 2 mg daily  (0 % change)    Instructed patient to follow up no later than: 7-10 days sooner for recurrence and worsening of diverticulitis symptoms    Education provided: importance of checking INR for worsening of diverticulitis symptoms.    Sienna verbalizes understanding and agrees to warfarin dosing plan.    Instructed to call the [facility] for any changes, questions or concerns. (#[phone])   ?   Lauren Willard RN    Subjective/Objective:      Guera Peters, a 85 y.o. female is on warfarin.     Guera reports:     Home warfarin dose: as updated on anticoagulation calendar per template     Missed doses: No     Medication changes:  No     S/S of bleeding or thromboembolism:  No     New Injury or illness:  Yes: see above     Changes in diet or alcohol consumption:  Yes: see above     Upcoming surgery, procedure or cardioversion:  No    Anticoagulation Episode Summary     Current INR goal:  2.0-3.0   TTR:  65.6 % (1 y)   Next INR check:  11/3/2020   INR from last check:  3.00 (10/6/2020)   Weekly max warfarin dose:     Target end date:     INR check location:     Preferred lab:     Send INR reminders to:  [facility]    Indications    Chronic a-fib (H) (Resolved) [I48.20]           Comments:           Anticoagulation Care Providers     Provider Role Specialty Phone number    Dora Castillo DO Referring Family  Medicine [phone]

## 2021-06-12 NOTE — TELEPHONE ENCOUNTER
RN cannot approve Refill Request    RN can NOT refill this medication med is not covered by policy/route to provider. Last office visit: 2/18/2020 Dora Castillo DO Last Physical: 10/6/2020 Last MTM visit: Visit date not found Last visit same specialty: 2/18/2020 Dora Castillo DO.  Next visit within 3 mo: Visit date not found  Next physical within 3 mo: Visit date not found      Sania Salazar, [facility] Triage/Med Refill 10/8/2020    Requested Prescriptions   Pending Prescriptions Disp Refills     nitroglycerin (NITROSTAT) 0.4 MG SL tablet [Pharmacy Med Name: NITROGLYCERIN 0.4MG SUB TAB 25] 100 tablet 1     Sig: PLACE ONE TABLET UNDER TONGUE AS NEEDED FOR CHEST PAIN EVERY 5 MINUTES, MAX 3 DOSES, CALL 911 AS DIRECTED       There is no refill protocol information for this order

## 2021-06-12 NOTE — PROGRESS NOTES
Assessment and Plan:     Patient has been advised of split billing requirements and indicates understanding: Yes  Guera was seen today for annual wellness visit.    Diagnoses and all orders for this visit:    Encounter for annual wellness exam in Medicare patient- here with daughter angela. reviewed immunizations and health maintenance. Pt would like to hold off on dexa till next year due to pandemic   -     Comprehensive Metabolic Panel  -     HM2(CBC w/o Differential)    Acquired hypothyroidism-tsh< 0.3 but normal T4. Asx. Continue current dosing for now    Chronic atrial fibrillation (H)- rate controlled. INRs been stable. Continue current dosing of medication. Doesn't qualify for watchman due to mitral valve   -     HM2(CBC w/o Differential)  -     Thyroid Cascade  -     T4, Free    Coronary artery disease involving native heart without angina pectoris, unspecified vessel or lesion type-on aspirin, carvedilol. Asx. rosuvastatin 20mg with goal LDL < 70 Continue to monitor    Essential hypertension with goal blood pressure less than 140/90- bp within goal continue lisinopril and coreg  -     Comprehensive Metabolic Panel    Hyperlipidemia LDL goal <100-continue rosuvastatin  -     Lipid Cascade FASTING    Alzheimer's dementia without behavioral disturbance, unspecified timing of dementia onset (H)-tolerating namenda 5mg two times a day and exelon two times a day. No progression. Has assistance at home. Continue to monitor    Osteoporosis without current pathological fracture, unspecified osteoporosis type- on alendronate. Continue and repeat boen scan in 1 year   -     Vitamin D, Total (25-Hydroxy)    S/P MVR (mitral valve repair)-to be used prior to dental appt. Continue warfarin with goal 2.5-3.5  -     amoxicillin (AMOXIL) 500 MG tablet; Take 4 tablets (2,000 mg total) by mouth once as needed (1 hour prior to dental procedure).    Left lower quadrant pain- unclear etiology, reoccurs from time to time and  resolves with increased water intake. Iatrogenic? reviewed US and pelvis imaging. Normal. Continue to monitor. No concerns on exam  -     Urinalysis-UC if Indicated    Hypopigmentation/Atypical pigmented skin lesion-discussed referral for opinion and possibly bx  -     Ambulatory referral to Dermatology    Chronic a-fib (H)  -     INR    Elevated fasting glucose  -     Glycosylated Hemoglobin A1c          The patient's current medical problems were reviewed.    I have had an Advance Directives discussion with the patient.  The following health maintenance schedule was reviewed with the patient and provided in printed form in the after visit summary:   Health Maintenance   Topic Date Due     DXA SCAN  09/10/2020     MEDICARE ANNUAL WELLNESS VISIT  10/06/2021     FALL RISK ASSESSMENT  10/06/2021     ADVANCE CARE PLANNING  10/06/2025     TD 18+ HE  04/07/2027     Pneumococcal Vaccine: 65+ Years  Completed     INFLUENZA VACCINE RULE BASED  Completed     ZOSTER VACCINES  Completed     Pneumococcal Vaccine: Pediatrics (0 to 5 Years) and At-Risk Patients (6 to 64 Years)  Aged Out     HEPATITIS B VACCINES  Aged Out        Subjective:   Chief Complaint: Guera Peters is an 85 y.o. female here for an Annual Wellness visit.   HPI: Has been having recurrence of her lower left abdominal pain. Regular BM. Forgets to drink water regularly. Doing metamucil once daily when it flares. Notes doesn't feel good when comes on but doesn't keep her down.   No urinary symptoms. Last CT 2/2018 -pelvic ultrasound  10/2018 -unremarkable on left.  No worsening progression of alzheimers.    Hx CAD -pacemaker stable. No anginal symptoms.  Blood pressure well controlled. She sets up meds and  checks it.   Has had a lot of supratherapeutic -last saw dr. Mercado [facility].   On namenda twice daily and exelon twice daily    Last bone scan 3/2018   Hypopigmentation hands and lesion on R hand -not sure duration      Review of Systems:      Please see above.  The rest of the review of systems are negative for all systems.    Patient Care Team:  Dora Castillo DO as PCP - General (Family Medicine)  Dora Castillo DO (Family Medicine)  Dora Castillo DO as Assigned PCP     Patient Active Problem List   Diagnosis     Acquired hypothyroidism     Alzheimer's dementia without behavioral disturbance, unspecified timing of dementia onset (H)     Angiomyolipoma     Arteriosclerotic heart disease (ASHD)     Carotid stenosis, bilateral     Chronic atrial fibrillation (H)     Coronary artery disease     Cardiac pacemaker in situ     Diverticulosis of large intestine without hemorrhage     Essential hypertension with goal blood pressure less than 140/90     Hyperlipidemia LDL goal <100     Hypovitaminosis D     Long term current use of anticoagulant therapy     Mitral valve disorder     NSTEMI (non-ST elevated myocardial infarction) (H)     Osteoporosis without current pathological fracture, unspecified osteoporosis type     Pacemaker     S/P MVR (mitral valve repair)     Past Medical History:   Diagnosis Date     A-fib (H)      Acute encephalopathy 10/27/2018     VERNON (acute kidney injury) (H)      CKD (chronic kidney disease) stage 3, GFR 30-59 ml/min      Disease of thyroid gland     hypothryoid     Heart attack (H) 2011     Herpes zoster complication 10/30/2018     HLD (hyperlipidemia)      Hypertension      Hypothyroid      Osteoporosis      Shingles 11/6/2018     Stroke (H)     Hx TIA     Thrombocytopenia (H)      Transient cerebral ischemia 2/6/2018    Overview:  Moderate bilateral carotid disease      Past Surgical History:   Procedure Laterality Date     ANGIOPLASTY       APPENDECTOMY       BREAST SURGERY      removal breast implant     CARDIAC PACEMAKER PLACEMENT       embolization of angiomyolipoma       MITRAL VALVE REPAIR       partial excision of thyroid       PATENT FORAMEN OVALE CLOSURE        Family History   Problem Relation Age  of Onset     Heart disease Mother      Heart disease Father       Social History     Socioeconomic History     Marital status:      Spouse name: Not on file     Number of children: Not on file     Years of education: Not on file     Highest education level: Not on file   Occupational History     Not on file   Social Needs     Financial resource strain: Not on file     Food insecurity     Worry: Not on file     Inability: Not on file     Transportation needs     Medical: Not on file     Non-medical: Not on file   Tobacco Use     Smoking status: Former Smoker     Smokeless tobacco: Never Used     Tobacco comment: quit >20 yrs ago   Substance and Sexual Activity     Alcohol use: Yes     Comment: once a month     Drug use: No     Sexual activity: Never   Lifestyle     Physical activity     Days per week: Not on file     Minutes per session: Not on file     Stress: Not on file   Relationships     Social connections     Talks on phone: Not on file     Gets together: Not on file     Attends Orthodoxy service: Not on file     Active member of club or organization: Not on file     Attends meetings of clubs or organizations: Not on file     Relationship status: Not on file     Intimate partner violence     Fear of current or ex partner: Not on file     Emotionally abused: Not on file     Physically abused: Not on file     Forced sexual activity: Not on file   Other Topics Concern     Not on file   Social History Narrative    ** Merged History Encounter **         Lives in [place] with her       Current Outpatient Medications   Medication Sig Dispense Refill     alendronate (FOSAMAX) 70 MG tablet TAKE 1 TABLET BY MOUTH ONCE A WEEK. DO NOT LIE DOWN FOR 1 HOURS AFTER TAKING. TAKE WITH FULL GLASS OF WATER 12 tablet 3     aspirin 81 MG EC tablet TAKE 1 TABLET BY MOUTH DAILY 100 tablet 11     carvediloL (COREG) 12.5 MG tablet TAKE 1 TABLET BY MOUTH TWICE DAILY WITH MEALS 180 tablet 3     levothyroxine  "(SYNTHROID, LEVOTHROID) 100 MCG tablet TAKE 1 TABLET BY MOUTH DAILY 90 tablet 3     lisinopril (PRINIVIL,ZESTRIL) 20 MG tablet Take 1 tablet (20 mg total) by mouth daily. 90 tablet 3     memantine (NAMENDA) 5 MG tablet Take 1 tablet (5 mg total) by mouth 2 (two) times a day. 180 tablet 2     nitroglycerin (NITROSTAT) 0.4 MG SL tablet Place 1 tablet (0.4 mg total) under the tongue every 5 (five) minutes as needed for chest pain. Max 3 doses. Call 911 100 tablet 1     rivastigmine tartrate (EXELON) 3 MG capsule TAKE 1 CAPSULE BY MOUTH TWICE DAILY 180 capsule 1     rosuvastatin (CRESTOR) 20 MG tablet Take 1 tablet (20 mg total) by mouth at bedtime. 90 tablet 4     warfarin ANTICOAGULANT (COUMADIN/JANTOVEN) 1 MG tablet Take 2 to 3mg (2 or 3 tabs) by mouth daily as directed.  Adjust dose based on INR. 225 tablet 1     amoxicillin (AMOXIL) 500 MG tablet Take 4 tablets (2,000 mg total) by mouth once as needed (1 hour prior to dental procedure). 4 tablet 3     No current facility-administered medications for this visit.       Objective:   Vital Signs:   Visit Vitals  /54   Pulse 74   Temp 97.8  F (36.6  C) (Oral)   Resp 24   Ht 5' 5\" (1.651 m)   Wt 185 lb 12.8 oz (84.3 kg)   SpO2 96%   BMI 30.92 kg/m           VisionScreening:  No exam data present     PHYSICAL EXAM    General Appearance:    Alert, cooperative, no distress, appears stated age   Head:    Normocephalic, without obvious abnormality, atraumatic   Eyes:    PERRL, conjunctiva/corneas clear, EOM's intact, fundi     benign, both eyes   Ears:    Normal TM's and external ear canals, both ears   Nose:   Nares normal, septum midline, mucosa normal, no drainage    or sinus tenderness   Throat:   Lips, mucosa, and tongue normal; teeth and gums normal   Neck:   Supple, symmetrical, trachea midline, no adenopathy;     thyroid:  no enlargement/tenderness/nodules; no carotid    bruit or JVD   Back:     Symmetric, no curvature, ROM normal, no CVA tenderness   Lungs:     " Clear to auscultation bilaterally, respirations unlabored   Chest Wall:    No tenderness or deformity    Heart:    Regular rate and rhythm, S1 and S2 normal, no murmur, rub   or gallop   Breast Exam:    No tenderness, masses, or nipple abnormality   Abdomen:     Soft, non-tender, bowel sounds active all four quadrants,     no masses, no organomegaly   Genitalia:  deferred   Rectal:     Extremities:   Extremities normal, atraumatic, no cyanosis or edema   Pulses:   2+ and symmetric all extremities   Skin:    There is hypopigmentation along dorsal hands bilateral of unclear etiology and a scaly erythematous lesion on dorsal right hand   Lymph nodes:   Cervical, supraclavicular, and axillary nodes normal   Neurologic:   CNII-XII intact, normal strength, sensation and reflexes     throughout       Assessment Results 10/6/2020   Activities of Daily Living No help needed   Instrumental Activities of Daily Living 1 - Full function   Mini Cog Total Score 0   Some recent data might be hidden     A Mini-Cog score of 0-2 suggests the possibility of dementia, score of 3-5 suggests no dementia  Fall Risk -no falls  Cognitive Screen not completed due to known dementia.    Identified Health Risks:     She is at risk for lack of exercise and has been provided with information to increase physical activity for the benefit of her well-being.  The patient was counseled and encouraged to consider modifying their diet and eating habits. She was provided with information on recommended healthy diet options.  The patient reports that she has difficulty with instrumental activities of daily living.  She was provided with a list of local organizations that provide support services and advised to make a follow up appointment in  6 mo  to address this further.   Patient's advanced directive was discussed and I am comfortable with the patient's wishes.        The patient was provided with appropriate referrals to address her memory problem.

## 2021-06-13 NOTE — TELEPHONE ENCOUNTER
Refill Approved    Rx renewed per Medication Renewal Policy. Medication was last renewed on 11/27/19.    Sania Salazar, [facility] Triage/Med Refill 12/1/2020     Requested Prescriptions   Pending Prescriptions Disp Refills     lisinopriL (PRINIVIL,ZESTRIL) 20 MG tablet 90 tablet 3     Sig: Take 1 tablet (20 mg total) by mouth daily.       Ace Inhibitors Refill Protocol Passed - 11/30/2020  2:00 PM        Passed - PCP or prescribing provider visit in past 12 months       Last office visit with prescriber/PCP: 2/18/2020 Dora Castillo DO OR same dept: 2/18/2020 Dora Castillo DO OR same specialty: 2/18/2020 Dora Castillo DO  Last physical: 10/6/2020 Last MTM visit: Visit date not found   Next visit within 3 mo: Visit date not found  Next physical within 3 mo: Visit date not found  Prescriber OR PCP: Dora Castillo DO  Last diagnosis associated with med order: 1. Arteriosclerotic heart disease (ASHD)  - lisinopriL (PRINIVIL,ZESTRIL) 20 MG tablet; Take 1 tablet (20 mg total) by mouth daily.  Dispense: 90 tablet; Refill: 3    2. Chronic atrial fibrillation (H)  - lisinopriL (PRINIVIL,ZESTRIL) 20 MG tablet; Take 1 tablet (20 mg total) by mouth daily.  Dispense: 90 tablet; Refill: 3    3. Coronary artery disease involving native heart without angina pectoris, unspecified vessel or lesion type  - lisinopriL (PRINIVIL,ZESTRIL) 20 MG tablet; Take 1 tablet (20 mg total) by mouth daily.  Dispense: 90 tablet; Refill: 3    4. Essential hypertension with goal blood pressure less than 140/90  - lisinopriL (PRINIVIL,ZESTRIL) 20 MG tablet; Take 1 tablet (20 mg total) by mouth daily.  Dispense: 90 tablet; Refill: 3    5. Mitral valve disorder  - lisinopriL (PRINIVIL,ZESTRIL) 20 MG tablet; Take 1 tablet (20 mg total) by mouth daily.  Dispense: 90 tablet; Refill: 3    6. NSTEMI (non-ST elevated myocardial infarction) (H)  - lisinopriL (PRINIVIL,ZESTRIL) 20 MG tablet; Take 1 tablet (20 mg total) by mouth  daily.  Dispense: 90 tablet; Refill: 3    7. Pacemaker  - lisinopriL (PRINIVIL,ZESTRIL) 20 MG tablet; Take 1 tablet (20 mg total) by mouth daily.  Dispense: 90 tablet; Refill: 3    8. S/P MVR (mitral valve repair)  - lisinopriL (PRINIVIL,ZESTRIL) 20 MG tablet; Take 1 tablet (20 mg total) by mouth daily.  Dispense: 90 tablet; Refill: 3    9. Chronic a-fib (H)  - lisinopriL (PRINIVIL,ZESTRIL) 20 MG tablet; Take 1 tablet (20 mg total) by mouth daily.  Dispense: 90 tablet; Refill: 3    10. Essential hypertension, benign  - lisinopriL (PRINIVIL,ZESTRIL) 20 MG tablet; Take 1 tablet (20 mg total) by mouth daily.  Dispense: 90 tablet; Refill: 3    If protocol passes may refill for 12 months if within 3 months of last provider visit (or a total of 15 months).             Passed - Serum Potassium in past 12 months     Lab Results   Component Value Date    Potassium 4.2 10/06/2020             Passed - Blood pressure filed in past 12 months     BP Readings from Last 1 Encounters:   10/06/20 114/54             Passed - Serum Creatinine in past 12 months     Creatinine   Date Value Ref Range Status   10/06/2020 0.81 0.60 - 1.10 mg/dL Final

## 2021-06-13 NOTE — TELEPHONE ENCOUNTER
Requested Prescriptions     Pending Prescriptions Disp Refills     warfarin ANTICOAGULANT (COUMADIN/JANTOVEN) 1 MG tablet 225 tablet 1     Sig: Take 2 to 3mg (2 or 3 tabs) by mouth daily as directed.  Adjust dose based on INR.

## 2021-06-13 NOTE — TELEPHONE ENCOUNTER
Patient Returning Call  Reason for call:  Call back  Information relayed to patient:  The writer read the following to patient per Dr Castillo: inr was normal the last couple times and so from that aspect not necessary to come in but wouldn't hurt to check early as well as do a lab appt for urine, cmp and cbc . Daughter is wanting to change appointments to a lab now   Writer transferred daughter to scheduling.  No further questions at this time.   Patient has additional questions:  No  If YES, what are your questions/concerns:  NA  Okay to leave a detailed message?: No call back needed

## 2021-06-13 NOTE — TELEPHONE ENCOUNTER
Daughter Sienna is calling and is wanting to know if Guera needs to be seen earlier for an INR.   Guera states that her INR has been running high.  Guera is having some abdominal pain.  Pain is coming and going.  Not severe and daughter is wanting to know if Guera should come in sooner for INR as perhaps the abdominal discomfort will affect her INR readings.  Guera states that she will call the clinic and speak with MD Castillo INR checking.      COVID 19 Nurse Triage Plan/Patient Instructions    Please be aware that novel coronavirus (COVID-19) may be circulating in the community. If you develop symptoms such as fever, cough, or SOB or if you have concerns about the presence of another infection including coronavirus (COVID-19), please contact your health care provider or visit [email or web address].     Disposition/Instructions    Home care recommended. Follow home care protocol based instructions.    Thank you for taking steps to prevent the spread of this virus.  o Limit your contact with others.  o Wear a simple mask to cover your cough.  o Wash your hands well and often.    Resources    [facility]: About COVID-19: [email or web address]    CDC: What to Do If You're Sick: www.cdc.gov/coronavirus/2019-ncov/about/steps-when-sick.html    CDC: Ending Home Isolation: www.cdc.gov/coronavirus/2019-ncov/hcp/disposition-in-home-patients.html     CDC: Caring for Someone: www.cdc.gov/coronavirus/2019-ncov/if-you-are-sick/care-for-someone.html     [facility]: Interim Guidance for Hospital Discharge to Home: www.health.[facility].[place]/diseases/coronavirus/hcp/hospdischarge.pdf    [facility] clinical trials (COVID-19 research studies): [email or web address].[email or web address]/umn-clinical-trials     Below are the COVID-19 hotlines at the [place] Department of Health ([facility]). Interpreters are available.   o For health questions: Call 616-932-1935 or 1-243.601.7354 (7 a.m. to 7 p.m.)  o For questions about schools and childcare: Call  [phone] or 1-152.148.3741 (7 a.m. to 7 p.m.)       Reason for Disposition    Mild abdominal pain    Additional Information    Negative: Passed out (i.e., fainted, collapsed and was not responding)    Negative: Shock suspected (e.g., cold/pale/clammy skin, too weak to stand, low BP, rapid pulse)    Negative: Sounds like a life-threatening emergency to the triager    Negative: SEVERE abdominal pain (e.g., excruciating)    Negative: Vomiting red blood or black (coffee ground) material    Negative: Bloody, black, or tarry bowel movements    Negative: Constant abdominal pain lasting > 2 hours    Negative: Vomiting bile (green color)    Negative: Patient sounds very sick or weak to the triager    Negative: Vomiting and abdomen looks much more swollen than usual    Negative: White of the eyes have turned yellow (i.e., jaundice)    Negative: Blood in urine (red, pink, or tea-colored)    Negative: Fever > 103 F (39.4 C)    Negative: Fever > 101 F (38.3 C) and over 60 years of age    Negative: Fever > 100.0 F (37.8 C) and has diabetes mellitus or a weak immune system (e.g., HIV positive, cancer chemotherapy, organ transplant, splenectomy, chronic steroids)    Negative: Fever > 100.0 F (37.8 C) and bedridden (e.g., nursing home patient, stroke, chronic illness, recovering from surgery)    Negative: Pregnant or could be pregnant (i.e., missed last menstrual period)    Negative: MODERATE OR MILD pain that comes and goes (cramps) lasts > 24 hours    Negative: Unusual vaginal discharge    Negative: Age > 60 years    Negative: Patient wants to be seen    Negative: Abdominal pain is a chronic symptom (recurrent or ongoing AND lasting > 4 weeks)    Negative: Pain with sexual intercourse (dyspareunia)    Protocols used: ABDOMINAL PAIN - FEMALE-A-OH

## 2021-06-13 NOTE — TELEPHONE ENCOUNTER
JAN with patient's daughter Sienna. Please relay message below from Dr. Castillo to Sienna when she calls back.

## 2021-06-13 NOTE — TELEPHONE ENCOUNTER
Requested Prescriptions     Pending Prescriptions Disp Refills     lisinopriL (PRINIVIL,ZESTRIL) 20 MG tablet 90 tablet 3     Sig: Take 1 tablet (20 mg total) by mouth daily.

## 2021-06-13 NOTE — TELEPHONE ENCOUNTER
RN cannot approve Refill Request    RN can NOT refill this medication med is not covered by policy/route to provider. Last office visit: 2/18/2020 Dora Castillo DO Last Physical: 10/6/2020 Last MTM visit: Visit date not found Last visit same specialty: 2/18/2020 Dora Castillo DO.  Next visit within 3 mo: Visit date not found  Next physical within 3 mo: Visit date not found      Aracelis Carlisle, [facility] Triage/Med Refill 11/10/2020    Requested Prescriptions   Pending Prescriptions Disp Refills     warfarin ANTICOAGULANT (COUMADIN/JANTOVEN) 1 MG tablet 225 tablet 1     Sig: Take 2 to 3mg (2 or 3 tabs) by mouth daily as directed.  Adjust dose based on INR.       Warfarin Refill Protocol  Failed - 11/10/2020  2:59 PM        Failed -  Route to appropriate pool/provider     Last Anticoagulation Summary:   Anticoagulation Episode Summary     Current INR goal:  2.0-3.0   TTR:  62.7 % (11.7 mo)   Next INR check:  11/12/2020   INR from last check:  2.70 (10/28/2020)   Weekly max warfarin dose:     Target end date:     INR check location:     Preferred lab:     Send INR reminders to:  [facility]    Indications    Chronic a-fib (H) (Resolved) [I48.20]           Comments:           Anticoagulation Care Providers     Provider Role Specialty Phone number    Dora Castillo DO Referring Family Medicine [phone]                Passed - Provider visit in last year     Last office visit with prescriber/PCP: 2/18/2020 Dora Castillo DO OR same dept: 2/18/2020 Dora Castillo DO OR same specialty: 2/18/2020 Dora Castillo DO  Last physical: 10/6/2020 Last MTM visit: Visit date not found    Next appt within 3 mo: Visit date not found Next physical within 3 mo: Visit date not found  Prescriber OR PCP: Dora Castillo DO  Last diagnosis associated with med order: 1. Chronic atrial fibrillation (H)  - warfarin ANTICOAGULANT (COUMADIN/JANTOVEN) 1 MG tablet; Take 2 to 3mg (2  or 3 tabs) by mouth daily as directed.  Adjust dose based on INR.  Dispense: 225 tablet; Refill: 1    If protocol passes may refill for 6 months if within 3 months of last provider visit (or a total of 9 months).

## 2021-06-13 NOTE — TELEPHONE ENCOUNTER
ANTICOAGULATION  MANAGEMENT    Assessment     Today's INR result of 2.30 is Therapeutic (goal INR of 2.0-3.0)        Warfarin taken as previously instructed    No new diet changes affecting INR    No new medication/supplements affecting INR    Continues to tolerate warfarin with no reported s/s of bleeding or thromboembolism     Previous INR was Therapeutic at 2.70 on 10/28/20.    Plan:     Spoke on phone with daughterSienna regarding INR result and instructed:     Warfarin Dosing Instructions:    - Continue current warfarin dose 2 mg daily.    Instructed patient to follow up no later than:  4 wks.   - INR scheduled on 12/10/20 @ [facility].    Education provided: importance of consistent vitamin K intake and target INR goal and significance of current INR result    Sienna verbalizes understanding and agrees to warfarin dosing plan.    Instructed to call the [facility] for any changes, questions or concerns. (#[phone])   ?   Columba Aguilar RN    Subjective/Objective:      Guera Peters, a 85 y.o. female is on warfarin.     Guera reports:     Home warfarin dose: verbally confirmed home dose with Sienna and updated on anticoagulation calendar     Missed doses: No     Medication changes:  No     S/S of bleeding or thromboembolism:  No     New Injury or illness:  No     Changes in diet or alcohol consumption:  No     Upcoming surgery, procedure or cardioversion:  No    Anticoagulation Episode Summary     Current INR goal:  2.0-3.0   TTR:  64.0 % (1 y)   Next INR check:  12/10/2020   INR from last check:  2.30 (11/12/2020)   Weekly max warfarin dose:     Target end date:     INR check location:     Preferred lab:     Send INR reminders to:  [facility]    Indications    Chronic a-fib (H) (Resolved) [I48.20]           Comments:           Anticoagulation Care Providers     Provider Role Specialty Phone number    Dora Castillo DO Referring Family Medicine [phone]

## 2021-06-13 NOTE — TELEPHONE ENCOUNTER
ANTICOAGULATION  MANAGEMENT    Assessment     Today's INR result of 2.5 is Therapeutic (goal INR of 2.0-3.0)        Warfarin taken as previously instructed    No new diet changes affecting INR    No new medication/supplements affecting INR    Continues to tolerate warfarin with no reported s/s of bleeding or thromboembolism     Previous INR was Therapeutic     Per Seinna she brought the patient in today due to patient had flare up of diverticulitis symptoms but is feeling better now.    Plan:     Spoke on phone with Guera regarding INR result and instructed:     Warfarin Dosing Instructions:  Continue current warfarin dose 2 mg daily   (0 % change)    Instructed patient to follow up no later than:  4 weeks -appointment made. Made aware to come in sooner if diverticulitis symptoms recurs/worsens.    Education provided: target INR goal and significance of current INR result and importance of following up for INR monitoring at instructed interval    Sienna verbalizes understanding and agrees to warfarin dosing plan.    Instructed to call the [facility] for any changes, questions or concerns. (#[phone])   ?   Lauren Willard RN    Subjective/Objective:      Guera MOSER Lorraine, a 85 y.o. female is on warfarin.     Guera reports:     Home warfarin dose: verbally confirmed home dose with Sienna and updated on anticoagulation calendar     Missed doses: No     Medication changes:  No     S/S of bleeding or thromboembolism:  No     New Injury or illness:  Yes: see above     Changes in diet or alcohol consumption:  No     Upcoming surgery, procedure or cardioversion:  No    Anticoagulation Episode Summary     Current INR goal:  2.0-3.0   TTR:  65.2 % (1 y)   Next INR check:  12/29/2020   INR from last check:  2.50 (12/1/2020)   Weekly max warfarin dose:     Target end date:     INR check location:     Preferred lab:     Send INR reminders to:  [facility]    Indications    Chronic a-fib (H) (Resolved)  [I48.20]           Comments:           Anticoagulation Care Providers     Provider Role Specialty Phone number    Dora Castillo DO Referring Family Medicine [phone]

## 2021-06-13 NOTE — TELEPHONE ENCOUNTER
inr was normal the last couple times and so from that aspect not necessary to come in but wouldn't hurt to check early as well as do a lab appt for urine, cmp and cbc

## 2021-06-14 NOTE — TELEPHONE ENCOUNTER
I called cardiology, spoke to Monika in the device check dept, she stated they would prefer patient to est care with our cardiologist and can schedule the device check at the same time.

## 2021-06-14 NOTE — TELEPHONE ENCOUNTER
ANTICOAGULATION  MANAGEMENT    Assessment     Today's INR result of 2.3 is Therapeutic (goal INR of 2.0-3.0)        Warfarin taken as previously instructed    No new diet changes affecting INR    No new medication/supplements affecting INR    Continues to tolerate warfarin with no reported s/s of bleeding or thromboembolism     Previous INR was Therapeutic    Plan:     Spoke on phone with patients daughter Sienna regarding INR result and instructed:     Warfarin Dosing Instructions:  Continue current warfarin dose    2 mg every day     (0 % change)    Instructed patient to follow up no later than: 4-6 weeks, appointment made.  Sienna is  aware to come in sooner if diverticulitis symptoms recurs/worsens.    Education provided: importance of therapeutic range    Sienna verbalizes understanding and agrees to warfarin dosing plan.    Instructed to call the [facility] for any changes, questions or concerns. (#[phone])   ?   Lauren Willard RN    Subjective/Objective:      Guera Peters, a 85 y.o. female is on warfarin.     Guera reports:     Home warfarin dose: verbally confirmed home dose with Sienna and updated on anticoagulation calendar     Missed doses: No     Medication changes:  No     S/S of bleeding or thromboembolism:  No     New Injury or illness:  No     Changes in diet or alcohol consumption:  No     Upcoming surgery, procedure or cardioversion:  No    Anticoagulation Episode Summary     Current INR goal:  2.0-3.0   TTR:  71.2 % (1 y)   Next INR check:  2/9/2021   INR from last check:  2.30 (12/29/2020)   Weekly max warfarin dose:     Target end date:     INR check location:     Preferred lab:     Send INR reminders to:  [facility]    Indications    Chronic a-fib (H) (Resolved) [I48.20]           Comments:           Anticoagulation Care Providers     Provider Role Specialty Phone number    Dora Castillo DO Referring Family Medicine [phone]

## 2021-06-14 NOTE — TELEPHONE ENCOUNTER
Refill Approved    Rx renewed per Medication Renewal Policy. Medication was last renewed on 2/18/20.    Sania Salazar, [facility] Triage/Med Refill 12/24/2020     Requested Prescriptions   Pending Prescriptions Disp Refills     rivastigmine tartrate (EXELON) 3 MG capsule 180 capsule 1     Sig: TAKE 1 CAPSULE BY MOUTH TWICE DAILY       Cholinesterase Inhibitor/Anti-Alzheimer Agent Refill Protocol Passed - 12/22/2020  7:38 AM        Passed - Visit with PCP or prescribing provider visit in last 6 months or next 3 months     Last office visit with prescriber/PCP: Visit date not found OR same dept: 2/18/2020 Dora Castillo DO OR same specialty: 2/18/2020 Dora Castillo DO Last physical: 10/6/2020 Last MTM visit: Visit date not found     Next appt within 3 mo: Visit date not found  Next physical within 3 mo: Visit date not found  Prescriber OR PCP: Dora Castillo DO  Last diagnosis associated with med order: 1. Alzheimer's dementia without behavioral disturbance, unspecified timing of dementia onset (H)  - rivastigmine tartrate (EXELON) 3 MG capsule; TAKE 1 CAPSULE BY MOUTH TWICE DAILY  Dispense: 180 capsule; Refill: 1    If protocol passes may refill for 6 months if within 3 months of last provider visit (or a total of 9 months).

## 2021-06-14 NOTE — TELEPHONE ENCOUNTER
Patient's daughter is calling to see what Dr. Castillo's recommendations are. Patient has been going to [facility] to check her pacemaker, but would like to go within our care system. She stated that patient will be due for her pacemaker check sometime this month. Patient has it checked every 6 months. They would like to know what the providers recommendations and how to go about this. Would appreciate a call back.

## 2021-06-14 NOTE — TELEPHONE ENCOUNTER
Tried calling patient's daughter but phone listed is not in service. I tried calling patient, but patient is confused and advised for me to talk to her daughter.     If patient's daughter calls back to follow up, please relay provider's message provide cardiology's # [phone] to her.

## 2021-06-14 NOTE — TELEPHONE ENCOUNTER
Referral placed, can you call cardiology to see if she needs to see cardiology or if she can transfer to have her seen by the nurses for device checks. If cardiology, lets see how soon we can get her in. If not before her next check is due At [facility] she can go there for one last check. Its ok if she is a little delayed doing it if she wants to do at [facility]

## 2021-06-15 NOTE — TELEPHONE ENCOUNTER
ANTICOAGULATION  MANAGEMENT    Assessment     Today's INR result of 2.00 is Therapeutic (goal INR of 2.0-3.0)        Warfarin taken as previously instructed    No new diet changes affecting INR    No new medication/supplements affecting INR    - on 2/23/21 completed 2 shots Pfizer Covid-19 vaccination.    Continues to tolerate warfarin with no reported s/s of bleeding or thromboembolism     Previous INR was Subtherapeutic at 1.90 on 2/9/21.    Plan:     Spoke on phone with daughterSienna regarding INR result and instructed:      Warfarin Dosing Instructions:  (dinner time.  1mg tabs)   - Continue current warfarin dose 2 mg daily.    Instructed patient to follow up no later than:  1-2 wks.   - INR schedule don 3/16/21 @ [facility].    Education provided: target INR goal and significance of current INR result    Sienna verbalizes understanding and agrees to warfarin dosing plan.    Instructed to call the [facility] for any changes, questions or concerns. (#[phone])   ?   Columba Aguilar RN    Subjective/Objective:      Guera Peters, a 85 y.o. female is on warfarin. Guera Lynne reports:     Home warfarin dose: as updated on anticoagulation calendar per template     Missed doses: No     Medication changes:  No     S/S of bleeding or thromboembolism:  No     New Injury or illness:  No     Changes in diet or alcohol consumption:  No     Upcoming surgery, procedure or cardioversion:  No    Anticoagulation Episode Summary     Current INR goal:  2.0-3.0   TTR:  65.2 % (1 y)   Next INR check:  3/16/2021   INR from last check:  2.00 (3/2/2021)   Weekly max warfarin dose:     Target end date:     INR check location:     Preferred lab:     Send INR reminders to:  [facility]    Indications    Chronic a-fib (H) (Resolved) [I48.20]           Comments:           Anticoagulation Care Providers     Provider Role Specialty Phone number    Dora Castillo DO Referring Family Medicine [phone]

## 2021-06-15 NOTE — TELEPHONE ENCOUNTER
Refill Approved    Rx renewed per Medication Renewal Policy. Medication was last renewed on 3/10/20.    Sandor Jackson, [facility] Triage/Med Refill 2/24/2021     Requested Prescriptions   Pending Prescriptions Disp Refills     alendronate (FOSAMAX) 70 MG tablet [Pharmacy Med Name: ALENDRONATE 70MG TABLETS] 12 tablet 3     Sig: TAKE 1 TABLET BY MOUTH ONCE A WEEK; DO NOT LIE DOWN FOR 1 HOUR AFTER TAKING, TAKE WITH A FULL GLASS OF WATER       Biphosphonates Refill Protocol Passed - 2/24/2021  3:32 AM        Passed - PCP or prescribing provider visit in last 12 months     Last office visit with prescriber/PCP: 2/18/2020 Dora Castillo DO OR same dept: Visit date not found OR same specialty: 2/18/2020 Dora Castillo DO  Last physical: 10/6/2020 Last MTM visit: Visit date not found   Next visit within 3 mo: Visit date not found  Next physical within 3 mo: Visit date not found  Prescriber OR PCP: Dora Castillo DO  Last diagnosis associated with med order: 1. Osteoporosis without current pathological fracture, unspecified osteoporosis type  - alendronate (FOSAMAX) 70 MG tablet [Pharmacy Med Name: ALENDRONATE 70MG TABLETS]; TAKE 1 TABLET BY MOUTH ONCE A WEEK; DO NOT LIE DOWN FOR 1 HOUR AFTER TAKING, TAKE WITH A FULL GLASS OF WATER  Dispense: 12 tablet; Refill: 3    If protocol passes may refill for 12 months if within 3 months of last provider visit (or a total of 15 months).             Passed - Serum creatinine in last 12 months     Creatinine   Date Value Ref Range Status   12/01/2020 0.85 0.60 - 1.10 mg/dL Final

## 2021-06-15 NOTE — PROGRESS NOTES
ASSESSMENT & PLAN:  Guera was seen today for establish care.    Diagnoses and all orders for this visit:    Establishing care with new doctor, encounter for    Osteoporosis  -     DXA Bone Density Scan; Future    Chronic a-fib  -     Ambulatory referral to Anticoagulation Monitoring  -     INR    Acquired hypothyroidism    Alzheimer's dementia without behavioral disturbance, unspecified timing of dementia onset    Arteriosclerotic heart disease (ASHD)    Cardiac pacemaker in situ    Coronary artery disease    Essential hypertension with goal blood pressure less than 140/90    Diverticulosis of large intestine without hemorrhage    Hyperlipidemia LDL goal <100    Other orders  -     warfarin (COUMADIN) 2.5 MG tablet; Take 2.5 mg every day  PO      -Patient presents today to establish care.  Reviewed health history with patient, her  and her son who is present however typically it is her daughter who is more familiar with her medical issues.  She does have chronic atrial fibrillation and is on warfarin therapy.  Today her INR was supratherapeutic.  I instructed her to hold her warfarin for 2 days and then to resume her dosing that instructed her to take 1.25 mg on Mondays and Thursdays which she had not been doing as instructed by her previous clinic.  I am concerned that her memory impairment is worse than her son and  realize and is more on the moderate side rather than mild.  She manages her own medications and she did agree with me that it is possible that she gets confused and may be took an extra warfarin tablet.  I discussed with the family that they could consider getting a pillbox and having them set up each week.  They have had occupational therapy in the home, and evaluate patient and how she gets around the home and they have had no concerns up until now.  She lives with her  and currently they both feel comfortable about their safety and family looks in on them regularly.  I  encouraged him to bring in a copy of their advanced directive.  -She is on Exelon to slow down any cognitive impairment and they do not know if it is made a significant difference.  She follows at [facility] and we would like to try to obtain records from there.  We discussed that if medications are not helpful sometimes we may consider taking her off of the medication but if it seems to be helping we will leave it on at this time and that is a medication that they will be filling  -History of osteoporosis on Fosamax so continue at this time.  She is due for repeat bone density scan  -She is on levothyroxine for hypothyroidism and we will recheck a thyroid level to make sure it is within goal.  Reviewed all her recent lab work  -She is on lisinopril for blood pressure control which it is on 20 mg daily and she also takes carvedilol 12.5 mg twice daily for history of atrial fibrillation and coronary artery disease.  Also on aspirin and statin therapy which is okay to continue at this time since she is tolerating it.  Maybe in the future we may consider discontinuing her statin  -She does have some abdominal issues that she is being worked up for by [place] [facility] and has an upcoming CT scan with history of diverticulitis supposedly.  I reviewed emergency room notes as well as their records but we are asking them to send a record of their consultation report.    Follow-up in 6 months or sooner if needed       Patient Instructions   Please bring a copy of the advanced healthcare directive to the clinic.     Start taking vitamin D 2000 IU daily FOR VITAMIN D DEFICIENCY      Repeat DXA scan for bone density.     Follow up with Dr. Castillo in 6 months.   Let us know if you need any refills.        Orders Placed This Encounter   Procedures     DXA Bone Density Scan     Standing Status:   Future     Number of Occurrences:   1     Standing Expiration Date:   2/7/2019     Scheduling Instructions:      PATIENT  WILL SCHEDULE?  No            [facility]       [phone]- [place]       [phone]- [facility]       [phone]- [place]      [phone]- [place]     Order Specific Question:   Reason for Exam (Describe Symptoms):     Answer:   hx osteoporosis on fosamax     Order Specific Question:   Can the procedure be changed per Radiologist protocol?     Answer:   Yes     INR     Ambulatory referral to Anticoagulation Monitoring     Referral Priority:   Routine     Referral Type:   Health Education     Referral Reason:   Evaluation and Treatment     Requested Specialty:   Hematology     Number of Visits Requested:   1     Medications Discontinued During This Encounter   Medication Reason     terbinafine HCl (LAMISIL) 250 mg tablet Therapy completed     warfarin (COUMADIN) 5 MG tablet Reorder       No Follow-up on file.     CHIEF COMPLAINT:  Chief Complaint   Patient presents with     Establish Care     CT Abdomen/Pelvis scheduled 02/09/18 for abdominal pain        HISTORY OF PRESENT ILLNESS:  Guera is a 82 y.o. female presenting to the clinic today with her  and son to establish care. She was previously a patient at [facility] for quite a while. Her daughter thought this clinic would be a lot closer. She wasn't seeing her primary doctor very often.     Alzheimer's Disease: She is followed by a doctor at [facility] in [place]; she was last seen about 3 months ago and she doesn't need to be seen for another year or longer unless she gets worse. She has been diagnosed with Alzheimer's and her son doesn't think it has been progressive; he thinks it is mild. They started noticing issues with her memory a couple years ago. She hasn't thought about how long she has been having memory issues. She is using Exelon capsules 3 mg twice daily. Her  thinks she has been taking that medication for a little over a year now; he hasn't noticed much difference since she started taking the  medication. Her son is wondering if the medication slows down the progression of the disease. She lives with her  in a [facility]. They are getting by well. She hasn't been driving for a while; she voluntarily stopped driving because they are together most of the time anyway. Her  drives her most places. She prepares some meals and so does he. They had a nurse come out and do an assessment, and everything was okay. She hasn't been put on any new medications recently; she thinks the Fosamax was the newest medication she started. She sets up her own medications; she does have a pill box but she doesn't use it. She takes her medications then she flips the bottle over. That works for the most part; it sits on her lamp table. She knows where her pills are. That is the way she has been doing it. The nurse who did the assessment did see how she manages her medications and she was fine with that. Her  has very mild memory loss; he has gone to [facility] and been evaluated and he was fine. They both have cell phones.  They lived in [place] during the [place] for the past 18 years, but this is their first winter living in [place]. She isn't sure if she had a primary doctor in [place]. They sold their house in [place]. They have children, grandchildren, and great grandchildren. She has four children, so they are each there about one day per week. Someone calls about one day per week. She notes they have been betty; they have had a good life.     Heart Disease/Hx TIA: She has had a mild heart attack in 2015 or so, and a massive heart attack about 20 years ago. She sees a cardiologist at [facility] 1-2 times per year. She doesn't do any exercise. She was getting her INR checked every 2 weeks or so. She had a TIA a long time ago. She has a pacemaker and she has had a heart valve repaired with a stitch. She has never taken a Nitro. She has been taking 2.5 mg warfarin daily.     Hypothyroidism: She has been tolerating  levothyroxine 100 mcg daily without any issues.     CKD Stage 3: She doesn't see a kidney specialist.     Sleep Apnea: She was diagnosed in April 2009 per her records. She doesn't recall being diagnosed; her  has sleep apnea. Her son and  isn't sure if she has sleep apnea. Her son notes his sister takes their parents to appointments, so maybe she would know if she has sleep apnea or not.     Left Lower Abdominal Pain: She had abdominal pain about a year ago, then it disappeared, then the pain came back about a month ago. The pain is in her left lower abdomen; she doesn't have any pain on the right side. It feels sometimes like something isn't right. It isn't severe pain. Her son thinks his sister, Sienna, coordinated her with [facility]. She was in the hospital to be tested for the pain. She has a CT scan scheduled for 3 days from now at [facility]. Last time, her pain went away on it's own, and then it came back. At [facility] on 2/2/2018, she report some weight loss. She has lost about 15 pounds over several months; her  thinks it has been 3-4 months. Her  notes she doesn't eat that much; she doesn't have much appetite. She eats toast, yogurt, sometimes a little bit of oatmeal; she doesn't eat breakfast often, sometimes she waits until 10 am to eat. She never ate very much for breakfast. She usually makes herself a sandwich for lunch with soup. For dinner, she eats meat, corn, potatoes, and dinner is usually larger. She hasn't changed her diet much. Her  notes she hasn't been eating as much food. She doesn't think her diet has changed because of the abdominal pain, but she just isn't as hungry. She was seen by Dr. Sparrow at the end of December 2017 for this pain. She normally has a BM every day about the same time. Her abdominal pain today is present, but it isn't as severe as it was before. She has no history of blood in her stool. She is sure she had a colonoscopy before.     Osteoporosis: She has  vitamin D by her pills, but she mostly doesn't take it. She wonders how many pills she should take at once because she is concerned it might interfere with her more important pills. She isn't sure how long she has been taking Fosamax weekly for.     Health Maintenance: Her son notes his sister is the power of  and they have everything in writing; they did that about 2 years ago. She is wondering if her medications should be usually taken in the morning or the evening.     REVIEW OF SYSTEMS:   She doesn't feel depressed or anxious, and she doesn't have a hard time sleeping. She denies any chest pain, shortness of breath, constipation, hematochezia, nausea, emesis, or abnormal bruising. All other systems are negative.     PFSH:  She had low back pain a long time ago, but she no longer does. She is not taking medication for toenail fungus anymore. 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     History   Smoking Status     Former Smoker   Smokeless Tobacco     Never Used     Comment: quit 30 years ago     Family History   Problem Relation Age of Onset     Heart disease Mother      Heart disease Father      Social History     Social History     Marital status:      Spouse name: N/A     Number of children: N/A     Years of education: N/A     Occupational History     Not on file.     Social History Main Topics     Smoking status: Former Smoker     Smokeless tobacco: Never Used      Comment: quit 30 years ago     Alcohol use Yes      Comment: once a month     Drug use: No     Sexual activity: Not on file     Other Topics Concern     Not on file     Social History Narrative    Lives in [place] with her      Past Surgical History:   Procedure Laterality Date     ANGIOPLASTY       APPENDECTOMY       BREAST SURGERY      removal breast implant     CARDIAC PACEMAKER PLACEMENT       embolization of angiomyolipoma       MITRAL VALVE  "REPAIR       partial excision of thyroid       PATENT FORAMEN OVALE CLOSURE       No Known Allergies  Active Ambulatory Problems     Diagnosis Date Noted     Acquired hypothyroidism 05/16/2016     Alzheimer's dementia without behavioral disturbance, unspecified timing of dementia onset 06/03/2016     Angiomyolipoma 06/27/2011     Arteriosclerotic heart disease (ASHD) 09/02/2011     Carotid stenosis, bilateral 05/16/2016     Chronic atrial fibrillation 05/16/2016     Coronary artery disease 12/13/2011     Cardiac pacemaker in situ 10/08/2012     Diverticulosis of large intestine without hemorrhage 12/19/2017     Essential hypertension with goal blood pressure less than 140/90 05/16/2016     Transient cerebral ischemia 02/06/2018     Hyperlipidemia LDL goal <100 05/16/2016     Hypovitaminosis D 11/16/2017     Long-term (current) use of anticoagulants 06/21/2016     Microscopic hematuria 08/19/2013     Mitral valve disorder 06/10/2009     NSTEMI (non-ST elevated myocardial infarction) 05/16/2016     Osteoporosis without current pathological fracture, unspecified osteoporosis type 11/16/2017     Pacemaker 05/16/2016     S/P MVR (mitral valve repair) 08/26/2014     Chronic a-fib 02/06/2018     Resolved Ambulatory Problems     Diagnosis Date Noted     Back pain 02/10/2017     Past Medical History:   Diagnosis Date     A-fib      CKD (chronic kidney disease) stage 3, GFR 30-59 ml/min      Disease of thyroid gland      Heart attack 2011     HLD (hyperlipidemia)      Hypertension      Hypothyroid      Osteoporosis      Stroke      Thrombocytopenia         VITALS:  Vitals:    02/06/18 1005   BP: 102/64   Patient Site: Right Arm   Patient Position: Sitting   Cuff Size: Adult Regular   Pulse: 76   Resp: 16   Temp: 98.1  F (36.7  C)   TempSrc: Oral   Weight: 163 lb (73.9 kg)   Height: 5' 5\" (1.651 m)     Wt Readings from Last 3 Encounters:   02/06/18 163 lb (73.9 kg)   05/28/17 180 lb (81.6 kg)   02/11/17 182 lb 9.6 oz (82.8 kg) "     Body mass index is 27.12 kg/(m^2).  No LMP recorded. Patient is postmenopausal.      PHYSICAL EXAM:  GENERAL:  Reveals an alert 82 y.o. female in NAD.  Vitals:  Per nursing notes.   CARDIAC:  Regular rate irregularly irregular and rhythm without murmurs, rubs, or gallops. Legs with 1+ nonpitting edema, varicose veins. Carotids without bruits.   LUNGS: Clear.  Respiratory effort normal.  ABDOMEN: Soft, without organomegaly. Tenderness to palpation min llq  SKIN:   Slight bruising from warfarin  NEURO:  Cranial nerves II-XII intact.     PSYCH:  Mood appropriate, moderate cognitive impairment  MUSC: normal gait. Full ROM   Neck: no lymphadenopathy     QUALITY MEASURES:  I have had an Advance Directives discussion with the patient.     DATA REVIEWED:  ADDITIONAL HISTORY SUMMARIZED (2): Reviewed Dr. Sparrow's [place] note 12/19/2017 regarding abdominal pain.   DECISION TO OBTAIN EXTRA INFORMATION (1): Accessed Care Everywhere. SHEY's signed for WALTER and Cherie.   RADIOLOGY TESTS (1): None.  LABS (1): Reviewed and ordered labs.  MEDICINE TESTS (1): None.  INDEPENDENT REVIEW (2 each): None.      The visit lasted a total of 60 minutes face to face with the patient. Over 50% of the time was spent counseling and educating the patient about her health history, chronic health conditions, medications, and follow up.     I, Sharon Aquino, am scribing for and in the presence of Dr. Castillo.  IDora DO , personally performed the services described in this documentation, as scribed by Sharon Aquino in my presence, and it is both accurate and complete.    This note has been dictated using voice recognition software. Any grammatical or context distortions are unintentional and inherent to the software.     MEDICATIONS:  Current Outpatient Prescriptions   Medication Sig Dispense Refill     alendronate (FOSAMAX) 70 MG tablet Take 70 mg by mouth every 7 days. On Sunday. Take in the morning on an empty stomach with a full glass of  water 30 minutes before food       aspirin 81 MG EC tablet Take 81 mg by mouth daily.       carvedilol (COREG) 12.5 MG tablet Take 12.5 mg by mouth 2 (two) times a day with meals.       levothyroxine (SYNTHROID, LEVOTHROID) 100 MCG tablet Take 100 mcg by mouth Daily at 6:00 am.       lisinopril (PRINIVIL,ZESTRIL) 20 MG tablet Take 20 mg by mouth daily.       nitroglycerin (NITROSTAT) 0.4 MG SL tablet Place 0.4 mg under the tongue every 5 (five) minutes as needed for chest pain.       rivastigmine tartrate (EXELON) 3 MG capsule Take 3 mg by mouth 2 (two) times a day.        rosuvastatin (CRESTOR) 20 MG tablet Take 20 mg by mouth bedtime.       warfarin (COUMADIN) 2.5 MG tablet Take 2.5 mg every day  PO 90 tablet 3     No current facility-administered medications for this visit.         Total data points: 4

## 2021-06-16 NOTE — TELEPHONE ENCOUNTER
Reason for Call:  Other order     Detailed comments: pt daughter called as pt was seen yesterday by Dr Castillo & they had discussed getting a bone density test.  Daughter called to schedule with [facility] today & no orders found  Looked in chart & no order for bone density    Please advise & order if appropriate    Phone Number Patient can be reached at:   Cell number on file:    Telephone Information:   Mobile [phone]       Best Time: na    Can we leave a detailed message on this number?: Yes    Call taken on 4/13/2021 at 11:43 AM by Colleen Lawrence

## 2021-06-16 NOTE — TELEPHONE ENCOUNTER
ANTICOAGULATION  MANAGEMENT    Assessment     Today's INR result of 2.10 is Therapeutic (goal INR of 2.0-3.0)        Warfarin taken as previously instructed    No new diet changes affecting INR    No new medication/supplements affecting INR    Continues to tolerate warfarin with no reported s/s of bleeding or thromboembolism     Previous INR was Therapeutic at 2.00 on 3/2/21.    NOTE:  Reported injury below her left eye, bruised.  Looks better now.    Plan:     Spoke on phone with daughterSienna regarding INR result and instructed:      Warfarin Dosing Instructions:   (dinner.  1mg tabs)   - Continue current warfarin dose 2 mg daily.    Instructed patient to follow up no later than:  4 wks.   - INR scheduled on 4/12/21 during OV with Dr. Castillo.    Education provided: importance of consistent vitamin K intake and target INR goal and significance of current INR result    Sienna verbalizes understanding and agrees to warfarin dosing plan.    Instructed to call the [facility] for any changes, questions or concerns. (#[phone])   ?   Columba Aguilar RN    Subjective/Objective:      Guera Peters, a 85 y.o. female is on warfarin. Guera Lynne reports:     Home warfarin dose: verbally confirmed home dose with Sienna and updated on anticoagulation calendar     Missed doses: No     Medication changes:  No     S/S of bleeding or thromboembolism:  No     New Injury or illness:  Yes.  Reported a bruise under left eye.  Not sure how she got the bruise.     Changes in diet or alcohol consumption:  No     Upcoming surgery, procedure or cardioversion:  No    Anticoagulation Episode Summary     Current INR goal:  2.0-3.0   TTR:  65.2 % (1 y)   Next INR check:  4/13/2021   INR from last check:  2.10 (3/16/2021)   Weekly max warfarin dose:     Target end date:     INR check location:     Preferred lab:     Send INR reminders to:  [facility]    Indications    Chronic a-fib (H) (Resolved) [I48.20]            Comments:           Anticoagulation Care Providers     Provider Role Specialty Phone number    Dora Castillo DO Referring Family Medicine [phone]

## 2021-06-16 NOTE — TELEPHONE ENCOUNTER
Reason contacted:  Md message  Information relayed:  Informed patient daughter of message below. Patient daughter states understanding.  Additional questions:  No  Further follow-up needed:  No  Okay to leave a detailed message:  No

## 2021-06-16 NOTE — TELEPHONE ENCOUNTER
Attempted to call patient to schedule Cardiology Referral   No answer   Left message to call back to schedule

## 2021-06-16 NOTE — TELEPHONE ENCOUNTER
ANTICOAGULATION  MANAGEMENT    Assessment     Today's INR result of 2.30 is Therapeutic (goal INR of 2.0-3.0)        Warfarin taken as previously instructed    No new diet changes affecting INR    No interaction expected between Colace and warfarin    - 4/12/21 recommended Colace for constipation.    Continues to tolerate warfarin with no reported s/s of bleeding or thromboembolism     Previous INR was Therapeutic at 2.10 on 3/16/21.    Plan:     Spoke on phone with daughterSienna regarding INR result and instructed:      Warfarin Dosing Instructions:   (dinner. 1mg tabs)   - Continue current warfarin dose 2 mg daily.    Instructed patient to follow up no later than:  4 wks.   - INR scheduled on 5/10/21 @ [facility].    Education provided: importance of consistent vitamin K intake and target INR goal and significance of current INR result    Sienna verbalizes understanding and agrees to warfarin dosing plan.    Instructed to call the [facility] for any changes, questions or concerns. (#[phone])   ?   Columba Aguilar RN    Subjective/Objective:      Guera Peters, a 85 y.o. female is on warfarin. Guera Lynne reports:     Home warfarin dose: as updated on anticoagulation calendar per template     Missed doses: No     Medication changes:  No     S/S of bleeding or thromboembolism:  No     New Injury or illness:  No     Changes in diet or alcohol consumption:  No     Upcoming surgery, procedure or cardioversion:  No    Anticoagulation Episode Summary     Current INR goal:  2.0-3.0   TTR:  65.2 % (1 y)   Next INR check:  5/10/2021   INR from last check:  2.30 (4/12/2021)   Weekly max warfarin dose:     Target end date:     INR check location:     Preferred lab:     Send INR reminders to:  [facility]    Indications    Chronic a-fib (H) (Resolved) [I48.20]           Comments:           Anticoagulation Care Providers     Provider Role Specialty Phone number    Dora Castillo DO Referring  Family Medicine [phone]

## 2021-06-16 NOTE — TELEPHONE ENCOUNTER
Apologize to daughter angela that order didn't sign. It is in now and can call [phone] with [place] [facility] ([facility] to schedule)

## 2021-06-16 NOTE — PROGRESS NOTES
Assessment & Plan     LLQ abdominal pain  Unclear diagnosis and is mild and intermittent and has been going on since I met her several years ago.  Has had ultrasound imaging but cannot see ovaries, a lot of gas so potentially does have some distention related to that.  We discussed normal CT imaging.  Has some minimal discomfort when I palpate the left upper and lower abdomen.  No evidence of herniation.  We discussed she has diverticulosis and would be advised that get symptoms are worsening or persistent that we would repeat CT scan for urinalysis.  They should take home and extra urine cup in case they after doing a visit.  For now they are comfortable with just monitoring.  She will be quitting the Fosamax sooner we will see if that also helps  - Urinalysis-UC if Indicated    Diverticulosis of large intestine without hemorrhage  Recommended Colace to see if this is helpful for managing any constipation issues that could be contributing to pain although the patient denies that she is having constipation issues    Acquired hypothyroidism  On levothyroxine will adjust dose accordingly  - Thyroid Cascade    Arteriosclerotic heart disease (ASHD)  Has not had cardiac episodes since 2006.  Not currently seeing cardiology.  Has her pacemaker checked regularly at [facility] and we discussed for now likely could keep it there rather than transitioning unless cardiology is requesting that they see her in person.  In which case we can act on her previous referral to reestablish care with our cardiology team for pacemaker checks.      Chronic atrial fibrillation (H)  -Continues to be rate controlled and continues on warfarin due to history of mitral valve replacement.  Stable on carvedilol    Essential hypertension with goal blood pressure less than 140/90  Blood pressure well controlled continue current medication dosing of lisinopril and carvedilol  - Comprehensive Metabolic Panel    Hyperlipidemia LDL goal <100  Continue  rosuvastatin.  Last LDL was within goal    Cardiac pacemaker in situ  Pacemaker checks at [facility]    Alzheimer's dementia without behavioral disturbance, unspecified timing of dementia onset (H)  Has been stable.  Daughter helps with cares.  Continues on rivastigmine and Namenda    Hypovitaminosis D  Daughter thinks she is taking 2000 IU daily.  We will recheck vitamin D levels and see if you need additional supplementation.  - Vitamin D, Total (25-Hydroxy)    Osteoporosis without current pathological fracture, unspecified osteoporosis type   -As noted we will have her stop Fosamax since she has been on it for at least 5 years for drug holiday.  She will obtain bone density scan first  - Vitamin D, Total (25-Hydroxy)  - Electrophoresis, Protein, Serum    Elevated blood protein   -Previous notes indicated possibly elevated protein of undetermined significance.  We will recheck SPEP    Thrombocytopenia (H)  Platelets in the 120s.  Fairly stable  - HM2(CBC w/o Differential)    Prediabetes    - Glycosylated Hemoglobin A1c    Cerumen impaction left ear-ear was irrigated by clinical assistant and then I used a lighted curette to remove the soft cerumen.  Total time spent 1 minute    Patient instructions-   Schedule bone scan [phone].   And if stable from previous- stop fosamax x 2 years and then will plan to repeat imaging then  Check at home to make sure you are taking vitamin D  2000IU.     Consider daily colace 100mg for constipation and continue ongoing water intake. See if this helps with stomach pain   Review of external notes as documented in note  Review of the result(s) of each unique test - Reviewed previous ultrasound and CT scan.  Information obtained from daughter Sienna  Ordering of each unique test  45 minutes spent on the date of the encounter doing chart review, history and exam, documentation and further activities per the note        BMI:   Estimated body mass index is 30.35 kg/m  as calculated from  "the following:    Height as of 10/6/20: 5' 5\" (1.651 m).    Weight as of this encounter: 182 lb 6.4 oz (82.7 kg).   The following high BMI interventions were performed this visit: encouragement to exercise    Return in about 9 months (around 1/12/2022) for Annual physical.    Dora Castillo DO  [facility]    Jyoti Peters is 85 y.o. and presents today for the following health issues   HPI   Patient was brought in today by her daughter Angela who is her power of   Here for med check. Gets intermittent LLQ pain intermittently and seems to correlate with not drinking enough water. Not constipated.  Been 2-3 x per month. When drinks more water gets better within a day. Doesn't last weeks like it used to.  It seems to be her ongoing chronic pain she has had since I met her.  No nausea, or fever or headache   Has had US imaging that was benign but couldn't see ovaries, and normal CT in 207 normal. Diverticulosis.    helps set up pill box.     Been on fosamax x 5 years or so. Prior to coming to me. Last scan 2018 stable but didn't have one for comparison.     For heart was seeing Dr. Mercado but still does pacemaker checks and didn't know if needs to transfer care    Eats a banana and yogurt. Doesn't like a lot of veggies. Eats applesauce.   Daughter angela buys lean cuisines. Peanut butter on bagels.     Memory has been stable in the last 6 months.  Still moderate impairment.  No better no worse.       Review of Systems  Review of Systems  Complete ROS reviewed in HPI or otherwise negative        Objective    /65   Pulse 63   Temp 97.7  F (36.5  C) (Oral)   Resp 24   Wt 182 lb 6.4 oz (82.7 kg)   BMI 30.35 kg/m    Body mass index is 30.35 kg/m .  Physical Exam  Gen- NAD  Heart-irregularly irregular rhythm.  Normal rate  Lungs- CTAB  Legs- varicose veins. Trace edema.   Abdomen soft nondistended minimally tender to palpation left upper and left lower " quadrant no rebound tenderness guarding or rigidity  Cerumen impaction on the left- irrigated today by CA and I was able to use lighted curette to remove the remaining cerumen.

## 2021-06-16 NOTE — TELEPHONE ENCOUNTER
Refill Approved    Rx renewed per Medication Renewal Policy. Medication was last renewed on 1/22/20.    Sandor Jackson, [facility] Triage/Med Refill 4/7/2021     Requested Prescriptions   Pending Prescriptions Disp Refills     aspirin 81 MG EC tablet 100 tablet 11     Sig: Take 1 tablet (81 mg total) by mouth daily.       Aspirin/Dipyridamole Refill Protocol Passed - 4/5/2021  3:11 PM        Passed - PCP or prescribing provider visit in past 12 months       Last office visit with prescriber/PCP: 2/18/2020 Dora Castillo DO OR same dept: Visit date not found OR same specialty: 2/18/2020 Dora Castillo DO  Last physical: 10/6/2020 Last MTM visit: Visit date not found    Next appt within 3 mo: Visit date not found Next physical within 3 mo: Visit date not found  Prescriber OR PCP: Dora Castillo DO  Last diagnosis associated with med order: 1. Arteriosclerotic heart disease (ASHD)  - aspirin 81 MG EC tablet; Take 1 tablet (81 mg total) by mouth daily.  Dispense: 100 tablet; Refill: 11    If protocol passes may refill for 6 months if within 3 months of last provider visit (or a total of 9 months).

## 2021-06-16 NOTE — PATIENT INSTRUCTIONS - HE
Schedule bone scan [phone].   And if stable from previous- stop fosamax x 2 years and then will plan to repeat imaging then  Check at home to make sure you are taking vitamin D  2000IU.     Consider daily colace 100mg for constipation and continue ongoing water intake. See if this helps with stomach pain

## 2021-06-17 NOTE — TELEPHONE ENCOUNTER
Telephone Encounter by Grecia Staples RN at 4/10/2020  2:34 PM     Author: Grecia Staples RN Service: -- Author Type: Registered Nurse    Filed: 4/10/2020  2:37 PM Encounter Date: 4/10/2020 Status: Attested    : Grecia Staples RN (Registered Nurse) Cosigner: Dora Castillo DO at 4/10/2020  2:39 PM    Attestation signed by Dora Castillo DO at 4/10/2020  2:39 PM    Agree with plan                 Anticoagulation Annual Referral Renewal Review    Guera Peters's chart reviewed for annual renewal of referral to anticoagulation monitoring.        Criteria for anticoagulation nurse and/or pharmacist renewal met   Warfarin indication: Atrial Fibrillation Yes, per indication   Current with INR monitoring/compliant Yes Yes   Date of last office visit 2/18/20 Yes, had office visit within last year   Time in Therapeutic Range (TTR) 80.2 % Yes, TTR > 60%       Guera Peters met all criteria for anticoagulation management program initiated renewal.  New INR standing orders and anticoagulation referral renewal placed.      Grecia Staples RN  2:34 PM

## 2021-06-17 NOTE — TELEPHONE ENCOUNTER
Who is calling:  Sienna  Reason for Call:  Patients daughter called requesting to speak with a nurse. Guera took 2, 81 mg Aspirin today.She normally takes 1 per day. Please advise.  Date of last appointment with primary care: 4/12/2021  Okay to leave a detailed message: Yes

## 2021-06-17 NOTE — PATIENT INSTRUCTIONS - HE
Patient Instructions by Dora Castillo DO at 7/9/2019 11:40 AM     Author: Dora Castillo DO Service: -- Author Type: Physician    Filed: 7/9/2019 12:23 PM Encounter Date: 7/9/2019 Status: Addendum    : Dora Castillo DO (Physician)    Related Notes: Original Note by Dora Castillo DO (Physician) filed at 7/9/2019 12:05 PM       Resume vitamin D -2000u daily  - increase the mematadine for memory and I would have you take 10mg in the morning and 5mg at night for 1 week  Then go up to 10mg twice daily and there is a new prescription called in for 10mg tablets.   Patient Education     Exercise for a Healthier Heart  You may wonder how you can improve the health of your heart. If youre thinking about exercise, youre on the right track. You dont need to become an athlete, but you do need a certain amount of brisk exercise to help strengthen your heart. If you have been diagnosed with a heart condition, your doctor may recommend exercise to help stabilize your condition. To help make exercise a habit, choose safe, fun activities.       Be sure to check with your health care provider before starting an exercise program.    Why exercise?  Exercising regularly offers many healthy rewards. It can help you do all of the following:    Improve your blood cholesterol levels to help prevent further heart trouble    Lower your blood pressure to help prevent a stroke or heart attack    Control diabetes, or reduce your risk of getting this disease    Improve your heart and lung function    Reach and maintain a healthy weight    Make your muscles stronger and more limber so you can stay active    Prevent falls and fractures by slowing the loss of bone mass (osteoporosis)    Manage stress better  Exercise tips  Ease into your routine. Set small goals. Then build on them.  Exercise on most days. Aim for a total of 150 or more minutes of moderate to  vigorous intensity activity each week. Consider 40  minutes, 3 to 4 times a week. For best results, activity should last for 40 minutes on average. It is OK to work up to the 40 minute period over time. Examples of moderate-intensity activity is walking one mile in 15 minutes or 30 to 45 minutes of yard work.  Step up your daily activity level. Along with your exercise program, try being more active throughout the day. Walk instead of drive. Do more household tasks or yard work.  Choose one or more activities you enjoy. Walking is one of the easiest things you can do. You can also try swimming, riding a bike, or taking an exercise class.  Stop exercising and call your doctor if you:    Have chest pain or feel dizzy or lightheaded    Feel burning, tightness, pressure, or heaviness in your chest, neck, shoulders, back, or arms    Have unusual shortness of breath    Have increased joint or muscle pain    Have palpitations or an irregular heartbeat      4060-6709 [employer]. [address] [place] [address]. All rights reserved. This information is not intended as a substitute for professional medical care. Always follow your healthcare professional's instructions.         Patient Education    Home Fire Safety  Each year, thousands of people, including children, are injured and killed in home fires. Children are often curious about fire, and may not understand the dangers. This makes home fire safety practices especially important. Three important things you can do to keep your home safe from fire are:    Install smoke alarms in your home and make sure they work properly.    Teach children not to play with matches, lighters, and other materials that can be used to start fires. And keep these materials out of childrens reach.    Teach children what to do in case of fire. Create a fire safety action plan and practice it.  Read on for more details about keeping your family and home safe from fire.        Being Prepared for a Fire  A home fire can  happen at any time. The following can help you be prepared:    Install smoke alarms on every level of your home, including the basement and outside all sleeping areas. This simple step cuts your familys risk of dying in a fire nearly in half.    Test smoke alarms monthly, and change the batteries once a year or when the alarm chirps.    Dont disable smoke alarms, even for a short time.    Ask your local fire department for tips on where to place smoke alarms in your home.    Replace all smoke alarms every 10 years.    Consider using voice smoke alarms. These alarms allow you to substitute your own voice for the alarm sound. They are helpful because many children dont wake up to the sound of a regular smoke alarm.    Install carbon monoxide detectors near sleeping areas.    Be aware that carbon monoxide is a byproduct of smoke that can be deadly. Its a gas that you cant see, smell, or taste.    Consider buying a combination smoke alarm / carbon monoxide detector.    Keep fire extinguishers in the home.    Keep them in accessible locations, especially in the kitchen.    Check usage dates to make sure they are not .    Use fire extinguishers only when the fire is in a contained area and is not spreading. (Otherwise, you should focus on getting out of the home.)    Train adults to use fire extinguishers. (Children should focus on getting out of the home during a fire.)    If you live in an apartment, talk to your landlord about where smoke alarms are and how often they are tested. Also ask about fire extinguisher locations and emergency exit routes.  Indoor Fire Safety  Many things in your home are potential fire hazards. Follow these steps to help keep your home safe.    Be careful in the kitchen.    Never leave food thats cooking unattended.    If a fire breaks out in a cooking pan, put a lid on it to smother it. And never throw water on a grease fire. It will make the fire worse.    Conduct a home safety  inspection. Look for anything, such as frayed wires and cords, that can cause a fire. Fix or remove any fire safety hazards you find.    Keep all matches and lighters in a secured drawer or cabinet out of the reach of children. Use childproof lighters.    Check to make sure all appliances, including the stove, are turned off before leaving the home.    Know where the gas main shut-off is located.    Make sure space heaters are stable and have protective covers. Keep them at least 3 feet from anything that can burn, such as curtains. Dont use space heaters in areas where young kids spend time alone.    Keep flammable liquids such as kerosene and gasoline locked up and safely stored away from kids and heat.    Keep all smoking materials out of reach of children. And never smoke in bed. If possible, smoke outdoors only.  Outdoor Fire Safety  Fire can be a hazard outdoors as well as indoors. When outdoors, be sure to do the following:    Always supervise kids near a barbecue grill, campfire, or portable stove.    Dont use fire pits around children. Kids can fall into them, and pits can be hot even after the fire goes out.    Keep a garden hose or fire extinguisher handy when cooking outdoors in case of fire.  Teaching Your Child About Fire Safety  One of the best ways to keep your home safe from fire is education. Make sure everyone in your family knows fire safety rules, including children.    Teach your children the dangers of matches, lighters, and other dangerous items.    Teach them to never touch these and other objects that are hot, such as candles.    Have them tell you right away whenever they find matches or lighters. Explain that these items are tools for grown-ups, not toys. And never amuse children with matches or lighters.    Round up all matches and lighters and store them safely. In case you missed some, ask your children to tell you where any are located throughout your home.    Never leave a child  alone in a room with a lit candle. Dont allow teens to have candles in their rooms.    Show children what to do in case of fire.    Be sure your kids know what the fire alarm sounds like and what to do if it goes off.    Teach kids what to do if their clothes catch fire: Stop, Drop to the ground, and Roll until the fire is put out. They should also cover their face with their hands. Practice these steps with your children. Make sure they understand that running will make the fire burn faster.    Show children how to crawl below smoke during a fire.    Make sure kids know at least two escape routes from each room in the home. These escape routes can be windows.    Teach kids to test doors for nearby fire by feeling for heat with the back of their hand. If the door is warm or hot, they should try their second exit.    Explain to children that they cant hide from a fire. Hiding in a closet or under a bed wont make them safe. Instead, they should try to escape the home. And if they cant escape, they should let others know they are trapped. They can do this by shutting the door to the room, opening a window, and turning on the lights.    Talk to your local fire department.    Introduce your children to a . Let them know that firefighters will look different when in full protective gear. Tell them to never hide from firefighters, and to follow all directions from firefighters during a fire.    Find out if the fire department has a fire safety program for kids.      Create a Fire Safety Plan  Create a plan for your family to follow in case of a fire. Try making it a family project. Important steps for the plan include leaving the home right away and having a designated meeting place.    Make sure your child understands to get out and stay out. He or she should get out of the home immediately and not go back in, even if family members or pets are still inside.    Decide on a safe meeting place away from the home  for everyone to gather.    Teach children to call 911 or emergency services from a cell phone or neighbors phone. Make sure they know to do this only after they are safely out of the home.    Teach your children the fire safety plan. Practice it and make sure they understand it.    Have fire drills twice a year to keep your children prepared in case of fire.    Visit the National Fire Protection Association web site at www.nfpa.org for more information.      4063-7225 [employer]. [address] [place] [address]. All rights reserved. This information is not intended as a substitute for professional medical care. Always follow your healthcare professional's instructions.         Patient Education   Instrumental Activities of Daily Living  Your Health Risk Assessment indicates you have difficulties with instrumental activities of daily living which include laundry, housekeeping, banking, shopping, using the telephone, food preparation, transportation, or taking your own medications. Please make a follow up appointment for us to address this issue in more detail.    [employer] has resources available on the following website: https://www.[employer].org/caregivers.html     Also, here is a local agency that provides help with meals and other assistance:   [facility] Line: [phone]       Advance Directive  Patients advance directive was discussed and I am comfortable with the patients wishes.  Patient Education   Personalized Prevention Plan  You are due for the preventive services outlined below.  Your care team is available to assist you in scheduling these services.  If you have already completed any of these items, please share that information with your care team to update in your medical record.  Health Maintenance   Topic Date Due   ? ZOSTER VACCINES (1 of 2) 06/15/1987   ? FALL RISK ASSESSMENT  05/03/2019   ? INFLUENZA VACCINE RULE BASED (1) 08/01/2019   ? DXA SCAN  09/10/2020   ?  ADVANCE DIRECTIVES DISCUSSED WITH PATIENT  02/06/2023   ? TD 18+ HE  04/07/2027   ? PNEUMOCOCCAL POLYSACCHARIDE VACCINE AGE 65 AND OVER  Completed   ? PNEUMOCOCCAL CONJUGATE VACCINE FOR ADULTS (PCV13 OR PREVNAR)  Completed

## 2021-06-17 NOTE — TELEPHONE ENCOUNTER
ANTICOAGULATION  MANAGEMENT    Assessment     Today's INR result of 2.10 is Therapeutic (goal INR of 2.0-3.0)        Warfarin taken as previously instructed    No new diet changes affecting INR    No new medication/supplements affecting INR    Continues to tolerate warfarin with no reported s/s of bleeding or thromboembolism     Previous INR was Therapeutic on 2.30 on 4/12/21.   (las 4 INR's therapeutic).    Plan:     Spoke on phone with daughterSienna regarding INR result and instructed:      Warfarin Dosing Instructions:   (Dinner. 1mg tabs)   - Continue current warfarin dose 2 mg daily.    Instructed patient to follow up no later than:  4-6 wks.   - INR scheduled on 6/21/21 @ right ear.    Education provided: importance of consistent vitamin K intake and target INR goal and significance of current INR result    Sienna verbalizes understanding and agrees to warfarin dosing plan.    Instructed to call the [facility] for any changes, questions or concerns. (#[phone])   ?   Columba Aguilar RN    Subjective/Objective:      Guera Peters, a 85 y.o. female is on warfarin. Guera Lynne reports:     Home warfarin dose: verbally confirmed home dose with Sienna and updated on anticoagulation calendar     Missed doses: No     Medication changes:  No     S/S of bleeding or thromboembolism:  No     New Injury or illness:  No     Changes in diet or alcohol consumption:  No     Upcoming surgery, procedure or cardioversion:  No    Anticoagulation Episode Summary     Current INR goal:  2.0-3.0   TTR:  65.2 % (1 y)   Next INR check:  6/21/2021   INR from last check:  2.10 (5/10/2021)   Weekly max warfarin dose:     Target end date:     INR check location:     Preferred lab:     Send INR reminders to:  [facility]    Indications    Chronic a-fib (H) (Resolved) [I48.20]           Comments:           Anticoagulation Care Providers     Provider Role Specialty Phone number    Dora Castillo DO  [facility] [phone]

## 2021-06-17 NOTE — PROGRESS NOTES
Assessment:     1. Diverticulosis of large intestine without hemorrhage     2. Long-term (current) use of anticoagulants  INR    CANCELED: INR   3. Chronic atrial fibrillation  INR    CANCELED: INR   4. Alzheimer's dementia without behavioral disturbance, unspecified timing of dementia onset       It appears that low grade intermittent diverticulitis may be contributory.  Main concern for patient was what can she eat specially when they go out for dinner.  Printed material provided and dietary reccomendation highlighted.      Plan:      The diagnosis was discussed with the patient and evaluation and treatment plans outlined.  Low fibre diet during symptoms     Subjective:      Chief Complaint   Patient presents with     Abdominal Pain     pt notes stomach issues on and off for a couple years     INR Check      Guera Peters is a 82 y.o. female who presents for evaluation of abdominal pain. Onset was a few months ago. Symptoms have been waxes and wanes. The pain is described as dull and aching, and is mild/10 in intensity. Pain is located in the LLQ without radiation.  Aggravating factors: certain foods, though unable to point to specific ones..  Alleviating factors: Resolves by self. Associated symptoms: none. The patient denies anorexia, chills, diarrhea, fever, hematochezia, melena, myalgias, nausea and vomiting.    Patient is a poor historian and there is mention of dementia in her problem list.    Reviewed Dr Bermudez last note and [facility] Dec 2017, evaluation and other ancillary notes in Care Everywhere and this note helped me affirm that patient may not have great insight.  Dr Babb notes from 2016  The patient is in an apparently stable phase of chronic illness. She had a ST elevation myocardial infarction about a year ago and has been following with a cardiologist with [facility], Dr. Mercado. As it turns out the patient was supposed to be seen for a follow up appointment next  month by Dr. Mercado. She's been treated with warfarin for atrial fibrillation and also Clopidogrel [ Plavix ] because of her history of percutaneous coronary intervention and intraluminal coronary stent. She was directed to remain with both anticoagulants until July. See assessment and plan section . She has multiple additional medical problems and needed a number of refills today. I would say we've completed the [facility] now for primary care and all refills provided and laboratory studies ordered. I have to add that this is a very pleasant woman but she's got prominent cognitive dysfunction including confabulation and virtually no understanding of her health problems at all. I questioned very carefully about how are things going at home ? I am assured by patient,  and daughter that there are no significant unmet needs at home and no home health care or other needs are seen at this point.     The following portions of the patient's history were reviewed and updated as appropriate: allergies, current medications, past family history, past medical history, past social history, past surgical history and problem list.  No Known Allergies    Current Outpatient Prescriptions on File Prior to Visit   Medication Sig Dispense Refill     alendronate (FOSAMAX) 70 MG tablet Take 70 mg by mouth every 7 days. On Sunday. Take in the morning on an empty stomach with a full glass of water 30 minutes before food       aspirin 81 MG EC tablet Take 81 mg by mouth daily.       carvedilol (COREG) 12.5 MG tablet Take 12.5 mg by mouth 2 (two) times a day with meals.       levothyroxine (SYNTHROID, LEVOTHROID) 100 MCG tablet Take 100 mcg by mouth Daily at 6:00 am.       lisinopril (PRINIVIL,ZESTRIL) 20 MG tablet Take 20 mg by mouth daily.       nitroglycerin (NITROSTAT) 0.4 MG SL tablet Place 0.4 mg under the tongue every 5 (five) minutes as needed for chest pain.       rivastigmine tartrate (EXELON) 3 MG capsule Take 3 mg by mouth 2  (two) times a day.        rosuvastatin (CRESTOR) 20 MG tablet Take 20 mg by mouth bedtime.       warfarin (COUMADIN) 1 MG tablet Take 1 to 1 1/2 tablets (1 to 1.5 mg) by mouth daily. Adjust dose based on INR results as directed. 100 tablet 1     warfarin (COUMADIN) 2.5 MG tablet Take 2.5 mg every day  PO 90 tablet 3     No current facility-administered medications on file prior to visit.        Patient Active Problem List   Diagnosis     Acquired hypothyroidism     Alzheimer's dementia without behavioral disturbance, unspecified timing of dementia onset     Angiomyolipoma     Arteriosclerotic heart disease (ASHD)     Carotid stenosis, bilateral     Chronic atrial fibrillation     Coronary artery disease     Cardiac pacemaker in situ     Diverticulosis of large intestine without hemorrhage     Essential hypertension with goal blood pressure less than 140/90     Transient cerebral ischemia     Hyperlipidemia LDL goal <100     Hypovitaminosis D     Long-term (current) use of anticoagulants     Microscopic hematuria     Mitral valve disorder     NSTEMI (non-ST elevated myocardial infarction)     Osteoporosis without current pathological fracture, unspecified osteoporosis type     Pacemaker     S/P MVR (mitral valve repair)     Chronic a-fib       Past Medical History:   Diagnosis Date     A-fib      CKD (chronic kidney disease) stage 3, GFR 30-59 ml/min      Disease of thyroid gland     hypothryoid     Heart attack 2011     HLD (hyperlipidemia)      Hypertension      Hypothyroid      Osteoporosis      Stroke     Hx TIA     Thrombocytopenia        Past Surgical History:   Procedure Laterality Date     ANGIOPLASTY       APPENDECTOMY       BREAST SURGERY      removal breast implant     CARDIAC PACEMAKER PLACEMENT       embolization of angiomyolipoma       MITRAL VALVE REPAIR       partial excision of thyroid       PATENT FORAMEN OVALE CLOSURE         Family History   Problem Relation Age of Onset     Heart disease Mother       Heart disease Father        Social History     Social History     Marital status:      Spouse name: N/A     Number of children: N/A     Years of education: N/A     Social History Main Topics     Smoking status: Former Smoker     Smokeless tobacco: Never Used      Comment: quit 30 years ago     Alcohol use Yes      Comment: once a month     Drug use: No     Sexual activity: Not Asked     Other Topics Concern     None     Social History Narrative    Lives in [place] with her        Review of Systems  A 12 point comprehensive review of systems was negative except as noted.       Objective:   /64 (Patient Site: Left Arm, Patient Position: Sitting, Cuff Size: Adult Regular)  Pulse 62  Temp 98.2  F (36.8  C) (Oral)   Resp 16  Wt 170 lb (77.1 kg)  BMI 28.29 kg/m2    GENERAL APPEARANCE:  Appearing stated age, smiling, alert, cooperative, and in no acute distress.   HEENT: Pupils equal, regular, react to light and accommodation. Extraocular muscles intact, fundi benign. Ear canals and tympanic membranes are normal. Lips, mouth, and throat are unremarkable.   NECK: Neck supple without adenopathy, thyromegaly or masses.   LYMPH: No anterior cervical or supraclavicular LN enlargement   PULMONARY: Normal respiratory effort. Chest is clear.   CARDIOVASCULAR: Heart auscultation: rhythm regular, bruit carotid artery absent.   SKIN: Warm and well perfused..   ABDOMEN: Abdomen soft, mild tenderness in LLQ without rebound. BS normal. No masses or organomegaly..   EXTREMITIES: Peripheral pulses are full. Extremities with no edema.   MENTAL STATUS: Alert, oriented and thought content appropriate   NEUROLOGIC: Station and gait normal, strength and movement normal,

## 2021-06-17 NOTE — TELEPHONE ENCOUNTER
Telephone Encounter by Columba Aguilar RN at 4/16/2021  3:28 PM     Author: Columba Aguilar RN Service: -- Author Type: Registered Nurse    Filed: 4/16/2021  3:36 PM Encounter Date: 4/16/2021 Status: Attested    : Columba Aguilar RN (Registered Nurse) Cosigner: Dora Castillo DO at 4/18/2021  8:09 AM    Attestation signed by Dora Castillo DO at 4/18/2021  8:09 AM    Agree with plan to continue                 Anticoagulation Annual Referral Renewal Review    Guera Peters's chart reviewed for annual renewal of referral to anticoagulation monitoring.        Criteria for anticoagulation nurse and/or pharmacist renewal met   Warfarin indication: Atrial Fibrillationm chronic Yes, per indication   Current with INR monitoring/compliant Yes Yes   Date of last office visit 04/12/21 Yes, had office visit within last year   Time in Therapeutic Range (TTR) 65.2 % Yes, TTR > 60%       Guera Peters met all criteria for anticoagulation management program initiated renewal.  New INR standing orders and anticoagulation referral renewal placed.      Columba Aguilar RN  3:28 PM

## 2021-06-17 NOTE — TELEPHONE ENCOUNTER
Telephone Encounter by Grecia Staples RN at 4/17/2020 10:04 AM     Author: Grecia Staples RN Service: -- Author Type: Registered Nurse    Filed: 4/17/2020 10:18 AM Encounter Date: 4/17/2020 Status: Attested    : Grecai Staples RN (Registered Nurse) Cosigner: Dora Castillo DO at 4/17/2020 11:20 AM    Attestation signed by Dora Castillo DO at 4/17/2020 11:20 AM    agree                ANTICOAGULATION  MANAGEMENT    Assessment     Today's INR result of 2.8 is Therapeutic (goal INR of 2.0-3.0)        Warfarin taken as previously instructed    No new diet changes affecting INR    No new medication/supplements affecting INR    Continues to tolerate warfarin with no reported s/s of bleeding or thromboembolism     Previous INR was Therapeutic    Plan:     Spoke with Sienna regarding INR result and instructed:     Warfarin Dosing Instructions:  Continue current warfarin dose 3 mg daily on sun/wed/fri; and 2 mg daily rest of week  (0 % change)    Instructed patient to follow up no later than: 6 weeks    Education provided: importance of therapeutic range    Sienna verbalizes understanding and agrees to warfarin dosing plan.    Instructed to call the [facility] for any changes, questions or concerns. (#[phone])   ?   Grecia Staples RN    Subjective/Objective:      Guera Peters, a 84 y.o. female is on warfarin.     Guera reports:     Home warfarin dose: template incorrect; verbally confirmed home dose with Sienna and updated on anticoagulation calendar     Missed doses: No     Medication changes:  No     S/S of bleeding or thromboembolism:  No     New Injury or illness:  No     Changes in diet or alcohol consumption:  No     Upcoming surgery, procedure or cardioversion:  No    Anticoagulation Episode Summary     Current INR goal:   2.0-3.0   TTR:   80.8 % (1 y)   Next INR check:   5/29/2020   INR from last check:   2.80 (4/17/2020)   Weekly max warfarin dose:      Target  end date:      INR check location:      Preferred lab:      Send INR reminders to:   [facility] ARASH FELIX    Indications    Chronic a-fib (Resolved) [I48.20]           Comments:            Anticoagulation Care Providers     Provider Role Specialty Phone number    Dora Castillo DO Referring Family Medicine [phone]

## 2021-06-17 NOTE — TELEPHONE ENCOUNTER
Medication Request  Medication name: warfarin 1mg- quantity 180   Requested [facility]: [facility]  Reason for request: patient out by tomorrow 5/11/21  When did you use medication last?:  5/10/21  Patient offered appointment:  patient declined  Okay to leave a detailed message: yes

## 2021-06-17 NOTE — TELEPHONE ENCOUNTER
Telephone Encounter by Columba Aguilar RN at 2/9/2021 11:55 AM     Author: Columba Aguilar RN Service: -- Author Type: Registered Nurse    Filed: 2/9/2021 12:30 PM Encounter Date: 2/9/2021 Status: Signed    : Columba Aguilar RN (Registered Nurse)       ANTICOAGULATION  MANAGEMENT    Assessment     Today's INR result of 1.90 is Subtherapeutic (goal INR of 2.0-3.0)        Warfarin taken as previously instructed    No new diet changes affecting INR    No new medication/supplements affecting INR    - 2/1/21 vaccinated with 1st Pfizer Covid-19.    Continues to tolerate warfarin with no reported s/s of bleeding or thromboembolism     Previous INR was Therapeutic at 2.30 from 12/29/20.    Plan:     Spoke on phone with daughterSienna regarding INR result and instructed:      Warfarin Dosing Instructions:   (evenings/dinner. 1mg tabs)   - today, advised one time booster with 3mg warfarin dose,   - then continue current warfarin dose 2 mg daily.    Instructed patient to follow up no later than:  1-2 wks.   - INR scheduled on 3/2/21 @ [facility].    Education provided: importance of consistent vitamin K intake and target INR goal and significance of current INR result    Sienna verbalizes understanding and agrees to warfarin dosing plan.    Instructed to call the [facility] for any changes, questions or concerns. (#[phone])   ?   Columba Aguilar RN    Subjective/Objective:      Guera MOSER Peters, a 85 y.o. female is on warfarin. Guera Lynne reports:     Home warfarin dose: verbally confirmed home dose with Sienna and updated on anticoagulation calendar     Missed doses: No     Medication changes:  No     S/S of bleeding or thromboembolism:  No     New Injury or illness:  No     Changes in diet or alcohol consumption:  No     Upcoming surgery, procedure or cardioversion:  No    Anticoagulation Episode Summary     Current INR goal:  2.0-3.0   TTR:  71.0 % (1 y)   Next INR check:  2/23/2021   INR from  last check:  1.90 (2/9/2021)   Weekly max warfarin dose:     Target end date:     INR check location:     Preferred lab:     Send INR reminders to:  NANCY FELIX    Indications    Chronic a-fib (H) (Resolved) [I48.20]           Comments:           Anticoagulation Care Providers     Provider Role Specialty Phone number    Dora Castillo DO Referring Family Medicine [phone]

## 2021-06-17 NOTE — TELEPHONE ENCOUNTER
Telephone Encounter by Jannette Oliver RN at 10/14/2020  2:23 PM     Author: Jannette Oliver RN Service: -- Author Type: Registered Nurse    Filed: 10/14/2020  2:28 PM Encounter Date: 10/14/2020 Status: Signed    : Jannette Oliver RN (Registered Nurse)       ANTICOAGULATION  MANAGEMENT    Assessment     Today's INR result of 3.3 is Supratherapeutic (goal INR of 2.0-3.0)        Warfarin taken as previously instructed    No new diet changes affecting INR.  DaughterSienna states patient is feeling much better than last week, eating as usual.     No new medication/supplements affecting INR    Continues to tolerate warfarin with no reported s/s of bleeding or thromboembolism     Previous INR was Therapeutic on current dose x2     Plan:     Spoke on phone with patients Sienna killian regarding INR result and instructed:     Warfarin Dosing Instructions:  Continue current warfarin dose 2 mg daily     Instructed patient to follow up no later than: 2 weeks     Education provided: importance of therapeutic range, target INR goal and significance of current INR result, monitoring for bleeding signs and symptoms and importance of notifying clinic for diarrhea, nausea/vomiting, reduced intake and/or illness    Sienna verbalizes understanding and agrees to warfarin dosing plan.    Instructed to call the [facility] for any changes, questions or concerns. (#[phone])   ?   Jannette Oliver RN    Subjective/Objective:      Guera Peters, a 85 y.o. female is on warfarin.     Guera reports:     Home warfarin dose: verbally confirmed home dose with Sienna and updated on anticoagulation calendar     Missed doses: No     Medication changes:  No     S/S of bleeding or thromboembolism:  No     New Injury or illness:  No     Changes in diet or alcohol consumption:  No     Upcoming surgery, procedure or cardioversion:  No    Anticoagulation Episode Summary     Current INR goal:  2.0-3.0   TTR:  63.4 % (1 y)   Next INR check:  10/28/2020    INR from last check:  3.30 (10/14/2020)   Weekly max warfarin dose:     Target end date:     INR check location:     Preferred lab:     Send INR reminders to:  NANCY FELIX    Indications    Chronic a-fib (H) (Resolved) [I48.20]           Comments:           Anticoagulation Care Providers     Provider Role Specialty Phone number    Dora Castillo DO Referring Family Medicine [phone]

## 2021-06-17 NOTE — TELEPHONE ENCOUNTER
Refill Approved    Rx renewed per Medication Renewal Policy. Medication was last renewed on 4/1/20.    Sandor Jackson, [facility] Triage/Med Refill 5/24/2021     Requested Prescriptions   Pending Prescriptions Disp Refills     carvediloL (COREG) 12.5 MG tablet [Pharmacy Med Name: CARVEDILOL 12.5MG TABLETS] 180 tablet 3     Sig: TAKE 1 TABLET BY MOUTH TWICE DAILY WITH MEALS       Beta-Blockers Refill Protocol Passed - 5/23/2021  4:44 PM        Passed - PCP or prescribing provider visit in past 12 months or next 3 months     Last office visit with prescriber/PCP: 4/12/2021 Dora Castillo DO OR same dept: 4/12/2021 Dora Castillo DO OR same specialty: 4/12/2021 Dora Castillo DO  Last physical: 10/6/2020 Last MTM visit: Visit date not found   Next visit within 3 mo: Visit date not found  Next physical within 3 mo: Visit date not found  Prescriber OR PCP: Dora Castillo DO  Last diagnosis associated with med order: 1. Coronary artery disease involving native heart without angina pectoris, unspecified vessel or lesion type  - carvediloL (COREG) 12.5 MG tablet [Pharmacy Med Name: CARVEDILOL 12.5MG TABLETS]; TAKE 1 TABLET BY MOUTH TWICE DAILY WITH MEALS  Dispense: 180 tablet; Refill: 3    If protocol passes may refill for 12 months if within 3 months of last provider visit (or a total of 15 months).             Passed - Blood pressure filed in past 12 months     BP Readings from Last 1 Encounters:   04/12/21 138/65

## 2021-06-18 NOTE — PATIENT INSTRUCTIONS - HE
Patient Instructions by Dora Castillo DO at 10/6/2020 10:20 AM     Author: Dora Castillo DO Service: -- Author Type: Physician    Filed: 10/6/2020 11:09 AM Encounter Date: 10/6/2020 Status: Addendum    : Dora Castillo DO (Physician)    Related Notes: Original Note by Dora Castillo DO (Physician) filed at 10/6/2020 10:40 AM       [facility]  PHONE: [phone]    Patient Education     Exercise for a Healthier Heart  You may wonder how you can improve the health of your heart. If youre thinking about exercise, youre on the right track. You dont need to become an athlete, but you do need a certain amount of brisk exercise to help strengthen your heart. If you have been diagnosed with a heart condition, your doctor may recommend exercise to help stabilize your condition. To help make exercise a habit, choose safe, fun activities.       Be sure to check with your health care provider before starting an exercise program.    Why exercise?  Exercising regularly offers many healthy rewards. It can help you do all of the following:    Improve your blood cholesterol levels to help prevent further heart trouble    Lower your blood pressure to help prevent a stroke or heart attack    Control diabetes, or reduce your risk of getting this disease    Improve your heart and lung function    Reach and maintain a healthy weight    Make your muscles stronger and more limber so you can stay active    Prevent falls and fractures by slowing the loss of bone mass (osteoporosis)    Manage stress better  Exercise tips  Ease into your routine. Set small goals. Then build on them.  Exercise on most days. Aim for a total of 150 or more minutes of moderate to  vigorous intensity activity each week. Consider 40 minutes, 3 to 4 times a week. For best results, activity should last for 40 minutes on average. It is OK to work up to the 40 minute period over time. Examples of moderate-intensity  activity is walking one mile in 15 minutes or 30 to 45 minutes of yard work.  Step up your daily activity level. Along with your exercise program, try being more active throughout the day. Walk instead of drive. Do more household tasks or yard work.  Choose one or more activities you enjoy. Walking is one of the easiest things you can do. You can also try swimming, riding a bike, or taking an exercise class.  Stop exercising and call your doctor if you:    Have chest pain or feel dizzy or lightheaded    Feel burning, tightness, pressure, or heaviness in your chest, neck, shoulders, back, or arms    Have unusual shortness of breath    Have increased joint or muscle pain    Have palpitations or an irregular heartbeat      0861-9571 [employer]. [address] [place] [address] 50155. All rights reserved. This information is not intended as a substitute for professional medical care. Always follow your healthcare professional's instructions.         Patient Education   Understanding [employer] MyPlate  The USDA (US Department of Agriculture) has guidelines to help you make healthy food choices. These are called MyPlate. MyPlate shows the food groups that make up healthy meals using the image of a place setting. Before you eat, think about the healthiest choices for what to put onto your plate or into your cup or bowl. To learn more about building a healthy plate, visit www.choosemyplate.gov.       The Food Groups    Fruits: Any fruit or 100% fruit juice counts as part of the Fruit Group. Fruits may be fresh, canned, frozen, or dried, and may be whole, cut-up, or pureed. Make half your plate fruits and vegetables.    Vegetables: Any vegetable or 100% vegetable juice counts as a member of the Vegetable Group. Vegetables may be fresh, frozen, canned, or dried. They can be served raw or cooked and may be whole, cut-up, or mashed. Make half your plate fruits and vegetables.     Grains: All foods made from grains  are part of the Grains Group. These include wheat, rice, oats, cornmeal, and barley such as bread, pasta, oatmeal, cereal, tortillas, and grits. Grains should be no more than a quarter of your plate. At least half of your grains should be whole grains.    Protein: This group includes meat, poultry, seafood, beans and peas, eggs, processed soy products (like tofu), nuts (including nut butters), and seeds. Make protein choices no more than a quarter of your plate. Meat and poultry choices should be lean or low fat.    Dairy: All fluid milk products and foods made from milk that contain calcium, like yogurt and cheese are part of the Dairy Group. (Foods that have little calcium, such as cream, butter, and cream cheese, are not part of the group.) Most dairy choices should be low-fat or fat-free.    Oils: These are fats that are liquid at room temperature. They include canola, corn, olive, soybean, and sunflower oil. Foods that are mainly oil include mayonnaise, certain salad dressings, and soft margarines. You should have only 5 to 7 teaspoons of oils a day. You probably already get this much from the food you eat.  Use ShareSquare to Help Build Your Meals  The SuperTracker can help you plan and track your meals and activity. You can look up individual foods to see or compare their nutritional value. You can get guidelines for what and how much you should eat. You can compare your food choices. And you can assess personal physical activities and see ways you can improve. Go to www.Blogic.gov/supertracker/.    3258-4821 [employer]. [address] [place] [address] 85621. All rights reserved. This information is not intended as a substitute for professional medical care. Always follow your healthcare professional's instructions.           Patient Education   Instrumental Activities of Daily Living  Your Health Risk Assessment indicates you have difficulties with instrumental activities of daily  living which include laundry, housekeeping, banking, shopping, using the telephone, food preparation, transportation, or taking your own medications. Please make a follow up appointment for us to address this issue in more detail.    [employer] has resources available on the following website: https://www.healthRuffWire.org/caregivers.html     Also, here is a local agency that provides help with meals and other assistance:   [facility] Line: [phone]       Advance Directive  Patients advance directive was discussed and I am comfortable with the patients wishes.  Patient Education   Personalized Prevention Plan  You are due for the preventive services outlined below.  Your care team is available to assist you in scheduling these services.  If you have already completed any of these items, please share that information with your care team to update in your medical record.  Health Maintenance   Topic Date Due   ? FALL RISK ASSESSMENT  07/09/2020   ? DXA SCAN  09/10/2020   ? MEDICARE ANNUAL WELLNESS VISIT  10/06/2021   ? ADVANCE CARE PLANNING  11/05/2024   ? TD 18+ HE  04/07/2027   ? Pneumococcal Vaccine: 65+ Years  Completed   ? INFLUENZA VACCINE RULE BASED  Completed   ? ZOSTER VACCINES  Completed   ? Pneumococcal Vaccine: Pediatrics (0 to 5 Years) and At-Risk Patients (6 to 64 Years)  Aged Out   ? HEPATITIS B VACCINES  Aged Out

## 2021-06-18 NOTE — PROGRESS NOTES
ASSESSMENT & PLAN:  Problem List Items Addressed This Visit     Alzheimer's dementia without behavioral disturbance, unspecified timing of dementia onset    Relevant Medications    rivastigmine tartrate (EXELON) 3 MG capsule    Essential hypertension with goal blood pressure less than 140/90    Chronic a-fib      Other Visit Diagnoses     LLQ pain    -  Primary    Relevant Orders    US Pelvis With Transvaginal Non OB    Comprehensive Metabolic Panel    Need for vaccination        Relevant Orders    Varicella Zoster, Recombinant Vaccine IM    Hypothyroid        Relevant Orders    Thyroid [place]        -Patient was seen today with her daughter for follow-up of left lower quadrant abdominal pain that she has been adamant has been diverticulitis but spent a long time discussing with the patient to have some cognitive insufficiency that diverticulitis is never been diagnosed on CT scan.  We did treat her with a course of Augmentin to try to piece her and she thought that symptoms have been improving but on examination today she is still having some lower abdominal pain although she does not think she is complaining about it as often as she actually is.  We reviewed CT scan reports and suggested even getting an ultrasound of the pelvis to evaluate her ovaries.  A colonoscopy was not felt to be necessary by GI but following up with them as another option.  I had stopped her Rivastigmine  that she is taking for her dementia to see if that would help remedy her symptoms.  I suggested going back to a lower dose of 3 mg twice daily in 2 weeks if her symptoms have fully improved.  If she is tolerating the 3 mg twice daily then she could go back to 4.5 mg twice daily.  We also on examination noted that she had tenderness to palpation with light palpation just under the skin that seem more consistent with abdominal muscle strain or herniation.  It is not significant enough to need any kind of evaluation or surgical treatment and  we discussed using warm packs when the discomfort is bothering her and acetaminophen.  She is stable from all other health aspects and we are going to have her come back for labs when she comes for an INR check in 1 week and we can check her thyroid levels complete metabolic panel and also may be a lactic acid level as she does have significant atherosclerosis that we discussed good symptoms because ischemic bowel.  Patient and daughter were in agreement to the plan.  From a cardiology perspective things are stable and I reviewed his last note.  Suggest following up in 3 months on her overall health  -due for bone density scan.  She has been on Fosamax for 5 years and we will have to determine continuing on current regimen    Patient Instructions   Continue holding your Exelon/rivastigmine for memory for the next two weeks to see if you stop having abdominal pain.      If you're having pain, try taking Tylenol or using a heating pad on your abdomen.     Double check the dose you have at home and report it back to us.     Keep a diary of your abdominal pain and bring it in next time.     If you don't have abdominal pain for the next two weeks, we can restart your Exelon/rivastigmine at 3 mg twice daily.     Schedule the pelvic ultrasound.     Shingrix vaccine today for shingles prevention - next dose due in 2-6 months.        Orders Placed This Encounter   Procedures     US Pelvis With Transvaginal Non OB     Standing Status:   Future     Number of Occurrences:   1     Standing Expiration Date:   6/4/2019     Order Specific Question:   Reason for Exam (Describe Symptoms):     Answer:   ongoing LLQ pain. eval for ovarian abnormalities.     Order Specific Question:   Can the procedure be changed per Radiologist protocol?     Answer:   Yes     Varicella Zoster, Recombinant Vaccine IM     Standing Status:   Standing     Number of Occurrences:   2     Standing Expiration Date:   6/4/2019     Comprehensive Metabolic Panel      Standing Status:   Future     Standing Expiration Date:   6/4/2019     Thyroid [place]     Standing Status:   Future     Standing Expiration Date:   6/4/2019     Medications Discontinued During This Encounter   Medication Reason     rivastigmine tartrate (EXELON) 3 MG capsule Reorder       Return in about 3 months (around 9/4/2018) for llq pain .     CHIEF COMPLAINT:  Chief Complaint   Patient presents with     Follow-up     Follow up on diverticulosis, abdominal pain.  Getting better.         HISTORY OF PRESENT ILLNESS:  Guera is a 82 y.o. female presenting to the clinic today with her daughter, Sienna, to follow up on her abdominal pain. She was seeing a gastroenterologist, and she doesn't remember what they found; she thinks everything was normal. She has some stomach problems every so often; she has some problems in her lower left quadrant. Sienna notes she is better than she used to be now. She took a course of Augmentin and that took away her pain. She used to have the pain every day, but she has been only having the pain a few days per week since she took Augmentin. She has a little pain in her abdomen today. It feels like there is a lump in her abdomen, but there is not sharp, stabbing pain. She feels achy. She goes to lay down because she doesn't feel perfect. She forgets about the abdomen issues sometimes and she goes about her day. Her pain is 3-4/10, and it isn't hurting her; it is different than the other side of her abdomen. Sienna notes it is affecting her daily life; she didn't think so before Sienna said something. Sienna notes she sometimes cancels lunch plans when it is bothering her. It lasted 2 months last year, then it came back again in December 2017 and also March 2018. Her bowel movements are normal and happen at the same time every day. She doesn't have bloating or cramping. She eats some yogurt in the mornings; she stopped for a couple weeks, and she doesn't think it made a difference.  Sienna notes she used to weigh about 182 pounds; she lost weight when she was having abdominal pain flares because she wasn't eating as much. She doesn't think she is doing anything differently. The doctor she was seeing at [place] was at a loss for what to do, and he said she could see a general surgeon. She was told a colonoscopy wasn't necessary, but it would be the next step. Sienna notes her stomach pain is bad on the bad days she has. She still has all her female organs. She isn't eating hamburgers at [employer] anymore because they made her feel sick. She sometimes has a milkshake. They eat wheat bread. She thinks she has an okay diet. When she is laying in bed with the abdominal pain, she says she doesn't want to feel that way every day. She has stopped taking Exelon for the past couple weeks, and that didn't make much of a difference. She was taking Exelon 4.5 mg twice daily, and she didn't have any intolerance to it. Her system for taking pills has been working pretty well. She does her own laundry, housework, and cooking. She doesn't think there is anything wrong with her. She was diagnosed with memory impairment at [facility] and she used to go to [facility]. She is still feeling the abdominal pain on the left, but Sienna notes it is not keeping her in bed. She had a bladder work up at urology. Sienna notes they found nothing at urology; she went because of her hematuria. That was about a year ago, and they thought it was from her warfarin use. Sienna is wondering if she should be eating a high fiber diet or not. She never strains to have a bowel movement and she has no cracks in her stool. She hasn't been doing anything strenuous or lifting anything heavy in the past few months. Sienna is wondering if a strained abdominal muscle would make her feel sick enough to stay in bed. She feels really good in general, unless she has the abdominal pain.     Chronic A-Fib: She takes 3 mg warfarin on  Tuesdays, Thursdays, and Saturdays, and 2 mg on the rest of the days. She follows with Dr. Mercado at [place]; she was last seen in October 2017 and she follows up annually. She has a pacemaker, and it was checked at home about a month ago. She is tolerating aspirin 81 mg daily and rosuvastatin 20 mg daily. She has had stents placed in the past.     Osteoporosis: Sienna notes they were going to do a DXA at her other clinic in August 2018; she will have been on Fosamax for 5 years at that time. She is willing to repeat a DXA this year.     Hypothyroidism: Sienna doesn't know when her thyroid was last checked. She was previously a patient at [facility].     Health Maintenance: She had a shingles vaccine before, but she hasn't had the Shingrix. Sienna notes her arm ached for a week after the first dose of the Shingrix vaccine. She is getting her blood drawn next week.     REVIEW OF SYSTEMS:   She denies any vaginal bleeding, hematochezia, change in her stool caliber, dysuria, urinary frequency, chest pain, or lightheadedness. All other systems are negative.     PFSH:    TOBACCO USE:  History   Smoking Status     Former Smoker   Smokeless Tobacco     Never Used     Comment: quit 30 years ago        VITALS:  Vitals:    06/04/18 1026   BP: 106/50   Pulse: 70   Resp: 20   Temp: 98.1  F (36.7  C)   TempSrc: Oral   Weight: 171 lb (77.6 kg)     Wt Readings from Last 3 Encounters:   06/04/18 171 lb (77.6 kg)   05/15/18 172 lb 9.6 oz (78.3 kg)   05/03/18 170 lb (77.1 kg)     Body mass index is 28.46 kg/(m^2).  No LMP recorded. Patient is postmenopausal.     PHYSICAL EXAM:  GENERAL:  Reveals an alert 82 y.o. female in NAD.  Vitals:  Per nursing notes.  CARDIAC:  Regular rate and rhythm without murmurs, rubs, or gallops. Legs without edema. Carotids without bruits.   LUNGS: Clear.  Respiratory effort normal.  ABDOMEN: Normal bowel sounds, soft nondistended mildly tender to palpation left lower quadrant with focal skin and tissue texture  changes possibly consistent with a small herniated defect.  No rebound tenderness or guarding  SKIN:   Without rash, bruise, or palpable lesions.  NEURO:  Cranial nerves II-XII intact.     PSYCH:  Mood appropriate, memory impaired    QUALITY MEASURES:  The following high BMI interventions were performed this visit: dietary needs education and lifestyle education regarding diet    DATA REVIEWED:  ADDITIONAL HISTORY SUMMARIZED (2): Additional history provided by patient's daughter. Reviewed Dr. Mercado' cardiology note 10/11/2017 from [facility]. Reviewed GI notes   DECISION TO OBTAIN EXTRA INFORMATION (1): Accessed Care Everywhere.   RADIOLOGY TESTS (1): Ordered DXA.   LABS (1): Reviewed and ordered labs.  MEDICINE TESTS (1): None.  INDEPENDENT REVIEW (2 each): None.     The visit lasted a total of 37 minutes face to face with the patient. Over 50% of the time was spent counseling and educating the patient about abdominal pain, her chronic health conditions, diet, medications, and health maintenance.     ISharon, am scribing for and in the presence of Dr. Castillo.  IDora DO , personally performed the services described in this documentation, as scribed by Sharon Aquino in my presence, and it is both accurate and complete.    This note has been dictated using voice recognition software. Any grammatical or context distortions are unintentional and inherent to the software.     MEDICATIONS:  Current Outpatient Prescriptions   Medication Sig Dispense Refill     alendronate (FOSAMAX) 70 MG tablet Take 70 mg by mouth every 7 days. On Sunday. Take in the morning on an empty stomach with a full glass of water 30 minutes before food       aspirin 81 MG EC tablet Take 81 mg by mouth daily.       carvedilol (COREG) 12.5 MG tablet Take 12.5 mg by mouth 2 (two) times a day with meals.       levothyroxine (SYNTHROID, LEVOTHROID) 100 MCG tablet Take 100 mcg by mouth Daily at 6:00 am.       lisinopril  (PRINIVIL,ZESTRIL) 20 MG tablet Take 20 mg by mouth daily.       nitroglycerin (NITROSTAT) 0.4 MG SL tablet Place 0.4 mg under the tongue every 5 (five) minutes as needed for chest pain.       rosuvastatin (CRESTOR) 20 MG tablet Take 20 mg by mouth bedtime.       warfarin (COUMADIN) 1 MG tablet Take 2-3 tablets (2-3 mg total) by mouth See Admin Instructions. Adjust dose per INR results as instructed. 204 tablet 1     rivastigmine tartrate (EXELON) 3 MG capsule Take 1 capsule (3 mg total) by mouth 2 (two) times a day. 60 capsule 1     No current facility-administered medications for this visit.         Total data points: 5

## 2021-06-19 NOTE — LETTER
Letter by Marva Munoz CHW at      Author: Marva Munoz CHW Service: -- Author Type: --    Filed:  Encounter Date: 12/4/2019 Status: Signed       [facility] Maintenance Wellness Plan provides private information in regard to the work I have done with my Care Team at my Primary Care Clinic.  This document provides insight on the goals I have worked hard to achieve.  My Care Team congratulates me on my journey to become well.  With the assistance of the Clinic Care Coordination Team and my Primary Care Provider, I have succeeded in improving areas of my health that I identified as barriers to becoming well.  I will continue to seek wellness and use the skills I have obtained to further my journey.  My Community Health Worker will follow up with me in 2 months.  In the meantime, if I should have any questions or concerns I will contact my Community Health Worker.    My Clinic Care Coordination Wellness Plan    [facility]  [address] [place]  [address]  [phone]    My Preferred Method of Contact:  Phone: [phone] Sienna    My Primary/Preferred Language:  English         Emergency Contact: Extended Emergency Contact Information  Primary Emergency Contact: Saurav Peters  Address: [address] [place] [address] [facility]  Home Phone: [phone]  Mobile Phone: [phone]  Relation: Spouse  Secondary Emergency Contact: Sienna Yanez  Address: [address] [place] [address] [id number] [place] [address]  Home Phone: [phone]  Mobile Phone: [phone]  Relation: Child     PCP:  Dora Castillo DO     Care Team            Dora Castillo DO   [phone] PCP - General, Family Medicine    Merged    Dora Castillo DO   [phone] Family Medicine    Merged    Dora Castillo DO   [phone] Assigned PCP    Marva Munoz CHW   [phone] Community Health Worker,  Primary Care - CC    Irina Villegas, RN Lead Care Coordinator, Primary Care - CC        Accomplishments:  Goals        Patient Stated    ? COMPLETED: Healthy Eating (pt-stated)      Goal Statement- My family has resources for Meals on Wheels and Mom's Meals and will discuss trying these services in the new year.  Action steps to achieve this goal  1.  My family will help set up a meal service for me and my spouse    Date goal set: 10/25/19  Completed: 11/25/19      ? COMPLETED: Psychosocial (pt-stated)      Goal:  I will complete a healthcare directive in the next 30-60 days;  Completed- copy made and to be scanned into chart  Action Steps to achieve this goal  1) I will have healthcare directive notarized or signed by two witnesses  2) I will give a copy to my health care agent, primary care clinic, and keep original safe at home        ? COMPLETED: Reducing Risks (pt-stated)      Goal Statement- My family will installed grab bars and shower chair in bathroom.  My family has the information for Life Alert    Action steps to achieve this goal  1.  My family will help install grab bars in the bathroom  2.  My family will look into a Life Alert      Date goal set:  10/25/19  Completed: 11/25/19      ? COMPLETED: Transportation (pt-stated)      Goal Statement- My family will continue to drive me to appts but has other transportation resources provided by  if needed    Action steps to achieve this goal  1.  My family will help me and my  schedule a ride      Date goal set:  10/25/19  Completed: 11/25/19            Advanced Directive/Living Will: On file with the [facility]    Clinical Emergency Plan  Emergency Plan Recommendation:     When to Use the Emergency Department (ED)  An emergency means you could die if you dont get care quickly. Or you could be hurt permanently (disabled). Read below to know when to use -- and when not to use -- an emergency department (also called ED).     Dangers to your life  Here are  examples of emergencies. These need immediate care:  A hard time breathing  Severe chest pain  Choking  Severe bleeding  Suddenly not able to move or speak  Blacking out (fainting)`  Poisoning     Dangers of permanent injuries  Here are other emergencies. These also need immediate care:  Deep cuts or severe burns  Broken bones, or sudden severe pain and swelling in a joint     When its an emergency  If you have an emergency, follow these steps:     1. Go to the nearest emergency department  If you can, go to the hospital ED closest to you right away.  If you cannot get there right away, or if it is not safe to take yourself, call 911 or your police emergency number.  2. Call your primary care doctor  Tell your doctor about the emergency. Call within 24 hours of going to the ED.  If you cannot call, have someone call for you.  Go to your doctor (not the ED) for any follow-up care.     When its not an emergency  If a problem is not an emergency, follow these steps:     1. Call your primary care doctor  If you dont know the name of your doctor, call your health plan.  If you cannot call, have someone call for you.  2. Follow instructions  Your doctor will tell you what you should do.  You may be told to see your doctor right away. You may be told to go to the ED. Or you may be told to go to an urgent care center.  Follow your doctors advice.  Goal:   I would like support to get the Medical Waiver -[id number] complete by PCP or psychotherapist ([facility]) within the 3 months.     Action Steps:  1. I will inform Care Guide when Medical waiver is completed to make copies and give to PCP.  2. I will send the original Medical Waiver to my .     Alzheimer Disease  Alzheimer disease is a brain illness that can happen usually in older adults, but it can also happen as early as age 40. It is the most common cause of dementia. It is a progressive disease. This means it gets worse over time.  What is Alzheimer  disease?  Alzheimer disease causes a series of changes to nerves of the brain. Some nerves form into clumps and tangles, and lose some of their connections to other nerves.  Healthcare providers dont fully understand what causes Alzheimer disease. But they think these may be some of the causes:    Age and family history    Certain genes    Abnormal protein deposits in the brain    Environmental factors    Problems with a persons immune system    Possibly infections  Symptoms of Alzheimer disease  The disease causes changes in behavior and thinking known as dementia. The symptoms include:    Memory loss    Confusion    Restlessness    Personality and behavior changes    Problems with judgment    Problems communicating with others    Inability to follow directions    Lack of emotion  Diagnosing Alzheimer disease  No single test is able to diagnose Alzheimer disease. Instead healthcare providers use a series of tests to rule out other health conditions. The tests may include:    A complete medical history. This may include questions about overall health and past health problems. The healthcare provider may ask how well the person can do daily tasks. The healthcare provider may ask family or close friends about any changes in behavior or personality.    Mental status test. This is a test of memory, problem solving, attention, counting, and language.     Standard medical tests. These may include blood and urine tests to find possible causes for the problem.    Brain imaging tests. CT, MRI, or positron emission tomography (PET) may be used to rule out other causes of the problem.  Treating Alzheimer disease  Alzheimer disease has no cure. Instead healthcare providers can help ease some symptoms. This can make a person with Alzheimer more comfortable. Treatment can also make it easier for their caregivers to take care of them.  Some medicines may help slow the decline of a persons memory, thinking, and language skills.  They may help with problems of behavior, such as aggression. They can lessen hallucinations and delusions. These medicines can work for some but not all people. And they may help for only a limited time. Medicines include:    Cholinesterase inhibitors    Donepezil    Galantamine    Rivastigmine    Memantine  In some cases, behavior problems can be caused by medicine side effects. Talk with the persons healthcare provider about all medicines he or she is taking.  Keeping healthy  For a person with Alzheimer, its important to stay healthy. Good nutrition and physical and social activity are vital. A calm and well-structured environment will help. Make sure to keep up with healthcare appointments and managing other health conditions, such as diabetes. Some people benefit from having a nutritionist help to prevent weight loss.   Caring for someone with Alzheimer  A person with Alzheimer will need more caregiving over time. Talk with your healthcare provider about caregiving resources.                      All [facility] clinic patients have access to a Nurse 24 hours a day, 7 days a week.  If you have questions or want advice from a Nurse, please know [facility] is here for you.  You can call your clinic and they will connect you or you can call Care Connection at [phone].  [facility] also has Walk In Care clinics in multiple locations.  Call the number listed above for more information about our Walk In Care clinics or visit the [facility] website at [email or web address].

## 2021-06-19 NOTE — LETTER
Letter by Dayana Medina LICSW at      Author: Dayana Medina LICSW Service: -- Author Type: --    Filed:  Encounter Date: 10/25/2019 Status: Signed       CARE COORDINATION  [facility] [place]  [address] [place] [address]  October 25, 2019    Guera Peters  [address] [facility] [address]      Dear Guera,    I am a clinic care coordinator who works with Dora Castillo DO.  Below is a description of clinic care coordination and how I can further assist you.     The clinic care coordinator team is made up of a registered nurse,  and community health worker who understand the health care system. The goal of clinic care coordination is to help you manage your health and improve access to the health care system in the most efficient manner. The team can assist you in meeting your health care goals by providing education, coordinating services, strengthening the communication among your providers, assist you with any financial, behavioral, psychosocial, chemical dependency, counseling, and/or psychiatric resources if needed.    Please feel free to contact Moira, the Community Health Worker, at [phone] with any questions or concerns. We are focused on providing you with the highest-quality healthcare experience possible and that all starts with you.     Sincerely,   Dayana Medina    Enclosed: I have enclosed a copy of the Care Plan. This has helpful information and goals that we have talked about. Please keep this in an easy to access place to use as needed.

## 2021-06-19 NOTE — LETTER
Letter by Dayana Medina LICSW at      Author: Dayana Medina LICSW Service: -- Author Type: --    Filed:  Encounter Date: 10/25/2019 Status: Signed       Care Plan  About Me:    Patient Name:  Guera Peters    YOB: 1935  Age:         84 y.o.   [facility] MRN:    [id number] Telephone Information:  Home Phone [phone]   Mobile [phone]       Address:  [address] [place], [place] [id number] Email address:  [email or web address]      Emergency Contact(s)  Extended Emergency Contact Information      Name: Saurav Peters  Address:       [address] [place] [address]  Home Phone Number: [phone]  Relation: Spouse      Name[place] [address]  Home Phone Number: [phone]  Relation: Child      [place] [address]  Home Phone Number: [phone]  Relation: Child      Name[place] [address]  Home Phone Number: [phone]  Relation: Child      [place] [address]  Home Phone Number: [phone]  Relation: Spouse      [place] [address]  Home Phone Number: [phone]  Relation: Child      [place] [address]  Home Phone Number: [phone]  Relation: Child          Primary language:  English     needed? [employer]:  [phone] op. 1  Other communication barriers:    Preferred Method of Communication:  Zora  Current living arrangement: I live in a private home with spouse  Mobility Status/ Medical Equipment: Independent    Health Maintenance  Health Maintenance Reviewed: Not assessed    My Access Plan  Medical Emergency 911   Primary Clinic Line Dora Castillo,  - [phone]   24 Hour Appointment Line [phone] or  [phone]) (toll-free)   24 Hour Nurse Line [phone] (toll-free)   Preferred Urgent Care [facility], [phone]    Preferred Hospital [facility]  [phone]   Preferred Pharmacy [id number] - [place], [place] - [address] [facility] [address] [facility]     Behavioral Health Crisis Line The National Suicide Prevention Lifeline at 1-715.538.2152 or 288             My Care Team Members  Patient Care Team       Relationship Specialty Notifications Start End    Dora Castillo DO PCP - General Family Medicine  10/27/18     Merged    Phone: [phone] Fax: [phone]         [address] [facility] [address]    Dora Castillo DO  Family Medicine  10/27/18     Merged    Phone: [phone] Fax: [phone]         [address] [facility] [address]    Dora Castillo DO Assigned PCP   7/28/19     Phone: [phone] Fax: [phone]         [address] [facility] [address]    Dayana Medina LICSW Lead Care Coordinator Primary Care - CC Admissions 10/25/19     Fax: [phone]         Marva Munoz CHW Community Health Worker Primary Care - CC Admissions 10/25/19     Phone: [phone] Fax: [phone]        Irina Villegas, RN Clinic Care Coordinator Primary Care - CC Admissions 10/25/19     Fax: [phone]                 My Care Plans  Self Management and Treatment Plan  Goals and (Comments)  Goals        General    Healthy Eating (pt-stated)     Notes - Note created  10/25/2019 10:26 AM by Dayana Medina LICSW    Goal Statement- My family would like me to try Meals on Wheels or Mom's Meals in the next 6 months    Action steps to achieve this goal  1.  My family will help set up a meal service for me and my spouse    Date goal set: 10/25/19      Reducing Risks (pt-stated)     Notes - Note edited  10/25/2019 10:29 AM by Dayana Medina LICSW    Goal Statement- My family will help improve home safety in the next 6 months    Action steps to achieve this goal  1.  My family will help install grab bars in the bathroom  2.  My family will  look into a Life Alert      Date goal set:  10/25/19      Transportation (pt-stated)     Notes - Note created  10/25/2019 10:24 AM by Dayana Medina LICSW    Goal Statement- My family would like me to try [employer] as an alternative in the next 6 months    Action steps to achieve this goal  1.  My family will help me and my  schedule a ride      Date goal set:  10/25/19               Advance Care Plans/Directives Type:   Type Advanced Care Plans/Directives: Advanced Directive - On File    My Medical and Care Information  Problem List   Patient Active Problem List   Diagnosis   ? Acquired hypothyroidism   ? Alzheimer's dementia without behavioral disturbance, unspecified timing of dementia onset (H)   ? Angiomyolipoma   ? Arteriosclerotic heart disease (ASHD)   ? Carotid stenosis, bilateral   ? Chronic atrial fibrillation   ? Coronary artery disease   ? Cardiac pacemaker in situ   ? Diverticulosis of large intestine without hemorrhage   ? Essential hypertension with goal blood pressure less than 140/90   ? Transient cerebral ischemia   ? Hyperlipidemia LDL goal <100   ? Hypovitaminosis D   ? Long term current use of anticoagulant therapy   ? Mitral valve disorder   ? NSTEMI (non-ST elevated myocardial infarction) (H)   ? Osteoporosis without current pathological fracture, unspecified osteoporosis type   ? Pacemaker   ? S/P MVR (mitral valve repair)   ? Chronic a-fib   ? Essential hypertension, benign      Current Medications and Allergies:  See printed Medication Report.    Care Coordination Start Date: 10/25/2019   Frequency of Care Coordination:     Form Last Updated: 10/25/2019

## 2021-06-21 ENCOUNTER — AMBULATORY - HEALTHEAST (OUTPATIENT)
Dept: LAB | Facility: CLINIC | Age: 86
End: 2021-06-21

## 2021-06-21 ENCOUNTER — COMMUNICATION - HEALTHEAST (OUTPATIENT)
Dept: ANTICOAGULATION | Facility: CLINIC | Age: 86
End: 2021-06-21

## 2021-06-21 DIAGNOSIS — Z98.890 S/P MVR (MITRAL VALVE REPAIR): ICD-10-CM

## 2021-06-21 DIAGNOSIS — Z79.01 LONG TERM (CURRENT) USE OF ANTICOAGULANTS: ICD-10-CM

## 2021-06-21 LAB — INR PPP: 1.7 (ref 0.9–1.1)

## 2021-06-21 NOTE — PROGRESS NOTES
ASSESSMENT and PLAN:  Guera was seen today for hospital visit follow up.    Diagnoses and all orders for this visit:    Hospital discharge follow-up    Acute kidney failure (H)  -     Basic Metabolic Panel    Shingles    Alzheimer's dementia without behavioral disturbance, unspecified timing of dementia onset  -     rivastigmine tartrate (EXELON) 3 MG capsule; TAKE 1 CAPSULE(3 MG) BY MOUTH TWICE DAILY  -     HM2(CBC w/o Differential)    Acquired hypothyroidism    Cardiac pacemaker in situ    Chronic a-fib (H)    Osteoporosis without current pathological fracture, unspecified osteoporosis type    Other orders  -     gabapentin (NEURONTIN) 100 MG capsule; Take 2  capsules by mouth 3x daily as needed for pain  -     levothyroxine (SYNTHROID, LEVOTHROID) 100 MCG tablet; TAKE 1 TABLET(100 MCG) BY MOUTH DAILY  -     alendronate (FOSAMAX) 70 MG tablet; Take 1 tablet (70 mg total) by mouth every 7 days. Do not lie down for 1 hr after taking, take with full glass water  -     lisinopril (PRINIVIL,ZESTRIL) 20 MG tablet; Take 1 tablet (20 mg total) by mouth daily.  -     aspirin 81 MG EC tablet; Take 1 tablet (81 mg total) by mouth daily.      At this time patient is being seen for hospital discharge follow-up.  Her kidney function had completely resolved at the time of discharge however we are going to reassess to ensure it has been stable today with a BMP and hemogram to look at her platelets.  -Her pain is better controlled over the last couple days on current medication regimen I gave her instructions noted below for weaning off the gabapentin and then the Tylenol.  -Heart stable and she will follow-up with her cardiologist.  She is getting an INR today.  Continue aspirin, statin, warfarin   -osteoporosis stable and will repeat an density scan another year and a half. We will refill her Fosamax  -Thyroid functions stable we will refill her levothyroxine  -For her cognitive insufficiency this seems to be stable on the ribs  taking 3 mg twice daily okay to continue    Should recheck kidney function at least twice a year.  Follow-up in 6 months sooner if any concerns    Patient Instructions   - continue to drink 4 bottles of water a day    - at the end of this week, try cutting down your gabapentin to 1 capsule 3 daily with the extra strength tylenol(2 tablets at a time)    -if you are doing well with this after 4-5 days, then ok to stop the gabapentin and keep taking tylenol extra strength 3x daily     -if doing ok with pain that week with tylenol only , can stop tylenol and take only as needed       - follow up 6 months blood pressure check        Orders Placed This Encounter   Procedures     HM2(CBC w/o Differential)     Basic Metabolic Panel     Medications Discontinued During This Encounter   Medication Reason     gabapentin (NEURONTIN) 100 MG capsule Reorder     rivastigmine tartrate (EXELON) 3 MG capsule Reorder     levothyroxine (SYNTHROID, LEVOTHROID) 100 MCG tablet Reorder     alendronate (FOSAMAX) 70 MG tablet Reorder     lisinopril (PRINIVIL,ZESTRIL) 20 MG tablet Reorder     aspirin 81 MG EC tablet Reorder     traMADol (ULTRAM) 50 mg tablet Therapy completed       Return in about 6 months (around 5/6/2019).    DATA REVIEWED:  Additional History from Old Records Summarized (2): Hospital records reviewed as well as ophthalmology notes regarding recent inpatient hospitalization  Decision to Obtain Records (1):    Radiology Tests Summarized or Ordered (1): Bone density scan reviewed from February 2018  Labs Reviewed or Ordered (1): Labs ordered and reviewed  Medicine Test Summarized or Ordered (1):    Independent Review of EKG, X-RAY, or RAPID STREP (2 each):      The visit lasted a total of 25 minutes face to face with the patient. Over 50% of the time was spent counseling and educating the patient     CHIEF COMPLAINT:  Chief Complaint   Patient presents with     Hospital Visit Follow Up     10/27-10/30, No questions or concerns         HISTORY OF PRESENT ILLNESS:  Guera Peters is a 83 y.o. female presents today for follow-up of hospital discharge from [facility] from 10/27/2018 through 10/30/2018.  Please note that that hospitalization was created under another patient name that needs to be merged Clara Peters.    Patient was hospitalized with altered mental status secondary to tramadol and gabapentin on board in the setting of acute renal failure.  Creatinine was up to 3.4 and was found to be secondary to dehydration as I had seen patient on 10/19/2018 for new symptoms of shingles and severe head pain and pressure.  Blood pressure was elevated at that time.  She was started on antiviral therapy.  She was seen by  ophthalmology within a couple days and there was not found to be any shingles into the eye.  Her pain continued to progress and she must have been poor at drinking she does have some Alzheimer's dementia, no previous kidney insufficiency but does have underlying pacemaker, coronary artery disease and chronic atrial fibrillation.  -When she got to the hospital creatinine was elevated and she improved with fluid hydration.  Tramadol was stopped.  Upon discharge she was sent home with gabapentin for continued pain control.  The daughter had called me several days later indicating that her pain was still quite severe and now we are going on 3 weeks.  She is taking 600 mg of gabapentin 3 times a day with extra strength Tylenol 2 tablets 3 times a day and that seems to be doing the trick for her pain control.  She is getting better.  There were no other complications in the hospital.  I did note some mild thrombocytopenia.  She lives at home with her  and gets around okay and her daughter Sienna helps with all decision making and is usually the point of contact and she is here at her visit today    I did a full medication reconciliation today.    In terms of other chronic medical issues I am managing her  "resisting me now which she is doing well on 3 mg twice daily.  At higher strength she was having abdominal complaints and constipation.  They are wondering if they can cancel the neurology appointment as they are not offering anything other than refilling the medication-she has an upcoming appointment with her cardiologist Dr. Mercado in December  -She is on medication Fosamax it was started by her previous PCP for osteoporosis and her last bone density scan was in 2018 and was stable.  We discussed continuing the medication for another year and repeating a bone scan and if no worsening we could consider a drug holiday depending on the levels.  She continues on vitamin D supplementation  -Blood pressure is stable today on lisinopril 20 mg daily she is also on carvedilol 12.5 mg twice daily  -Thyroid function this year has also been normal        REVIEW OF SYSTEMS:    Comprehensive review of systems is negative except as noted in the HPI.     PFSH:  Tobacco use and medications reviewed below.     TOBACCO USE:  History   Smoking Status     Former Smoker   Smokeless Tobacco     Never Used     Comment: quit 30 years ago       VITALS:  Vitals:    11/06/18 1359   BP: 132/71   Patient Site: Left Arm   Patient Position: Sitting   Cuff Size: Adult Regular   Pulse: 72   Resp: 18   SpO2: 98%   Weight: 171 lb 6.4 oz (77.7 kg)   Height: 5' 5\" (1.651 m)     Wt Readings from Last 3 Encounters:   11/06/18 171 lb 6.4 oz (77.7 kg)   10/19/18 178 lb 3.2 oz (80.8 kg)   06/04/18 171 lb (77.6 kg)       PHYSICAL EXAM:  Constitutional:  Reveals a 83 y.o. female in NAD.  Vitals:  Per nursing notes.  Neck:  Supple, thyroid not palpable.  Cardiac:  Regular rate and irregularly irregular rhythm without murmurs, rubs, or gallops. Carotids without bruits. Legs without edema. Palpation of the radial pulse regular.  Lungs: Clear.  Respiratory effort normal.  Skin:   Without rash, bruise, or palpable lesions.  No pain to palpation where previous " lesions were.  Lesions have completely dried out  Rheumatologic: Normal joints and nails of the hands.  Neurologic:  Cranial nerves II-XII intact.     Psychiatric:  Mood appropriate, memory at baseline, cognitive insufficiency. pleasant         MEDICATIONS:  Current Outpatient Prescriptions   Medication Sig Dispense Refill     alendronate (FOSAMAX) 70 MG tablet Take 1 tablet (70 mg total) by mouth every 7 days. Do not lie down for 1 hr after taking, take with full glass water 12 tablet 11     aspirin 81 MG EC tablet Take 1 tablet (81 mg total) by mouth daily. 100 tablet 11     carvedilol (COREG) 12.5 MG tablet Take 12.5 mg by mouth 2 (two) times a day with meals.       erythromycin ophthalmic ointment APPLY TO THE AFFECTED AREA TWICE DAILY UNTIL HEALED  0     gabapentin (NEURONTIN) 100 MG capsule Take 2  capsules by mouth 3x daily as needed for pain 60 capsule 0     levothyroxine (SYNTHROID, LEVOTHROID) 100 MCG tablet TAKE 1 TABLET(100 MCG) BY MOUTH DAILY 90 tablet 12     lisinopril (PRINIVIL,ZESTRIL) 20 MG tablet Take 1 tablet (20 mg total) by mouth daily. 90 tablet 12     rivastigmine tartrate (EXELON) 3 MG capsule TAKE 1 CAPSULE(3 MG) BY MOUTH TWICE DAILY 180 capsule 12     rosuvastatin (CRESTOR) 20 MG tablet Take 20 mg by mouth bedtime.       warfarin (COUMADIN) 1 MG tablet Take 2-3 tablets (2-3 mg total) by mouth See Admin Instructions. Adjust dose per INR results as instructed. 204 tablet 1     nitroglycerin (NITROSTAT) 0.4 MG SL tablet Place 0.4 mg under the tongue every 5 (five) minutes as needed for chest pain.       No current facility-administered medications for this visit.

## 2021-06-21 NOTE — PROGRESS NOTES
TCM DISCHARGE FOLLOW UP CALL    Discharge Date:  10/30/2018  Reason for hospital stay (discharge diagnosis)::  VERNON, encephalopathy  Are you feeling better, the same or worse since your discharge?:  Patient is feeling better  Do you feel like you have a plan in the event of a health emergency?: Yes (She would tell her . Encouraged pt or  to call nurse triage.)    As part of your discharge plan, were  home care services ordered for you?: Yes    Have you seen them yet, or are they scheduled to visit?: Yes    Do you have any follow up visits scheduled with your PCP or Specialist?:  Yes, with PCP  (RN) Is PCP appt scheduled soon enough (within 14 days of discharge date)?: Yes

## 2021-06-22 NOTE — TELEPHONE ENCOUNTER
ANTICOAGULATION  MANAGEMENT    Assessment     Today's INR result of 1.5 is Subtherapeutic (goal INR of 2.0-3.0)        Warfarin taken as previously instructed as confirmed by Sienna    Sienna reported that her Dad is the one that gives the patient her doses and that they follow the instructions given.    Patient received total of 13 mg this week.    No new diet changes affecting INR    No new medication/supplements affecting INR    Continues to tolerate warfarin with no reported s/s of bleeding or thromboembolism     Previous INR was Subtherapeutic, Sienna confirmed that the patient took the booster dose as instructed on 1/3/19- INR     Plan:     Spoke with Sienna regarding INR result and instructed:     Warfarin Dosing Instructions:  have the patient take 3 mg booster dose today then change warfarin dose to    2 mg every day      (8 % change)    Instructed patient to follow up no later than: one week - appointment made.    Education provided: importance of therapeutic range, target INR goal and significance of current INR result and importance of taking warfarin as instructed    Sienna verbalizes understanding and agrees to warfarin dosing plan.    Instructed to call the [facility] for any changes, questions or concerns. (#[phone])   ?   Lauren Willard RN    Subjective/Objective:      Guera Peters, a 83 y.o. female is on warfarin.     Guera reports:     Home warfarin dose: verbally confirmed home dose with Sienna and updated on anticoagulation calendar     Missed doses: No     Medication changes:  No     S/S of bleeding or thromboembolism:  No     New Injury or illness:  No     Changes in diet or alcohol consumption:  No     Upcoming surgery, procedure or cardioversion:  No    Anticoagulation Episode Summary     Current INR goal:   2.0-3.0   TTR:   53.5 % (10.6 mo)   Next INR check:   1/16/2019   INR from last check:   1.50! (1/9/2019)   Weekly max warfarin dose:      Target end date:      INR check  location:      Preferred lab:      Send INR reminders to:   ANTICOAGULATION POOL C (DTN,VAD,CGR,GAV)    Indications    Chronic a-fib (H) [I48.2]           Comments:            Anticoagulation Care Providers     Provider Role Specialty Phone number    Dora Castillo DO Referring Family Medicine [phone]

## 2021-06-22 NOTE — TELEPHONE ENCOUNTER
ANTICOAGULATION  MANAGEMENT    Assessment     Today's INR result of 1.4 is Subtherapeutic (goal INR of 2.0-3.0)        Warfarin taken as previously instructed    No new diet changes affecting INR    No new medication/supplements affecting INR    Continues to tolerate warfarin with no reported s/s of bleeding or thromboembolism     Previous INR was Subtherapeutic Sienna confirmed that the patient took the 3 mg booster dose on 12/27 as instructed.    Sienna made aware that INR will be monitored closely until stable.    Plan:     Spoke with Sienna regarding INR result and instructed:     Warfarin Dosing Instructions:  take 4 mg booster dose today then change warfarin dose to    1 mg on Mon, Wed, Sat; 2 mg all other days       (10 % change)    Instructed patient to follow up no later than: 5-7 days  Appointment made    Education provided: importance of therapeutic range, target INR goal and significance of current INR result, importance of taking warfarin as instructed, monitoring for clotting signs and symptoms and when to seek medical attention/emergency care    Sienna verbalizes understanding and agrees to warfarin dosing plan.    Instructed to call the [facility] for any changes, questions or concerns. (#[phone])   ?   Lauren Willard RN    Subjective/Objective:      Guera Prdiesen, a 83 y.o. female is on warfarin.     Guera reports:     Home warfarin dose: verbally confirmed home dose with Sienna ( daughter) and updated on anticoagulation calendar     Missed doses: No     Medication changes:  No     S/S of bleeding or thromboembolism:  No     New Injury or illness:  No     Changes in diet or alcohol consumption:  No     Upcoming surgery, procedure or cardioversion:  No    Anticoagulation Episode Summary     Current INR goal:   2.0-3.0   TTR:   54.6 % (10.4 mo)   Next INR check:   1/10/2019   INR from last check:   1.40! (1/3/2019)   Weekly max warfarin dose:      Target end date:      INR check location:       Preferred lab:      Send INR reminders to:   ANTICOAGULATION POOL C (DTN,VAD,CGR,GAV)    Indications    Chronic a-fib (H) [I48.2]           Comments:            Anticoagulation Care Providers     Provider Role Specialty Phone number    Dora Castillo DO Referring [facility] [phone]

## 2021-06-23 NOTE — TELEPHONE ENCOUNTER
BAILEE returned Sienna's call and verified patients current warfarin doses of 3 mg on Wed,2 mg all other days.    She has no other concerns at this time.    Lauren Willard RN

## 2021-06-23 NOTE — TELEPHONE ENCOUNTER
ANTICOAGULATION  MANAGEMENT    Assessment     Today's INR result of 1.9 is Subtherapeutic (goal INR of 2.0-3.0)        Warfarin taken as previously instructed    No new diet changes affecting INR    No new medication/supplements affecting INR    Continues to tolerate warfarin with no reported s/s of bleeding or thromboembolism     Previous INR was Subtherapeutic Sienna confirmed that the patient took the 3 mg booster as instructed on 1/9/19.    Plan:     Spoke with Sienna ,patient's daughter regarding INR result and instructed:     Warfarin Dosing Instructions:  Change warfarin dose to    3 mg on Wed; 2 mg all other days     ( 7 %)    Instructed patient to follow up no later than: 1-2 weeks, appointment made.    Education provided: importance of therapeutic range, target INR goal and significance of current INR result and importance of taking warfarin as instructed    Sienna verbalizes understanding and agrees to warfarin dosing plan.    Instructed to call the [facility] for any changes, questions or concerns. (#[phone])   ?   Lauren Willard RN    Subjective/Objective:      Guera Peters, a 83 y.o. female is on warfarin.     Guera reports:     Home warfarin dose: verbally confirmed home dose with Sienna and updated on anticoagulation calendar     Missed doses: No     Medication changes:  No     S/S of bleeding or thromboembolism:  No     New Injury or illness:  No     Changes in diet or alcohol consumption:  No     Upcoming surgery, procedure or cardioversion:  No    Anticoagulation Episode Summary     Current INR goal:   2.0-3.0   TTR:   52.4 % (10.8 mo)   Next INR check:   1/30/2019   INR from last check:   1.90! (1/16/2019)   Weekly max warfarin dose:      Target end date:      INR check location:      Preferred lab:      Send INR reminders to:   ANTICOAGULATION POOL C (DTN,VAD,CGR,GAV)    Indications    Chronic a-fib (H) [I48.2]           Comments:            Anticoagulation Care Providers     Provider  Role Specialty Phone number    Dora Castillo DO Referring Family Medicine [phone]

## 2021-06-23 NOTE — TELEPHONE ENCOUNTER
ANTICOAGULATION  MANAGEMENT    Assessment     Today's INR result of 1.8 is Subtherapeutic (goal INR of 2.0-3.0)        Warfarin taken as previously instructed    No new diet changes affecting INR    No new medication/supplements affecting INR    Continues to tolerate warfarin with no reported s/s of bleeding or thromboembolism     Previous INR was Subtherapeutic    Plan:     Spoke with Sienna regarding INR result and instructed:     Warfarin Dosing Instructions:  Change warfarin dose to    3 mg on Mon, Thu; 2 mg all other days     (7 % change)    Instructed patient to follow up no later than: 1-2 weeks, appointment made.    Education provided: importance of therapeutic range and target INR goal and significance of current INR result    Sienna verbalizes understanding and agrees to warfarin dosing plan.    Instructed to call the [facility] for any changes, questions or concerns. (#[phone])   ?   Lauren Willard RN    Subjective/Objective:      Guera MOSER Lorraine, a 83 y.o. female is on warfarin.     Guera reports:     Home warfarin dose: verbally confirmed home dose with Sienna and updated on anticoagulation calendar     Missed doses: No     Medication changes:  No     S/S of bleeding or thromboembolism:  No     New Injury or illness:  No     Changes in diet or alcohol consumption:  No     Upcoming surgery, procedure or cardioversion:  No    Anticoagulation Episode Summary     Current INR goal:   2.0-3.0   TTR:   50.1 % (11.3 mo)   Next INR check:   2/14/2019   INR from last check:   1.80! (1/31/2019)   Weekly max warfarin dose:      Target end date:      INR check location:      Preferred lab:      Send INR reminders to:   ANTICOAGULATION POOL C (DTN,VAD,CGR,GAV)    Indications    Chronic a-fib (H) [I48.2]           Comments:            Anticoagulation Care Providers     Provider Role Specialty Phone number    Dora Castillo DO Referring Family Medicine [phone]

## 2021-06-23 NOTE — TELEPHONE ENCOUNTER
Spoke with Sienna. Rescheduled INR for 1/31/19. Instructed patient to continue current Warfarin dose and take 3 mg tonight. Sienna verbalized understanding and agreed to plan.    Marva Corrales, RN  Anticoagulation Nurse  [phone]

## 2021-06-23 NOTE — TELEPHONE ENCOUNTER
Who is calling:  Patients daughter Sienna  Reason for Call:  Sienna was calling to verify that patients warfarin dosing. Please call her back to discuss this.   Date of last appointment with primary care: n/a  Has the patient been recently seen:  n/a  Okay to leave a detailed message: Yes

## 2021-06-23 NOTE — TELEPHONE ENCOUNTER
ANTICOAGULATION  MANAGEMENT    Assessment     Today's INR result of 2.00 is Therapeutic (goal INR of 2.0-3.0)        Warfarin taken as previously instructed    No new diet changes affecting INR    No new medication/supplements affecting INR    Continues to tolerate warfarin with no reported s/s of bleeding or thromboembolism     Previous INR was Subtherapeutic at 1.80 on 1/31/19.    Plan:     Spoke with Sienna killian regarding INR result and instructed:     Warfarin Dosing Instructions:  (has 1mg tabs).   - Continue current warfarin dose 3 mg daily on Mon/Thurs; and 2 mg daily rest of week.    Instructed patient to follow up no later than:  One wk.   - scheduled on 2/15/19 @ [facility].    Education provided: target INR goal and significance of current INR result and importance of notifying clinic for changes in medications    Sienna verbalizes understanding and agrees to warfarin dosing plan.    Instructed to call the [facility] for any changes, questions or concerns. (#[phone])   ?   Columba Aguilar RN    Subjective/Objective:      Guera Peters, a 83 y.o. female is on warfarin.     Guera reports:     Home warfarin dose: verbally confirmed home dose with Sienna killian and updated on anticoagulation calendar     Missed doses: No.   has been assisting setting up medications     Medication changes:  No     S/S of bleeding or thromboembolism:  No     New Injury or illness:  No     Changes in diet or alcohol consumption:  No     Upcoming surgery, procedure or cardioversion:  No    Anticoagulation Episode Summary     Current INR goal:   2.0-3.0   TTR:   48.9 % (11.6 mo)   Next INR check:   2/15/2019   INR from last check:   2.00 (2/8/2019)   Weekly max warfarin dose:      Target end date:      INR check location:      Preferred lab:      Send INR reminders to:   ANTICOAGULATION POOL C (DTN,VAD,CGR,GAV)    Indications    Chronic a-fib (H) [I48.2]           Comments:            Anticoagulation Care  Providers     Provider Role Specialty Phone number    Dora Castillo DO Referring Family Medicine [phone]

## 2021-06-23 NOTE — TELEPHONE ENCOUNTER
Who is calling:  Dali El  Reason for Call:  INR appointment and dosing  Date of last appointment with primary care:   Has the patient been recently seen:    Okay to leave a detailed message: Yes    Patient has an INR appointment scheduled for today but because of the weather wants to verify it would be ok to reschedule INR for tomorrow. And if so, should patient continue with same dosing.     Please call and advise and help to reschedule.

## 2021-06-24 NOTE — TELEPHONE ENCOUNTER
Who is calling:  Patient's daughterSienna   Reason for Call:  Returning missed called from [facility] staff on patient's INR.    At time of call [facility] was unavailable  Date of last appointment with primary care: 11/06/18  Has the patient been recently seen:  No  Okay to leave a detailed message: No

## 2021-06-24 NOTE — TELEPHONE ENCOUNTER
ANTICOAGULATION  MANAGEMENT    Assessment     Today's INR result of 2.2 is Therapeutic (goal INR of 2.0-3.0)        Warfarin taken as previously instructed    No new diet changes affecting INR    No new medication/supplements affecting INR    Continues to tolerate warfarin with no reported s/s of bleeding or thromboembolism     Previous INR was Subtherapeutic    Plan:     Spoke with Sienna regarding INR result and instructed:     Warfarin Dosing Instructions:  Continue current warfarin dose    3 mg on Mon, Wed, Fri; 2 mg all other days     (0 % change)    Instructed patient to follow up no later than: 2 weeks, appointment made.    Education provided: importance of therapeutic range and target INR goal and significance of current INR result    Sienna verbalizes understanding and agrees to warfarin dosing plan.    Instructed to call the [facility] for any changes, questions or concerns. (#[phone])   ?   Lauren Willard RN    Subjective/Objective:      Guera Pridesen, a 83 y.o. female is on warfarin.     Guera reports:     Home warfarin dose: verbally confirmed home dose with Sienna and updated on anticoagulation calendar     Missed doses: No     Medication changes:  No     S/S of bleeding or thromboembolism:  No     New Injury or illness:  No     Changes in diet or alcohol consumption:  No     Upcoming surgery, procedure or cardioversion:  No    Anticoagulation Episode Summary     Current INR goal:   2.0-3.0   TTR:   48.7 % (1 y)   Next INR check:   3/15/2019   INR from last check:   2.20 (3/1/2019)   Weekly max warfarin dose:      Target end date:      INR check location:      Preferred lab:      Send INR reminders to:   ANTICOAGULATION POOL C (DTN,VAD,CGR,GAV)    Indications    Chronic a-fib (H) [I48.2]           Comments:            Anticoagulation Care Providers     Provider Role Specialty Phone number    Dora Castillo DO Referring Family Medicine [phone]

## 2021-06-24 NOTE — TELEPHONE ENCOUNTER
ANTICOAGULATION  MANAGEMENT    Assessment     Today's INR result of 1.9 is Subtherapeutic (goal INR of 2.0-3.0)        Warfarin taken as previously instructed    No new diet changes affecting INR    No new medication/supplements affecting INR    Continues to tolerate warfarin with no reported s/s of bleeding or thromboembolism     Previous INR was low Therapeutic at 2.0 on 2/8/19    Plan:     Spoke with Sienna regarding INR result and instructed:     Warfarin Dosing Instructions:  Change warfarin dose to    3 mg on Mon, Wed, Fri; 2 mg all other days        (6 % change)    Instructed patient to follow up no later than: 2 weeks, appointment made.    Education provided: importance of therapeutic range and target INR goal and significance of current INR result    Sienna verbalizes understanding and agrees to warfarin dosing plan.    Instructed to call the [facility] for any changes, questions or concerns. (#[phone])   ?   Lauren Willard RN    Subjective/Objective:      Guera SHANTI ZeePeters, a 83 y.o. female is on warfarin.     Guera reports:     Home warfarin dose: verbally confirmed home dose with Sienna and updated on anticoagulation calendar     Missed doses: No     Medication changes:  No     S/S of bleeding or thromboembolism:  No     New Injury or illness:  No     Changes in diet or alcohol consumption:  No     Upcoming surgery, procedure or cardioversion:  No    Anticoagulation Episode Summary     Current INR goal:   2.0-3.0   TTR:   47.9 % (11.8 mo)   Next INR check:   3/1/2019   INR from last check:   1.90! (2/15/2019)   Weekly max warfarin dose:      Target end date:      INR check location:      Preferred lab:      Send INR reminders to:   ANTICOAGULATION POOL C (DTN,VAD,CGR,GAV)    Indications    Chronic a-fib (H) [I48.2]           Comments:            Anticoagulation Care Providers     Provider Role Specialty Phone number    Dora Castillo DO Referring Family Medicine [phone]

## 2021-06-25 NOTE — TELEPHONE ENCOUNTER
ANTICOAGULATION  MANAGEMENT    Assessment     Today's INR result of 2.1 is Therapeutic (goal INR of 2.0-3.0)        Warfarin taken as previously instructed    No new diet changes affecting INR    No new medication/supplements affecting INR    Continues to tolerate warfarin with no reported s/s of bleeding or thromboembolism     Previous INR was Therapeutic    Plan:     Spoke with Sienna regarding INR result and instructed:     Warfarin Dosing Instructions:  Continue current warfarin dose    3 mg every Mon, Wed, Fri; 2 mg all other days      (0 % change)    Instructed patient to follow up no later than: 4 weeks, appointment made.    Education provided: importance of therapeutic range, target INR goal and significance of current INR result and importance of taking warfarin as instructed    Sienna verbalizes understanding and agrees to warfarin dosing plan.    Instructed to call the [facility] for any changes, questions or concerns. (#[phone])   ?   Lauren Willard RN    Subjective/Objective:      Guera Peters, a 83 y.o. female is on warfarin.     Guera reports:     Home warfarin dose: verbally confirmed home dose with Sienna and updated on anticoagulation calendar     Missed doses: No     Medication changes:  No     S/S of bleeding or thromboembolism:  No     New Injury or illness:  No     Changes in diet or alcohol consumption:  No     Upcoming surgery, procedure or cardioversion:  No    Anticoagulation Episode Summary     Current INR goal:   2.0-3.0   TTR:   50.5 % (1 y)   Next INR check:   4/12/2019   INR from last check:   2.10 (3/15/2019)   Weekly max warfarin dose:      Target end date:      INR check location:      Preferred lab:      Send INR reminders to:   ANTICOAGULATION POOL C (DTN,VAD,CGR,GAV)    Indications    Chronic a-fib (H) [I48.2]           Comments:            Anticoagulation Care Providers     Provider Role Specialty Phone number    Dora Castillo DO Referring Family Medicine  [phone]

## 2021-06-25 NOTE — TELEPHONE ENCOUNTER
Refill Approved    Rx renewed per Medication Renewal Policy. Medication was last renewed on 8/2020    [facility] Triage/Med Refill 5/27/2021     Requested Prescriptions   Pending Prescriptions Disp Refills     memantine (NAMENDA) 5 MG tablet [Pharmacy Med Name: MEMANTINE 5MG TABLETS] 180 tablet 2     Sig: TAKE 1 TABLET(5 MG) BY MOUTH TWICE DAILY       Cholinesterase Inhibitor/Anti-Alzheimer Agent Refill Protocol Passed - 5/27/2021  3:39 AM        Passed - Visit with PCP or prescribing provider visit in last 6 months or next 3 months     Last office visit with prescriber/PCP: 4/12/2021 OR same dept: 4/12/2021 Dora Castillo DO OR same specialty: 4/12/2021 Dora Castillo DO Last physical: Visit date not found Last MTM visit: Visit date not found     Next appt within 3 mo: Visit date not found  Next physical within 3 mo: Visit date not found  Prescriber OR PCP: Dora Castillo DO  Last diagnosis associated with med order: 1. Alzheimer's dementia without behavioral disturbance, unspecified timing of dementia onset (H)  - memantine (NAMENDA) 5 MG tablet [Pharmacy Med Name: MEMANTINE 5MG TABLETS]; TAKE 1 TABLET(5 MG) BY MOUTH TWICE DAILY  Dispense: 180 tablet; Refill: 2    If protocol passes may refill for 6 months if within 3 months of last provider visit (or a total of 9 months).

## 2021-06-26 NOTE — PROGRESS NOTES
Progress Notes by Filemon Maddox MD at 5/15/2018 10:50 AM     Author: Filemon Maddox MD Service: -- Author Type: Physician    Filed: 5/16/2018 11:28 AM Encounter Date: 5/15/2018 Status: Signed    : Filemon Maddox MD (Physician)          Assessment:     1. Lower abdominal pain  Urinalysis-UC if Indicated   2. Osteoporosis screening  CANCELED: DXA Bone Density Scan   3. Age-related osteoporosis without current pathological fracture   CANCELED: DXA Bone Density Scan   4. Alzheimer's dementia without behavioral disturbance, unspecified timing of dementia onset       Discussed the possible causes of lower abdominal pain.  She declines that it is related to food as she has tried to follow or diverticulosis/diverticulitis diet and has had no relief.  She informs me that the pain is also there when she is sitting and watching TV.  She is unable to recall if there is resolution of pain and lying down position.  Do note that patient does have dementia.  Pelvic exam was normal.  UA is normal.  Reviewed this last CT scan report and does not show any active diverticulitis.  This is all discussed with patient and I have asked her to continue to monitor for the next 2 weeks specifically asking her to monitor for any triggers and the times when her pain is minimal.    Patient does have arteriosclerotic heart disease, however chronic mesenteric ischemia symptoms do not match patient's symptoms and we will have to monitor it further closely specifically as she also has dementia although she remains functional.       Plan:      The diagnosis was discussed with the patient and evaluation and treatment plans outlined.      I have seen you for lower abdominal pain today.    A urine exam is normal.    I did a pelvic exam and I do not see any prolapse and the vaginal exam was normal.    Reviewed your CT scan and it shows diverticulosis as discussed before.    One thought is that you may have muscle strain of the abdominal  wall.    Use ice and heat for comfort.    Do gentle abdominal stretches to see if this helps you.      I will see you back in 2 weeks time  Subjective:      Guera Peters is a  female who presents for evaluation of   Chief Complaint   Patient presents with   ? Follow-up     follow up on diverticulitis-patient states there has been no change in stomach concerns      Onset was several months ago. Symptoms have been unchanged. The pain is described as aching and dull, and is 4/10 in intensity at its maximum. Pain is located in the suprapubic and left lower quadrant with radiation to none.  Aggravating factors: Unable to identify.  Alleviating factors: none. Associated symptoms: none. The patient denies anorexia, belching, constipation, diarrhea, dysuria, fever, flatus, hematochezia, hematuria, melena, nausea and vomiting.      The following portions of the patient's history were reviewed and updated as appropriate: allergies, current medications, past family history, past medical history, past social history, past surgical history and problem list.    No Known Allergies    Current Outpatient Prescriptions on File Prior to Visit   Medication Sig Dispense Refill   ? alendronate (FOSAMAX) 70 MG tablet Take 70 mg by mouth every 7 days. On Sunday. Take in the morning on an empty stomach with a full glass of water 30 minutes before food     ? aspirin 81 MG EC tablet Take 81 mg by mouth daily.     ? carvedilol (COREG) 12.5 MG tablet Take 12.5 mg by mouth 2 (two) times a day with meals.     ? levothyroxine (SYNTHROID, LEVOTHROID) 100 MCG tablet Take 100 mcg by mouth Daily at 6:00 am.     ? lisinopril (PRINIVIL,ZESTRIL) 20 MG tablet Take 20 mg by mouth daily.     ? nitroglycerin (NITROSTAT) 0.4 MG SL tablet Place 0.4 mg under the tongue every 5 (five) minutes as needed for chest pain.     ? rivastigmine tartrate (EXELON) 3 MG capsule Take 3 mg by mouth 2 (two) times a day.      ? rosuvastatin (CRESTOR) 20 MG tablet Take 20  mg by mouth bedtime.     ? warfarin (COUMADIN) 1 MG tablet Take 1 to 1 1/2 tablets (1 to 1.5 mg) by mouth daily. Adjust dose based on INR results as directed. 100 tablet 1   ? [DISCONTINUED] warfarin (COUMADIN) 2.5 MG tablet Take 2.5 mg every day  PO 90 tablet 3     No current facility-administered medications on file prior to visit.        Patient Active Problem List   Diagnosis   ? Acquired hypothyroidism   ? Alzheimer's dementia without behavioral disturbance, unspecified timing of dementia onset   ? Angiomyolipoma   ? Arteriosclerotic heart disease (ASHD)   ? Carotid stenosis, bilateral   ? Chronic atrial fibrillation   ? Coronary artery disease   ? Cardiac pacemaker in situ   ? Diverticulosis of large intestine without hemorrhage   ? Essential hypertension with goal blood pressure less than 140/90   ? Transient cerebral ischemia   ? Hyperlipidemia LDL goal <100   ? Hypovitaminosis D   ? Long-term (current) use of anticoagulants   ? Microscopic hematuria   ? Mitral valve disorder   ? NSTEMI (non-ST elevated myocardial infarction)   ? Osteoporosis without current pathological fracture, unspecified osteoporosis type   ? Pacemaker   ? S/P MVR (mitral valve repair)   ? Chronic a-fib       Past Medical History:   Diagnosis Date   ? A-fib    ? CKD (chronic kidney disease) stage 3, GFR 30-59 ml/min    ? Disease of thyroid gland     hypothryoid   ? Heart attack 2011   ? HLD (hyperlipidemia)    ? Hypertension    ? Hypothyroid    ? Osteoporosis    ? Stroke     Hx TIA   ? Thrombocytopenia        Past Surgical History:   Procedure Laterality Date   ? ANGIOPLASTY     ? APPENDECTOMY     ? BREAST SURGERY      removal breast implant   ? CARDIAC PACEMAKER PLACEMENT     ? embolization of angiomyolipoma     ? MITRAL VALVE REPAIR     ? partial excision of thyroid     ? PATENT FORAMEN OVALE CLOSURE         Family History   Problem Relation Age of Onset   ? Heart disease Mother    ? Heart disease Father        Social History      Social History   ? Marital status:      Spouse name: N/A   ? Number of children: N/A   ? Years of education: N/A     Social History Main Topics   ? Smoking status: Former Smoker   ? Smokeless tobacco: Never Used      Comment: quit 30 years ago   ? Alcohol use Yes      Comment: once a month   ? Drug use: No   ? Sexual activity: Not Asked     Other Topics Concern   ? None     Social History Narrative    Lives in [place] with her        Review of Systems  A 12 point comprehensive review of systems was negative except as noted.       Objective:     /78 (Patient Site: Left Arm, Patient Position: Sitting, Cuff Size: Adult Regular)  Pulse 64  Temp 97.7  F (36.5  C) (Oral)   Resp 16  Wt 172 lb 9.6 oz (78.3 kg)  BMI 28.72 kg/m2  GENERAL APPEARANCE:  Appearing stated age, smiling, alert, cooperative, and in no acute distress.   HEENT: Pupils equal, regular, react to light and accommodation. Extraocular muscles intact, fundi benign. Ear canals and tympanic membranes are normal. Lips, mouth, and throat are unremarkable.   NECK: Neck supple without adenopathy, thyromegaly or masses.   LYMPH: No anterior cervical or supraclavicular LN enlargement   PULMONARY: Normal respiratory effort. Chest is clear.   CARDIOVASCULAR: Heart auscultation: rhythm regular, heart sounds normal    SKIN: Warm and well perfused..   ABDOMEN: Abdomen soft, mild tenderness in the suprapubic area and adjacent LLQ without any rebound. BS normal. No masses or organomegaly.  PELVIC EXAMINATION:  EXT GEN: Normal  PERINEUM: Intact. No perineal or external anal lesions.  URETHRA: No meatal lesions, prolapse. No urethral          tenderness or masses  BLADDER: Nontender, no masses  INTROITUS: Well supported. No lesions or other abnormalities  VAGINA: Clean, no bleeding  CERVIX: Normal appearing epithelium.  No tenderness  UTERUS: Nl in size, shape and consistency   ADNEXA: Clear, nontender  RECTOVAG: Confirms vaginal. No  palpable internal anal                                  lesions, no rectal masses. Sphincter intact.    EXTREMITIES: Peripheral pulses are full. Extremities with trace edema.   MENTAL STATUS: Alert, oriented and thought content appropriate   NEUROLOGIC: Station and gait normal, strength and movement normal, reflexes are normal and symmetric

## 2021-06-26 NOTE — PROGRESS NOTES
Progress Notes by Dora Castillo DO at 10/19/2018  4:20 PM     Author: Dora Castillo DO Service: -- Author Type: Physician    Filed: 10/22/2018  7:47 AM Encounter Date: 10/19/2018 Status: Signed    : Dora Castillo DO (Physician)       ASSESSMENT and PLAN:  Diagnoses and all orders for this visit:    Shingles  -     Ambulatory referral to Ophthalmology    Headache  -     Cancel: Sedimentation Rate  -     C-Reactive Protein(CRP)    Essential hypertension with goal blood pressure less than 140/90    Other orders  -     valACYclovir (VALTREX) 1000 MG tablet; Take 1 tablet (1,000 mg total) by mouth 3 (three) times a day for 7 days.  -     gabapentin (NEURONTIN) 100 MG capsule; Take 100 mg by mouth 3 (three) times a day as needed (for pain from shingles).       I discussed with the patient that her headache is likely secondary to onset of shingles as I can see the development of the rash.  I noted to her my concern that it is over the V1 dermatome and can sometimes set into the eye.  I started her on Valtrex and gave her a prescription for gabapentin if needed for pain and we can escalate the dose if needed.  I counseled her on side effects of the medication.  I then called her daughter Sienna because patient has cognitive impairment at baseline to let her know that we would try to get her in for an eye appointment as early as Monday or Tuesday for evaluation.  She notes that she had the old Zostavax vaccine long ago and has been trying to get in for that shingrix.   We also discussed her elevated blood pressure which likely could be secondary to the infection and she should come back to have that reassessed with a nurse visit      Patient Instructions   I think your headache is from the shingles given the rash that has started.   I want you to start medication Valcyclovir. 1000mg 3x daily. Take first dose today   If you are having pain you can try the gabapentin in addition 100mg up to 3 x daily  as needed. Ok to take tylenol or ibuprofen.   - we will try to schedule an eye doctor appointment for you to check and make sure shingles has not gone into the eye for soonest available next week.     - blood pressure was high today so we would like you to call back and schedule a nurse only visit in 2-3 weeks when you are feeling better to recheck your blood pressure.         Shingles  Shingles is a viral infection caused by the same virus as chicken pox. Anyone who has had chicken pox may get shingles later in life. The virus stays in the body, but remains dormant (asleep). Shingles often occurs in older persons or persons with lowered immunity. But it can affect anyone at any age.  Shingles starts as a tingling patch of skin on one side of the body. Small, painful blisters may then appear. The rash does not spread to the rest of the body.  Exposure to shingles cannot cause shingles. However, it can cause chicken pox in anyone who has not had chicken pox or has not been vaccinated. The contagious period ends when all blisters have crusted over (generally about 2 weeks after the illness begins).  After the blisters heal, the affected skin may be sensitive or painful for months (neuralgia). This often gradually goes away.  A shingles vaccine is available. This can help prevent shingles or make it less painful. It is generally recommended for adults over the age of 60 who have had chicken pox in the past, but who have never had shingles. Adults over 60 who have had neither chicken pox nor shingles can prevent both diseases with the chicken pox vaccine. Ask your healthcare provider about these vaccines.  Home care    Medicines may be prescribed to help relieve pain. Take these medicines as directed. Ask your healthcare provider or pharmacist before using over-the-counter medicines for helping treat pain and itching.    In certain cases, antiviral medicines may be prescribed to reduce pain, shorten the illness, and  prevent neuralgia. Take these medicines as directed.    Compresses made from a solution of cool water mixed with cornstarch or baking soda may help relieve pain and itching.     Gently wash skin daily with soap and water to help prevent infection.  Be certain to rinse off all of the soap, which can be irritating.    Trim fingernails and try not to scratch. Scratching the sores may leave scars.    Stay home from work or school until all blisters have formed a crust and you are no longer contagious.  Follow-up care  Follow up with your healthcare provider or as directed by our staff.  When to seek medical advice    Fever of 100.4 F (38 C) or higher, or as directed by your healthcare provider    Affected skin is on the face or neck and any of the following occur:  ? Headache  ? Eye pain  ? Changes in vision  ? Sores near the eye  ? Weakness of facial muscles    Pain, redness, or swelling of a joint    Signs of skin infection: colored drainage from the sores, warmth, increasing redness, or increasing pain  Date Last Reviewed: 9/25/2015 2000-2017 [employer]. [address] [place] [address]. All rights reserved. This information is not intended as a substitute for professional medical care. Always follow your healthcare professional's instructions.            Orders Placed This Encounter   Procedures   ? C-Reactive Protein(CRP)   ? Ambulatory referral to Ophthalmology     Referral Priority:   Urgent     Referral Type:   Consultation     Referral Reason:   Evaluation and Treatment     Requested Specialty:   Ophthalmology     Number of Visits Requested:   1     There are no discontinued medications.    No Follow-up on file.      The visit lasted a total of 20 minutes face to face with the patient. Over 50% of the time was spent counseling and educating the patient     CHIEF COMPLAINT:  No chief complaint on file.      HISTORY OF PRESENT ILLNESS:  Guera Peters is a 83 y.o. female who was  brought in today by her  for evaluation of a headache over her left temple and forehead.  Has been present for 2 days.  Notes that it is quite severe and unrelieved by Tylenol.  No double vision or visual changes.  No ataxia, garbled speech, paresthesias or weakness into extremities.  She has cognitive insufficiency at baseline and is on active treatment for dementia.  There have been no medication changes.   She has new lesions that are popping up along her scalp line and eyelid that her  just noticed the patient thinks it is because she has been poking around her head.  Blood pressure is elevated today and she did take her medications.  Typically it is normotensive.-    REVIEW OF SYSTEMS:    Comprehensive review of systems is negative except as noted in the HPI.     PFSH:  Tobacco use and medications reviewed below.     TOBACCO USE:  History   Smoking Status   ? Former Smoker   Smokeless Tobacco   ? Never Used     Comment: quit 30 years ago       VITALS:  Vitals:    10/19/18 1711   BP: 172/84   Pulse: 70   Resp: 20   Temp: 97.7  F (36.5  C)   TempSrc: Oral   SpO2: 97%   Weight: 178 lb 3.2 oz (80.8 kg)     Wt Readings from Last 3 Encounters:   10/19/18 178 lb 3.2 oz (80.8 kg)   06/04/18 171 lb (77.6 kg)   05/15/18 172 lb 9.6 oz (78.3 kg)       PHYSICAL EXAM:  Constitutional:  Reveals a 83 y.o. female in NAD.  Vitals:  Per nursing notes.  Neck:  Supple, thyroid not palpable.  Cardiac:  Regular rate and rhythm without murmurs, rubs, or gallops. Carotids without bruits. Legs without edema. Palpation of the radial pulse regular.  Lungs: Clear.  Respiratory effort normal.  Skin:    Small erythematous papules and blisterlike lesions that are appearing along her left upper eyelid and along scalp line on the left.  Rheumatologic: Normal joints and nails of the hands.  Neurologic:  Cranial nerves II-XII intact.   Negative for pronator drift.  5 out of 5 muscle strength testing  Psychiatric:  Mood appropriate,  cognitive impairment at baseline  No pain to palpation of the temples      MEDICATIONS:  Current Outpatient Prescriptions   Medication Sig Dispense Refill   ? alendronate (FOSAMAX) 70 MG tablet Take 70 mg by mouth every 7 days. On Sunday. Take in the morning on an empty stomach with a full glass of water 30 minutes before food     ? aspirin 81 MG EC tablet Take 81 mg by mouth daily.     ? carvedilol (COREG) 12.5 MG tablet Take 12.5 mg by mouth 2 (two) times a day with meals.     ? levothyroxine (SYNTHROID, LEVOTHROID) 100 MCG tablet TAKE 1 TABLET(100 MCG) BY MOUTH DAILY 90 tablet 0   ? lisinopril (PRINIVIL,ZESTRIL) 20 MG tablet Take 20 mg by mouth daily.     ? nitroglycerin (NITROSTAT) 0.4 MG SL tablet Place 0.4 mg under the tongue every 5 (five) minutes as needed for chest pain.     ? rivastigmine tartrate (EXELON) 3 MG capsule TAKE 1 CAPSULE(3 MG) BY MOUTH TWICE DAILY 180 capsule 0   ? rosuvastatin (CRESTOR) 20 MG tablet Take 20 mg by mouth bedtime.     ? warfarin (COUMADIN) 1 MG tablet Take 2-3 tablets (2-3 mg total) by mouth See Admin Instructions. Adjust dose per INR results as instructed. 204 tablet 1   ? gabapentin (NEURONTIN) 100 MG capsule Take 100 mg by mouth 3 (three) times a day as needed (for pain from shingles). 60 capsule 0   ? valACYclovir (VALTREX) 1000 MG tablet Take 1 tablet (1,000 mg total) by mouth 3 (three) times a day for 7 days. 21 tablet 0     No current facility-administered medications for this visit.

## 2021-07-03 NOTE — ADDENDUM NOTE
Addendum Note by Audrey Rodriguez DO at 6/7/2018  7:07 PM     Author: Audrey Rodriguez DO Service: -- Author Type: Physician    Filed: 6/7/2018  7:07 PM Encounter Date: 6/4/2018 Status: Signed    : Audrey Rodriguez DO (Physician)    Addended by: AUDREY RODRIGUEZ on: 6/7/2018 07:07 PM        Modules accepted: Orders

## 2021-07-03 NOTE — ADDENDUM NOTE
Addendum Note by Audrey Rodriguez DO at 11/24/2020 10:20 AM     Author: Audrey Rodriguez DO Service: -- Author Type: Physician    Filed: 11/24/2020 10:20 AM Encounter Date: 11/24/2020 Status: Signed    : Audrey Rodriguez DO (Physician)    Addended by: AUDREY RODRIGUEZ on: 11/24/2020 10:20 AM        Modules accepted: Orders

## 2021-07-04 NOTE — TELEPHONE ENCOUNTER
"Telephone Encounter by Marva Luong RN at 6/21/2021  3:19 PM     Author: Marva Luong RN Service: -- Author Type: Registered Nurse    Filed: 6/21/2021  3:28 PM Encounter Date: 6/21/2021 Status: Signed    : Marva Luong RN (Registered Nurse)       ANTICOAGULATION  MANAGEMENT    Assessment     Today's INR result of 1.7 is Subtherapeutic (goal INR of 2.0-3.0)        Per daughter, she thinks patient may have missed a dose last week but does not know for sure and  did not want to \"double up\" so patient \"probably missed a dose but cannot say for 100%.      No new diet changes affecting INR    No new medication/supplements affecting INR    Continues to tolerate warfarin with no reported s/s of bleeding or thromboembolism     Previous INR was Therapeutic    Plan:     Spoke on phone with Sienna regarding INR result and instructed:      Warfarin Dosing Instructions:  Boost today 3 mg then continue current warfarin dose 2 mg daily  (0 % change)    Instructed patient to follow up no later than: 2 weeks    Education provided: importance of consistent vitamin K intake, impact of vitamin K foods on INR, importance of therapeutic range, target INR goal and significance of current INR result, importance of following up for INR monitoring at instructed interval, importance of taking warfarin as instructed, monitoring for clotting signs and symptoms and when to seek medical attention/emergency care    Sienna verbalizes understanding and agrees to warfarin dosing plan.    Instructed to call the [facility] for any changes, questions or concerns. (#[phone])   ?   Marva Luong RN    Subjective/Objective:      Guera Peters, a 86 y.o. female is on warfarin. Guera yLnne reports:     Home warfarin dose: verbally confirmed home dose with Sienna and updated on anticoagulation calendar     Missed doses: Yes: probably, but cannot be 100% certain     Medication changes:  No     S/S of " bleeding or thromboembolism:  No     New Injury or illness:  No     Changes in diet or alcohol consumption:  No     Upcoming surgery, procedure or cardioversion:  No    Anticoagulation Episode Summary     Current INR goal:  2.0-3.0   TTR:  59.5 % (1 y)   Next INR check:  6/21/2021   INR from last check:  1.70 (6/21/2021)   Weekly max warfarin dose:     Target end date:     INR check location:     Preferred lab:     Send INR reminders to:  [facility]    Indications    Chronic a-fib (H) (Resolved) [I48.20]           Comments:           Anticoagulation Care Providers     Provider Role Specialty Phone number    Dora Castillo DO Referring Family Medicine [phone]

## 2021-07-07 ENCOUNTER — COMMUNICATION - HEALTHEAST (OUTPATIENT)
Dept: ANTICOAGULATION | Facility: CLINIC | Age: 86
End: 2021-07-07

## 2021-07-07 ENCOUNTER — AMBULATORY - HEALTHEAST (OUTPATIENT)
Dept: LAB | Facility: CLINIC | Age: 86
End: 2021-07-07

## 2021-07-07 DIAGNOSIS — Z79.01 LONG TERM (CURRENT) USE OF ANTICOAGULANTS: ICD-10-CM

## 2021-07-07 DIAGNOSIS — I48.20 CHRONIC ATRIAL FIBRILLATION (H): Primary | ICD-10-CM

## 2021-07-07 DIAGNOSIS — Z98.890 S/P MVR (MITRAL VALVE REPAIR): ICD-10-CM

## 2021-07-07 LAB — INR PPP: 1.9 (ref 0.9–1.1)

## 2021-07-07 NOTE — TELEPHONE ENCOUNTER
Telephone Encounter by Tiffanie Mcknight, RN at 7/7/2021 12:27 PM     Author: Tiffanie Mcknight RN Service: -- Author Type: Registered Nurse    Filed: 7/7/2021 12:35 PM Encounter Date: 7/7/2021 Status: Signed    : Tiffanie Mcknight, RN (Registered Nurse)       ANTICOAGULATION MANAGEMENT     Guera Peters 86 y.o., female is on warfarin with Subtherapeutic INR result (goal range 2.0-3.0)    Recent labs: (last 7 days)     07/07/21  1041   INR 1.90*       ASSESSMENT     Source: Patient/Caregiver Call      Warfarin dosing taken: Warfarin taken as instructed    Diet: Increased greens/vitamin K in diet; plans to resume previous intake    Illness, Injury or hospitalization: No    Medication changes: None    Signs or symptoms of bleeding or clotting: No    Previous INR: subtherapeutic d/t possible missed dose    Additional findings: None     PLAN     Recommended plan for temporary change(s) affecting INR:     Dosing instructions: Boost today Wed 7/7 take 3mg then continue your current warfarin dose 2 mg daily     Follow up no later than: 2 weeks     Telephone call with daughter Sienna who verbalizes understanding and agrees to plan    Lab visit scheduled    Education provided: importance of consistent vitamin K intake, impact of vitamin K foods on INR and target INR goal and significance of current INR result    Plan made per [facility] anticoagulation protocol    Tiffanie Mcknight  Anticoagulation Clinic   [phone]    Anticoagulation Episode Summary     Current INR goal:  2.0-3.0   TTR:  59.5 % (1 y)   Next INR check:  7/21/2021   INR from last check:  1.90 (7/7/2021)   Weekly max warfarin dose:     Target end date:     INR check location:     Preferred lab:     Send INR reminders to:  [facility]    Indications    Chronic a-fib (H) (Resolved) [I48.20]           Comments:           Anticoagulation Care Providers     Provider Role Specialty Phone number    Dora Castillo DO Referring Family  Medicine [phone]

## 2021-07-14 PROBLEM — B02.9 SHINGLES: Status: RESOLVED | Noted: 2018-11-06 | Resolved: 2019-08-31

## 2021-07-14 PROBLEM — B02.8 HERPES ZOSTER COMPLICATION: Status: RESOLVED | Noted: 2018-10-30 | Resolved: 2019-08-31

## 2021-07-14 PROBLEM — G45.9 TRANSIENT CEREBRAL ISCHEMIA: Status: RESOLVED | Noted: 2018-02-06 | Resolved: 2020-10-06

## 2021-07-14 PROBLEM — I10 ESSENTIAL HYPERTENSION, BENIGN: Status: RESOLVED | Noted: 2018-10-30 | Resolved: 2020-02-18

## 2021-07-20 ENCOUNTER — LAB (OUTPATIENT)
Dept: LAB | Facility: CLINIC | Age: 86
End: 2021-07-20
Payer: COMMERCIAL

## 2021-07-20 ENCOUNTER — ANTICOAGULATION THERAPY VISIT (OUTPATIENT)
Dept: ANTICOAGULATION | Facility: CLINIC | Age: 86
End: 2021-07-20

## 2021-07-20 DIAGNOSIS — I48.20 CHRONIC ATRIAL FIBRILLATION (H): ICD-10-CM

## 2021-07-20 LAB — INR BLD: 2.1 (ref 0.9–1.1)

## 2021-07-20 PROCEDURE — 36416 COLLJ CAPILLARY BLOOD SPEC: CPT

## 2021-07-20 PROCEDURE — 85610 PROTHROMBIN TIME: CPT

## 2021-07-20 NOTE — PROGRESS NOTES
ANTICOAGULATION MANAGEMENT     Guera Peters 86 year old female is on warfarin with therapeutic INR result. (Goal INR 2.0-3.0)    Recent labs: (last 7 days)     07/20/21  1306   INR 2.1*       ASSESSMENT     Source(s): Patient/Caregiver Call       Warfarin doses taken: Warfarin taken as instructed    Diet: No new diet changes identified    New illness, injury, or hospitalization: No    Medication/supplement changes: None noted    Signs or symptoms of bleeding or clotting: No    Previous INR: Subtherapeutic at 1.90 on 7/7/21.    Additional findings: None     PLAN     Recommended plan for no diet, medication or health factor changes affecting INR     Dosing Instructions: Continue your current warfarin dose with next INR in 2 weeks       Summary  As of 7/20/2021    Full warfarin instructions:  2 mg every day   Next INR check:  8/3/2021             Telephone call with daughterSienna who verbalizes understanding and agrees to plan    Lab visit scheduled - INR on 8/10/21 @ [facility]    Education provided: Importance of consistent vitamin K intake and Target INR goal and significance of current INR result    Plan made per [facility] anticoagulation protocol    Columba Aguilar, RN  Anticoagulation Clinic  7/20/2021    _______________________________________________________________________     Anticoagulation Episode Summary     Current INR goal:  2.0-3.0   TTR:  61.3 % (1 y)   Target end date:     Send INR reminders to:  [facility]       Comments:           Anticoagulation Care Providers     Provider Role Specialty Phone number    Dora Castillo MD Referring Family Medicine [phone]

## 2021-08-03 PROBLEM — I48.20 CHRONIC A-FIB (H): Status: RESOLVED | Noted: 2018-02-06 | Resolved: 2020-02-20

## 2021-08-10 ENCOUNTER — LAB (OUTPATIENT)
Dept: LAB | Facility: CLINIC | Age: 86
End: 2021-08-10
Payer: COMMERCIAL

## 2021-08-10 ENCOUNTER — ANTICOAGULATION THERAPY VISIT (OUTPATIENT)
Dept: ANTICOAGULATION | Facility: CLINIC | Age: 86
End: 2021-08-10

## 2021-08-10 DIAGNOSIS — I48.20 CHRONIC ATRIAL FIBRILLATION (H): ICD-10-CM

## 2021-08-10 LAB — INR BLD: 1.8 (ref 0.9–1.1)

## 2021-08-10 PROCEDURE — 36416 COLLJ CAPILLARY BLOOD SPEC: CPT

## 2021-08-10 PROCEDURE — 85610 PROTHROMBIN TIME: CPT

## 2021-08-10 NOTE — PROGRESS NOTES
ANTICOAGULATION MANAGEMENT     Guera Peters 86 year old female is on warfarin with subtherapeutic INR result. (Goal INR 2.0-3.0)    Recent labs: (last 7 days)     08/10/21  1308   INR 1.8*       ASSESSMENT     Source(s): Patient/Caregiver Call and Template       Warfarin doses taken: Warfarin taken as instructed    Diet: No new diet changes identified    New illness, injury, or hospitalization: No    Medication/supplement changes: None noted    Signs or symptoms of bleeding or clotting: No    Previous INR: Therapeutic last visit; previously outside of goal range    Additional findings: None     PLAN     Recommended plan for no diet, medication or health factor changes affecting INR     Dosing Instructions:    - Increase your warfarin dose to 3mg every Tuesdays and 2mg all other days.   - (7.1% change)   - ext INR in 2 weeks       Summary  As of 8/10/2021    Full warfarin instructions:  3 mg every Tue; 2 mg all other days   Next INR check:  8/24/2021             Telephone call with  daughterSienna ([phone]) who verbalizes understanding and agrees to plan    Lab visit scheduled - INR on 8/24/21 @ [facility].    Education provided: Importance of consistent vitamin K intake and Target INR goal and significance of current INR result    Plan made per [facility] anticoagulation protocol    Columba Aguilar, RN  Anticoagulation Clinic  8/10/2021    _______________________________________________________________________     Anticoagulation Episode Summary     Current INR goal:  2.0-3.0   TTR:  63.2 % (1 y)   Target end date:     Send INR reminders to:  [facility]       Comments:           Anticoagulation Care Providers     Provider Role Specialty Phone number    Dora Castillo MD Referring Family Medicine [phone]

## 2021-08-17 ENCOUNTER — TRANSFERRED RECORDS (OUTPATIENT)
Dept: HEALTH INFORMATION MANAGEMENT | Facility: CLINIC | Age: 86
End: 2021-08-17

## 2021-08-24 ENCOUNTER — LAB (OUTPATIENT)
Dept: LAB | Facility: CLINIC | Age: 86
End: 2021-08-24
Payer: COMMERCIAL

## 2021-08-24 ENCOUNTER — ANTICOAGULATION THERAPY VISIT (OUTPATIENT)
Dept: ANTICOAGULATION | Facility: CLINIC | Age: 86
End: 2021-08-24

## 2021-08-24 DIAGNOSIS — I48.20 CHRONIC ATRIAL FIBRILLATION (H): ICD-10-CM

## 2021-08-24 LAB — INR BLD: 2 (ref 0.9–1.1)

## 2021-08-24 PROCEDURE — 36416 COLLJ CAPILLARY BLOOD SPEC: CPT

## 2021-08-24 PROCEDURE — 85610 PROTHROMBIN TIME: CPT

## 2021-08-24 NOTE — PROGRESS NOTES
ANTICOAGULATION MANAGEMENT     Guera Peters 86 year old female is on warfarin with therapeutic INR result. (Goal INR 2.0-3.0)    Recent labs: (last 7 days)     08/24/21  1056   INR 2.0*       ASSESSMENT     Source(s): Chart Review and Template       Warfarin doses taken: Warfarin taken as instructed    Diet: No new diet changes identified    New illness, injury, or hospitalization: No    Medication/supplement changes: None noted    Signs or symptoms of bleeding or clotting: No    Previous INR: Subtherapeutic    Additional findings: None     PLAN     Recommended plan for no diet, medication or health factor changes affecting INR     Dosing Instructions: Continue your current warfarin dose with next INR in 2 weeks       Summary  As of 8/24/2021    Full warfarin instructions:  3 mg every Tue; 2 mg all other days   Next INR check:  9/7/2021             Detailed voice message left for Sienna with dosing instructions and follow up date.     Contact [phone] to schedule and with any changes, questions or concerns.     Education provided: None required    Plan made per [facility] anticoagulation protocol    Tommie Mccoy RN  Anticoagulation Clinic  8/24/2021    _______________________________________________________________________     Anticoagulation Episode Summary     Current INR goal:  2.0-3.0   TTR:  61.2 % (1 y)   Target end date:     Send INR reminders to:  [facility]       Comments:           Anticoagulation Care Providers     Provider Role Specialty Phone number    Dora Castillo MD Referring Family Medicine [phone]

## 2021-09-03 DIAGNOSIS — E78.5 HYPERLIPIDEMIA LDL GOAL <100: ICD-10-CM

## 2021-09-03 RX ORDER — ROSUVASTATIN CALCIUM 20 MG/1
TABLET, COATED ORAL
Qty: 90 TABLET | Refills: 1 | Status: SHIPPED | OUTPATIENT
Start: 2021-09-03 | End: 2021-12-10

## 2021-09-04 NOTE — TELEPHONE ENCOUNTER
"Last Written Prescription Date:  6/13/20  Last Fill Quantity: 90,  # refills: 4   Last office visit provider:  4/12/21     Requested Prescriptions   Pending Prescriptions Disp Refills     rosuvastatin (CRESTOR) 20 MG tablet [Pharmacy Med Name: ROSUVASTATIN 20MG TABLETS] 90 tablet 3     Sig: TAKE 1 TABLET(20 MG) BY MOUTH AT BEDTIME       Statins Protocol Passed - 9/3/2021  3:32 AM        Passed - LDL on file in past 12 months     Recent Labs   Lab Test 10/06/20  1118   LDL 63             Passed - No abnormal creatine kinase in past 12 months     No lab results found.             Passed - Recent (12 mo) or future (30 days) visit within the authorizing provider's specialty     Patient has had an office visit with the authorizing provider or a provider within the authorizing providers department within the previous 12 mos or has a future within next 30 days. See \"Patient Info\" tab in inbasket, or \"Choose Columns\" in Meds & Orders section of the refill encounter.              Passed - Medication is active on med list        Passed - Patient is age 18 or older        Passed - No active pregnancy on record        Passed - No positive pregnancy test in past 12 months             Gia Martinez RN 09/03/21 7:34 PM  "

## 2021-09-07 ENCOUNTER — ANTICOAGULATION THERAPY VISIT (OUTPATIENT)
Dept: FAMILY MEDICINE | Facility: CLINIC | Age: 86
End: 2021-09-07

## 2021-09-07 ENCOUNTER — LAB (OUTPATIENT)
Dept: LAB | Facility: CLINIC | Age: 86
End: 2021-09-07
Payer: COMMERCIAL

## 2021-09-07 DIAGNOSIS — I48.20 CHRONIC ATRIAL FIBRILLATION (H): ICD-10-CM

## 2021-09-07 LAB — INR BLD: 2 (ref 0.9–1.1)

## 2021-09-07 PROCEDURE — 36416 COLLJ CAPILLARY BLOOD SPEC: CPT

## 2021-09-07 PROCEDURE — 85610 PROTHROMBIN TIME: CPT

## 2021-09-07 NOTE — PROGRESS NOTES
ANTICOAGULATION MANAGEMENT     Guera Peters 86 year old female is on warfarin with therapeutic INR result. (Goal INR 2.0-3.0)    Recent labs: (last 7 days)     09/07/21  1008   INR 2.0*       ASSESSMENT     Source(s): Chart Review, Patient/Caregiver Call and Template       Warfarin doses taken: Warfarin taken as instructed    Diet: No new diet changes identified    New illness, injury, or hospitalization: No    Medication/supplement changes: None noted    Signs or symptoms of bleeding or clotting: No    Previous INR: Therapeutic last visit; previously outside of goal range    Additional findings: None     PLAN     Recommended plan for no diet, medication or health factor changes affecting INR     Dosing Instructions: Continue your current warfarin dose with next INR in 2 weeks       Summary  As of 9/7/2021    Full warfarin instructions:  3 mg every Tue; 2 mg all other days   Next INR check:  9/21/2021             Telephone call with  daughterSienna ([phone]) who verbalizes understanding and agrees to plan    Lab visit scheduled - INR on 9/28/21 @ \[facility]".    Education provided: Importance of consistent vitamin K intake and Goal range and significance of current result    Plan made per [facility] anticoagulation protocol    Columba Aguilar, RN  Anticoagulation Clinic  9/7/2021    _______________________________________________________________________     Anticoagulation Episode Summary     Current INR goal:  2.0-3.0   TTR:  63.8 % (1 y)   Target end date:     Send INR reminders to:  [facility]       Comments:           Anticoagulation Care Providers     Provider Role Specialty Phone number    Dora Castillo MD Referring Family Medicine [phone]

## 2021-09-26 ENCOUNTER — HEALTH MAINTENANCE LETTER (OUTPATIENT)
Age: 86
End: 2021-09-26

## 2021-09-28 ENCOUNTER — ANTICOAGULATION THERAPY VISIT (OUTPATIENT)
Dept: ANTICOAGULATION | Facility: CLINIC | Age: 86
End: 2021-09-28

## 2021-09-28 ENCOUNTER — LAB (OUTPATIENT)
Dept: LAB | Facility: CLINIC | Age: 86
End: 2021-09-28
Payer: COMMERCIAL

## 2021-09-28 DIAGNOSIS — I48.20 CHRONIC ATRIAL FIBRILLATION (H): ICD-10-CM

## 2021-09-28 LAB — INR BLD: 3.1 (ref 0.9–1.1)

## 2021-09-28 PROCEDURE — 36416 COLLJ CAPILLARY BLOOD SPEC: CPT

## 2021-09-28 PROCEDURE — 85610 PROTHROMBIN TIME: CPT

## 2021-09-28 NOTE — PROGRESS NOTES
ANTICOAGULATION MANAGEMENT     Guera Peters 86 year old female is on warfarin with supratherapeutic INR result. (Goal INR 2.0-3.0)    Recent labs: (last 7 days)     09/28/21  1018   INR 3.1*       ASSESSMENT     Source(s): Chart Review, Patient/Caregiver Call and Template       Warfarin doses taken: Warfarin taken as instructed    Diet: Yes.  Flare-up of her Diverticulitis / stomach issues / did not have much of an appetite may be affecting diet and INR    New illness, injury, or hospitalization: Yes:    Reported stomach issues last week.    Medication/supplement changes: None noted    Signs or symptoms of bleeding or clotting: No    Previous INR: Therapeutic last 2(+) visits    Additional findings: None     PLAN     Recommended plan for temporary change(s) affecting INR     Dosing Instructions:  (1mg tabs)   - Decrease your warfarin dose to 2mg daily,   - (6.7% change)   - INR in 1-2 weeks       Summary  As of 9/28/2021    Full warfarin instructions:  2 mg every day   Next INR check:  10/12/2021             Telephone call with  daughterSienna ([phone]) who verbalizes understanding and agrees to plan    - will come in 3 wks.    - advised to come in sooner for INR, if any s/sx of any abnormal bleeding / bruising.    Lab visit scheduled - INR on 10/19/21 @[facility].    Education provided: Importance of consistent vitamin K intake, Impact of vitamin K foods on INR, Goal range and significance of current result and Importance of notifying clinic for diarrhea, nausea/vomiting, reduced intake, and/or illness; a sooner lab recheck maybe needed.    Plan made per [facility] anticoagulation protocol    Columba Aguilar, RN  Anticoagulation Clinic  9/28/2021    _______________________________________________________________________     Anticoagulation Episode Summary     Current INR goal:  2.0-3.0   TTR:  64.3 % (1 y)   Target end date:     Send INR reminders to:  NANCY FELIX       Comments:            Anticoagulation Care Providers     Provider Role Specialty Phone number    Dora Castillo MD Referring Family Medicine [phone]

## 2021-10-07 ENCOUNTER — IMMUNIZATION (OUTPATIENT)
Dept: NURSING | Facility: CLINIC | Age: 86
End: 2021-10-07
Payer: COMMERCIAL

## 2021-10-07 PROCEDURE — 91300 PR COVID VAC PFIZER DIL RECON 30 MCG/0.3 ML IM: CPT

## 2021-10-07 PROCEDURE — 0004A PR COVID VAC PFIZER DIL RECON 30 MCG/0.3 ML IM: CPT

## 2021-10-12 ENCOUNTER — IMMUNIZATION (OUTPATIENT)
Dept: FAMILY MEDICINE | Facility: CLINIC | Age: 86
End: 2021-10-12
Payer: COMMERCIAL

## 2021-10-12 PROCEDURE — 90662 IIV NO PRSV INCREASED AG IM: CPT

## 2021-10-12 PROCEDURE — G0008 ADMIN INFLUENZA VIRUS VAC: HCPCS

## 2021-10-19 ENCOUNTER — ANTICOAGULATION THERAPY VISIT (OUTPATIENT)
Dept: ANTICOAGULATION | Facility: CLINIC | Age: 86
End: 2021-10-19

## 2021-10-19 ENCOUNTER — LAB (OUTPATIENT)
Dept: LAB | Facility: CLINIC | Age: 86
End: 2021-10-19
Payer: COMMERCIAL

## 2021-10-19 ENCOUNTER — TELEPHONE (OUTPATIENT)
Dept: FAMILY MEDICINE | Facility: CLINIC | Age: 86
End: 2021-10-19

## 2021-10-19 DIAGNOSIS — I48.20 CHRONIC ATRIAL FIBRILLATION (H): ICD-10-CM

## 2021-10-19 LAB — INR BLD: 2.7 (ref 0.9–1.1)

## 2021-10-19 PROCEDURE — 85610 PROTHROMBIN TIME: CPT

## 2021-10-19 PROCEDURE — 36416 COLLJ CAPILLARY BLOOD SPEC: CPT

## 2021-10-19 NOTE — PROGRESS NOTES
ANTICOAGULATION MANAGEMENT     Guera Peters 86 year old female is on warfarin with therapeutic INR result. (Goal INR 2.0-3.0)    Recent labs: (last 7 days)     10/19/21  1011   INR 2.7*       ASSESSMENT     Source(s): Chart Review, Patient/Caregiver Call, Home Care/Facility Nurse and Template       Warfarin doses taken: Warfarin taken as instructed    Diet: No new diet changes identified    New illness, injury, or hospitalization: No    Medication/supplement changes: None noted    Signs or symptoms of bleeding or clotting: No    Previous INR: Supratherapeutic at 3.1 on 9/28/21.    Additional findings: None     PLAN     Recommended plan for no diet, medication or health factor changes affecting INR     Dosing Instructions: (1mg tabs)   - Continue your current warfarin dose with next INR in 2 weeks       Summary  As of 10/19/2021    Full warfarin instructions:  2 mg every day   Next INR check:  11/2/2021             Telephone call with  daughterSienna ([phone])  who verbalizes understanding and agrees to plan    Lab visit scheduled - INR on 10/28/21 during annual check with Dr. Gonzalez    Education provided: Importance of consistent vitamin K intake and Goal range and significance of current result    Plan made per [facility] anticoagulation protocol    Columba Aguilar, RN  Anticoagulation Clinic  10/19/2021    _______________________________________________________________________     Anticoagulation Episode Summary     Current INR goal:  2.0-3.0   TTR:  66.3 % (1 y)   Target end date:     Send INR reminders to:  [facility]       Comments:           Anticoagulation Care Providers     Provider Role Specialty Phone number    Dora Castillo MD Referring Family Medicine [phone]

## 2021-10-19 NOTE — TELEPHONE ENCOUNTER
Reason for Call:  Request for results:    Name of test or procedure: INR    Date of test of procedure: 10-19    Location of the test or procedure: [place]    OK to leave the result message on voice mail or with a family member? YES    Phone number Patient can be reached at:  Other phone number:  Sienna [phone]    Additional comments: daughter requesting call to discuss INR results    Call taken on 10/19/2021 at 12:52 PM by Debbi Lazaro

## 2021-10-28 ENCOUNTER — ANTICOAGULATION THERAPY VISIT (OUTPATIENT)
Dept: ANTICOAGULATION | Facility: CLINIC | Age: 86
End: 2021-10-28

## 2021-10-28 ENCOUNTER — OFFICE VISIT (OUTPATIENT)
Dept: FAMILY MEDICINE | Facility: CLINIC | Age: 86
End: 2021-10-28
Payer: COMMERCIAL

## 2021-10-28 VITALS
HEART RATE: 62 BPM | BODY MASS INDEX: 29.37 KG/M2 | WEIGHT: 176.25 LBS | HEIGHT: 65 IN | DIASTOLIC BLOOD PRESSURE: 68 MMHG | SYSTOLIC BLOOD PRESSURE: 141 MMHG

## 2021-10-28 DIAGNOSIS — I48.20 CHRONIC ATRIAL FIBRILLATION (H): ICD-10-CM

## 2021-10-28 DIAGNOSIS — Z00.00 ENCOUNTER FOR MEDICARE ANNUAL WELLNESS EXAM: Primary | ICD-10-CM

## 2021-10-28 DIAGNOSIS — E55.9 HYPOVITAMINOSIS D: ICD-10-CM

## 2021-10-28 PROBLEM — B35.1 DERMATOPHYTOSIS OF NAIL: Status: RESOLVED | Noted: 2017-01-04 | Resolved: 2021-10-28

## 2021-10-28 LAB — INR BLD: 2 (ref 0.9–1.1)

## 2021-10-28 PROCEDURE — 36416 COLLJ CAPILLARY BLOOD SPEC: CPT | Performed by: FAMILY MEDICINE

## 2021-10-28 PROCEDURE — 82306 VITAMIN D 25 HYDROXY: CPT | Performed by: FAMILY MEDICINE

## 2021-10-28 PROCEDURE — 99397 PER PM REEVAL EST PAT 65+ YR: CPT | Performed by: FAMILY MEDICINE

## 2021-10-28 PROCEDURE — 85610 PROTHROMBIN TIME: CPT | Performed by: FAMILY MEDICINE

## 2021-10-28 PROCEDURE — 36415 COLL VENOUS BLD VENIPUNCTURE: CPT | Performed by: FAMILY MEDICINE

## 2021-10-28 RX ORDER — MEMANTINE HYDROCHLORIDE 5 MG/1
5 TABLET ORAL 2 TIMES DAILY
COMMUNITY
End: 2021-11-19

## 2021-10-28 RX ORDER — WARFARIN SODIUM 1 MG/1
TABLET ORAL
COMMUNITY
Start: 2021-08-19 | End: 2022-05-12

## 2021-10-28 ASSESSMENT — MIFFLIN-ST. JEOR: SCORE: 1246.37

## 2021-10-28 ASSESSMENT — ACTIVITIES OF DAILY LIVING (ADL)
CURRENT_FUNCTION: MONEY MANAGEMENT REQUIRES ASSISTANCE
CURRENT_FUNCTION: MEDICATION ADMINISTRATION REQUIRES ASSISTANCE

## 2021-10-28 NOTE — PROGRESS NOTES
"SUBJECTIVE:   Guera Peters is a 86 year old female who presents for Preventive Visit.    Chief Complaint   Patient presents with     Wellness Visit        Are you in the first 12 months of your Medicare coverage?  No    Healthy Habits:     In general, how would you rate your overall health?  Good    Frequency of exercise:  None    Do you usually eat at least 4 servings of fruit and vegetables a day, include whole grains    & fiber and avoid regularly eating high fat or \"junk\" foods?  No    Taking medications regularly:  Yes    Medication side effects:  None    Ability to successfully perform activities of daily living:  Medication administration requires assistance and money management requires assistance    Home Safety:  No safety concerns identified    Hearing Impairment:  No hearing concerns    In the past 6 months, have you been bothered by leaking of urine?  No    In general, how would you rate your overall mental or emotional health?  Good      PHQ-2 Total Score: 0    Additional concerns today:  No    Do you feel safe in your environment? Yes    Have you ever done Advance Care Planning? (For example, a Health Directive, POLST, or a discussion with a medical provider or your loved ones about your wishes): Yes, patient states has an Advance Care Planning document and will bring a copy to the clinic.    Fall risk  Fallen 2 or more times in the past year?: No  Any fall with injury in the past year?: No    Cognitive Screening Not appropriate due to known dementia    Reviewed and updated as needed this visit by clinical staff  Tobacco  Allergies  Meds  Problems  Med Hx  Surg Hx  Fam Hx          Reviewed and updated as needed this visit by Provider  Tobacco  Allergies  Meds  Problems  Med Hx  Surg Hx  Fam Hx         Social History     Tobacco Use     Smoking status: Former Smoker     Smokeless tobacco: Never Used     Tobacco comment: quit >20 yrs ago   Substance Use Topics     Alcohol use: Not " "Currently       Alcohol Use 10/28/2021   Prescreen: >3 drinks/day or >7 drinks/week? No   Prescreen: >3 drinks/day or >7 drinks/week? -     Would like to check eye today. Slight bruising medial to right eye, no known injuries, not painful. No bruising elsewhere.   INR levels have been good, is due for draw today.   Meds are good. Feeling good.     Current providers sharing in care for this patient include:   Patient Care Team:  Dora Castillo MD as PCP - General  System, Provider Not In as Cardiologist (Clinic)  [facility] as Other (see comments)  Dora Castillo MD as Assigned PCP    The following health maintenance items are reviewed in Epic and correct as of today:  Health Maintenance Due   Topic Date Due     ANNUAL REVIEW OF  ORDERS  Never done     FALL RISK ASSESSMENT  10/06/2021     Mammogram Screening - Patient over age 75, has elected to discontinue screenings.  Pertinent mammograms are reviewed under the imaging tab.    Review of Systems  10 point ROS negative except for as reported above.     OBJECTIVE:   BP (!) 141/68   Pulse 62   Ht 1.645 m (5' 4.75\")   Wt 79.9 kg (176 lb 4 oz)   LMP  (LMP Unknown)   BMI 29.56 kg/m   Estimated body mass index is 29.56 kg/m  as calculated from the following:    Height as of this encounter: 1.645 m (5' 4.75\").    Weight as of this encounter: 79.9 kg (176 lb 4 oz).  Physical Exam  GENERAL: healthy, alert and no distress  EYES: Eyes grossly normal to inspection, PERRL and conjunctivae and sclerae normal  HENT: ear canals and TM's normal, nose and mouth without ulcers or lesions  NECK: no adenopathy, no asymmetry, masses, or scars and thyroid normal to palpation  RESP: lungs clear to auscultation - no rales, rhonchi or wheezes  BREAST: exam declined today   CV: regular rate and rhythm, no murmurs. Pulses intact. Mild lower extremity edema bilaterally.  ABDOMEN: soft, nontender, no hepatosplenomegaly, no masses and bowel sounds normal   " "(female): exam declined today   MS: no gross musculoskeletal defects noted, no edema  SKIN: no suspicious lesions or rashes  NEURO: Is forgetful, able to carry on a normal conversation well. No gross deficits present.   PSYCH: mentation appears normal, affect normal/bright    Recent Results (from the past 24 hour(s))   INR point of care    Collection Time: 10/28/21  2:42 PM   Result Value Ref Range    INR 2.0 (H) 0.9 - 1.1   Vitamin D Deficiency    Collection Time: 10/28/21  2:42 PM   Result Value Ref Range    Vitamin D, Total (25-Hydroxy) 25 (L) 30 - 80 ug/L        ASSESSMENT / PLAN:     1. Encounter for Medicare annual wellness exam  Reviewed health history and health maintenance recommendations.   Up to date on health maintenance recommendations.   Blood pressure at goal by JNC8 criteria.     2. Hypovitaminosis D  - Vitamin D Deficiency    Restarting vitamin D supplementation, hadn't been taking regularly.   Important for bone health in setting of osteoporosis.     3. Chronic atrial fibrillation (H)  - INR point of care     INR drawn today for anticoagulation clinic.   Heart rate actually regular today.  Bruise present medial of right eye, isolated, non-tender, no identified injuries.   Offered reassurance that it should clear up in the next 1-2 weeks.      COUNSELING:  Reviewed preventive health counseling, as reflected in patient instructions       Regular exercise       Healthy diet/nutrition       Vision screening       Hearing screening       Dental care       Fall risk prevention       Osteoporosis prevention/bone health    Estimated body mass index is 29.56 kg/m  as calculated from the following:    Height as of this encounter: 1.645 m (5' 4.75\").    Weight as of this encounter: 79.9 kg (176 lb 4 oz).        She reports that she has quit smoking. She has never used smokeless tobacco.      Appropriate preventive services were discussed with this patient, including applicable screening as appropriate for " cardiovascular disease, diabetes, osteopenia/osteoporosis, and glaucoma.  As appropriate for age/gender, discussed screening for colorectal cancer, prostate cancer, breast cancer, and cervical cancer. Checklist reviewing preventive services available has been given to the patient.    Reviewed patients plan of care and provided an AVS. The Basic Care Plan (routine screening as documented in Health Maintenance) for Guera meets the Care Plan requirement. This Care Plan has been established and reviewed with the Patient and daughter.    Counseling Resources:  ATP IV Guidelines  Pooled Cohorts Equation Calculator  Breast Cancer Risk Calculator  Breast Cancer: Medication to Reduce Risk  FRAX Risk Assessment  ICSI Preventive Guidelines  Dietary Guidelines for Americans, 2010  [email or web address]'s MyPlate  ASA Prophylaxis  Lung CA Screening    Sravani Gonzalez, [facility]    Identified Health Risks:    She is at risk for lack of exercise and has been provided with information to increase physical activity for the benefit of her well-being.  The patient was counseled and encouraged to consider modifying their diet and eating habits. She was provided with information on recommended healthy diet options.  The patient reports that she has difficulty with activities of daily living. Daughter states they currently have the support they need and are managing adequately.

## 2021-10-29 LAB — DEPRECATED CALCIDIOL+CALCIFEROL SERPL-MC: 25 UG/L (ref 30–80)

## 2021-10-29 NOTE — PROGRESS NOTES
Progress Notes by Dayana Medina LICSW at 10/25/2019  9:00 AM     Author: Dayana Medina LICSW Service: -- Author Type:     Filed: 10/25/2019 10:56 AM Encounter Date: 10/25/2019 Status: Signed    : Dayana Medina LICSW ()       Clinic Care Coordination Contact    Clinic Care Coordination Contact  INITIAL ASSESSMENT    CC BILL met with ps 4 children.  Reviewed completed healthcare directive and financial POA.  Discussed family's wish for parents to remain at home.  Discussed gradually improving home safety and introducing outside services.  Provided information on Meals on Wheels, [employer], Mom's Meals, Go Go Grandparent (rides).  Discussed caregiver resources on www.alz.org.  Discussed long term needing to potentially hire caregivers and this would be an out of pocket expense.  Discussed communication techniques for how to address accepting help with family/pt and spouse.      Referral Information:  Referral Source: PCP        Chief Complaint   Patient presents with   ? Clinic Care Coodination - Face To Face        Universal Utilization:  Clinic Utilization  Difficulty keeping appointments:: No  Compliance Concerns: No  No-Show Concerns: No  No PCP office visit in Past Year: No  Utilization    Last refreshed: 10/25/2019 10:20 AM:  Hospital Admissions 1           Last refreshed: 10/25/2019 10:20 AM:  ED Visits 0           Last refreshed: 10/25/2019 10:20 AM:  No Show Count (past year) 0              Current as of: 10/25/2019 10:20 AM               Medication Management:  Spouse assists and daughter helps spouse when needed    Functional Status:  Dependent ADLs:: Independent  Dependent IADLs:: Independent, Transportation(Spouse drives to close places)  Bed or wheelchair confined:: No  Mobility Status: Independent  Fallen 2 or more times in the past year?: No  Any fall with injury in the past year?: No    Living Situation:  Current living arrangement:: I live in a  The patient was here for a vision care examination. There were no untoward findings noted. Repeat evaluation in one to two years is indicated. private home with spouse  Type of residence:: Town home    Diet/Exercise/Sleep:  Diet:: Regular  Inadequate nutrition (GOAL):: No  Food Insecurity: No  Tube Feeding: No  Exercise:: Currently not exercising  Inadequate activity/exercise (GOAL):: No  Significant changes in sleep pattern (GOAL): No    Transportation:  Transportation concerns (GOAL):: No  Transportation means:: Family     Psychosocial:  Synagogue or spiritual beliefs that impact treatment:: No  Mental health DX:: No  Mental health management concern (GOAL):: No  Informal Support system:: Children     Financial/Insurance:   Financial/Insurance concerns (GOAL):: No(Social Security and pension)       Resources and Interventions:   ;   Community Resources: None  Supplies used at home:: None  Equipment Currently Used at Home: none    Advance Care Plan/Directive  Advanced Care Plans/Directives on file:: Yes  Type Advanced Care Plans/Directives: Advanced Directive - On File  Advanced Care Plan/Directive Status: Not Applicable    Referrals Placed: Meals on Wheels, Transportation, Lifeline, Community Resources(Mom's Meals, [employer], Go GO Grandparent)     Goals:   Goals        General    Healthy Eating (pt-stated)     Notes - Note created  10/25/2019 10:26 AM by Dayana Medina LICSW    Goal Statement- My family would like me to try Meals on Wheels or Mom's Meals in the next 6 months    Action steps to achieve this goal  1.  My family will help set up a meal service for me and my spouse    Date goal set: 10/25/19      Reducing Risks (pt-stated)     Notes - Note edited  10/25/2019 10:29 AM by Dayana Medina LICSW    Goal Statement- My family will help improve home safety in the next 6 months    Action steps to achieve this goal  1.  My family will help install grab bars in the bathroom  2.  My family will look into a Life Alert      Date goal set:  10/25/19      Transportation (pt-stated)     Notes - Note created  10/25/2019 10:24 AM by Adam  Dayana FRIAS, [facility]    Goal Statement- My family would like me to try [employer] as an alternative in the next 6 months    Action steps to achieve this goal  1.  My family will help me and my  schedule a ride      Date goal set:  10/25/19              Patient/Caregiver understanding: Daughter Sienna and 3 siblings convey understanding.       Future Appointments              In 1 week [facility]          Plan: CC  to call Sienna in one month

## 2021-10-29 NOTE — PATIENT INSTRUCTIONS
Patient Education   Personalized Prevention Plan  You are due for the preventive services outlined below.  Your care team is available to assist you in scheduling these services.  If you have already completed any of these items, please share that information with your care team to update in your medical record.  There are no preventive care reminders to display for this patient.    Exercise for a Healthier Heart  You may wonder how you can improve the health of your heart. If you re thinking about exercise, you re on the right track. You don t need to become an athlete. But you do need a certain amount of brisk exercise to help strengthen your heart. If you have been diagnosed with a heart condition, your healthcare provider may advise exercise to help stabilize your condition. To help make exercise a habit, choose safe, fun activities.      Exercise with a friend. When activity is fun, you're more likely to stick with it.   Before you start  Check with your healthcare provider before starting an exercise program. This is especially important if you have not been active for a while. It's also important if you have a long-term (chronic) health problem such as heart disease, diabetes, or obesity. Or if you are at high risk for having these problems.   Why exercise?  Exercising regularly offers many healthy rewards. It can help you do all of the following:     Improve your blood cholesterol level to help prevent further heart trouble    Lower your blood pressure to help prevent a stroke or heart attack    Control diabetes, or reduce your risk of getting this disease    Improve your heart and lung function    Reach and stay at a healthy weight    Make your muscles stronger so you can stay active    Prevent falls and fractures by slowing the loss of bone mass (osteoporosis)    Manage stress better    Reduce your blood pressure    Improve your sense of self and your body image  Exercise tips      Ease into your  routine. Set small goals. Then build on them. If you are not sure what your activity level should be, talk with your healthcare provider first before starting an exercise routine.    Exercise on most days. Aim for a total of 150 minutes (2 hours and 30 minutes) or more of moderate-intensity aerobic activity each week. Or 75 minutes (1 hour and 15 minutes) or more of vigorous-intensity aerobic activity each week. Or try for a combination of both. Moderate activity means that you breathe heavier and your heart rate increases but you can still talk. Think about doing 40 minutes of moderate exercise, 3 to 4 times a week. For best results, activity should last for about 40 minutes to lower blood pressure and cholesterol. It's OK to work up to the 40-minute period over time. Examples of moderate-intensity activity are walking 1 mile in 15 minutes. Or doing 30 to 45 minutes of yard work.    Step up your daily activity level.  Along with your exercise program, try being more active the whole day. Walk instead of drive. Or park further away so that you take more steps each day. Do more household tasks or yard work. You may not be able to meet the advised mount of physical activity. But doing some moderate- or vigorous-intensity aerobic activity can help reduce your risk for heart disease. Your healthcare provider can help you figure out what is best for you.    Choose 1 or more activities you enjoy.  Walking is one of the easiest things you can do. You can also try swimming, riding a bike, dancing, or taking an exercise class.    When to call your healthcare provider  Call your healthcare provider if you have any of these:     Chest pain or feel dizzy or lightheaded    Burning, tightness, pressure, or heaviness in your chest, neck, shoulders, back, or arms    Abnormal shortness of breath    More joint or muscle pain    A very fast or irregular heartbeat (palpitations)  Les last reviewed this educational content on  7/1/2019 2000-2021 [employer]. All rights reserved. This information is not intended as a substitute for professional medical care. Always follow your healthcare professional's instructions.          Understanding USDA MyPlate  The USDA has guidelines to help you make healthy food choices. These are called MyPlate. MyPlate shows the food groups that make up healthy meals using the image of a place setting. Before you eat, think about the healthiest choices for what to put on your plate or in your cup or bowl. To learn more about building a healthy plate, visit www.choosemyplate.gov.    The food groups    Fruits. Any fruit or 100% fruit juice counts as part of the Fruit Group. Fruits may be fresh, canned, frozen, or dried, and may be whole, cut-up, or pureed. Make 1/2 of your plate fruits and vegetables.    Vegetables. Any vegetable or 100% vegetable juice counts as a member of the Vegetable Group. Vegetables may be fresh, frozen, canned, or dried. They can be served raw or cooked and may be whole, cut-up, or mashed. Make 1/2 of your plate fruits and vegetables.    Grains. All foods made from grains are part of the Grains Group. These include wheat, rice, oats, cornmeal, and barley. Grains are often used to make foods such as bread, pasta, oatmeal, cereal, tortillas, and grits. Grains should be no more than 1/4 of your plate. At least half of your grains should be whole grains.    Protein. This group includes meat, poultry, seafood, beans and peas, eggs, processed soy products (such as tofu), nuts (including nut butters), and seeds. Make protein choices no more than 1/4 of your plate. Meat and poultry choices should be lean or low fat.    Dairy. The Dairy Group includes all fluid milk products and foods made from milk that contain calcium, such as yogurt and cheese. (Foods that have little calcium, such as cream, butter, and cream cheese, are not part of this group.) Most dairy choices should be  low-fat or fat-free.    Oils. Oils aren't a food group, but they do contain essential nutrients. However it's important to watch your intake of oils. These are fats that are liquid at room temperature. They include canola, corn, olive, soybean, vegetable, and sunflower oil. Foods that are mainly oil include mayonnaise, certain salad dressings, and soft margarines. You likely already get your daily oil allowance from the foods you eat.  Things to limit  Eating healthy also means limiting these things in your diet:       Salt (sodium). Many processed foods have a lot of sodium. To keep sodium intake down, eat fresh vegetables, meats, poultry, and seafood when possible. Purchase low-sodium, reduced-sodium, or no-salt-added food products at the store. And don't add salt to your meals at home. Instead, season them with herbs and spices such as dill, oregano, cumin, and paprika. Or try adding flavor with lemon or lime zest and juice.    Saturated fat. Saturated fats are most often found in animal products such as beef, pork, and chicken. They are often solid at room temperature, such as butter. To reduce your saturated fat intake, choose leaner cuts of meat and poultry. And try healthier cooking methods such as grilling, broiling, roasting, or baking. For a simple lower-fat swap, use plain nonfat yogurt instead of mayonnaise when making potato salad or macaroni salad.    Added sugars. These are sugars added to foods. They are in foods such as ice cream, candy, soda, fruit drinks, sports drinks, energy drinks, cookies, pastries, jams, and syrups. Cut down on added sugars by sharing sweet treats with a family member or friend. You can also choose fruit for dessert, and drink water or other unsweetened beverages.     [employer] last reviewed this educational content on 6/1/2020 2000-2021 [employer]. All rights reserved. This information is not intended as a substitute for professional medical care. Always  follow your healthcare professional's instructions.        Activities of Daily Living    Your Health Risk Assessment indicates you have difficulties with activities of daily living such as housework, bathing, preparing meals, taking medication, etc. Please make a follow up appointment for us to address this issue in more detail.

## 2021-11-17 DIAGNOSIS — F02.80 ALZHEIMER'S DEMENTIA WITHOUT BEHAVIORAL DISTURBANCE, UNSPECIFIED TIMING OF DEMENTIA ONSET: Primary | ICD-10-CM

## 2021-11-17 DIAGNOSIS — G30.9 ALZHEIMER'S DEMENTIA WITHOUT BEHAVIORAL DISTURBANCE, UNSPECIFIED TIMING OF DEMENTIA ONSET: Primary | ICD-10-CM

## 2021-11-18 ENCOUNTER — ANTICOAGULATION THERAPY VISIT (OUTPATIENT)
Dept: ANTICOAGULATION | Facility: CLINIC | Age: 86
End: 2021-11-18

## 2021-11-18 ENCOUNTER — LAB (OUTPATIENT)
Dept: LAB | Facility: CLINIC | Age: 86
End: 2021-11-18
Payer: COMMERCIAL

## 2021-11-18 DIAGNOSIS — I48.20 CHRONIC ATRIAL FIBRILLATION (H): ICD-10-CM

## 2021-11-18 LAB — INR BLD: 2.2 (ref 0.9–1.1)

## 2021-11-18 PROCEDURE — 85610 PROTHROMBIN TIME: CPT

## 2021-11-18 PROCEDURE — 36416 COLLJ CAPILLARY BLOOD SPEC: CPT

## 2021-11-18 NOTE — PROGRESS NOTES
ANTICOAGULATION MANAGEMENT     Guera Peters 86 year old female is on warfarin with therapeutic INR result. (Goal INR 2.0-3.0)    Recent labs: (last 7 days)     11/18/21  1322   INR 2.2*       ASSESSMENT     Source(s): Chart Review and Patient/Caregiver Call       Warfarin doses taken: Warfarin taken as instructed    Diet: No new diet changes identified    New illness, injury, or hospitalization: No    Medication/supplement changes: None noted    Signs or symptoms of bleeding or clotting: No    Previous INR: Therapeutic last 2(+) visits    Additional findings: None     PLAN     Recommended plan for no diet, medication or health factor changes affecting INR     Dosing Instructions: Continue your current warfarin dose with next INR in 4 weeks       Summary  As of 11/18/2021    Full warfarin instructions:  2 mg every day   Next INR check:  12/16/2021             Telephone call with  daughter Sienna who verbalizes understanding and agrees to plan    Lab visit scheduled    Education provided: None required    Plan made per [facility] anticoagulation protocol    Tommie Mccoy RN  Anticoagulation Clinic  11/18/2021    _______________________________________________________________________     Anticoagulation Episode Summary     Current INR goal:  2.0-3.0   TTR:  67.0 % (1 y)   Target end date:     Send INR reminders to:  NANCY FELIX       Comments:           Anticoagulation Care Providers     Provider Role Specialty Phone number    Dora Castillo MD Referring Family Medicine [phone]

## 2021-11-18 NOTE — TELEPHONE ENCOUNTER
"Routing refill request to provider for review/approval because:  Refill too soon.     Last Written Prescription Date:  5/27/2021  Last Fill Quantity: 180,  # refills: 2   Last office visit provider:  10/28/2021     Requested Prescriptions   Pending Prescriptions Disp Refills     memantine (NAMENDA) 5 MG tablet [Pharmacy Med Name: MEMANTINE 5MG TABLETS] 180 tablet      Sig: TAKE 1 TABLET(5 MG) BY MOUTH TWICE DAILY       Miscellaneous Dementia Agents Passed - 11/17/2021  8:08 AM        Passed - Recent (12 mo) or future (30 days) visit within the authorizing provider's specialty     Patient has had an office visit with the authorizing provider or a provider within the authorizing providers department within the previous 12 mos or has a future within next 30 days. See \"Patient Info\" tab in inbasket, or \"Choose Columns\" in Meds & Orders section of the refill encounter.              Passed - Medication is active on med list        Passed - Patient is 18 years of age or older             Bhavani Angel RN 11/18/21 4:07 PM  "

## 2021-11-19 RX ORDER — MEMANTINE HYDROCHLORIDE 5 MG/1
TABLET ORAL
Qty: 180 TABLET | Refills: 1 | Status: SHIPPED | OUTPATIENT
Start: 2021-11-19 | End: 2022-05-13

## 2021-12-09 NOTE — NURSING NOTE
Guera Peters is a 81 year old female who comes in today for a Blood Pressure check because Lisinopril was stopped 3 days ago.  Vitals as recorded, a regular cuff was used.  BP Readings from Last 6 Encounters:   02/17/17 148/90   02/14/17 94/50   02/07/17 118/70   01/31/17 128/76   01/23/17 146/90   01/19/17 100/60         Current complaints: none    Disposition: MD/AP notified while patient in the clinic, patient will restart Lisinopril 20 mg and recheck blood pressure on 02/24/17. Appointment has been scheduled. Kimberly Roth MA       09-Dec-2021 18:35

## 2021-12-10 DIAGNOSIS — E78.5 HYPERLIPIDEMIA LDL GOAL <100: ICD-10-CM

## 2021-12-10 RX ORDER — ROSUVASTATIN CALCIUM 20 MG/1
TABLET, COATED ORAL
Qty: 90 TABLET | Refills: 1 | Status: SHIPPED | OUTPATIENT
Start: 2021-12-10 | End: 2022-05-13

## 2021-12-16 ENCOUNTER — ANTICOAGULATION THERAPY VISIT (OUTPATIENT)
Dept: ANTICOAGULATION | Facility: CLINIC | Age: 86
End: 2021-12-16

## 2021-12-16 ENCOUNTER — LAB (OUTPATIENT)
Dept: LAB | Facility: CLINIC | Age: 86
End: 2021-12-16
Payer: COMMERCIAL

## 2021-12-16 ENCOUNTER — TELEPHONE (OUTPATIENT)
Dept: NURSING | Facility: CLINIC | Age: 86
End: 2021-12-16

## 2021-12-16 DIAGNOSIS — I48.20 CHRONIC ATRIAL FIBRILLATION (H): ICD-10-CM

## 2021-12-16 LAB — INR BLD: 2.5 (ref 0.9–1.1)

## 2021-12-16 PROCEDURE — 36415 COLL VENOUS BLD VENIPUNCTURE: CPT

## 2021-12-16 PROCEDURE — 85610 PROTHROMBIN TIME: CPT

## 2021-12-16 NOTE — PROGRESS NOTES
ANTICOAGULATION MANAGEMENT     Guera Peters 86 year old female is on warfarin with therapeutic INR result. (Goal INR 2.0-3.0)    Recent labs: (last 7 days)     12/16/21  1314   INR 2.5*       ASSESSMENT     Source(s): Chart Review, Patient/Caregiver Call and Template       Warfarin doses taken: Warfarin taken as instructed    Diet: No new diet changes identified    New illness, injury, or hospitalization: No    Medication/supplement changes: None noted    Signs or symptoms of bleeding or clotting: No    Previous INR: Therapeutic last 3 INR visits.    Additional findings: None     PLAN     Recommended plan for no diet, medication or health factor changes affecting INR     Dosing Instructions:    Continue your current warfarin dose with next INR in 4 weeks       Summary  As of 12/16/2021    Full warfarin instructions:  2 mg every day   Next INR check:  1/13/2022             Telephone call with  daughterSienna ([phone]) who verbalizes understanding and agrees to plan    Lab visit scheduled - INR on 1/13/22 @ [facility]    Education provided: Importance of consistent vitamin K intake and Goal range and significance of current result    Plan made per [facility] anticoagulation protocol    Columba Aguilar, RN  Anticoagulation Clinic  12/16/2021    _______________________________________________________________________     Anticoagulation Episode Summary     Current INR goal:  2.0-3.0   TTR:  67.0 % (1 y)   Target end date:     Send INR reminders to:  [facility]       Comments:           Anticoagulation Care Providers     Provider Role Specialty Phone number    Dora Castillo MD Referring Family Medicine [phone]

## 2021-12-16 NOTE — TELEPHONE ENCOUNTER
Reason for Call: Call back    Detailed comments: Call pt back to discuss INR    Phone Number Patient can be reached at: [phone] Daughter's phone (Sienna)    Best Time: Anytime    Can we leave a detailed message on this number? Yes    Call taken on 12/16/2021 at 3:56 PM by Demetrio Han

## 2022-01-08 DIAGNOSIS — I10 ESSENTIAL HYPERTENSION WITH GOAL BLOOD PRESSURE LESS THAN 140/90: ICD-10-CM

## 2022-01-10 RX ORDER — CARVEDILOL 12.5 MG/1
TABLET ORAL
Qty: 180 TABLET | Refills: 1 | Status: SHIPPED | OUTPATIENT
Start: 2022-01-10 | End: 2022-05-13

## 2022-01-12 ENCOUNTER — ANTICOAGULATION THERAPY VISIT (OUTPATIENT)
Dept: ANTICOAGULATION | Facility: CLINIC | Age: 87
End: 2022-01-12

## 2022-01-12 ENCOUNTER — LAB (OUTPATIENT)
Dept: LAB | Facility: CLINIC | Age: 87
End: 2022-01-12
Payer: COMMERCIAL

## 2022-01-12 DIAGNOSIS — I48.20 CHRONIC ATRIAL FIBRILLATION (H): ICD-10-CM

## 2022-01-12 LAB — INR BLD: 2.2 (ref 0.9–1.1)

## 2022-01-12 PROCEDURE — 36416 COLLJ CAPILLARY BLOOD SPEC: CPT

## 2022-01-12 PROCEDURE — 85610 PROTHROMBIN TIME: CPT

## 2022-01-12 NOTE — PROGRESS NOTES
ANTICOAGULATION MANAGEMENT     Guera Peters 86 year old female is on warfarin with therapeutic INR result. (Goal INR 2.0-3.0)    Recent labs: (last 7 days)     01/12/22  1233   INR 2.2*       ASSESSMENT     Source(s): Chart Review and Patient/Caregiver Call       Warfarin doses taken: Warfarin taken as instructed    Diet: No new diet changes identified    New illness, injury, or hospitalization: No    Medication/supplement changes: None noted    Signs or symptoms of bleeding or clotting: No    Previous INR: Therapeutic last 4 INR results.    Additional findings: None     PLAN     Recommended plan for no diet, medication or health factor changes affecting INR     Dosing Instructions:    Continue your current warfarin dose with next INR in 4 weeks       Summary  As of 1/12/2022    Full warfarin instructions:  2 mg every day   Next INR check:  2/9/2022             Telephone call with  daughterSienna ([phone]) who verbalizes understanding and agrees to plan    Lab visit scheduled - INR on 2/9/22 @ [facility].    Education provided: Importance of consistent vitamin K intake and Goal range and significance of current result    Plan made per [facility] anticoagulation protocol    Columba Aguilar, RN  Anticoagulation Clinic  1/12/2022    _______________________________________________________________________     Anticoagulation Episode Summary     Current INR goal:  2.0-3.0   TTR:  67.0 % (1 y)   Target end date:     Send INR reminders to:  [facility]       Comments:           Anticoagulation Care Providers     Provider Role Specialty Phone number    Dora Castillo MD Referring Family Medicine [phone]

## 2022-01-19 DIAGNOSIS — E03.9 ACQUIRED HYPOTHYROIDISM: ICD-10-CM

## 2022-01-21 RX ORDER — LEVOTHYROXINE SODIUM 100 UG/1
TABLET ORAL
Qty: 90 TABLET | Refills: 0 | Status: SHIPPED | OUTPATIENT
Start: 2022-01-21 | End: 2022-04-19

## 2022-01-21 NOTE — TELEPHONE ENCOUNTER
"Last Written Prescription Date:  1/12/2021  Last Fill Quantity: 90,  # refills: 3   Last office visit provider:  10/28/2021 Dr. Gonzalez     levothyroxine (SYNTHROID, LEVOTHROID) 100 MCG tablet 90 tablet 3 1/12/2021  No   Sig: TAKE 1 TABLET BY MOUTH DAILY   Sent to pharmacy as: levothyroxine 100 mcg tablet (SYNTHROID, LEVOTHROID)   E-Prescribing Status: Receipt confirmed by pharmacy (1/12/2021 10:49 AM CST       Requested Prescriptions   Pending Prescriptions Disp Refills     levothyroxine (SYNTHROID/LEVOTHROID) 100 MCG tablet [Pharmacy Med Name: LEVOTHYROXINE 0.100MG (100MCG) TAB] 90 tablet 0     Sig: TAKE 1 TABLET BY MOUTH DAILY       Thyroid Protocol Passed - 1/19/2022  2:22 PM        Passed - Patient is 12 years or older        Passed - Recent (12 mo) or future (30 days) visit within the authorizing provider's specialty     Patient has had an office visit with the authorizing provider or a provider within the authorizing providers department within the previous 12 mos or has a future within next 30 days. See \"Patient Info\" tab in inbasket, or \"Choose Columns\" in Meds & Orders section of the refill encounter.              Passed - Medication is active on med list        Passed - Normal TSH on file in past 12 months     Recent Labs   Lab Test 04/12/21  1108   TSH 0.39              Passed - No active pregnancy on record     If patient is pregnant or has had a positive pregnancy test, please check TSH.          Passed - No positive pregnancy test in past 12 months     If patient is pregnant or has had a positive pregnancy test, please check TSH.               Christina De Anda RN 01/21/22 3:27 PM  "

## 2022-01-23 DIAGNOSIS — E03.9 ACQUIRED HYPOTHYROIDISM: ICD-10-CM

## 2022-01-25 RX ORDER — LEVOTHYROXINE SODIUM 100 UG/1
TABLET ORAL
Qty: 90 TABLET | Refills: 0 | OUTPATIENT
Start: 2022-01-25

## 2022-02-09 ENCOUNTER — ANTICOAGULATION THERAPY VISIT (OUTPATIENT)
Dept: ANTICOAGULATION | Facility: CLINIC | Age: 87
End: 2022-02-09

## 2022-02-09 ENCOUNTER — LAB (OUTPATIENT)
Dept: LAB | Facility: CLINIC | Age: 87
End: 2022-02-09
Payer: COMMERCIAL

## 2022-02-09 DIAGNOSIS — I48.20 CHRONIC ATRIAL FIBRILLATION (H): ICD-10-CM

## 2022-02-09 LAB — INR BLD: 1.8 (ref 0.9–1.1)

## 2022-02-09 PROCEDURE — 85610 PROTHROMBIN TIME: CPT

## 2022-02-09 PROCEDURE — 36416 COLLJ CAPILLARY BLOOD SPEC: CPT

## 2022-02-09 NOTE — PROGRESS NOTES
ANTICOAGULATION MANAGEMENT     Guera Peters 86 year old female is on warfarin with subtherapeutic INR result. (Goal INR 2.0-3.0)    Recent labs: (last 7 days)     02/09/22  1228   INR 1.8*       ASSESSMENT     Source(s): Chart Review and Patient/Caregiver Call       Warfarin doses taken: Warfarin taken as instructed    Diet:  Yes.   Diverticulosis flare up may be affecting diet and INR    New illness, injury, or hospitalization: Yes:    Reported a bout with diverticolosis last week, but she is feeling good now.    Medication/supplement changes: None noted    Signs or symptoms of bleeding or clotting: No    Previous INR: Therapeutic last r INRvisits.    Additional findings: None     PLAN     Recommended plan for temporary change(s) affecting INR     Dosing Instructions:   (1mg tabs)    Booster dose then continue your current warfarin dose with next INR in 2 weeks       Summary  As of 2/9/2022    Full warfarin instructions:  2/9: 3 mg; Otherwise 2 mg every day   Next INR check:  2/23/2022             Telephone call with  daughterSienna ([phone]) who verbalizes understanding and agrees to plan    Lab visit scheduled - INR on 2/23/22 @ [facility].    Education provided: Importance of consistent vitamin K intake, Goal range and significance of current result and Importance of notifying clinic for diarrhea, nausea/vomiting, reduced intake, and/or illness; a sooner lab recheck maybe needed.    Plan made per [facility] anticoagulation protocol    Columba Aguilar, RN  Anticoagulation Clinic  2/9/2022    _______________________________________________________________________     Anticoagulation Episode Summary     Current INR goal:  2.0-3.0   TTR:  65.9 % (1 y)   Target end date:     Send INR reminders to:  NANCY FELIX       Comments:           Anticoagulation Care Providers     Provider Role Specialty Phone number    Dora Castillo MD Referring Family Medicine [phone]

## 2022-02-23 ENCOUNTER — LAB (OUTPATIENT)
Dept: LAB | Facility: CLINIC | Age: 87
End: 2022-02-23
Payer: COMMERCIAL

## 2022-02-23 ENCOUNTER — ANTICOAGULATION THERAPY VISIT (OUTPATIENT)
Dept: ANTICOAGULATION | Facility: CLINIC | Age: 87
End: 2022-02-23

## 2022-02-23 DIAGNOSIS — I48.20 CHRONIC ATRIAL FIBRILLATION (H): ICD-10-CM

## 2022-02-23 LAB — INR BLD: 2.6 (ref 0.9–1.1)

## 2022-02-23 PROCEDURE — 36416 COLLJ CAPILLARY BLOOD SPEC: CPT

## 2022-02-23 PROCEDURE — 85610 PROTHROMBIN TIME: CPT

## 2022-02-23 NOTE — PROGRESS NOTES
"ANTICOAGULATION MANAGEMENT     Guera Peters 86 year old female is on warfarin with therapeutic INR result. (Goal INR 2.0-3.0)    Recent labs: (last 7 days)     22  1452   INR 2.6*       ASSESSMENT     Source(s): Chart Review, Patient/Caregiver Call and Template       Warfarin doses taken: Warfarin taken as instructed    Diet: {Diet changes:992068::\"No new diet changes identified\"}    New illness, injury, or hospitalization: {No/Yes:496933::\"No\"}    Medication/supplement changes: {Medication changes/interactions:014202::\"None noted\"}    Signs or symptoms of bleeding or clotting: {No/Yes:272875::\"No\"}    Previous INR: Subtherapeutic at 1.8 on 22.    Additional findings: {additional findings:641859::\"None\"}     PLAN     Recommended plan for {accassessment:769069::\"no diet, medication or health factor changes\"} affecting INR     Dosing Instructions:    Continue your current warfarin dose with next INR in 2 weeks       Summary  As of 2022    Full warfarin instructions:  2 mg every day   Next INR check:               {Contact type and understandin}    {accappointmentoffer:006848}    Education provided: {ACCeducation:985316}    Plan made {Venessa protocol:686018::\"per [facility] anticoagulation protocol\"}    Columba Aguilar RN  Anticoagulation Clinic  2022    _______________________________________________________________________     Anticoagulation Episode Summary     Current INR goal:  2.0-3.0   TTR:  68.8 % (1 y)   Target end date:     Send INR reminders to:  [facility]       Comments:           Anticoagulation Care Providers     Provider Role Specialty Phone number    Dora Castillo MD Referring Family Medicine [phone]          "

## 2022-02-23 NOTE — PROGRESS NOTES
Anticoagulation Management    Unable to reach daughterSienna today.    Today's INR result of 2.6 is therapeutic (goal INR of 2.0-3.0).  Result received from: Clinic Lab    Follow up required to assess for changes     Left message to continue current dose of warfarin 2 mg tonight. Request call back for assessment.      Anticoagulation clinic to follow up on 2/24.    Columba Aguilar RN

## 2022-02-24 NOTE — PROGRESS NOTES
ANTICOAGULATION MANAGEMENT     Guera Peters 86 year old female is on warfarin with therapeutic INR result. (Goal INR 2.0-3.0)    Recent labs: (last 7 days)     02/23/22  1452   INR 2.6*       ASSESSMENT     Source(s): Chart Review and Patient/Caregiver Call       Warfarin doses taken: Warfarin taken as instructed    Diet: No new diet changes identified    New illness, injury, or hospitalization: No    Medication/supplement changes: None noted    Signs or symptoms of bleeding or clotting: No    Previous INR: Subtherapeutic at 1.8 on 2/9/22.    Additional findings: None     PLAN     Recommended plan for no diet, medication or health factor changes affecting INR     Dosing Instructions:   (1mg tabs)    Continue your current warfarin dose with next INR in 2 weeks       Summary  As of 2/23/2022    Full warfarin instructions:  2 mg every day   Next INR check:  3/16/2022             Telephone call with  daughterSienna ([phone]) who verbalizes understanding and agrees to plan    Lab visit scheduled - INR on 3/16/22 @ [facility].   - [facility] to [facility].    Education provided: Importance of consistent vitamin K intake and Goal range and significance of current result    Plan made per [facility] anticoagulation protocol    Columba Aguilar RN  Anticoagulation Clinic  2/24/2022    _______________________________________________________________________     Anticoagulation Episode Summary     Current INR goal:  2.0-3.0   TTR:  70.8 % (1 y)   Target end date:     Send INR reminders to:  [facility]       Comments:           Anticoagulation Care Providers     Provider Role Specialty Phone number    Dora Castillo MD Referring Family Medicine [phone]

## 2022-03-17 ENCOUNTER — DOCUMENTATION ONLY (OUTPATIENT)
Dept: FAMILY MEDICINE | Facility: CLINIC | Age: 87
End: 2022-03-17

## 2022-03-17 ENCOUNTER — LAB (OUTPATIENT)
Dept: LAB | Facility: CLINIC | Age: 87
End: 2022-03-17
Payer: COMMERCIAL

## 2022-03-17 ENCOUNTER — ANTICOAGULATION THERAPY VISIT (OUTPATIENT)
Dept: ANTICOAGULATION | Facility: CLINIC | Age: 87
End: 2022-03-17

## 2022-03-17 DIAGNOSIS — I63.9 STROKE, EMBOLIC (H): ICD-10-CM

## 2022-03-17 DIAGNOSIS — I65.23 CAROTID STENOSIS, BILATERAL: ICD-10-CM

## 2022-03-17 DIAGNOSIS — I48.20 CHRONIC ATRIAL FIBRILLATION (H): ICD-10-CM

## 2022-03-17 DIAGNOSIS — Z79.01 LONG TERM (CURRENT) USE OF ANTICOAGULANTS: ICD-10-CM

## 2022-03-17 DIAGNOSIS — I48.20 CHRONIC ATRIAL FIBRILLATION (H): Primary | ICD-10-CM

## 2022-03-17 DIAGNOSIS — Z86.73 HISTORY OF TIA (TRANSIENT ISCHEMIC ATTACK) AND STROKE: ICD-10-CM

## 2022-03-17 LAB — INR BLD: 3 (ref 0.9–1.1)

## 2022-03-17 PROCEDURE — 85610 PROTHROMBIN TIME: CPT

## 2022-03-17 PROCEDURE — 36415 COLL VENOUS BLD VENIPUNCTURE: CPT

## 2022-03-17 NOTE — PROGRESS NOTES
ANTICOAGULATION MANAGEMENT     Guera Peters 86 year old female is on warfarin with therapeutic INR result. (Goal INR 2.0-3.0)    Recent labs: (last 7 days)     03/17/22  1034   INR 3.0*       ASSESSMENT       Source(s): Chart Review, Patient/Caregiver Call and Template       Warfarin doses taken: Warfarin taken as instructed    Diet: Decreased greens/vitamin K in diet; plans to resume previous intake    Likes to eat fruits, but not many vegetables or salads.     Daughter noted, groceries did not include any vegetables.    New illness, injury, or hospitalization: No    Medication/supplement changes: None noted    Signs or symptoms of bleeding or clotting: No    Previous INR: Therapeutic last visit at 2.6; previously outside of goal range at 1.8    Additional findings: None       PLAN     Recommended plan for temporary change(s) affecting INR     Dosing Instructions:   (1mg tabs)    Continue your current warfarin dose with next INR in 2 weeks       Summary  As of 3/17/2022    Full warfarin instructions:  2 mg every day   Next INR check:  3/31/2022             Telephone call with  daughterSienna ([phone]) who verbalizes understanding and agrees to plan.   - not fond of eating much of Vitamin-K.  Advised yogurt (which she eats everyday), perhaps incorporate regular Boost skake every 3-4 days with wkly regiment.    Lab visit scheduled - INR on 4/4/22 @ [facility].    Education provided: Importance of consistent vitamin K intake, Impact of vitamin K foods on INR, Goal range and significance of current result and Monitoring for bleeding signs and symptoms    Plan made per [facility] anticoagulation protocol    Columba Aguilar, RN  Anticoagulation Clinic  3/17/2022    _______________________________________________________________________     Anticoagulation Episode Summary     Current INR goal:  2.0-3.0   TTR:  70.8 % (1 y)   Target end date:     Send INR reminders to:  NANCY FELIX       Comments:            Anticoagulation Care Providers     Provider Role Specialty Phone number    Dora Castillo MD Referring Family Medicine [phone]

## 2022-03-17 NOTE — PROGRESS NOTES
ANTICOAGULATION MANAGEMENT      Guera Peters due for annual renewal of referral to anticoagulation monitoring. Order pended for your review and signature.      ANTICOAGULATION SUMMARY      Warfarin indication(s)     Atrial fibrillation, chronic  Stroke / TIA    Heart valve present?  NO       Current goal range   INR: 2.0-3.0     Goal appropriate for indication? Yes, INR 2-3 appropriate for hx of DVT, PE, hypercoagulable state, Afib, LVAD, or bileaflet AVR without risk factors     Current duration of therapy Indefinite/long term therapy   Time in Therapeutic Range (TTR)  (Goal > 60%) 70.8%       Office visit with referring provider's group within last year Yes on 10/28/2021       Columba Aguilar RN

## 2022-03-21 DIAGNOSIS — F02.80 ALZHEIMER'S DEMENTIA WITHOUT BEHAVIORAL DISTURBANCE, UNSPECIFIED TIMING OF DEMENTIA ONSET: ICD-10-CM

## 2022-03-21 DIAGNOSIS — G30.9 ALZHEIMER'S DEMENTIA WITHOUT BEHAVIORAL DISTURBANCE, UNSPECIFIED TIMING OF DEMENTIA ONSET: ICD-10-CM

## 2022-03-22 RX ORDER — RIVASTIGMINE TARTRATE 3 MG/1
CAPSULE ORAL
Qty: 180 CAPSULE | Refills: 1 | Status: SHIPPED | OUTPATIENT
Start: 2022-03-22 | End: 2022-05-13

## 2022-03-22 NOTE — TELEPHONE ENCOUNTER
Routing refill request to provider for review/approval because:  Drug not on the [facility] refill protocol     Last Written Prescription Date:  8/30/21  Last Fill Quantity: 180,  # refills: 1   Last office visit provider:  10/28/21     Requested Prescriptions   Pending Prescriptions Disp Refills     rivastigmine (EXELON) 3 MG capsule [Pharmacy Med Name: RIVASTIGMINE 3MG CAPSULES] 180 capsule 1     Sig: TAKE 1 CAPSULE BY MOUTH TWICE DAILY       There is no refill protocol information for this order          Sandor Jackson RN 03/22/22 9:36 AM

## 2022-04-04 ENCOUNTER — ANTICOAGULATION THERAPY VISIT (OUTPATIENT)
Dept: ANTICOAGULATION | Facility: CLINIC | Age: 87
End: 2022-04-04

## 2022-04-04 ENCOUNTER — LAB (OUTPATIENT)
Dept: LAB | Facility: CLINIC | Age: 87
End: 2022-04-04
Payer: COMMERCIAL

## 2022-04-04 DIAGNOSIS — I48.20 CHRONIC ATRIAL FIBRILLATION (H): ICD-10-CM

## 2022-04-04 DIAGNOSIS — I65.23 CAROTID STENOSIS, BILATERAL: ICD-10-CM

## 2022-04-04 DIAGNOSIS — Z79.01 LONG TERM (CURRENT) USE OF ANTICOAGULANTS: Primary | ICD-10-CM

## 2022-04-04 DIAGNOSIS — Z86.73 HISTORY OF TIA (TRANSIENT ISCHEMIC ATTACK) AND STROKE: ICD-10-CM

## 2022-04-04 LAB — INR BLD: 1.8 (ref 0.9–1.1)

## 2022-04-04 PROCEDURE — 36416 COLLJ CAPILLARY BLOOD SPEC: CPT

## 2022-04-04 PROCEDURE — 85610 PROTHROMBIN TIME: CPT

## 2022-04-04 NOTE — PROGRESS NOTES
ANTICOAGULATION MANAGEMENT     Guera Peters 86 year old female is on warfarin with subtherapeutic INR result. (Goal INR 2.0-3.0)    Recent labs: (last 7 days)     04/04/22  1051   INR 1.8*       ASSESSMENT       Source(s): Chart Review and Patient/Caregiver Call       Warfarin doses taken: Warfarin taken as instructed    Diet: No new diet changes identified    New illness, injury, or hospitalization: No    Medication/supplement changes: None noted    Signs or symptoms of bleeding or clotting: No    Previous INR: Therapeutic last 2(+) visits    Additional findings: None       PLAN     Recommended plan for no diet, medication or health factor changes affecting INR     Dosing Instructions: booster dose then continue your current warfarin dose with next INR in 2 weeks       Summary  As of 4/4/2022    Full warfarin instructions:  4/4: 3 mg; Otherwise 2 mg every day   Next INR check:  4/18/2022             Telephone call with  daughter Sienna who verbalizes understanding and agrees to plan    Lab visit scheduled    Education provided: None required    Plan made per [facility] anticoagulation protocol    Tommie Mccoy RN  Anticoagulation Clinic  4/4/2022    _______________________________________________________________________     Anticoagulation Episode Summary     Current INR goal:  2.0-3.0   TTR:  70.0 % (1 y)   Target end date:  Indefinite   Send INR reminders to:  [facility]    Indications    Long term (current) use of anticoagulants [Z79.01]  Chronic atrial fibrillation (H) [I48.20]  Carotid stenosis  bilateral [I65.23]  History of TIA (transient ischemic attack) and stroke [Z86.73]           Comments:           Anticoagulation Care Providers     Provider Role Specialty Phone number    Dora Castillo MD Referring Family Medicine [phone]

## 2022-04-18 DIAGNOSIS — E03.9 ACQUIRED HYPOTHYROIDISM: ICD-10-CM

## 2022-04-18 DIAGNOSIS — I21.4 NSTEMI (NON-ST ELEVATED MYOCARDIAL INFARCTION) (H): ICD-10-CM

## 2022-04-19 ENCOUNTER — ANTICOAGULATION THERAPY VISIT (OUTPATIENT)
Dept: ANTICOAGULATION | Facility: CLINIC | Age: 87
End: 2022-04-19

## 2022-04-19 ENCOUNTER — LAB (OUTPATIENT)
Dept: LAB | Facility: CLINIC | Age: 87
End: 2022-04-19
Payer: COMMERCIAL

## 2022-04-19 DIAGNOSIS — I48.20 CHRONIC ATRIAL FIBRILLATION (H): ICD-10-CM

## 2022-04-19 DIAGNOSIS — I65.23 CAROTID STENOSIS, BILATERAL: ICD-10-CM

## 2022-04-19 DIAGNOSIS — Z86.73 HISTORY OF TIA (TRANSIENT ISCHEMIC ATTACK) AND STROKE: ICD-10-CM

## 2022-04-19 DIAGNOSIS — Z79.01 LONG TERM (CURRENT) USE OF ANTICOAGULANTS: Primary | ICD-10-CM

## 2022-04-19 LAB — INR BLD: 1.8 (ref 0.9–1.1)

## 2022-04-19 PROCEDURE — 36416 COLLJ CAPILLARY BLOOD SPEC: CPT

## 2022-04-19 PROCEDURE — 85610 PROTHROMBIN TIME: CPT

## 2022-04-19 RX ORDER — NITROGLYCERIN 0.4 MG/1
TABLET SUBLINGUAL
Qty: 100 TABLET | Refills: 0 | Status: SHIPPED | OUTPATIENT
Start: 2022-04-19 | End: 2022-05-13

## 2022-04-19 RX ORDER — LEVOTHYROXINE SODIUM 100 UG/1
TABLET ORAL
Qty: 90 TABLET | Refills: 0 | Status: SHIPPED | OUTPATIENT
Start: 2022-04-19 | End: 2022-05-13

## 2022-04-19 NOTE — PROGRESS NOTES
ANTICOAGULATION MANAGEMENT     Guera Petesr 86 year old female is on warfarin with subtherapeutic INR result. (Goal INR 2.0-3.0)    Recent labs: (last 7 days)     04/19/22  1432   INR 1.8*       ASSESSMENT       Source(s): Chart Review, Patient/Caregiver Call and Template       Warfarin doses taken: Warfarin taken as instructed    Diet: No new diet changes identified    New illness, injury, or hospitalization: No    Medication/supplement changes: None noted    Signs or symptoms of bleeding or clotting: No    Previous INR: Subtherapeutic at 1.8 on 4/4/22.    Additional findings: None       PLAN     Unable to reach daughterSienna today.   - she reported having issues with her own cell phone and requested to call 's cell # she provided.    Left message to take a booster dose of warfarin,  3 mg tonight. Request call back for assessment.    Follow up required to assess for changes  and discuss out of range result     - last INR was subtherapeutic also.  Will increase wkly dose of warfarin by 7.1% to 3mg every Tuesdays and 2mg ROW.   - and recheck INR in 1-2 wks.    Columba Aguilar, RN  Anticoagulation Clinic  4/19/2022

## 2022-04-19 NOTE — CONFIDENTIAL NOTE
Sienna returning call to Cheli. Please call Sienna on her  phone, [phone]. She is having issues with her phone.

## 2022-04-19 NOTE — TELEPHONE ENCOUNTER
"Routing refill request to provider for review/approval because:  Drug not on the [facility] refill protocol   Labs not current:  TSH  Blood pressure out of range    Levothyroxine Last Written Prescription Date:  1/21/2022  Last Fill Quantity: 90,  # refills: 0   Last office visit provider:  10/28/2021 Dr. Gonzalez     Nitroglycerin Last Written Prescription Date:  10/8/2020  Last Fill Quantity: 100,  # refills: 1   Last office visit provider:  10/28/2021 Dr. Gonzalez   nitroglycerin (NITROSTAT) 0.4 MG SL tablet 100 tablet 1 10/8/2020  No   Sig: PLACE ONE TABLET UNDER TONGUE AS NEEDED FOR CHEST PAIN EVERY 5 MINUTES, MAX 3 DOSES, CALL 911 AS DIRECTED   Sent to pharmacy as: nitroglycerin 0.4 mg sublingual tablet (NITROSTAT)   E-Prescribing Status: Receipt confirmed by pharmacy (10/8/2020  2:04 PM CDT         Requested Prescriptions   Pending Prescriptions Disp Refills     levothyroxine (SYNTHROID/LEVOTHROID) 100 MCG tablet [Pharmacy Med Name: LEVOTHYROXINE 0.100MG (100MCG) TAB] 90 tablet 0     Sig: TAKE 1 TABLET BY MOUTH DAILY       Thyroid Protocol Failed - 4/18/2022  8:24 AM        Failed - Normal TSH on file in past 12 months     Recent Labs   Lab Test 04/12/21  1108   TSH 0.39              Passed - Patient is 12 years or older        Passed - Recent (12 mo) or future (30 days) visit within the authorizing provider's specialty     Patient has had an office visit with the authorizing provider or a provider within the authorizing providers department within the previous 12 mos or has a future within next 30 days. See \"Patient Info\" tab in inbasket, or \"Choose Columns\" in Meds & Orders section of the refill encounter.              Passed - Medication is active on med list        Passed - No active pregnancy on record     If patient is pregnant or has had a positive pregnancy test, please check TSH.          Passed - No positive pregnancy test in past 12 months     If patient is pregnant or has had a positive pregnancy test, please " "check TSH.             nitroGLYcerin (NITROSTAT) 0.4 MG sublingual tablet [Pharmacy Med Name: NITROGLYCERIN 0.4MG SUB TAB 25S] 100 tablet      Sig: PLACE 1 TABLET UNDER THE TONGUE EVERY 5 MINUTES AS NEEDED FOR CHEST PAIN. MAX OF 3 DOSES. CALL 911 AS DIRECTED       Nitrates Failed - 4/18/2022  8:24 AM        Failed - Blood pressure under 140/90 in past 12 months     BP Readings from Last 3 Encounters:   10/28/21 (!) 141/68   04/12/21 138/65   10/06/20 114/54                 Failed - Sublingual nitro order needs review     If refill exceeds 1 bottle per month, please forward request to provider.           Passed - Pt is not on erectile dysfunction medications        Passed - Recent (12 mo) or future (30 days) visit within the authorizing provider's specialty     Patient has had an office visit with the authorizing provider or a provider within the authorizing providers department within the previous 12 mos or has a future within next 30 days. See \"Patient Info\" tab in inbasket, or \"Choose Columns\" in Meds & Orders section of the refill encounter.              Passed - Medication is active on med list        Passed - Patient is age 18 or older             Christina De Anda RN 04/19/22 4:03 PM  "

## 2022-04-20 ENCOUNTER — TELEPHONE (OUTPATIENT)
Dept: FAMILY MEDICINE | Facility: CLINIC | Age: 87
End: 2022-04-20
Payer: COMMERCIAL

## 2022-04-20 NOTE — TELEPHONE ENCOUNTER
Please call Sienna. She said she is returning a call regarding [facility] INR.  Today her phone number is [phone]

## 2022-04-20 NOTE — PROGRESS NOTES
2 minutes ago (10:09 AM)     SILVANA       Please call Sienna. She said she is returning a call [facility].  Today her phone number is [phone]

## 2022-04-20 NOTE — PROGRESS NOTES
ANTICOAGULATION MANAGEMENT     Guera Peters 86 year old female is on warfarin with subtherapeutic INR result. (Goal INR 2.0-3.0)    Recent labs: (last 7 days)     04/19/22  1432   INR 1.8*       ASSESSMENT       Source(s): Chart Review, Patient/Caregiver Call and Template       Warfarin doses taken: Warfarin taken as instructed    Diet: No new diet changes identified    New illness, injury, or hospitalization: No    Medication/supplement changes: None noted    Signs or symptoms of bleeding or clotting: No    Previous INR: Subtherapeutic at 1.8 on 4/4/22.    Additional findings: None       PLAN     Recommended plan for no diet, medication or health factor changes affecting INR     Dosing Instructions:   (1mg tabs)   - reported taking 3mg warfarin dose last night.   - Increase your warfarin dose (7.1% change) with next INR in 1-2 weeks       Summary  As of 4/19/2022    Full warfarin instructions:  3 mg every Tue; 2 mg all other days   Next INR check:  5/3/2022             Telephone call with  daughter, Sienna ([phone]) who verbalizes understanding and agrees to plan    - Sienna's cell phone broke at this time - requested to call her 's cell#    Lab visit scheduled - INR on 5/4/22 @ [facility].    Education provided: Importance of consistent vitamin K intake and Goal range and significance of current result    Plan made per [facility] anticoagulation protocol    Columba Aguilar, RN  Anticoagulation Clinic  4/20/2022    _______________________________________________________________________     Anticoagulation Episode Summary     Current INR goal:  2.0-3.0   TTR:  65.8 % (1 y)   Target end date:  Indefinite   Send INR reminders to:  [facility]    Indications    Long term (current) use of anticoagulants [Z79.01]  Chronic atrial fibrillation (H) [I48.20]  Carotid stenosis  bilateral [I65.23]  History of TIA (transient ischemic attack) and stroke [Z86.73]           Comments:           Anticoagulation  Care Providers     Provider Role Specialty Phone number    Dora Castillo MD Referring Family Medicine [phone]

## 2022-05-05 ENCOUNTER — ANTICOAGULATION THERAPY VISIT (OUTPATIENT)
Dept: ANTICOAGULATION | Facility: CLINIC | Age: 87
End: 2022-05-05

## 2022-05-05 ENCOUNTER — LAB (OUTPATIENT)
Dept: LAB | Facility: CLINIC | Age: 87
End: 2022-05-05
Payer: COMMERCIAL

## 2022-05-05 DIAGNOSIS — I65.23 CAROTID STENOSIS, BILATERAL: ICD-10-CM

## 2022-05-05 DIAGNOSIS — Z86.73 HISTORY OF TIA (TRANSIENT ISCHEMIC ATTACK) AND STROKE: ICD-10-CM

## 2022-05-05 DIAGNOSIS — Z79.01 LONG TERM (CURRENT) USE OF ANTICOAGULANTS: Primary | ICD-10-CM

## 2022-05-05 DIAGNOSIS — I48.20 CHRONIC ATRIAL FIBRILLATION (H): ICD-10-CM

## 2022-05-05 LAB — INR BLD: 1.9 (ref 0.9–1.1)

## 2022-05-05 PROCEDURE — 36416 COLLJ CAPILLARY BLOOD SPEC: CPT

## 2022-05-05 PROCEDURE — 85610 PROTHROMBIN TIME: CPT

## 2022-05-05 NOTE — PROGRESS NOTES
ANTICOAGULATION MANAGEMENT     Guera Peters 86 year old female is on warfarin with subtherapeutic INR result. (Goal INR 2.0-3.0)    Recent labs: (last 7 days)     05/05/22  0912   INR 1.9*       ASSESSMENT       Source(s): Chart Review, Patient/Caregiver Call and Template       Warfarin doses taken: Warfarin taken as instructed    Diet: No new diet changes identified    New illness, injury, or hospitalization: No    Medication/supplement changes: None noted    Signs or symptoms of bleeding or clotting: No    Previous INR: Subtherapeutic last 2 INR results.    Additional findings: None       PLAN     Recommended plan for no diet, medication or health factor changes affecting INR     Dosing Instructions:    Increase your warfarin dose (6.7% change) with next INR in 1-2 weeks       Summary  As of 5/5/2022    Full warfarin instructions:  3 mg every Tue, Fri; 2 mg all other days   Next INR check:  5/19/2022             Telephone call with  daughterSienna ([phone]) who verbalizes understanding and agrees to plan    Check at provider office visit - INR on 5/12/22 during OV with Dr. Gonzalez.    Education provided: Importance of consistent vitamin K intake and Goal range and significance of current result    Plan made per [facility] anticoagulation protocol    Columba Aguilar RN  Anticoagulation Clinic  5/5/2022    _______________________________________________________________________     Anticoagulation Episode Summary     Current INR goal:  2.0-3.0   TTR:  61.5 % (1 y)   Target end date:  Indefinite   Send INR reminders to:  [facility]    Indications    Long term (current) use of anticoagulants [Z79.01]  Chronic atrial fibrillation (H) [I48.20]  Carotid stenosis  bilateral [I65.23]  History of TIA (transient ischemic attack) and stroke [Z86.73]           Comments:           Anticoagulation Care Providers     Provider Role Specialty Phone number    Dora Castillo MD Referring Family Medicine  [phone]

## 2022-05-12 ENCOUNTER — OFFICE VISIT (OUTPATIENT)
Dept: FAMILY MEDICINE | Facility: CLINIC | Age: 87
End: 2022-05-12
Payer: COMMERCIAL

## 2022-05-12 ENCOUNTER — ANTICOAGULATION THERAPY VISIT (OUTPATIENT)
Dept: ANTICOAGULATION | Facility: CLINIC | Age: 87
End: 2022-05-12

## 2022-05-12 VITALS
WEIGHT: 177 LBS | DIASTOLIC BLOOD PRESSURE: 63 MMHG | SYSTOLIC BLOOD PRESSURE: 148 MMHG | HEIGHT: 65 IN | HEART RATE: 62 BPM | BODY MASS INDEX: 29.49 KG/M2

## 2022-05-12 DIAGNOSIS — Z86.73 HISTORY OF TIA (TRANSIENT ISCHEMIC ATTACK) AND STROKE: ICD-10-CM

## 2022-05-12 DIAGNOSIS — E55.9 HYPOVITAMINOSIS D: ICD-10-CM

## 2022-05-12 DIAGNOSIS — F02.80 ALZHEIMER'S DEMENTIA WITHOUT BEHAVIORAL DISTURBANCE, UNSPECIFIED TIMING OF DEMENTIA ONSET: ICD-10-CM

## 2022-05-12 DIAGNOSIS — D69.6 THROMBOCYTOPENIA (H): ICD-10-CM

## 2022-05-12 DIAGNOSIS — I25.10 ARTERIOSCLEROSIS OF CORONARY ARTERY: ICD-10-CM

## 2022-05-12 DIAGNOSIS — I65.23 CAROTID STENOSIS, BILATERAL: ICD-10-CM

## 2022-05-12 DIAGNOSIS — Z79.01 LONG TERM (CURRENT) USE OF ANTICOAGULANTS: ICD-10-CM

## 2022-05-12 DIAGNOSIS — R73.09 ELEVATED GLUCOSE: ICD-10-CM

## 2022-05-12 DIAGNOSIS — I25.2 HX OF NON-ST ELEVATION MYOCARDIAL INFARCTION (NSTEMI): ICD-10-CM

## 2022-05-12 DIAGNOSIS — I10 ESSENTIAL HYPERTENSION WITH GOAL BLOOD PRESSURE LESS THAN 140/90: ICD-10-CM

## 2022-05-12 DIAGNOSIS — I48.20 CHRONIC ATRIAL FIBRILLATION (H): Primary | ICD-10-CM

## 2022-05-12 DIAGNOSIS — G30.9 ALZHEIMER'S DEMENTIA WITHOUT BEHAVIORAL DISTURBANCE, UNSPECIFIED TIMING OF DEMENTIA ONSET: ICD-10-CM

## 2022-05-12 DIAGNOSIS — Z79.01 LONG TERM (CURRENT) USE OF ANTICOAGULANTS: Primary | ICD-10-CM

## 2022-05-12 DIAGNOSIS — Z23 IMMUNIZATION DUE: ICD-10-CM

## 2022-05-12 DIAGNOSIS — M81.0 OSTEOPOROSIS WITHOUT CURRENT PATHOLOGICAL FRACTURE, UNSPECIFIED OSTEOPOROSIS TYPE: ICD-10-CM

## 2022-05-12 DIAGNOSIS — E78.5 HYPERLIPIDEMIA LDL GOAL <100: ICD-10-CM

## 2022-05-12 DIAGNOSIS — R80.9 MICROALBUMINURIA: ICD-10-CM

## 2022-05-12 DIAGNOSIS — E03.9 ACQUIRED HYPOTHYROIDISM: ICD-10-CM

## 2022-05-12 DIAGNOSIS — I48.20 CHRONIC ATRIAL FIBRILLATION (H): ICD-10-CM

## 2022-05-12 LAB
ALBUMIN SERPL-MCNC: 3.7 G/DL (ref 3.5–5)
ALP SERPL-CCNC: 106 U/L (ref 45–120)
ALT SERPL W P-5'-P-CCNC: 19 U/L (ref 0–45)
ANION GAP SERPL CALCULATED.3IONS-SCNC: 13 MMOL/L (ref 5–18)
AST SERPL W P-5'-P-CCNC: 21 U/L (ref 0–40)
BILIRUB SERPL-MCNC: 1.7 MG/DL (ref 0–1)
BUN SERPL-MCNC: 16 MG/DL (ref 8–28)
CALCIUM SERPL-MCNC: 9.3 MG/DL (ref 8.5–10.5)
CHLORIDE BLD-SCNC: 106 MMOL/L (ref 98–107)
CHOLEST SERPL-MCNC: 111 MG/DL
CO2 SERPL-SCNC: 24 MMOL/L (ref 22–31)
CREAT SERPL-MCNC: 0.75 MG/DL (ref 0.6–1.1)
CREAT UR-MCNC: 174 MG/DL
ERYTHROCYTE [DISTWIDTH] IN BLOOD BY AUTOMATED COUNT: 12.9 % (ref 10–15)
FASTING STATUS PATIENT QL REPORTED: ABNORMAL
GFR SERPL CREATININE-BSD FRML MDRD: 78 ML/MIN/1.73M2
GLUCOSE BLD-MCNC: 98 MG/DL (ref 70–125)
HBA1C MFR BLD: 6.1 %
HCT VFR BLD AUTO: 43.4 % (ref 35–47)
HDLC SERPL-MCNC: 37 MG/DL
HGB BLD-MCNC: 13.5 G/DL (ref 11.7–15.7)
INR BLD: 3 (ref 0.9–1.1)
LDLC SERPL CALC-MCNC: 56 MG/DL
MCH RBC QN AUTO: 28 PG (ref 26.5–33)
MCHC RBC AUTO-ENTMCNC: 31.1 G/DL (ref 31.5–36.5)
MCV RBC AUTO: 90 FL (ref 78–100)
MICROALBUMIN UR-MCNC: 3.27 MG/DL (ref 0–1.99)
MICROALBUMIN/CREAT UR: 18.8 MG/G CR
PLATELET # BLD AUTO: 135 10E3/UL (ref 150–450)
POTASSIUM BLD-SCNC: 4.3 MMOL/L (ref 3.5–5)
PROT SERPL-MCNC: 6.7 G/DL (ref 6–8)
RBC # BLD AUTO: 4.83 10E6/UL (ref 3.8–5.2)
SODIUM SERPL-SCNC: 143 MMOL/L (ref 136–145)
T4 FREE SERPL-MCNC: 1.47 NG/DL (ref 0.7–1.8)
TRIGL SERPL-MCNC: 90 MG/DL
TSH SERPL DL<=0.005 MIU/L-ACNC: 0.16 UIU/ML (ref 0.3–5)
WBC # BLD AUTO: 6.1 10E3/UL (ref 4–11)

## 2022-05-12 PROCEDURE — 83036 HEMOGLOBIN GLYCOSYLATED A1C: CPT | Performed by: FAMILY MEDICINE

## 2022-05-12 PROCEDURE — 84443 ASSAY THYROID STIM HORMONE: CPT | Performed by: FAMILY MEDICINE

## 2022-05-12 PROCEDURE — 85610 PROTHROMBIN TIME: CPT | Performed by: FAMILY MEDICINE

## 2022-05-12 PROCEDURE — 80053 COMPREHEN METABOLIC PANEL: CPT | Performed by: FAMILY MEDICINE

## 2022-05-12 PROCEDURE — 99214 OFFICE O/P EST MOD 30 MIN: CPT | Mod: 25 | Performed by: FAMILY MEDICINE

## 2022-05-12 PROCEDURE — 80061 LIPID PANEL: CPT | Performed by: FAMILY MEDICINE

## 2022-05-12 PROCEDURE — 36415 COLL VENOUS BLD VENIPUNCTURE: CPT | Performed by: FAMILY MEDICINE

## 2022-05-12 PROCEDURE — 82306 VITAMIN D 25 HYDROXY: CPT | Performed by: FAMILY MEDICINE

## 2022-05-12 PROCEDURE — 0054A COVID-19,PF,PFIZER (12+ YRS): CPT | Performed by: FAMILY MEDICINE

## 2022-05-12 PROCEDURE — 82043 UR ALBUMIN QUANTITATIVE: CPT | Performed by: FAMILY MEDICINE

## 2022-05-12 PROCEDURE — 85027 COMPLETE CBC AUTOMATED: CPT | Performed by: FAMILY MEDICINE

## 2022-05-12 PROCEDURE — 84439 ASSAY OF FREE THYROXINE: CPT | Performed by: FAMILY MEDICINE

## 2022-05-12 PROCEDURE — 91305 COVID-19,PF,PFIZER (12+ YRS): CPT | Performed by: FAMILY MEDICINE

## 2022-05-12 PROCEDURE — 36416 COLLJ CAPILLARY BLOOD SPEC: CPT | Performed by: FAMILY MEDICINE

## 2022-05-12 RX ORDER — WARFARIN SODIUM 1 MG/1
2-3 TABLET ORAL DAILY
Qty: 270 TABLET | Refills: 3 | Status: SHIPPED | OUTPATIENT
Start: 2022-05-12 | End: 2023-02-07

## 2022-05-12 NOTE — PROGRESS NOTES
ANTICOAGULATION MANAGEMENT     Guera Peters 86 year old female is on warfarin with therapeutic INR result. (Goal INR 2.0-3.0)    Recent labs: (last 7 days)     05/12/22  1339   INR 3.0*       ASSESSMENT       Source(s): Chart Review and Patient/Caregiver Call       Warfarin doses taken: Warfarin taken as instructed    Diet: No new diet changes identified    New illness, injury, or hospitalization: No    Medication/supplement changes: None noted    Signs or symptoms of bleeding or clotting: No    Previous INR: Subtherapeutic last 3 INR results.    Additional findings:  Vaccinated today with 2nd Pfizer Covid-19 booster shot.       PLAN     Recommended plan for no diet, medication or health factor changes affecting INR     Dosing Instructions:    continue your current warfarin dose with next INR in 1-2 weeks       Summary  As of 5/12/2022    Full warfarin instructions:  3 mg every Tue, Fri; 2 mg all other days   Next INR check:  5/26/2022             Telephone call with  daughterSienna ([phone]) who verbalizes understanding and agrees to plan.   - will give her yogurt tonight.    Lab visit scheduled - INR on 5/26/22 @ [facility].    Education provided: Importance of consistent vitamin K intake, Impact of vitamin K foods on INR and Goal range and significance of current result    Plan made per [facility] anticoagulation protocol    Columba Aguilar RN  Anticoagulation Clinic  5/12/2022    _______________________________________________________________________     Anticoagulation Episode Summary     Current INR goal:  2.0-3.0   TTR:  61.3 % (1 y)   Target end date:  Indefinite   Send INR reminders to:  [facility]    Indications    Long term (current) use of anticoagulants [Z79.01]  Chronic atrial fibrillation (H) [I48.20]  Carotid stenosis  bilateral [I65.23]  History of TIA (transient ischemic attack) and stroke [Z86.73]           Comments:           Anticoagulation Care Providers     Provider Role  Specialty Phone number    Dora Castillo MD Referring Family Medicine [phone]

## 2022-05-12 NOTE — PROGRESS NOTES
Assessment & Plan     1. Chronic atrial fibrillation (H)  2. Long term (current) use of anticoagulants  - warfarin ANTICOAGULANT (COUMADIN) 1 MG tablet; Take 2-3 tablets (2-3 mg) by mouth daily  Dispense: 270 tablet; Refill: 3  - INR point of care    Rate controlled with carvedilol.  Anticoagulated with warfarin.  Follows with the anticoagulation clinic for INR management.  Refill warfarin sent today.  Lab results returned, will send remainder of refills.    3. Arteriosclerosis of coronary artery  4. History of TIA (transient ischemic attack) and stroke  5. Carotid stenosis, bilateral  6. Hx of non-ST elevation myocardial infarction (NSTEMI)  7. Hyperlipidemia LDL goal <100  - Lipid panel reflex to direct LDL Non-fasting    No longer following with cardiology.  Not fasting today.  On baby aspirin daily and Crestor 20 mg daily.  Recheck lipid panel.  Last visible carotid ultrasound duplex with less than 50% stenosis right and left internal carotid artery.  Continue medication management.    8. Essential hypertension with goal blood pressure less than 140/90  - Comprehensive metabolic panel (BMP + Alb, Alk Phos, ALT, AST, Total. Bili, TP)    Blood pressure mildly above goal today, was at goal last check.  Continue current management and reassess next visit.  If continues to be elevated, will offer adjustment of medications.    Will send refills and labs return.    9. Acquired hypothyroidism  - TSH with free T4 reflex    Recheck thyroid levels today.  We will send prescription when labs return.    10. Thrombocytopenia (H)  - CBC with platelets    History of mild thrombocytopenia, recheck platelets today.      11. Elevated glucose  - Hemoglobin A1c    Recheck A1c today for monitoring of prediabetes.    12. Hypovitaminosis D  - Vitamin D Deficiency    Recheck vitamin D levels in setting of osteoporosis.    13. Microalbuminuria  - Albumin Random Urine Quantitative with Creat Ratio    History of microalbuminuria, recheck  today.    14. Osteoporosis without current pathological fracture, unspecified osteoporosis type  - Vitamin D Deficiency  - Comprehensive metabolic panel (BMP + Alb, Alk Phos, ALT, AST, Total. Bili, TP)    Continue current management, monitoring labs today, recheck calcium vitamin D levels.  We will send prescription into pharmacy when labs return.  Next DEXA due next summer.    15. Alzheimer's dementia without behavioral disturbance, unspecified timing of dementia onset (H)  Await labs, will send in prescriptions.  Not actively following with neurology.    16. Immunization due  - COVID-19,PF,PFIZER (12+ Yrs GRAY LABEL)       ADDENDUM:   Labs returned. Patient/family updated by [employer] message. Refills sent to the pharmacy.  Levothyroxine decreased, future labs placed to recheck in 6 weeks.    - rosuvastatin (CRESTOR) 20 MG tablet; Take 1 tablet (20 mg) by mouth At Bedtime  Dispense: 90 tablet; Refill: 3  - carvedilol (COREG) 12.5 MG tablet; TAKE 1 TABLET BY MOUTH TWICE DAILY WITH MEALS  Dispense: 180 tablet; Refill: 3  - lisinopril (ZESTRIL) 20 MG tablet; Take 1 tablet (20 mg) by mouth daily  Dispense: 90 tablet; Refill: 3  - levothyroxine (SYNTHROID/LEVOTHROID) 88 MCG tablet; Take 1 tablet (88 mcg) by mouth daily  Dispense: 90 tablet; Refill: 3  - TSH with free T4 reflex; Future  - alendronate (FOSAMAX) 70 MG tablet; TAKE 1 TABLET BY MOUTH ONCE A WEEK; DO NOT LIE DOWN FOR 1 HOUR AFTER TAKING, TAKE WITH A FULL GLASS OF WATER  Dispense: 12 tablet; Refill: 3  - memantine (NAMENDA) 5 MG tablet; TAKE 1 TABLET(5 MG) BY MOUTH TWICE DAILY  Dispense: 180 tablet; Refill: 3  - rivastigmine (EXELON) 3 MG capsule; Take 1 capsule (3 mg) by mouth 2 times daily  Dispense: 180 capsule; Refill: 3  - nitroGLYcerin (NITROSTAT) 0.4 MG sublingual tablet; PLACE 1 TABLET UNDER THE TONGUE EVERY 5 MINUTES AS NEEDED FOR CHEST PAIN. MAX OF 3 DOSES. CALL 911 AS DIRECTED  Dispense: 30 tablet; Refill: 1     Return in about 6 months (around  "11/12/2022) for Physical Exam.    Sravani Gonzalez DO  [facility] ISIDRO Lynne is a 86 year old who presents for the following health issues    Chief Complaints and History of Present Illnesses   Patient presents with     Follow Up        History of Present Illness       Reason for visit:  Med recheck  Symptom onset:  Today  Symptom intensity:  Mild  Symptom progression:  Staying the same  Had these symptoms before:  No    She eats 0-1 servings of fruits and vegetables daily.She consumes 0 sweetened beverage(s) daily.She exercises with enough effort to increase her heart rate 10 to 19 minutes per day.  She exercises with enough effort to increase her heart rate 3 or less days per week.   She is taking medications regularly.       Cardiology - hasn't seen recently. Did have pacemaker check in February. Following with primary care.   Primary care also managing memory medications.     No concerns today.    Daughter asks about rash on lower leg.    Review of Systems   10 point ROS negative.       Objective    BP (!) 148/63 (BP Location: Left arm, Patient Position: Sitting, Cuff Size: Adult Regular)   Pulse 62   Ht 1.645 m (5' 4.75\")   Wt 80.3 kg (177 lb)   LMP  (LMP Unknown)   BMI 29.68 kg/m    Body mass index is 29.68 kg/m .  Physical Exam   GENERAL: Clara is a pleasant, elderly female, no acute distress.  Accompanied today by daughter.  HEENT: Sclera white, cervical lymphadenopathy, no thyromegaly.  HEART: Regular rate and rhythm, no murmurs.  LUNGS: Clear to auscultation bilaterally, unlabored.  ABDOMEN: Soft, nontender to palpation.  No palpable masses.  EXTREMITIES: Trace nonpitting lower extremity edema.  DERM: Nonblanching erythematous papules medial left lower leg.  See media picture uploaded from today's visit.        "

## 2022-05-13 ENCOUNTER — TELEPHONE (OUTPATIENT)
Dept: FAMILY MEDICINE | Facility: CLINIC | Age: 87
End: 2022-05-13
Payer: COMMERCIAL

## 2022-05-13 LAB — DEPRECATED CALCIDIOL+CALCIFEROL SERPL-MC: 24 UG/L (ref 20–75)

## 2022-05-13 RX ORDER — ROSUVASTATIN CALCIUM 20 MG/1
20 TABLET, COATED ORAL AT BEDTIME
Qty: 90 TABLET | Refills: 3 | Status: SHIPPED | OUTPATIENT
Start: 2022-05-13 | End: 2023-05-06

## 2022-05-13 RX ORDER — LEVOTHYROXINE SODIUM 88 UG/1
88 TABLET ORAL DAILY
Qty: 90 TABLET | Refills: 3 | Status: SHIPPED | OUTPATIENT
Start: 2022-05-13 | End: 2022-07-20

## 2022-05-13 RX ORDER — ALENDRONATE SODIUM 70 MG/1
TABLET ORAL
Qty: 12 TABLET | Refills: 3 | Status: SHIPPED | OUTPATIENT
Start: 2022-05-13 | End: 2023-05-22

## 2022-05-13 RX ORDER — RIVASTIGMINE TARTRATE 3 MG/1
3 CAPSULE ORAL 2 TIMES DAILY
Qty: 180 CAPSULE | Refills: 3 | Status: SHIPPED | OUTPATIENT
Start: 2022-05-13 | End: 2023-06-15

## 2022-05-13 RX ORDER — LISINOPRIL 20 MG/1
20 TABLET ORAL DAILY
Qty: 90 TABLET | Refills: 3 | Status: SHIPPED | OUTPATIENT
Start: 2022-05-13 | End: 2023-02-18

## 2022-05-13 RX ORDER — NITROGLYCERIN 0.4 MG/1
TABLET SUBLINGUAL
Qty: 30 TABLET | Refills: 1 | Status: SHIPPED | OUTPATIENT
Start: 2022-05-13

## 2022-05-13 RX ORDER — MEMANTINE HYDROCHLORIDE 5 MG/1
TABLET ORAL
Qty: 180 TABLET | Refills: 3 | Status: SHIPPED | OUTPATIENT
Start: 2022-05-13 | End: 2023-05-06

## 2022-05-13 RX ORDER — CARVEDILOL 12.5 MG/1
TABLET ORAL
Qty: 180 TABLET | Refills: 3 | Status: SHIPPED | OUTPATIENT
Start: 2022-05-13 | End: 2023-06-20

## 2022-05-13 NOTE — RESULT ENCOUNTER NOTE
Please call patient to schedule a follow up lab appointment in 6-8 weeks to recheck thyroid. Thanks!  Patient updated by [employer] message with lab results per below.         Hello,    I am sending this message to follow-up on Guera's lab results.    1.  Vitamin D is normal.  2.  Urine protein level is normal.  3.  Thyroid is actually borderline hyperthyroid.  I am going to decrease the levothyroxine dose.  I recommend we recheck labs in 6 weeks.  I will ask my staff to reach out to schedule a follow-up appointment.  4.  A1c is stable in the prediabetes range.  5.  Cholesterol numbers look good.  6.  Metabolic panel also looks pretty good.  Bilirubin level is mildly elevated, but also stable.  7.  Blood counts also look good.  Platelets mildly low, but also stable.    I will send in all of Guera's prescriptions to the pharmacy.  Please reach out by [employer] with any questions or concerns.    Sravani Gonzalez, DO

## 2022-05-13 NOTE — TELEPHONE ENCOUNTER
Spoke to patient daughter.   She states understanding.  Patient is scheduled for a lab only on 06/27

## 2022-05-13 NOTE — TELEPHONE ENCOUNTER
----- Message from Sravani Gonzalez DO sent at 5/13/2022  4:39 PM CDT -----  Please call patient to schedule a follow up lab appointment in 6-8 weeks to recheck thyroid. Thanks!  Patient updated by [employer] message with lab results per below.         Hello,    I am sending this message to follow-up on Guera's lab results.    1.  Vitamin D is normal.  2.  Urine protein level is normal.  3.  Thyroid is actually borderline hyperthyroid.  I am going to decrease the levothyroxine dose.  I recommend we recheck labs in 6 weeks.  I will ask my staff to reach out to schedule a follow-up appointment.  4.  A1c is stable in the prediabetes range.  5.  Cholesterol numbers look good.  6.  Metabolic panel also looks pretty good.  Bilirubin level is mildly elevated, but also stable.  7.  Blood counts also look good.  Platelets mildly low, but also stable.    I will send in all of Guera's prescriptions to the pharmacy.  Please reach out by [employer] with any questions or concerns.    Sravani Gonzalez, DO

## 2022-05-21 DIAGNOSIS — I10 ESSENTIAL HYPERTENSION WITH GOAL BLOOD PRESSURE LESS THAN 140/90: ICD-10-CM

## 2022-05-22 RX ORDER — LISINOPRIL 20 MG/1
TABLET ORAL
Qty: 90 TABLET | Refills: 3 | OUTPATIENT
Start: 2022-05-22

## 2022-05-26 ENCOUNTER — ANTICOAGULATION THERAPY VISIT (OUTPATIENT)
Dept: ANTICOAGULATION | Facility: CLINIC | Age: 87
End: 2022-05-26

## 2022-05-26 ENCOUNTER — LAB (OUTPATIENT)
Dept: LAB | Facility: CLINIC | Age: 87
End: 2022-05-26
Payer: COMMERCIAL

## 2022-05-26 DIAGNOSIS — I48.20 CHRONIC ATRIAL FIBRILLATION (H): ICD-10-CM

## 2022-05-26 DIAGNOSIS — I65.23 CAROTID STENOSIS, BILATERAL: ICD-10-CM

## 2022-05-26 DIAGNOSIS — Z86.73 HISTORY OF TIA (TRANSIENT ISCHEMIC ATTACK) AND STROKE: ICD-10-CM

## 2022-05-26 DIAGNOSIS — Z79.01 LONG TERM (CURRENT) USE OF ANTICOAGULANTS: Primary | ICD-10-CM

## 2022-05-26 LAB — INR BLD: 3.1 (ref 0.9–1.1)

## 2022-05-26 PROCEDURE — 36416 COLLJ CAPILLARY BLOOD SPEC: CPT

## 2022-05-26 PROCEDURE — 85610 PROTHROMBIN TIME: CPT

## 2022-05-26 NOTE — PROGRESS NOTES
ANTICOAGULATION MANAGEMENT     Guera Peters 86 year old female is on warfarin with supratherapeutic INR result. (Goal INR 2.0-3.0)    Recent labs: (last 7 days)     05/26/22  0923   INR 3.1*       ASSESSMENT       Source(s): Chart Review, Patient/Caregiver Call and Template       Warfarin doses taken: Warfarin taken as instructed    Diet: No new diet changes identified    New illness, injury, or hospitalization: No    Medication/supplement changes: None noted    Signs or symptoms of bleeding or clotting: No    Previous INR: Therapeutic last visit; previously outside of goal range    Additional findings: None       PLAN     Recommended plan for no diet, medication or health factor changes affecting INR     Dosing Instructions: decrease your warfarin dose (3.1% change) with next INR in 2 weeks       Summary  As of 5/26/2022    Full warfarin instructions:  2.5 mg every Sun, Tue, Fri; 2 mg all other days   Next INR check:  6/9/2022             Telephone call with  daughterSienna who verbalizes understanding and agrees to plan and who agrees to plan and repeated back plan correctly    Lab visit scheduled    Education provided: Please call back if any changes to your diet, medications or how you've been taking warfarin and Goal range and significance of current result    Plan made per [facility] anticoagulation protocol    Aurora John RN  Anticoagulation Clinic  5/26/2022    _______________________________________________________________________     Anticoagulation Episode Summary     Current INR goal:  2.0-3.0   TTR:  59.0 % (1 y)   Target end date:  Indefinite   Send INR reminders to:  [facility]    Indications    Long term (current) use of anticoagulants [Z79.01]  Chronic atrial fibrillation (H) [I48.20]  Carotid stenosis  bilateral [I65.23]  History of TIA (transient ischemic attack) and stroke [Z86.73]           Comments:           Anticoagulation Care Providers     Provider Role Specialty Phone number     Dora Castillo MD [facility] [place] [facility] [phone]

## 2022-06-09 ENCOUNTER — LAB (OUTPATIENT)
Dept: LAB | Facility: CLINIC | Age: 87
End: 2022-06-09
Payer: COMMERCIAL

## 2022-06-09 ENCOUNTER — ANTICOAGULATION THERAPY VISIT (OUTPATIENT)
Dept: ANTICOAGULATION | Facility: CLINIC | Age: 87
End: 2022-06-09

## 2022-06-09 DIAGNOSIS — I48.20 CHRONIC ATRIAL FIBRILLATION (H): ICD-10-CM

## 2022-06-09 DIAGNOSIS — I65.23 CAROTID STENOSIS, BILATERAL: ICD-10-CM

## 2022-06-09 DIAGNOSIS — Z86.73 HISTORY OF TIA (TRANSIENT ISCHEMIC ATTACK) AND STROKE: ICD-10-CM

## 2022-06-09 DIAGNOSIS — Z79.01 LONG TERM (CURRENT) USE OF ANTICOAGULANTS: Primary | ICD-10-CM

## 2022-06-09 LAB — INR BLD: 2.3 (ref 0.9–1.1)

## 2022-06-09 PROCEDURE — 85610 PROTHROMBIN TIME: CPT

## 2022-06-09 PROCEDURE — 36416 COLLJ CAPILLARY BLOOD SPEC: CPT

## 2022-06-09 NOTE — PROGRESS NOTES
ANTICOAGULATION MANAGEMENT     Guera Peters 86 year old female is on warfarin with therapeutic INR result. (Goal INR 2.0-3.0)    Recent labs: (last 7 days)     06/09/22  0920   INR 2.3*       ASSESSMENT       Source(s): Chart Review and Patient/Caregiver Call       Warfarin doses taken: Warfarin taken as instructed    Diet: No new diet changes identified    New illness, injury, or hospitalization: No    Medication/supplement changes: None noted    Signs or symptoms of bleeding or clotting: No    Previous INR: Supratherapeutic at 3.1 on 5/12/22.    Additional findings: None       PLAN     Recommended plan for no diet, medication or health factor changes affecting INR     Dosing Instructions:    continue your current warfarin dose with next INR in 2 weeks       Summary  As of 6/9/2022    Full warfarin instructions:  2.5 mg every Sun, Tue, Fri; 2 mg all other days   Next INR check:  6/23/2022             Telephone call with  daughterSienna ([phone]) who verbalizes understanding and agrees to plan    Lab visit scheduled - INR on 6/27/22 @ [facility].   - also lab orders from Dr. Gonzalez.    Education provided: Goal range and significance of current result    Plan made per [facility] anticoagulation protocol    Columba Aguilar, RN  Anticoagulation Clinic  6/9/2022    _______________________________________________________________________     Anticoagulation Episode Summary     Current INR goal:  2.0-3.0   TTR:  62.3 % (1 y)   Target end date:  Indefinite   Send INR reminders to:  [facility]    Indications    Long term (current) use of anticoagulants [Z79.01]  Chronic atrial fibrillation (H) [I48.20]  Carotid stenosis  bilateral [I65.23]  History of TIA (transient ischemic attack) and stroke [Z86.73]           Comments:           Anticoagulation Care Providers     Provider Role Specialty Phone number    Dora Castillo MD Referring Family Medicine [phone]

## 2022-06-27 ENCOUNTER — ANTICOAGULATION THERAPY VISIT (OUTPATIENT)
Dept: ANTICOAGULATION | Facility: CLINIC | Age: 87
End: 2022-06-27

## 2022-06-27 ENCOUNTER — LAB (OUTPATIENT)
Dept: LAB | Facility: CLINIC | Age: 87
End: 2022-06-27
Payer: COMMERCIAL

## 2022-06-27 DIAGNOSIS — I65.23 CAROTID STENOSIS, BILATERAL: ICD-10-CM

## 2022-06-27 DIAGNOSIS — Z86.73 HISTORY OF TIA (TRANSIENT ISCHEMIC ATTACK) AND STROKE: ICD-10-CM

## 2022-06-27 DIAGNOSIS — E03.9 ACQUIRED HYPOTHYROIDISM: ICD-10-CM

## 2022-06-27 DIAGNOSIS — I48.20 CHRONIC ATRIAL FIBRILLATION (H): ICD-10-CM

## 2022-06-27 DIAGNOSIS — Z79.01 LONG TERM (CURRENT) USE OF ANTICOAGULANTS: Primary | ICD-10-CM

## 2022-06-27 LAB
INR BLD: 2.5 (ref 0.9–1.1)
TSH SERPL DL<=0.005 MIU/L-ACNC: 0.85 UIU/ML (ref 0.3–5)

## 2022-06-27 PROCEDURE — 84443 ASSAY THYROID STIM HORMONE: CPT

## 2022-06-27 PROCEDURE — 85610 PROTHROMBIN TIME: CPT

## 2022-06-27 PROCEDURE — 36415 COLL VENOUS BLD VENIPUNCTURE: CPT

## 2022-06-27 NOTE — PROGRESS NOTES
ANTICOAGULATION MANAGEMENT     Guera Peters 87 year old female is on warfarin with therapeutic INR result. (Goal INR 2.0-3.0)    Recent labs: (last 7 days)     06/27/22  1048   INR 2.5*       ASSESSMENT       Source(s): Chart Review and Patient/Caregiver Call       Warfarin doses taken: Warfarin taken as instructed    Diet: No new diet changes identified    New illness, injury, or hospitalization: No    Medication/supplement changes: None noted    Signs or symptoms of bleeding or clotting: No    Previous INR: Therapeutic last 2(+) visits    Additional findings: None       PLAN     Recommended plan for no diet, medication or health factor changes affecting INR     Dosing Instructions: continue your current warfarin dose with next INR in 4 weeks       Summary  As of 6/27/2022    Full warfarin instructions:  2.5 mg every Sun, Tue, Fri; 2 mg all other days   Next INR check:  7/25/2022             Telephone call with  daughter Sienna who verbalizes understanding and agrees to plan    Lab visit scheduled    Education provided: None required    Plan made per [facility] anticoagulation protocol    Tommie Mccoy RN  Anticoagulation Clinic  6/27/2022    _______________________________________________________________________     Anticoagulation Episode Summary     Current INR goal:  2.0-3.0   TTR:  67.2 % (1 y)   Target end date:  Indefinite   Send INR reminders to:  [facility]    Indications    Long term (current) use of anticoagulants [Z79.01]  Chronic atrial fibrillation (H) [I48.20]  Carotid stenosis  bilateral [I65.23]  History of TIA (transient ischemic attack) and stroke [Z86.73]           Comments:           Anticoagulation Care Providers     Provider Role Specialty Phone number    Dora Castillo MD Referring Family Medicine [phone]

## 2022-06-29 NOTE — RESULT ENCOUNTER NOTE
Patient updated by [employer] message.       Angel Luis Lynne,  Your thyroid has returned in the normal range. Continue current dose of levothyroxine.   Please reach out with questions or concerns.  Sravani Gonzalez, DO

## 2022-07-14 ENCOUNTER — TELEPHONE (OUTPATIENT)
Dept: FAMILY MEDICINE | Facility: CLINIC | Age: 87
End: 2022-07-14

## 2022-07-14 NOTE — TELEPHONE ENCOUNTER
Patient's dtr Sienna calling regarding patient's Warfarin prescription. She was wondering if it would be okay for patient to switch to taking her meds during lunch time rather than in the morning.    RN educated dtr that it is okay to switch & patient should continue to take her scheduled Warfarin dosage to the changed time daily. Dtr verbalizes understanding.    Deja Morales RN, BSN  [facility]

## 2022-07-17 DIAGNOSIS — E03.9 ACQUIRED HYPOTHYROIDISM: ICD-10-CM

## 2022-07-17 RX ORDER — LEVOTHYROXINE SODIUM 100 UG/1
TABLET ORAL
Qty: 90 TABLET | Refills: 2 | Status: SHIPPED | OUTPATIENT
Start: 2022-07-17 | End: 2022-07-20

## 2022-07-17 NOTE — TELEPHONE ENCOUNTER
"Last Written Prescription Date:  5/13/22  Last Fill Quantity: 90,  # refills: 3   Last office visit provider:  5/12/22     Requested Prescriptions   Pending Prescriptions Disp Refills     levothyroxine (SYNTHROID/LEVOTHROID) 100 MCG tablet [Pharmacy Med Name: LEVOTHYROXINE 0.100MG (100MCG) TAB] 90 tablet 0     Sig: TAKE 1 TABLET BY MOUTH DAILY       Thyroid Protocol Passed - 7/17/2022  3:33 AM        Passed - Patient is 12 years or older        Passed - Recent (12 mo) or future (30 days) visit within the authorizing provider's specialty     Patient has had an office visit with the authorizing provider or a provider within the authorizing providers department within the previous 12 mos or has a future within next 30 days. See \"Patient Info\" tab in inbasket, or \"Choose Columns\" in Meds & Orders section of the refill encounter.              Passed - Medication is active on med list        Passed - Normal TSH on file in past 12 months     Recent Labs   Lab Test 06/27/22  1048   TSH 0.85              Passed - No active pregnancy on record     If patient is pregnant or has had a positive pregnancy test, please check TSH.          Passed - No positive pregnancy test in past 12 months     If patient is pregnant or has had a positive pregnancy test, please check TSH.               Aparna Fernandez 07/17/22 3:55 PM  "

## 2022-07-20 ENCOUNTER — NURSE TRIAGE (OUTPATIENT)
Dept: NURSING | Facility: CLINIC | Age: 87
End: 2022-07-20

## 2022-07-20 DIAGNOSIS — E03.9 ACQUIRED HYPOTHYROIDISM: ICD-10-CM

## 2022-07-20 RX ORDER — LEVOTHYROXINE SODIUM 88 UG/1
88 TABLET ORAL DAILY
Qty: 90 TABLET | Refills: 3 | Status: SHIPPED | OUTPATIENT
Start: 2022-07-20 | End: 2023-06-20

## 2022-07-20 NOTE — TELEPHONE ENCOUNTER
Called [facility] to verify Levothyroxine dosage. Talked with [facility] [employer] & he states that the current dose ready for  is the 88 mcg. He also verbalizes that the 100 mcg Levothyroxine prescription has been discontinued, but looks like patient already picked up this dosage on 7/19.     Attempt to call patient's dtr Sienna. No answer. Left detailed message relaying provider's message regarding Levothyroxine prescription. Apologized to dtr that patient's current dosage should be 88 mcg per provider & that the correct dosage is ready for  at the [facility]. Also told dtr to call clinic if she has any questions or concerns.    Deja Morales, RN, BSN  [facility]

## 2022-07-20 NOTE — TELEPHONE ENCOUNTER
Nurse Triage SBAR      Please call daughter Sienna ph [phone]. Consent to communicate on file.     Situation:     Sienna patient's daughter calling requesting to clarify levothyroxine dosing.      Background:    Per 5/12/22 lab notes Levothyroxine was decreased to 88 mcg.  6/27/22 lab notes state Your thyroid has returned in the normal range. Continue current dose of levothyroxine.       Assessment:     Please advise on current Levothyroxine dosing.     Pharmacy received new prescription on 7/17/22 for 100 mcg.    Patient was currently on 88 mcg.    levothyroxine (SYNTHROID/LEVOTHROID) 100 MCG tablet 90 tablet 2 7/17/2022  No   Sig: TAKE 1 TABLET BY MOUTH DAILY   Sent to pharmacy as: Levothyroxine Sodium 100 MCG Oral Tablet      Sienna stating patient has 88 mcg left she is currently taking.     Melonie Paul RN  [facility]          Reason for Disposition    Caller has NON-URGENT medication question about med that PCP prescribed and triager unable to answer question    Additional Information    Negative: Drug overdose and triager unable to answer question    Negative: Caller requesting information unrelated to medicine    Negative: Caller requesting a prescription for Strep throat and has a positive culture result    Negative: Rash while taking a medication or within 3 days of stopping it    Negative: Immunization reaction suspected    Negative: Asthma and having symptoms of asthma (cough, wheezing, etc.)    Negative: Breastfeeding questions about mother's medicines and diet    Negative: MORE THAN A DOUBLE DOSE of a prescription or over-the-counter (OTC) drug    Negative: DOUBLE DOSE (an extra dose or lesser amount) of over-the-counter (OTC) drug and any symptoms (e.g., dizziness, nausea, pain, sleepiness)    Negative: DOUBLE DOSE (an extra dose or lesser amount) of prescription drug and any symptoms (e.g., dizziness, nausea, pain, sleepiness)    Negative: Took another person's prescription drug     Negative: DOUBLE DOSE (an extra dose or lesser amount) of prescription drug and NO symptoms (Exception: a double dose of antibiotics)    Negative: Diabetes drug error or overdose (e.g., took wrong type of insulin or took extra dose)    Negative: Caller has medication question about med not prescribed by PCP and triager unable to answer question (e.g., compatibility with other med, storage)    Negative: Request for URGENT new prescription or refill of 'essential' medication (i.e., likelihood of harm to patient if not taken) and triager unable to fill per department policy    Negative: Prescription not at pharmacy and was prescribed today by PCP    Negative: Pharmacy calling with prescription questions and triager unable to answer question    Negative: Caller has urgent medication question about med that PCP prescribed and triager unable to answer question    Protocols used: MEDICATION QUESTION CALL-A-OH

## 2022-07-20 NOTE — TELEPHONE ENCOUNTER
Please call Sienna and apologize.  The pharmacy sent a refill request specifically for the 100 mcg which someone approved under my name.  If she has been on the 88 mcg with the most recent lab I will make sure that we send the 88 mcg tablet to the pharmacy now.  That is the dose that she should be continuing on.  I am sorry if she just paid for the 100 mcg dose

## 2022-07-25 ENCOUNTER — LAB (OUTPATIENT)
Dept: LAB | Facility: CLINIC | Age: 87
End: 2022-07-25
Payer: COMMERCIAL

## 2022-07-25 ENCOUNTER — ANTICOAGULATION THERAPY VISIT (OUTPATIENT)
Dept: ANTICOAGULATION | Facility: CLINIC | Age: 87
End: 2022-07-25

## 2022-07-25 DIAGNOSIS — I48.20 CHRONIC ATRIAL FIBRILLATION (H): ICD-10-CM

## 2022-07-25 DIAGNOSIS — Z79.01 LONG TERM (CURRENT) USE OF ANTICOAGULANTS: Primary | ICD-10-CM

## 2022-07-25 DIAGNOSIS — I65.23 CAROTID STENOSIS, BILATERAL: ICD-10-CM

## 2022-07-25 DIAGNOSIS — Z86.73 HISTORY OF TIA (TRANSIENT ISCHEMIC ATTACK) AND STROKE: ICD-10-CM

## 2022-07-25 LAB — INR BLD: 3.4 (ref 0.9–1.1)

## 2022-07-25 PROCEDURE — 85610 PROTHROMBIN TIME: CPT

## 2022-07-25 PROCEDURE — 36416 COLLJ CAPILLARY BLOOD SPEC: CPT

## 2022-07-25 NOTE — PROGRESS NOTES
ANTICOAGULATION MANAGEMENT     Guera Peters 87 year old female is on warfarin with supratherapeutic INR result. (Goal INR 2.0-3.0)    Recent labs: (last 7 days)     07/25/22  1028   INR 3.4*       ASSESSMENT       Source(s): Chart Review, Patient/Caregiver Call and Template       Warfarin doses taken: More warfarin taken than planned which may be affecting INR    Reported son bought a new medication dispensing machine, and noted that warfarin was dispensing  2x in a day.  So possibly double dose for 2 days.    Diet: No new diet changes identified    Reported ate 2 salads yesterday.    New illness, injury, or hospitalization: No    Medication/supplement changes: None noted    Verified taking new decreased dose - Levothyroxine 88mcg daily.    Signs or symptoms of bleeding or clotting: Yes:    Reported occasional bleeding in between teeth.    Previous INR: Therapeutic last 2 visits    Additional findings: None       PLAN     Recommended plan for temporary change(s) affecting INR     Dosing Instructions:    hold dose then continue your current warfarin dose with next INR in 1-2 weeks       Summary  As of 7/25/2022    Full warfarin instructions:  7/25: Hold; Otherwise 2.5 mg every Sun, Tue, Fri; 2 mg all other days   Next INR check:  8/8/2022             Telephone call with  daughterSienna ([phone]) who verbalizes understanding and agrees to plan    Lab visit scheduled - INR on 8/9/22 @ [facility].    Education provided: Importance of consistent vitamin K intake, Impact of vitamin K foods on INR, Goal range and significance of current result, Monitoring for bleeding signs and symptoms and When to seek medical attention/emergency care    Plan made per [facility] anticoagulation protocol    Columba Aguilar RN  Anticoagulation Clinic  7/25/2022    _______________________________________________________________________     Anticoagulation Episode Summary     Current INR goal:  2.0-3.0   TTR:  68.5 % (1 y)   Target end  date:  Indefinite   Send INR reminders to:  [facility]    Indications    Long term (current) use of anticoagulants [Z79.01]  Chronic atrial fibrillation (H) [I48.20]  Carotid stenosis  bilateral [I65.23]  History of TIA (transient ischemic attack) and stroke [Z86.73]           Comments:           Anticoagulation Care Providers     Provider Role Specialty Phone number    Dora Castillo MD Referring Family Medicine [phone]

## 2022-08-09 ENCOUNTER — LAB (OUTPATIENT)
Dept: LAB | Facility: CLINIC | Age: 87
End: 2022-08-09
Payer: COMMERCIAL

## 2022-08-09 ENCOUNTER — ANTICOAGULATION THERAPY VISIT (OUTPATIENT)
Dept: ANTICOAGULATION | Facility: CLINIC | Age: 87
End: 2022-08-09

## 2022-08-09 DIAGNOSIS — I65.23 CAROTID STENOSIS, BILATERAL: ICD-10-CM

## 2022-08-09 DIAGNOSIS — I48.20 CHRONIC ATRIAL FIBRILLATION (H): ICD-10-CM

## 2022-08-09 DIAGNOSIS — Z79.01 LONG TERM (CURRENT) USE OF ANTICOAGULANTS: Primary | ICD-10-CM

## 2022-08-09 DIAGNOSIS — Z86.73 HISTORY OF TIA (TRANSIENT ISCHEMIC ATTACK) AND STROKE: ICD-10-CM

## 2022-08-09 LAB — INR BLD: 2.8 (ref 0.9–1.1)

## 2022-08-09 PROCEDURE — 36416 COLLJ CAPILLARY BLOOD SPEC: CPT

## 2022-08-09 PROCEDURE — 85610 PROTHROMBIN TIME: CPT

## 2022-08-09 NOTE — PROGRESS NOTES
ANTICOAGULATION MANAGEMENT     Guera Peters 87 year old female is on warfarin with therapeutic INR result. (Goal INR 2.0-3.0)    Recent labs: (last 7 days)     08/09/22  1031   INR 2.8*       ASSESSMENT       Source(s): Chart Review, Patient/Caregiver Call and Template       Warfarin doses taken: Warfarin taken as instructed    Diet: No new diet changes identified    New illness, injury, or hospitalization: No    Medication/supplement changes: None noted    Signs or symptoms of bleeding or clotting: No    Previous INR: Supratherapeutic at 3.4 on 7/25/22.    Additional findings: None       PLAN     Recommended plan for no diet, medication or health factor changes affecting INR     Dosing Instructions:    Continue your current warfarin dose with next INR in 2 weeks       Summary  As of 8/9/2022    Full warfarin instructions:  2.5 mg every Sun, Tue, Fri; 2 mg all other days   Next INR check:               Telephone call with  daughterSienna ([phone]) who verbalizes understanding and agrees to plan    Lab visit scheduled - INR on 8/30/22 @ [facility].    Education provided: Importance of consistent vitamin K intake and Goal range and significance of current result    Plan made per [facility] anticoagulation protocol    Columba Aguilar RN  Anticoagulation Clinic  8/9/2022    _______________________________________________________________________     Anticoagulation Episode Summary     Current INR goal:  2.0-3.0   TTR:  69.1 % (1 y)   Target end date:  Indefinite   Send INR reminders to:  [facility]    Indications    Long term (current) use of anticoagulants [Z79.01]  Chronic atrial fibrillation (H) [I48.20]  Carotid stenosis  bilateral [I65.23]  History of TIA (transient ischemic attack) and stroke [Z86.73]           Comments:           Anticoagulation Care Providers     Provider Role Specialty Phone number    Dora Castillo MD Referring Family Medicine [phone]

## 2022-08-30 ENCOUNTER — ANTICOAGULATION THERAPY VISIT (OUTPATIENT)
Dept: ANTICOAGULATION | Facility: CLINIC | Age: 87
End: 2022-08-30

## 2022-08-30 ENCOUNTER — LAB (OUTPATIENT)
Dept: LAB | Facility: CLINIC | Age: 87
End: 2022-08-30
Payer: COMMERCIAL

## 2022-08-30 DIAGNOSIS — Z86.73 HISTORY OF TIA (TRANSIENT ISCHEMIC ATTACK) AND STROKE: ICD-10-CM

## 2022-08-30 DIAGNOSIS — Z79.01 LONG TERM (CURRENT) USE OF ANTICOAGULANTS: Primary | ICD-10-CM

## 2022-08-30 DIAGNOSIS — I65.23 CAROTID STENOSIS, BILATERAL: ICD-10-CM

## 2022-08-30 DIAGNOSIS — I48.20 CHRONIC ATRIAL FIBRILLATION (H): ICD-10-CM

## 2022-08-30 LAB — INR BLD: 1.8 (ref 0.9–1.1)

## 2022-08-30 PROCEDURE — 85610 PROTHROMBIN TIME: CPT

## 2022-08-30 PROCEDURE — 36416 COLLJ CAPILLARY BLOOD SPEC: CPT

## 2022-08-30 NOTE — PROGRESS NOTES
ANTICOAGULATION MANAGEMENT     Guera Peters 87 year old female is on warfarin with subtherapeutic INR result. (Goal INR 2.0-3.0)    Recent labs: (last 7 days)     08/30/22  1024   INR 1.8*       ASSESSMENT       Source(s): Chart Review, Patient/Caregiver Call and Template       Warfarin doses taken: Warfarin taken as instructed    Diet: Increased greens/vitamin K in diet; plans to resume previous intake    Daughter, buys groceries and has been buying the bagged salad, which she eats 2x/week.      New illness, injury, or hospitalization: No    Medication/supplement changes: None noted    Signs or symptoms of bleeding or clotting: No    Previous INR: Therapeutic last visit at 2.8; previously outside of goal range at 3.4    Additional findings: None       PLAN     Recommended plan for temporary change(s) affecting INR     Dosing Instructions:   (evenings. Verified 1mg tabs)    booster dose then continue your current warfarin dose with next INR in 2 weeks       Summary  As of 8/30/2022    Full warfarin instructions:  8/30: 4 mg; Otherwise 2.5 mg every Sun, Tue, Fri; 2 mg all other days   Next INR check:  9/13/2022             Telephone call with  daughterSienna ([phone]) who verbalizes understanding and agrees to plan    - will make smaller salad portions.    Lab visit scheduled - INR on 9/13/22 @ [facility].    Education provided: Importance of consistent vitamin K intake, Impact of vitamin K foods on INR, Goal range and significance of current result and Monitoring for clotting signs and symptoms    Plan made per [facility] anticoagulation protocol    Columba Aguilar RN  Anticoagulation Clinic  8/30/2022    _______________________________________________________________________     Anticoagulation Episode Summary     Current INR goal:  2.0-3.0   TTR:  72.2 % (1 y)   Target end date:  Indefinite   Send INR reminders to:  [facility]    Indications    Long term (current) use of anticoagulants  [Z79.01]  Chronic atrial fibrillation (H) [I48.20]  Carotid stenosis  bilateral [I65.23]  History of TIA (transient ischemic attack) and stroke [Z86.73]           Comments:           Anticoagulation Care Providers     Provider Role Specialty Phone number    Dora Castillo MD Referring Family Medicine [phone]

## 2022-09-13 ENCOUNTER — ANTICOAGULATION THERAPY VISIT (OUTPATIENT)
Dept: ANTICOAGULATION | Facility: CLINIC | Age: 87
End: 2022-09-13

## 2022-09-13 ENCOUNTER — LAB (OUTPATIENT)
Dept: LAB | Facility: CLINIC | Age: 87
End: 2022-09-13
Payer: COMMERCIAL

## 2022-09-13 ENCOUNTER — TRANSFERRED RECORDS (OUTPATIENT)
Dept: HEALTH INFORMATION MANAGEMENT | Facility: CLINIC | Age: 87
End: 2022-09-13

## 2022-09-13 DIAGNOSIS — I65.23 CAROTID STENOSIS, BILATERAL: ICD-10-CM

## 2022-09-13 DIAGNOSIS — Z86.73 HISTORY OF TIA (TRANSIENT ISCHEMIC ATTACK) AND STROKE: ICD-10-CM

## 2022-09-13 DIAGNOSIS — I48.20 CHRONIC ATRIAL FIBRILLATION (H): ICD-10-CM

## 2022-09-13 DIAGNOSIS — Z79.01 LONG TERM (CURRENT) USE OF ANTICOAGULANTS: Primary | ICD-10-CM

## 2022-09-13 LAB — INR BLD: 2.3 (ref 0.9–1.1)

## 2022-09-13 PROCEDURE — 85610 PROTHROMBIN TIME: CPT

## 2022-09-13 PROCEDURE — 36416 COLLJ CAPILLARY BLOOD SPEC: CPT

## 2022-09-13 NOTE — PROGRESS NOTES
ANTICOAGULATION MANAGEMENT     Guera Peters 87 year old female is on warfarin with therapeutic INR result. (Goal INR 2.0-3.0)    Recent labs: (last 7 days)     09/13/22  1226   INR 2.3*       ASSESSMENT       Source(s): Chart Review, Patient/Caregiver Call and Template       Warfarin doses taken: Warfarin taken differently, but did not change total weekly dose    Diet: No new diet changes identified    New illness, injury, or hospitalization: No    Medication/supplement changes: None noted    Signs or symptoms of bleeding or clotting: No    Previous INR: Subtherapeutic at 1.8 on 8/30/22.    Additional findings: None       PLAN     Recommended plan for no diet, medication or health factor changes affecting INR     Dosing Instructions: Continue your current warfarin dose with next INR in 2 weeks       Summary  As of 9/13/2022    Full warfarin instructions:  2.5 mg every Sun, Tue, Fri; 2 mg all other days   Next INR check:  9/27/2022             Telephone call with  daughterSienna ([phone]) who verbalizes understanding and agrees to plan    Lab visit scheduled - INR on 10/4/22 @ [facility].   (daughter brings her to [facility])    Education provided: Importance of consistent vitamin K intake and Goal range and significance of current result    Plan made per [facility] anticoagulation protocol    Columba Aguilar RN  Anticoagulation Clinic  9/13/2022    _______________________________________________________________________     Anticoagulation Episode Summary     Current INR goal:  2.0-3.0   TTR:  70.7 % (1 y)   Target end date:  Indefinite   Send INR reminders to:  [facility]    Indications    Long term (current) use of anticoagulants [Z79.01]  Chronic atrial fibrillation (H) [I48.20]  Carotid stenosis  bilateral [I65.23]  History of TIA (transient ischemic attack) and stroke [Z86.73]           Comments:           Anticoagulation Care Providers     Provider Role Specialty Phone number    Dora Castillo  MD [facility] [phone]

## 2022-10-04 ENCOUNTER — ALLIED HEALTH/NURSE VISIT (OUTPATIENT)
Dept: FAMILY MEDICINE | Facility: CLINIC | Age: 87
End: 2022-10-04
Payer: COMMERCIAL

## 2022-10-04 ENCOUNTER — TELEPHONE (OUTPATIENT)
Dept: ANTICOAGULATION | Facility: CLINIC | Age: 87
End: 2022-10-04

## 2022-10-04 ENCOUNTER — ANTICOAGULATION THERAPY VISIT (OUTPATIENT)
Dept: ANTICOAGULATION | Facility: CLINIC | Age: 87
End: 2022-10-04

## 2022-10-04 ENCOUNTER — LAB (OUTPATIENT)
Dept: LAB | Facility: CLINIC | Age: 87
End: 2022-10-04
Payer: COMMERCIAL

## 2022-10-04 DIAGNOSIS — I65.23 CAROTID STENOSIS, BILATERAL: ICD-10-CM

## 2022-10-04 DIAGNOSIS — I48.20 CHRONIC ATRIAL FIBRILLATION (H): ICD-10-CM

## 2022-10-04 DIAGNOSIS — Z86.73 HISTORY OF TIA (TRANSIENT ISCHEMIC ATTACK) AND STROKE: ICD-10-CM

## 2022-10-04 DIAGNOSIS — Z23 NEED FOR COVID-19 VACCINE: Primary | ICD-10-CM

## 2022-10-04 DIAGNOSIS — Z79.01 LONG TERM (CURRENT) USE OF ANTICOAGULANTS: Primary | ICD-10-CM

## 2022-10-04 LAB — INR BLD: 2.2 (ref 0.9–1.1)

## 2022-10-04 PROCEDURE — 91312 COVID-19,PF,PFIZER BOOSTER BIVALENT: CPT

## 2022-10-04 PROCEDURE — 90662 IIV NO PRSV INCREASED AG IM: CPT

## 2022-10-04 PROCEDURE — 0124A COVID-19,PF,PFIZER BOOSTER BIVALENT: CPT

## 2022-10-04 PROCEDURE — 85610 PROTHROMBIN TIME: CPT

## 2022-10-04 PROCEDURE — G0008 ADMIN INFLUENZA VIRUS VAC: HCPCS

## 2022-10-04 PROCEDURE — 36416 COLLJ CAPILLARY BLOOD SPEC: CPT

## 2022-10-04 NOTE — PROGRESS NOTES
ANTICOAGULATION MANAGEMENT     Guera Peters 87 year old female is on warfarin with therapeutic INR result. (Goal INR 2.0-3.0)    Recent labs: (last 7 days)     10/04/22  1222   INR 2.2*       ASSESSMENT       Source(s): Chart Review, Patient/Caregiver Call and Template       Warfarin doses taken: Warfarin taken as instructed    Diet: No new diet changes identified    New illness, injury, or hospitalization: No    Medication/supplement changes: None noted    Signs or symptoms of bleeding or clotting: No    Previous INR: Therapeutic last visit at 2.3; previously outside of goal range at 1.8    Additional findings: Yes.  10/4/22 - vaccinated with Quad. High dose Flu shot and Covid-19 Pfizer bilvalent booster.       PLAN     Recommended plan for no diet, medication or health factor changes affecting INR     Dosing Instructions: Continue your current warfarin dose with next INR in 3 weeks       Summary  As of 10/4/2022    Full warfarin instructions:  2.5 mg every Sun, Tue, Fri; 2 mg all other days   Next INR check:  10/25/2022             Telephone call with  daughterSienna ([phone]) who verbalizes understanding and agrees to plan    Lab visit scheduled - INR on 10/25/22 @ [facility].    Education provided: Importance of consistent vitamin K intake and Goal range and significance of current result    Plan made per [facility] anticoagulation protocol    Columba Aguilar RN  Anticoagulation Clinic  10/4/2022    _______________________________________________________________________     Anticoagulation Episode Summary     Current INR goal:  2.0-3.0   TTR:  72.6 % (1 y)   Target end date:  Indefinite   Send INR reminders to:  [facility]    Indications    Long term (current) use of anticoagulants [Z79.01]  Chronic atrial fibrillation (H) [I48.20]  Carotid stenosis  bilateral [I65.23]  History of TIA (transient ischemic attack) and stroke [Z86.73]           Comments:           Anticoagulation Care Providers      Provider Role Specialty Phone number    Dora Castillo MD Referring Family Medicine [phone]

## 2022-10-04 NOTE — TELEPHONE ENCOUNTER
ANTICOAGULATION     Guera Peters is overdue for INR check.      Left message for patient to call and schedule lab appointment as soon as possible. If returning call, please schedule.     Columba Aguilar RN

## 2022-10-05 ENCOUNTER — NURSE TRIAGE (OUTPATIENT)
Dept: NURSING | Facility: CLINIC | Age: 87
End: 2022-10-05

## 2022-10-05 NOTE — TELEPHONE ENCOUNTER
Daughter Sienna calling requesting to know if it was ok patient had a 5th booster dose? Stating after talking to a friend who was advised they could only receive a 4th dose.      We are scheduling all people aged 6 months and older for COVID-19 vaccines.  Persons 6 months - 17 years will receive the Pfizer COVID-19 vaccine and patients 18+ years will be able to choose between Pfizer and Moderna COVID-19 vaccines.     People 12 and older are able to schedule to receive a COVID-19 Bivalent booster vaccine, at least 2 months after last primary dose, or last booster.  Per the CDC age 12 and older can receive Pfizer and 18 and over can receive Moderna, regardless of the original vaccine  received for primary COVID-19 vaccines.      To learn more about COVID-19 vaccines in general, please visit the CDC website: https://www.cdc.gov/coronavirus/2019-ncov/vaccines/index.html     To schedule a COVID-19 vaccine appointment, please log in to [employer] using this link to see when and where we have openings (to schedule a child s vaccine, you will need to log into their [employer] account). [employer] [facility] retail pharmacies also administer Moderna and Pfizer COVID Vaccines to patients 5 years and older. [facility] now offer Novavax primary COVID-19 vaccine.  To schedule at a [facility] pharmacy please go to [email or web address].    If you have technical difficulty using [employer], call [phone], option 1 for assistance.    More information about vaccine effectiveness at reducing spread of disease, hospitalizations, and death as well as vaccine safety and answers to other questions can be found on our website: [email or web address].        Reason for Disposition    Information only question and nurse able to answer    Additional Information    Negative: Nursing judgment    Negative: Nursing judgment    Negative: Nursing judgment    Negative: Nursing judgment    Protocols  used: INFORMATION ONLY CALL - NO TRIAGE-A-[place]

## 2022-10-26 ENCOUNTER — ANTICOAGULATION THERAPY VISIT (OUTPATIENT)
Dept: ANTICOAGULATION | Facility: CLINIC | Age: 87
End: 2022-10-26

## 2022-10-26 ENCOUNTER — LAB (OUTPATIENT)
Dept: LAB | Facility: CLINIC | Age: 87
End: 2022-10-26
Payer: COMMERCIAL

## 2022-10-26 DIAGNOSIS — Z86.73 HISTORY OF TIA (TRANSIENT ISCHEMIC ATTACK) AND STROKE: ICD-10-CM

## 2022-10-26 DIAGNOSIS — I48.20 CHRONIC ATRIAL FIBRILLATION (H): ICD-10-CM

## 2022-10-26 DIAGNOSIS — I65.23 CAROTID STENOSIS, BILATERAL: ICD-10-CM

## 2022-10-26 DIAGNOSIS — Z79.01 LONG TERM (CURRENT) USE OF ANTICOAGULANTS: Primary | ICD-10-CM

## 2022-10-26 LAB — INR BLD: 2.3 (ref 0.9–1.1)

## 2022-10-26 PROCEDURE — 85610 PROTHROMBIN TIME: CPT

## 2022-10-26 PROCEDURE — 36416 COLLJ CAPILLARY BLOOD SPEC: CPT

## 2022-10-26 NOTE — PROGRESS NOTES
ANTICOAGULATION MANAGEMENT     Guera Peters 87 year old female is on warfarin with therapeutic INR result. (Goal INR 2.0-3.0)    Recent labs: (last 7 days)     10/26/22  1341   INR 2.3*       ASSESSMENT       Source(s): Chart Review and Patient/Caregiver Call       Warfarin doses taken: Warfarin taken as instructed    Diet: No new diet changes identified    New illness, injury, or hospitalization: No    Medication/supplement changes: None noted    Signs or symptoms of bleeding or clotting: No    Previous INR: Therapeutic last 2 visits    Additional findings: None       PLAN     Recommended plan for no diet, medication or health factor changes affecting INR     Dosing Instructions: Continue your current warfarin dose with next INR in 4 weeks       Summary  As of 10/26/2022    Full warfarin instructions:  2.5 mg every Sun, Tue, Fri; 2 mg all other days; Starting 10/26/2022   Next INR check:  11/23/2022             Telephone call with  daughterSienna ([phone]) who verbalizes understanding and agrees to plan    Lab visit scheduled    Education provided:     Taking warfarin: Importance of taking warfarin as instructed    Goal range and lab monitoring: goal range and significance of current result    Dietary considerations: importance of consistent vitamin K intake    Plan made per [facility] anticoagulation protocol    Columba Aguilar RN  Anticoagulation Clinic  10/26/2022    _______________________________________________________________________     Anticoagulation Episode Summary     Current INR goal:  2.0-3.0   TTR:  72.6 % (1 y)   Target end date:  Indefinite   Send INR reminders to:  [facility]    Indications    Long term (current) use of anticoagulants [Z79.01]  Chronic atrial fibrillation (H) [I48.20]  Carotid stenosis  bilateral [I65.23]  History of TIA (transient ischemic attack) and stroke [Z86.73]           Comments:           Anticoagulation Care Providers     Provider Role Specialty  Phone number    Dora Castillo MD Referring Family Medicine [phone]

## 2022-11-23 ENCOUNTER — LAB (OUTPATIENT)
Dept: LAB | Facility: CLINIC | Age: 87
End: 2022-11-23
Payer: COMMERCIAL

## 2022-11-23 ENCOUNTER — ANTICOAGULATION THERAPY VISIT (OUTPATIENT)
Dept: ANTICOAGULATION | Facility: CLINIC | Age: 87
End: 2022-11-23

## 2022-11-23 DIAGNOSIS — I48.20 CHRONIC ATRIAL FIBRILLATION (H): ICD-10-CM

## 2022-11-23 DIAGNOSIS — Z79.01 LONG TERM (CURRENT) USE OF ANTICOAGULANTS: Primary | ICD-10-CM

## 2022-11-23 DIAGNOSIS — I65.23 CAROTID STENOSIS, BILATERAL: ICD-10-CM

## 2022-11-23 DIAGNOSIS — Z86.73 HISTORY OF TIA (TRANSIENT ISCHEMIC ATTACK) AND STROKE: ICD-10-CM

## 2022-11-23 LAB — INR BLD: 1.5 (ref 0.9–1.1)

## 2022-11-23 PROCEDURE — 85610 PROTHROMBIN TIME: CPT

## 2022-11-23 PROCEDURE — 36415 COLL VENOUS BLD VENIPUNCTURE: CPT

## 2022-11-23 NOTE — PROGRESS NOTES
ANTICOAGULATION MANAGEMENT     Guera Peters 87 year old female is on warfarin with subtherapeutic INR result. (Goal INR 2.0-3.0)    Recent labs: (last 7 days)     11/23/22  1511   INR 1.5*       ASSESSMENT       Source(s): Chart Review, Patient/Caregiver Call and Template       Warfarin doses taken: Less warfarin taken than planned which may be affecting INR    Only took 1mg on 11/15/22, instead of 2.5mg dose and missing doses.  (has a pill dispenser, but has memory decline issues. She does not like taking a bunch of pills together and sets them, and ends up forgeting to take meds).  Sienna will be more vigilante in ensuring her mom takes her meds.    Diet: No new diet changes identified    New illness, injury, or hospitalization: No    Medication/supplement changes: None noted    Signs or symptoms of bleeding or clotting: No    Previous INR: Therapeutic last 3 visits    Additional findings: None       PLAN     Recommended plan for temporary change(s) affecting INR     Dosing Instructions: booster dose then continue your current warfarin dose with next INR in 1-2 weeks       Summary  As of 11/23/2022    Full warfarin instructions:  11/24: 3 mg; Otherwise 2.5 mg every Sun, Tue, Fri; 2 mg all other days; Starting 11/23/2022   Next INR check:  12/7/2022             Telephone call with  daughterSienna ([phone]) who verbalizes understanding and agrees to plan    - will give the one time booster dose of warfarin on 11/24, as they will be picking up Mom for Thanksgiving.    Lab visit scheduled - INR on 12/5/22 @ [facility].    Education provided:     Taking warfarin: Importance of taking warfarin as instructed    Goal range and lab monitoring: goal range and significance of current result    Plan made per [facility] anticoagulation protocol    Columba Aguilar, RN  Anticoagulation Clinic  11/23/2022    _______________________________________________________________________     Anticoagulation Episode Summary     Current INR  goal:  2.0-3.0   TTR:  67.9 % (1 y)   Target end date:  Indefinite   Send INR reminders to:  [facility]    Indications    Long term (current) use of anticoagulants [Z79.01]  Chronic atrial fibrillation (H) [I48.20]  Carotid stenosis  bilateral [I65.23]  History of TIA (transient ischemic attack) and stroke [Z86.73]           Comments:           Anticoagulation Care Providers     Provider Role Specialty Phone number    Dora Castillo MD Referring Family Medicine [phone]

## 2022-12-05 ENCOUNTER — LAB (OUTPATIENT)
Dept: LAB | Facility: CLINIC | Age: 87
End: 2022-12-05
Payer: COMMERCIAL

## 2022-12-05 ENCOUNTER — ANTICOAGULATION THERAPY VISIT (OUTPATIENT)
Dept: ANTICOAGULATION | Facility: CLINIC | Age: 87
End: 2022-12-05

## 2022-12-05 DIAGNOSIS — Z86.73 HISTORY OF TIA (TRANSIENT ISCHEMIC ATTACK) AND STROKE: ICD-10-CM

## 2022-12-05 DIAGNOSIS — Z79.01 LONG TERM (CURRENT) USE OF ANTICOAGULANTS: Primary | ICD-10-CM

## 2022-12-05 DIAGNOSIS — I48.20 CHRONIC ATRIAL FIBRILLATION (H): ICD-10-CM

## 2022-12-05 DIAGNOSIS — I65.23 CAROTID STENOSIS, BILATERAL: ICD-10-CM

## 2022-12-05 LAB — INR BLD: 2.3 (ref 0.9–1.1)

## 2022-12-05 PROCEDURE — 36416 COLLJ CAPILLARY BLOOD SPEC: CPT

## 2022-12-05 PROCEDURE — 85610 PROTHROMBIN TIME: CPT

## 2022-12-05 NOTE — PROGRESS NOTES
ANTICOAGULATION MANAGEMENT     Guera Peters 87 year old female is on warfarin with therapeutic INR result. (Goal INR 2.0-3.0)    Recent labs: (last 7 days)     12/05/22  0957   INR 2.3*       ASSESSMENT       Source(s): Chart Review, Patient/Caregiver Call and Template       Warfarin doses taken: Warfarin taken as instructed    Sienna reported, she is more vigilante in ensuring her mom takes meds d/t a decline with her memory.    Diet: No new diet changes identified    New illness, injury, or hospitalization: No    Medication/supplement changes: None noted    Signs or symptoms of bleeding or clotting: No    Previous INR: Subtherapeutic at 1.5 on 11/23/22    Additional findings: None       PLAN     Recommended plan for no diet, medication or health factor changes affecting INR     Dosing Instructions: Continue your current warfarin dose with next INR in 2 weeks       Summary  As of 12/5/2022    Full warfarin instructions:  2.5 mg every Sun, Tue, Fri; 2 mg all other days; Starting 12/5/2022   Next INR check:  12/19/2022             Telephone call with  daughterSienna ([phone]) who verbalizes understanding and agrees to plan    Check at provider office visit - INR on 12/20/22 at OV with Dr. Law    Education provided:     Taking warfarin: Importance of taking warfarin as instructed    Goal range and lab monitoring: goal range and significance of current result    Plan made per [facility] anticoagulation protocol    Columba Aguilar, RN  Anticoagulation Clinic  12/5/2022    _______________________________________________________________________     Anticoagulation Episode Summary     Current INR goal:  2.0-3.0   TTR:  65.8 % (1 y)   Target end date:  Indefinite   Send INR reminders to:  [facility]    Indications    Long term (current) use of anticoagulants [Z79.01]  Chronic atrial fibrillation (H) [I48.20]  Carotid stenosis  bilateral [I65.23]  History of TIA (transient ischemic attack) and stroke  Notified needs med check   [Z86.73]           Comments:           Anticoagulation Care Providers     Provider Role Specialty Phone number    Dora Castillo MD Referring Family Medicine [phone]

## 2022-12-16 ENCOUNTER — TELEPHONE (OUTPATIENT)
Dept: FAMILY MEDICINE | Facility: CLINIC | Age: 87
End: 2022-12-16

## 2022-12-16 NOTE — TELEPHONE ENCOUNTER
Called and spoke with Sienna,     - OK to check INR on 12/28/22 during OV with Dr. Gonzalez.     - reported, so far her mom has not missed any warfarin dose.

## 2022-12-16 NOTE — TELEPHONE ENCOUNTER
Reason for Call:  Other call back    Detailed comments: Daughter Sienna would like to know if it would ok to have INR on 12/28/22 instead of 12/19/22. Patient has Physical on December 28th and would like to do everything in one day of possible.     Phone Number Patient can be reached at: Cell number on file:    Telephone Information:   Mobile [phone]       Best Time: anytime    Can we leave a detailed message on this number? YES    Call taken on 12/16/2022 at 12:33 PM by Pratima Mckeon

## 2022-12-27 ASSESSMENT — ENCOUNTER SYMPTOMS
DIZZINESS: 0
SORE THROAT: 0
SHORTNESS OF BREATH: 0
NERVOUS/ANXIOUS: 0
CHILLS: 0
HEARTBURN: 0
WEAKNESS: 0
FREQUENCY: 0
PARESTHESIAS: 0
ABDOMINAL PAIN: 0
HEADACHES: 0
BREAST MASS: 0
PALPITATIONS: 0
HEMATOCHEZIA: 0
DIARRHEA: 0
HEMATURIA: 0
MYALGIAS: 0
NAUSEA: 0
CONSTIPATION: 0
COUGH: 0
FEVER: 0
ARTHRALGIAS: 0
JOINT SWELLING: 0
EYE PAIN: 0
DYSURIA: 0

## 2022-12-27 ASSESSMENT — ACTIVITIES OF DAILY LIVING (ADL)
CURRENT_FUNCTION: TRANSPORTATION REQUIRES ASSISTANCE
CURRENT_FUNCTION: MONEY MANAGEMENT REQUIRES ASSISTANCE
CURRENT_FUNCTION: MEDICATION ADMINISTRATION REQUIRES ASSISTANCE

## 2022-12-28 ENCOUNTER — OFFICE VISIT (OUTPATIENT)
Dept: FAMILY MEDICINE | Facility: CLINIC | Age: 87
End: 2022-12-28
Payer: COMMERCIAL

## 2022-12-28 ENCOUNTER — ANTICOAGULATION THERAPY VISIT (OUTPATIENT)
Dept: ANTICOAGULATION | Facility: CLINIC | Age: 87
End: 2022-12-28

## 2022-12-28 VITALS
TEMPERATURE: 98.2 F | OXYGEN SATURATION: 99 % | SYSTOLIC BLOOD PRESSURE: 132 MMHG | BODY MASS INDEX: 28.84 KG/M2 | HEIGHT: 65 IN | DIASTOLIC BLOOD PRESSURE: 57 MMHG | WEIGHT: 173.13 LBS | RESPIRATION RATE: 20 BRPM | HEART RATE: 66 BPM

## 2022-12-28 DIAGNOSIS — Z86.73 HISTORY OF TIA (TRANSIENT ISCHEMIC ATTACK) AND STROKE: ICD-10-CM

## 2022-12-28 DIAGNOSIS — Z79.01 LONG TERM (CURRENT) USE OF ANTICOAGULANTS: Primary | ICD-10-CM

## 2022-12-28 DIAGNOSIS — I65.23 CAROTID STENOSIS, BILATERAL: ICD-10-CM

## 2022-12-28 DIAGNOSIS — Z00.00 ENCOUNTER FOR MEDICARE ANNUAL WELLNESS EXAM: Primary | ICD-10-CM

## 2022-12-28 DIAGNOSIS — I48.20 CHRONIC ATRIAL FIBRILLATION (H): ICD-10-CM

## 2022-12-28 DIAGNOSIS — L82.1 SEBORRHEIC KERATOSIS: ICD-10-CM

## 2022-12-28 LAB — INR BLD: 2.7 (ref 0.9–1.1)

## 2022-12-28 PROCEDURE — G0439 PPPS, SUBSEQ VISIT: HCPCS | Performed by: FAMILY MEDICINE

## 2022-12-28 PROCEDURE — 85610 PROTHROMBIN TIME: CPT | Performed by: FAMILY MEDICINE

## 2022-12-28 PROCEDURE — 36416 COLLJ CAPILLARY BLOOD SPEC: CPT | Performed by: FAMILY MEDICINE

## 2022-12-28 PROCEDURE — 17110 DESTRUCTION B9 LES UP TO 14: CPT | Performed by: FAMILY MEDICINE

## 2022-12-28 ASSESSMENT — ENCOUNTER SYMPTOMS
ABDOMINAL PAIN: 0
DYSURIA: 0
NERVOUS/ANXIOUS: 0
COUGH: 0
CONSTIPATION: 0
HEADACHES: 0
HEARTBURN: 0
DIARRHEA: 0
BREAST MASS: 0
DIZZINESS: 0
SORE THROAT: 0
EYE PAIN: 0
WEAKNESS: 0
FREQUENCY: 0
MYALGIAS: 0
SHORTNESS OF BREATH: 0
FEVER: 0
HEMATOCHEZIA: 0
PARESTHESIAS: 0
ARTHRALGIAS: 0
HEMATURIA: 0
CHILLS: 0
NAUSEA: 0
JOINT SWELLING: 0
PALPITATIONS: 0

## 2022-12-28 ASSESSMENT — ACTIVITIES OF DAILY LIVING (ADL)
CURRENT_FUNCTION: MEDICATION ADMINISTRATION REQUIRES ASSISTANCE
CURRENT_FUNCTION: TRANSPORTATION REQUIRES ASSISTANCE
CURRENT_FUNCTION: MONEY MANAGEMENT REQUIRES ASSISTANCE

## 2022-12-28 NOTE — PROGRESS NOTES
"SUBJECTIVE:   Guera is a 87 year old who presents for Preventive Visit.    Chief Complaints and History of Present Illnesses   Patient presents with     Wellness Visit        Patient has been advised of split billing requirements and indicates understanding: Yes  Are you in the first 12 months of your Medicare coverage?  No    Healthy Habits:     In general, how would you rate your overall health?  Good    Frequency of exercise:  None    Do you usually eat at least 4 servings of fruit and vegetables a day, include whole grains    & fiber and avoid regularly eating high fat or \"junk\" foods?  Yes    Taking medications regularly:  Yes    Medication side effects:  Not applicable and None    Ability to successfully perform activities of daily living:  Transportation requires assistance, medication administration requires assistance and money management requires assistance    Home Safety:  No safety concerns identified    Hearing Impairment:  No hearing concerns    In the past 6 months, have you been bothered by leaking of urine?  No    In general, how would you rate your overall mental or emotional health?  Good      PHQ-2 Total Score: 0    Additional concerns today:  No    EXERCISE: Trying to increase physical activity within the house.        ADLS: Needs are currently met with assistance.      Have you ever done Advance Care Planning? (For example, a Health Directive, POLST, or a discussion with a medical provider or your loved ones about your wishes): Yes, patient states has an Advance Care Planning document and will bring a copy to the clinic.    Fall risk  Fallen 2 or more times in the past year?: No  Any fall with injury in the past year?: No    Cognitive Screening Not appropriate due to known dementia        Reviewed and updated as needed this visit by clinical staff   Tobacco  Allergies  Meds  Problems  Med Hx  Surg Hx  Fam Hx          Reviewed and updated as needed this visit by Provider   Tobacco  " Allergies  Meds  Problems  Med Hx  Surg Hx  Fam Hx         Social History     Tobacco Use     Smoking status: Former     Smokeless tobacco: Never     Tobacco comments:     quit >20 yrs ago   Substance Use Topics     Alcohol use: Not Currently     If you drink alcohol do you typically have >3 drinks per day or >7 drinks per week? No    Alcohol Use 12/28/2022   Prescreen: >3 drinks/day or >7 drinks/week? -   Prescreen: >3 drinks/day or >7 drinks/week? No       Current providers sharing in care for this patient include:   Patient Care Team:  Dora Castillo MD as PCP - General (Family Medicine)  System, Provider Not In as Cardiologist (Clinic)  [facility] as Other (see comments)  Sravani Gonzalez,  as Assigned PCP    The following health maintenance items are reviewed in Epic and correct as of today:  Health Maintenance   Topic Date Due     MEDICARE ANNUAL WELLNESS VISIT  10/28/2022     ANNUAL REVIEW OF HM ORDERS  12/28/2023     FALL RISK ASSESSMENT  12/28/2023     DTAP/TDAP/TD IMMUNIZATION (3 - Td or Tdap) 04/07/2027     ADVANCE CARE PLANNING  12/28/2027     PHQ-2 (once per calendar year)  Completed     INFLUENZA VACCINE  Completed     Pneumococcal Vaccine: 65+ Years  Completed     ZOSTER IMMUNIZATION  Completed     COVID-19 Vaccine  Completed     IPV IMMUNIZATION  Aged Out     MENINGITIS IMMUNIZATION  Aged Out     MAMMO SCREENING  Discontinued     Mammogram Screening - Patient over age 75, has elected to discontinue screenings.  Pertinent mammograms are reviewed under the imaging tab.    Review of Systems   Constitutional: Negative for chills and fever.   HENT: Negative for congestion, ear pain, hearing loss and sore throat.    Eyes: Negative for pain and visual disturbance.   Respiratory: Negative for cough and shortness of breath.    Cardiovascular: Negative for chest pain, palpitations and peripheral edema.   Gastrointestinal: Negative for abdominal pain, constipation, diarrhea,  "heartburn, hematochezia and nausea.   Breasts:  Negative for tenderness, breast mass and discharge.   Genitourinary: Negative for dysuria, frequency, genital sores, hematuria, pelvic pain, urgency, vaginal bleeding and vaginal discharge.   Musculoskeletal: Negative for arthralgias, joint swelling and myalgias.   Skin: Negative for rash.   Neurological: Negative for dizziness, weakness, headaches and paresthesias.   Psychiatric/Behavioral: Negative for mood changes. The patient is not nervous/anxious.        OBJECTIVE:   /57 (BP Location: Left arm, Patient Position: Sitting, Cuff Size: Adult Regular)   Pulse 66   Temp 98.2  F (36.8  C) (Oral)   Resp 20   Ht 1.638 m (5' 4.5\")   Wt 78.5 kg (173 lb 2 oz)   LMP  (LMP Unknown)   SpO2 99%   BMI 29.26 kg/m   Estimated body mass index is 29.26 kg/m  as calculated from the following:    Height as of this encounter: 1.638 m (5' 4.5\").    Weight as of this encounter: 78.5 kg (173 lb 2 oz).  Physical Exam  GENERAL: healthy, alert and no distress  EYES: Eyes grossly normal to inspection, PERRL and conjunctivae and sclerae normal  HENT: ear canals and TM's normal, nose and mouth without ulcers or lesions  NECK: no adenopathy, no asymmetry, masses, or scars and thyroid normal to palpation  RESP: lungs clear to auscultation - no rales, rhonchi or wheezes  CV: regular rate and rhythm, normal S1 S2, no S3 or S4, no murmur, click or rub, no peripheral edema and peripheral pulses strong  ABDOMEN: soft, nontender, no hepatosplenomegaly, no masses and bowel sounds normal  MS: no gross musculoskeletal defects noted, no edema  SKIN: Thickened, slightly darker pigmented papule center of nose with rough texture.  NEURO: Normal strength and tone, mentation intact and speech normal  PSYCH: mentation appears normal, affect normal/bright  LYMPH: no cervical, supraclavicular, axillary, or inguinal adenopathy      ASSESSMENT / PLAN:     1. Encounter for Medicare annual wellness " "exam  - REVIEW OF HEALTH MAINTENANCE PROTOCOL ORDERS  - PRIMARY CARE FOLLOW-UP SCHEDULING; Future    Reviewed health history and health maintenance recommendations.  Medical problems stable, labs up-to-date, not due for any refills today.  We will plan for med check in 6 months and plan to bridge her prescriptions until her appointment.  Offered mammogram, declines today as she would like to discontinue breast cancer screening.  Declines any changes in breast issues.  Offered clinical breast exam but again declined today.  Encouraged increasing physical activity to help maintain general strength.  Current ADLs are appropriately assisted, all of her needs are currently being met.    2. Seborrheic keratosis  - DESTRUC BENIGN/PREMAL,1ST LESION [52405]    Lesion central nose consistent with a seborrheic keratosis.  Less suspect squamous cell carcinoma or actinic keratosis. Attempted localized cryotherapy with Q-tip today. If doesn't respond, recommend evaluation with dermatology at next skin check.      Patient has been advised of split billing requirements and indicates understanding: Yes      COUNSELING:  Reviewed preventive health counseling, as reflected in patient instructions      BMI:   Estimated body mass index is 29.26 kg/m  as calculated from the following:    Height as of this encounter: 1.638 m (5' 4.5\").    Weight as of this encounter: 78.5 kg (173 lb 2 oz).         She reports that she has quit smoking. She has never used smokeless tobacco.      Appropriate preventive services were discussed with this patient, including applicable screening as appropriate for cardiovascular disease, diabetes, osteopenia/osteoporosis, and glaucoma.  As appropriate for age/gender, discussed screening for colorectal cancer, prostate cancer, breast cancer, and cervical cancer. Checklist reviewing preventive services available has been given to the patient.    Reviewed patients plan of care and provided an AVS. The Basic Care " Plan (routine screening as documented in Health Maintenance) for Guera meets the Care Plan requirement. This Care Plan has been established and reviewed with the Patient and daughter.      Sravani Gonzalez, [facility]    Identified Health Risks:    She is at risk for lack of exercise and has been provided with information to increase physical activity for the benefit of her well-being.  The patient reports that she has difficulty with activities of daily living.

## 2022-12-28 NOTE — PATIENT INSTRUCTIONS
Patient Education   Personalized Prevention Plan  You are due for the preventive services outlined below.  Your care team is available to assist you in scheduling these services.  If you have already completed any of these items, please share that information with your care team to update in your medical record.  Health Maintenance Due   Topic Date Due     Annual Wellness Visit  10/28/2022       Exercise for a Healthier Heart  You may wonder how you can improve the health of your heart. If you re thinking about exercise, you re on the right track. You don t need to become an athlete. But you do need a certain amount of brisk exercise to help strengthen your heart. If you have been diagnosed with a heart condition, your healthcare provider may advise exercise to help stabilize your condition. To help make exercise a habit, choose safe, fun activities.      Exercise with a friend. When activity is fun, you're more likely to stick with it.   Before you start  Check with your healthcare provider before starting an exercise program. This is especially important if you have not been active for a while. It's also important if you have a long-term (chronic) health problem such as heart disease, diabetes, or obesity. Or if you are at high risk for having these problems.   Why exercise?  Exercising regularly offers many healthy rewards. It can help you do all of the following:     Improve your blood cholesterol level to help prevent further heart trouble    Lower your blood pressure to help prevent a stroke or heart attack    Control diabetes, or reduce your risk of getting this disease    Improve your heart and lung function    Reach and stay at a healthy weight    Make your muscles stronger so you can stay active    Prevent falls and fractures by slowing the loss of bone mass (osteoporosis)    Manage stress better    Reduce your blood pressure    Improve your sense of self and your body image  Exercise tips      Ease into  your routine. Set small goals. Then build on them. If you are not sure what your activity level should be, talk with your healthcare provider first before starting an exercise routine.    Exercise on most days. Aim for a total of 150 minutes (2 hours and 30 minutes) or more of moderate-intensity aerobic activity each week. Or 75 minutes (1 hour and 15 minutes) or more of vigorous-intensity aerobic activity each week. Or try for a combination of both. Moderate activity means that you breathe heavier and your heart rate increases but you can still talk. Think about doing 40 minutes of moderate exercise, 3 to 4 times a week. For best results, activity should last for about 40 minutes to lower blood pressure and cholesterol. It's OK to work up to the 40-minute period over time. Examples of moderate-intensity activity are walking 1 mile in 15 minutes. Or doing 30 to 45 minutes of yard work.    Step up your daily activity level.  Along with your exercise program, try being more active the whole day. Walk instead of drive. Or park further away so that you take more steps each day. Do more household tasks or yard work. You may not be able to meet the advised mount of physical activity. But doing some moderate- or vigorous-intensity aerobic activity can help reduce your risk for heart disease. Your healthcare provider can help you figure out what is best for you.    Choose 1 or more activities you enjoy.  Walking is one of the easiest things you can do. You can also try swimming, riding a bike, dancing, or taking an exercise class.    When to call your healthcare provider  Call your healthcare provider if you have any of these:     Chest pain or feel dizzy or lightheaded    Burning, tightness, pressure, or heaviness in your chest, neck, shoulders, back, or arms    Abnormal shortness of breath    More joint or muscle pain    A very fast or irregular heartbeat (palpitations)  Les last reviewed this educational content on  7/1/2019 2000-2021 [employer]. All rights reserved. This information is not intended as a substitute for professional medical care. Always follow your healthcare professional's instructions.        Activities of Daily Living    Your Health Risk Assessment indicates you have difficulties with activities of daily living such as housework, bathing, preparing meals, taking medication, etc. Please make a follow up appointment for us to address this issue in more detail.

## 2022-12-28 NOTE — PROGRESS NOTES
ANTICOAGULATION MANAGEMENT     Guera Peters 87 year old female is on warfarin with therapeutic INR result. (Goal INR 2.0-3.0)    Recent labs: (last 7 days)     12/28/22  1106   INR 2.7*       ASSESSMENT       Source(s): Chart Review, Patient/Caregiver Call and Template       Warfarin doses taken: Warfarin taken as instructed    Diet: No new diet changes identified    New illness, injury, or hospitalization: No   Medicare annual check with Dr. Gonzalez (Former pt of Dr. Castillo).    Medication/supplement changes: None noted    Signs or symptoms of bleeding or clotting: No    Previous INR: Therapeutic last visit at 2.3; previously outside of goal range at 1.5    Additional findings: None       PLAN     Recommended plan for no diet, medication or health factor changes affecting INR     Dosing Instructions: Continue your current warfarin dose with next INR in 3 weeks       Summary  As of 12/28/2022    Full warfarin instructions:  2.5 mg every Sun, Tue, Fri; 2 mg all other days   Next INR check:  1/18/2023             Telephone call with  daughterSienna ([phone]) who verbalizes understanding and agrees to plan    Lab visit scheduled - INR on 1/18/23 @ \[facility]".    Education provided:     Taking warfarin: Importance of taking warfarin as instructed    Goal range and lab monitoring: goal range and significance of current result    Plan made per [facility] anticoagulation protocol    Columba Aguilar RN  Anticoagulation Clinic  12/28/2022    _______________________________________________________________________     Anticoagulation Episode Summary     Current INR goal:  2.0-3.0   TTR:  65.8 % (1 y)   Target end date:  Indefinite   Send INR reminders to:  [facility]    Indications    Long term (current) use of anticoagulants [Z79.01]  Chronic atrial fibrillation (H) [I48.20]  Carotid stenosis  bilateral [I65.23]  History of TIA (transient ischemic attack) and stroke [Z86.73]           Comments:            Anticoagulation Care Providers     Provider Role Specialty Phone number    Dora Castillo MD Referring Family Medicine [phone]

## 2023-01-01 NOTE — TELEPHONE ENCOUNTER
FYI - Status Update  Who is Calling: Sienna  Update: Returned the ACN call, ACN not available at the time of scheduling, please call Sienna back, thank you.  Okay to leave a detailed message?:  Yes   2023 13:30

## 2023-01-19 ENCOUNTER — LAB (OUTPATIENT)
Dept: LAB | Facility: CLINIC | Age: 88
End: 2023-01-19
Payer: COMMERCIAL

## 2023-01-19 ENCOUNTER — ANTICOAGULATION THERAPY VISIT (OUTPATIENT)
Dept: ANTICOAGULATION | Facility: CLINIC | Age: 88
End: 2023-01-19

## 2023-01-19 DIAGNOSIS — Z86.73 HISTORY OF TIA (TRANSIENT ISCHEMIC ATTACK) AND STROKE: ICD-10-CM

## 2023-01-19 DIAGNOSIS — Z79.01 LONG TERM (CURRENT) USE OF ANTICOAGULANTS: Primary | ICD-10-CM

## 2023-01-19 DIAGNOSIS — I65.23 CAROTID STENOSIS, BILATERAL: ICD-10-CM

## 2023-01-19 DIAGNOSIS — I48.20 CHRONIC ATRIAL FIBRILLATION (H): ICD-10-CM

## 2023-01-19 LAB — INR BLD: 2.8 (ref 0.9–1.1)

## 2023-01-19 PROCEDURE — 85610 PROTHROMBIN TIME: CPT

## 2023-01-19 PROCEDURE — 36416 COLLJ CAPILLARY BLOOD SPEC: CPT

## 2023-01-19 NOTE — PROGRESS NOTES
ANTICOAGULATION MANAGEMENT     Guera Peters 87 year old female is on warfarin with therapeutic INR result. (Goal INR 2.0-3.0)    Recent labs: (last 7 days)     01/19/23  0807   INR 2.8*       ASSESSMENT       Source(s): Chart Review, Patient/Caregiver Call and Template       Warfarin doses taken: Template incorrect; verbally confirmed home dose with daughterSienna and she verified taking correct dose.  (Sienna has covid and her brother took Mom in for INR appt).     Diet: No new diet changes identified    New illness, injury, or hospitalization: No    Medication/supplement changes: None noted    Signs or symptoms of bleeding or clotting: No    Previous INR: Therapeutic last 2 visits    Additional findings: None       PLAN     Recommended plan for no diet, medication or health factor changes affecting INR     Dosing Instructions: Continue your current warfarin dose with next INR in 4 weeks       Summary  As of 1/19/2023    Full warfarin instructions:  2.5 mg every Sun, Tue, Fri; 2 mg all other days   Next INR check:  2/16/2023             Telephone call with  daughterSienna ([phone]) who verbalizes understanding and agrees to plan    Lab visit scheduled - INR on 2/15/23 @ [facility]    Education provided:     Taking warfarin: Importance of taking warfarin as instructed    Goal range and lab monitoring: goal range and significance of current result    Plan made per [facility] anticoagulation protocol    Columba Aguilar RN  Anticoagulation Clinic  1/19/2023    _______________________________________________________________________     Anticoagulation Episode Summary     Current INR goal:  2.0-3.0   TTR:  65.8 % (1 y)   Target end date:  Indefinite   Send INR reminders to:  [facility]    Indications    Long term (current) use of anticoagulants [Z79.01]  Chronic atrial fibrillation (H) [I48.20]  Carotid stenosis  bilateral [I65.23]  History of TIA (transient ischemic attack) and stroke [Z86.73]            Comments:           Anticoagulation Care Providers     Provider Role Specialty Phone number    Dora Castillo MD Referring Family Medicine [phone]

## 2023-01-23 DIAGNOSIS — M81.0 OSTEOPOROSIS WITHOUT CURRENT PATHOLOGICAL FRACTURE, UNSPECIFIED OSTEOPOROSIS TYPE: ICD-10-CM

## 2023-01-24 RX ORDER — ALENDRONATE SODIUM 70 MG/1
TABLET ORAL
Qty: 12 TABLET | Refills: 3 | OUTPATIENT
Start: 2023-01-24

## 2023-02-13 ENCOUNTER — NURSE TRIAGE (OUTPATIENT)
Dept: NURSING | Facility: CLINIC | Age: 88
End: 2023-02-13
Payer: COMMERCIAL

## 2023-02-13 NOTE — TELEPHONE ENCOUNTER
"Daughter Sienna calling ([facility]) pt not present - Mom has \"Red bruise eye\"  RN calling pt at [phone]  TRIAGE CALL - Is this a 2nd Level Triage? No  Nurse Triage SBAR -   Situation:  bruised eye ( has happened before)  Background:    On warfarin  Assessment:  Bruised \"dark\" eye on the shape of \"like a V\"  It is only the right eye,bruise is  inch and half big.  Does not know if she injured it  Notices this AM  Swollen this AM but not now  No Pain   Patients denies dizziness,vomiting, or fever  Patient is able to speak in full long sentences without getting short of breath.  Measures taken: None  Pt s PCP/Clinic: [facility] -  Note will be sent to PCP team.  Recommended Disposition per protocol:  Home care -  Called daughter and let her know I talked to mom, advised to send a picture [employer] to her PCP or any additional advised - She verbalized understanding and agrees with plan    Nadiya Ramírez RN Nurse Triage Advisor 5:00 PM 2/13/2023    Reason for Disposition    [1] Red eye is part of a cold AND [2] no eye pain or blurred vision    Additional Information    Negative: SEVERE eye pain    Negative: [1] Eyelids are very swollen (shut or almost) AND [2] fever    Negative: [1] Eyelid (outer) is very red (or tender to touch) AND [2] fever    Negative: [1] Foreign body sensation (\"feels like something is in there\") AND [2] irrigation didn't help    Negative: Vomiting    Negative: [1] Recent eye surgery AND [2] increasing eye pain    Negative: Patient sounds very sick or weak to the triager    Negative: MODERATE eye pain or discomfort (e.g., interferes with normal activities or awakens from sleep; more than mild)    Negative: New or worsening blurred vision    Negative: Looking at light causes MODERATE to SEVERE eye pain (i.e., photophobia)    Negative: Cloudy spot or sore seen on the cornea (clear part of the eye)    Negative: Eyelid is very swollen (shut or almost)    Negative: Eyelid is " red and painful (or tender to touch)    Negative: Eye pain present > 24 hours    Negative: [1] Bleeding on white of the eye AND [2] taking Coumadin (warfarin) or other strong blood thinner, or known bleeding disorder (e.g., thrombocytopenia)    Protocols used: EYE - RED WITHOUT PUS-A-AH

## 2023-02-15 ENCOUNTER — LAB (OUTPATIENT)
Dept: LAB | Facility: CLINIC | Age: 88
End: 2023-02-15
Payer: COMMERCIAL

## 2023-02-15 ENCOUNTER — ANTICOAGULATION THERAPY VISIT (OUTPATIENT)
Dept: ANTICOAGULATION | Facility: CLINIC | Age: 88
End: 2023-02-15

## 2023-02-15 DIAGNOSIS — I48.20 CHRONIC ATRIAL FIBRILLATION (H): ICD-10-CM

## 2023-02-15 DIAGNOSIS — Z86.73 HISTORY OF TIA (TRANSIENT ISCHEMIC ATTACK) AND STROKE: ICD-10-CM

## 2023-02-15 DIAGNOSIS — I65.23 CAROTID STENOSIS, BILATERAL: ICD-10-CM

## 2023-02-15 DIAGNOSIS — Z79.01 LONG TERM (CURRENT) USE OF ANTICOAGULANTS: Primary | ICD-10-CM

## 2023-02-15 LAB — INR BLD: 2.5 (ref 0.9–1.1)

## 2023-02-15 PROCEDURE — 36415 COLL VENOUS BLD VENIPUNCTURE: CPT

## 2023-02-15 PROCEDURE — 85610 PROTHROMBIN TIME: CPT

## 2023-02-15 NOTE — PROGRESS NOTES
ANTICOAGULATION MANAGEMENT     Guera Peters 87 year old female is on warfarin with therapeutic INR result. (Goal INR 2.0-3.0)    Recent labs: (last 7 days)     02/15/23  1557   INR 2.5*       ASSESSMENT       Source(s): Chart Review and Patient/Caregiver Call       Warfarin doses taken: Warfarin taken as instructed    Diet: No new diet changes identified    New illness, injury, or hospitalization: No    Medication/supplement changes: None noted    Signs or symptoms of bleeding or clotting: No    Previous INR: Therapeutic last 2(+) visits    Additional findings: None       PLAN     Recommended plan for no diet, medication or health factor changes affecting INR     Dosing Instructions: Continue your current warfarin dose with next INR in 4 weeks       Summary  As of 2/15/2023    Full warfarin instructions:  2.5 mg every Sun, Tue, Fri; 2 mg all other days   Next INR check:  3/15/2023             Telephone call with Sienna who verbalizes understanding and agrees to plan    Lab visit scheduled    Education provided:     Contact [phone] with any changes, questions or concerns.     Plan made per [facility] anticoagulation protocol and per [facility] protocol    Tommie Mccoy RN  Anticoagulation Clinic  2/15/2023    _______________________________________________________________________     Anticoagulation Episode Summary     Current INR goal:  2.0-3.0   TTR:  70.6 % (1 y)   Target end date:  Indefinite   Send INR reminders to:  [facility]    Indications    Long term (current) use of anticoagulants [Z79.01]  Chronic atrial fibrillation (H) [I48.20]  Carotid stenosis  bilateral [I65.23]  History of TIA (transient ischemic attack) and stroke [Z86.73]           Comments:           Anticoagulation Care Providers     Provider Role Specialty Phone number    Dora Castillo MD Referring Family Medicine [phone]

## 2023-02-16 ENCOUNTER — DOCUMENTATION ONLY (OUTPATIENT)
Dept: FAMILY MEDICINE | Facility: CLINIC | Age: 88
End: 2023-02-16

## 2023-02-16 DIAGNOSIS — Z86.73 HISTORY OF EMBOLIC STROKE: ICD-10-CM

## 2023-02-16 DIAGNOSIS — I48.20 CHRONIC ATRIAL FIBRILLATION (H): Primary | ICD-10-CM

## 2023-02-16 DIAGNOSIS — Z79.01 LONG TERM (CURRENT) USE OF ANTICOAGULANTS: ICD-10-CM

## 2023-02-16 DIAGNOSIS — Z86.73 HISTORY OF TIA (TRANSIENT ISCHEMIC ATTACK) AND STROKE: ICD-10-CM

## 2023-02-16 DIAGNOSIS — I65.23 CAROTID STENOSIS, BILATERAL: ICD-10-CM

## 2023-02-16 NOTE — PROGRESS NOTES
1. Chronic atrial fibrillation (H)  - INR point of care; Standing  - INR POCT, Enter/Edit Result; Standing  - Anticoagulation Clinic Referral    2. Long term (current) use of anticoagulants  - INR point of care; Standing  - INR POCT, Enter/Edit Result; Standing  - Anticoagulation Clinic Referral    3. Carotid stenosis, bilateral  - INR point of care; Standing  - INR POCT, Enter/Edit Result; Standing  - Anticoagulation Clinic Referral    4. History of TIA (transient ischemic attack) and stroke  - INR point of care; Standing  - INR POCT, Enter/Edit Result; Standing  - Anticoagulation Clinic Referral    5. History of embolic stroke  - INR point of care; Standing  - INR POCT, Enter/Edit Result; Standing  - Anticoagulation Clinic Referral     Orders signed.    Sravani Gonzalez, DO

## 2023-02-16 NOTE — PROGRESS NOTES
ANTICOAGULATION CLINIC REFERRAL RENEWAL REQUEST       An annual renewal order is required for all patients referred to [facility].?  Please review and sign the pended referral order for Guera Peters.       ANTICOAGULATION SUMMARY      Warfarin indication(s)    Chronic Atrial Fibrillation   Hx of Stroke / TIA / bilateral carotid stenosis        Mechanical heart valve present?  NO       Current goal range   INR: 2.0-3.0     Goal appropriate for indication? Goal INR 2-3, standard for indication(s) above     Time in Therapeutic Range (TTR)  (Goal > 60%) 70.6 %       Office visit with referring provider's group within last year Yes on 12/28/2022       Columba Aguilar RN  [facility]

## 2023-02-17 DIAGNOSIS — I10 ESSENTIAL HYPERTENSION WITH GOAL BLOOD PRESSURE LESS THAN 140/90: ICD-10-CM

## 2023-02-18 RX ORDER — LISINOPRIL 20 MG/1
TABLET ORAL
Qty: 90 TABLET | Refills: 2 | Status: SHIPPED | OUTPATIENT
Start: 2023-02-18 | End: 2023-06-20

## 2023-02-19 NOTE — TELEPHONE ENCOUNTER
"Last Written Prescription Date:  5/13/22  Last Fill Quantity: 90,  # refills: 3   Last office visit provider:  12/28/22     Requested Prescriptions   Pending Prescriptions Disp Refills     lisinopril (ZESTRIL) 20 MG tablet [Pharmacy Med Name: LISINOPRIL 20MG TABLETS] 90 tablet 3     Sig: TAKE 1 TABLET(20 MG) BY MOUTH DAILY       ACE Inhibitors (Including Combos) Protocol Passed - 2/17/2023  2:08 PM        Passed - Blood pressure under 140/90 in past 12 months     BP Readings from Last 3 Encounters:   12/28/22 132/57   05/12/22 (!) 148/63   10/28/21 (!) 141/68                 Passed - Recent (12 mo) or future (30 days) visit within the authorizing provider's specialty     Patient has had an office visit with the authorizing provider or a provider within the authorizing providers department within the previous 12 mos or has a future within next 30 days. See \"Patient Info\" tab in inbasket, or \"Choose Columns\" in Meds & Orders section of the refill encounter.              Passed - Medication is active on med list        Passed - Patient is age 18 or older        Passed - No active pregnancy on record        Passed - Normal serum creatinine on file in past 12 months     Recent Labs   Lab Test 05/12/22  1328   CR 0.75       Ok to refill medication if creatinine is low          Passed - Normal serum potassium on file in past 12 months     Recent Labs   Lab Test 05/12/22  1328   POTASSIUM 4.3             Passed - No positive pregnancy test within past 12 months             Sania Salazar, RN 02/18/23 7:20 PM  "

## 2023-03-15 ENCOUNTER — ANTICOAGULATION THERAPY VISIT (OUTPATIENT)
Dept: ANTICOAGULATION | Facility: CLINIC | Age: 88
End: 2023-03-15

## 2023-03-15 ENCOUNTER — LAB (OUTPATIENT)
Dept: LAB | Facility: CLINIC | Age: 88
End: 2023-03-15
Payer: COMMERCIAL

## 2023-03-15 DIAGNOSIS — Z79.01 LONG TERM (CURRENT) USE OF ANTICOAGULANTS: ICD-10-CM

## 2023-03-15 DIAGNOSIS — I65.23 CAROTID STENOSIS, BILATERAL: ICD-10-CM

## 2023-03-15 DIAGNOSIS — I48.20 CHRONIC ATRIAL FIBRILLATION (H): ICD-10-CM

## 2023-03-15 DIAGNOSIS — Z86.73 HISTORY OF EMBOLIC STROKE: ICD-10-CM

## 2023-03-15 DIAGNOSIS — Z79.01 LONG TERM (CURRENT) USE OF ANTICOAGULANTS: Primary | ICD-10-CM

## 2023-03-15 DIAGNOSIS — Z86.73 HISTORY OF TIA (TRANSIENT ISCHEMIC ATTACK) AND STROKE: ICD-10-CM

## 2023-03-15 LAB — INR BLD: 2.4 (ref 0.9–1.1)

## 2023-03-15 PROCEDURE — 36416 COLLJ CAPILLARY BLOOD SPEC: CPT

## 2023-03-15 PROCEDURE — 85610 PROTHROMBIN TIME: CPT

## 2023-03-15 NOTE — PROGRESS NOTES
ANTICOAGULATION MANAGEMENT     Guera Peters 87 year old female is on warfarin with therapeutic INR result. (Goal INR 2.0-3.0)    Recent labs: (last 7 days)     03/15/23  1052   INR 2.4*       ASSESSMENT     Warfarin Lab Questionnaire    Dose in Tablet or Mg 3/15/2023   TAB or MG? - warfarin 1mg tablets  milligram (mg)     Pt Rptd Dose AMAN MONDAY TUESDAY WEDNESDAY THURSDAY FRIDAY SATURDAY   3/15/2023 2.5 2 2.5 2 2 2.5 2     Warfarin Lab Questionnaire 3/15/2023   Missed doses? No   Medication changes? No   Abnormal bleeding? No   Shortness of breath? No   Injuries or illness since last INR? No   Changes in diet or alcohol? No   Upcoming surgery, procedure? No   Best number to call with results? [phone] (daughterSienna)       Additional findings: None       PLAN     Recommended plan for no diet, medication or health factor changes affecting INR     Dosing Instructions: Continue your current warfarin dose with next INR in 4 weeks       Summary  As of 3/15/2023    Full warfarin instructions:  2.5 mg every Sun, Tue, Fri; 2 mg all other days   Next INR check:  4/12/2023             Telephone call with  daughterSienna ([phone]) who verbalizes understanding and agrees to plan    Check at provider office visit - INR on 4/12/23 at OV with Dr. Gonzalez.    Education provided:     Taking warfarin: Importance of taking warfarin as instructed    Goal range and lab monitoring: goal range and significance of current result    Plan made per [facility] anticoagulation protocol    Columba Aguilar, RN  Anticoagulation Clinic  3/15/2023    _______________________________________________________________________     Anticoagulation Episode Summary     Current INR goal:  2.0-3.0   TTR:  70.6 % (1 y)   Target end date:  Indefinite   Send INR reminders to:  [facility]    Indications    Long term (current) use of anticoagulants [Z79.01]  Chronic atrial fibrillation (H) [I48.20]  Carotid stenosis  bilateral  [I65.23]  History of TIA (transient ischemic attack) and stroke [Z86.73]  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

## 2023-03-17 ENCOUNTER — TELEPHONE (OUTPATIENT)
Dept: FAMILY MEDICINE | Facility: CLINIC | Age: 88
End: 2023-03-17
Payer: COMMERCIAL

## 2023-03-17 NOTE — TELEPHONE ENCOUNTER
"Please clarify what is being requested.  For \"general health\" she is not due for a Medicare wellness until December.  She has 2 appointments scheduled with me, 1 in April and 1 in June.  Is there a specific symptom they are looking to be evaluated?    Sravani Gonzalez, DO   "

## 2023-03-17 NOTE — TELEPHONE ENCOUNTER
Reason for Call:  Appointment Request    Patient requesting this type of appt:  Routine health    Requested provider: Sravani Gonzalez    Reason patient unable to be scheduled: Not within requested timeframe    When does patient want to be seen/preferred time: Anytime before appointment date or confirmation if May16th would be okay    Comments: Requesting call back to see if there is another appointment sooner than May 16th or if the care team could get an appointment.     Could we send this information to you in [employer] or would you prefer to receive a phone call?:   Patient would prefer a phone call   Okay to leave a detailed message?: Yes at Other phone number:  [phone]    Call taken on 3/17/2023 at 8:28 AM by Savage Medina

## 2023-03-20 NOTE — TELEPHONE ENCOUNTER
Spoke with daughter -  Has an appt scheduled on 4/12/23 to treat spot on her nose. Daughter is going to be out of town that week.  Daughter is wondering if this is something that is ok to wait or should she make arrangements to have one of her siblings bring her in?      Your next available is in May - basically just needing to know if ok to wait until then.

## 2023-03-21 NOTE — TELEPHONE ENCOUNTER
Okay to wait, but ideally before May appointment. Can you offer to swap for a different reserved spot to work with daughter's schedule?   Thanks.  Sravani Gonzalez, DO

## 2023-03-27 ENCOUNTER — TELEPHONE (OUTPATIENT)
Dept: FAMILY MEDICINE | Facility: CLINIC | Age: 88
End: 2023-03-27
Payer: COMMERCIAL

## 2023-03-27 DIAGNOSIS — Z86.73 HISTORY OF TIA (TRANSIENT ISCHEMIC ATTACK) AND STROKE: ICD-10-CM

## 2023-03-27 DIAGNOSIS — Z79.01 LONG TERM (CURRENT) USE OF ANTICOAGULANTS: Primary | ICD-10-CM

## 2023-03-27 DIAGNOSIS — Z86.73 HISTORY OF EMBOLIC STROKE: ICD-10-CM

## 2023-03-27 DIAGNOSIS — I65.23 CAROTID STENOSIS, BILATERAL: ICD-10-CM

## 2023-03-27 DIAGNOSIS — I48.20 CHRONIC ATRIAL FIBRILLATION (H): ICD-10-CM

## 2023-03-27 NOTE — TELEPHONE ENCOUNTER
General Call    Contacts       Type Contact Phone/Fax    03/27/2023 10:25 AM CDT Phone (Incoming) Sienna Yanez (Emergency Contact) [phone]     Venessa        Reason for Call:  Venessa    What are your questions or concerns:  Patient daughter is calling for the patient regarding finding a half pill of Warfarin on floor last night. Needs advise regarding the dosing for today 3/27    Date of last appointment with provider: n/a    Could we send this information to you in [employer] [place] or would you prefer to receive a phone call?:   Patient would prefer a phone call   Okay to leave a detailed message?: Yes at Other phone number:  Daughter Sienna [phone]

## 2023-03-27 NOTE — TELEPHONE ENCOUNTER
Called and spoke with Sienna,     - during her visit this morning she found a 1/2 tablet of 1mg on the kitchen counter.  On Sundays her Mom take 2.5mg of her 1mg.       - Sienna reported, her brother set up Mom's medications on Sunday, 3/26.     - advised to take 2.5mg warfarin dose today, instead of 2mg.     - she will let me know if her brother states something differently.

## 2023-04-10 ENCOUNTER — NURSE TRIAGE (OUTPATIENT)
Dept: NURSING | Facility: CLINIC | Age: 88
End: 2023-04-10
Payer: COMMERCIAL

## 2023-04-10 ENCOUNTER — TELEPHONE (OUTPATIENT)
Dept: FAMILY MEDICINE | Facility: CLINIC | Age: 88
End: 2023-04-10
Payer: COMMERCIAL

## 2023-04-10 DIAGNOSIS — I48.20 CHRONIC ATRIAL FIBRILLATION (H): ICD-10-CM

## 2023-04-10 DIAGNOSIS — Z79.01 LONG TERM (CURRENT) USE OF ANTICOAGULANTS: Primary | ICD-10-CM

## 2023-04-10 DIAGNOSIS — Z86.73 HISTORY OF TIA (TRANSIENT ISCHEMIC ATTACK) AND STROKE: ICD-10-CM

## 2023-04-10 DIAGNOSIS — I65.23 CAROTID STENOSIS, BILATERAL: ICD-10-CM

## 2023-04-10 DIAGNOSIS — Z86.73 HISTORY OF EMBOLIC STROKE: ICD-10-CM

## 2023-04-10 NOTE — TELEPHONE ENCOUNTER
Nurse Triage SBAR    Situation: INR draw    Background: Daughter, Sienna, calling. Consent: on file in chart. Pt was supposed to have an INR Wednesday with he appt and they had to get ithe appt rescheduled rescheduled for next Tuesday.     Assessment: Pt missed a dosage of her warfarin last week. Sienna stated that she will schedule the INR lab appt for Wednesday and if anything changes she will call back and schedule/change the appt.     Recommendation: Will add to existing encounter.     Bhavani Angel RN Nursing Advisor 4/10/2023 4:22 PM

## 2023-04-10 NOTE — TELEPHONE ENCOUNTER
Nurse Triage SBAR    Situation: INR draw    Background: Daughter, Sienna, calling. Consent: on file in chart. Pt was supposed to have an INR Wednesday with he appt and they had to get ithe appt rescheduled rescheduled for next Tuesday.     Assessment: Pt missed a dosage of her warfarin last week. Sienna stated that she will schedule the INR lab appt for Wednesday and if anything changes she will call back and schedule/change the appt.     Recommendation: Will add to existing encounter.     Bhavani Angel RN Nursing Advisor 4/10/2023 4:22 PM     Reason for Disposition    [1] Caller requesting NON-URGENT health information AND [2] PCP's office is the best resource    Additional Information    Negative: [1] Caller is not with the adult (patient) AND [2] reporting urgent symptoms    Negative: Lab result questions    Negative: Medication questions    Negative: Caller can't be reached by phone    Negative: Caller has already spoken to PCP or another triager    Negative: Requesting regular office appointment    Negative: RN needs further essential information from caller in order to complete triage    Protocols used: INFORMATION ONLY CALL - [facility]

## 2023-04-10 NOTE — TELEPHONE ENCOUNTER
Called and left detailed message with daughterSienna.     - reported missing one warfarin dose last week.     - has f/u appt with Dr. Gonzalez on 4/18/23 to check spot on her nose.  If she wants, we can recheck INR status during her visit with Dr. Gonzalez.

## 2023-04-10 NOTE — TELEPHONE ENCOUNTER
Its ultimately up to the anticoagulation team's recommendations.   If she has been stable otherwise, I think okay to wait until appointment.  If she has a history of labile INRs, reasonable to check sooner.  Sravani Gonzalez, DO

## 2023-04-10 NOTE — TELEPHONE ENCOUNTER
Dr. Gonzalez,    Given potential for missed dosage it would probably be best to have her INR drawn this week as orginally planned. However I will leave this up to you, could also reach out to anticoagulation team.    Lam Mehta RN     [facility]

## 2023-04-10 NOTE — TELEPHONE ENCOUNTER
Daughter called again to check recommendation for INR check. Says that mother has been stable lately but she usually has bouts where reading have been different. Is unsure if she should schedule for this week or wait until appointment with Dr. Gonzalez on the 18th. Routing this message to anticoag pool for further instruction.     Please call daughter back at  [phone]

## 2023-04-10 NOTE — TELEPHONE ENCOUNTER
FYI - Status Update    Who is Calling: family member, Daughter    Update: Patient's daughter called in and states the patient was scheduled for an INR check in conjunction with her office visit on 4/12/23 but the office visit was rescheduled to 4/18/23. Patient's family is wondering if her INR can be checked on the 18th rather than the 12th or if she should come in this week. Patient's daughter also wanted to let the nurse know that 1 day last week, probably Wednesday or Thursday, the patient did not take 2 of her tablets.     Does caller want a call/response back: Yes     Could we send this information to you in [employer] or would you prefer to receive a phone call?:   Patient would prefer a phone call   Okay to leave a detailed message?: Yes at Other phone number:  [phone]

## 2023-04-11 NOTE — TELEPHONE ENCOUNTER
Called and spoke with daughterSienna     - mom missed 2mg warfarin dose on 4/5.     - Guera has OV with Dr. Gonzalez on 4/18/23 will just check INR during visit.

## 2023-04-18 ENCOUNTER — ANTICOAGULATION THERAPY VISIT (OUTPATIENT)
Dept: ANTICOAGULATION | Facility: CLINIC | Age: 88
End: 2023-04-18

## 2023-04-18 ENCOUNTER — OFFICE VISIT (OUTPATIENT)
Dept: FAMILY MEDICINE | Facility: CLINIC | Age: 88
End: 2023-04-18
Payer: COMMERCIAL

## 2023-04-18 VITALS
HEART RATE: 64 BPM | SYSTOLIC BLOOD PRESSURE: 158 MMHG | RESPIRATION RATE: 24 BRPM | OXYGEN SATURATION: 93 % | TEMPERATURE: 97.7 F | HEIGHT: 64 IN | WEIGHT: 175 LBS | DIASTOLIC BLOOD PRESSURE: 74 MMHG | BODY MASS INDEX: 29.88 KG/M2

## 2023-04-18 DIAGNOSIS — Z79.01 LONG TERM (CURRENT) USE OF ANTICOAGULANTS: ICD-10-CM

## 2023-04-18 DIAGNOSIS — Z86.73 HISTORY OF TIA (TRANSIENT ISCHEMIC ATTACK) AND STROKE: ICD-10-CM

## 2023-04-18 DIAGNOSIS — Z86.73 HISTORY OF EMBOLIC STROKE: ICD-10-CM

## 2023-04-18 DIAGNOSIS — L98.9 SKIN LESION: Primary | ICD-10-CM

## 2023-04-18 DIAGNOSIS — I65.23 CAROTID STENOSIS, BILATERAL: ICD-10-CM

## 2023-04-18 DIAGNOSIS — I48.20 CHRONIC ATRIAL FIBRILLATION (H): ICD-10-CM

## 2023-04-18 DIAGNOSIS — Z79.01 LONG TERM (CURRENT) USE OF ANTICOAGULANTS: Primary | ICD-10-CM

## 2023-04-18 LAB — INR BLD: 1.8 (ref 0.9–1.1)

## 2023-04-18 PROCEDURE — 17000 DESTRUCT PREMALG LESION: CPT | Performed by: FAMILY MEDICINE

## 2023-04-18 PROCEDURE — 99207 PR NO CHARGE LOS: CPT | Performed by: FAMILY MEDICINE

## 2023-04-18 PROCEDURE — 36416 COLLJ CAPILLARY BLOOD SPEC: CPT | Performed by: FAMILY MEDICINE

## 2023-04-18 PROCEDURE — 85610 PROTHROMBIN TIME: CPT | Performed by: FAMILY MEDICINE

## 2023-04-18 NOTE — PROGRESS NOTES
ANTICOAGULATION MANAGEMENT     Guera Peters 87 year old female is on warfarin with subtherapeutic INR result. (Goal INR 2.0-3.0)    Recent labs: (last 7 days)     04/18/23  1437   INR 1.8*       ASSESSMENT       Source(s): Chart Review and Patient/Caregiver Call       Warfarin doses taken: Warfarin taken as instructed and MAY HAVE MISSED A HALF TAB ONE DAY RECENTLY BUT NOT SURE WHICH DAY    Diet: No new diet changes identified    Medication/supplement changes: None noted    New illness, injury, or hospitalization: No    Signs or symptoms of bleeding or clotting: No    Previous INR: Therapeutic last 2(+) visits    Additional findings: None         PLAN     Recommended plan for temporary change(s) affecting INR     Dosing Instructions: Continue your current warfarin dose with next INR in 2 weeks       Summary  As of 4/18/2023    Full warfarin instructions:  2.5 mg every Sun, Tue, Fri; 2 mg all other days   Next INR check:  5/2/2023             Telephone call with  daughterSienna who verbalizes understanding and agrees to plan    Lab visit scheduled    Education provided:     Goal range and lab monitoring: goal range and significance of current result    Contact [phone] with any changes, questions or concerns.     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

## 2023-04-18 NOTE — PROGRESS NOTES
Assessment & Plan     1. Skin lesion  - Adult Dermatology Referral; Future  - DESTRUC BENIGN/PREMAL,1ST LESION [31276]    Returns for recurrence of crusty skin lesion on her nose after initial cryotherapy treatment.  Suspect actinic keratosis. We will repeat treatment today.  Obtained verbal consent to proceed with cryotherapy treatment.  If lesion is persistent after healing, recommend seeing dermatology. Referral placed.    PROCEDURE: Using liquid nitrogen gun, applied 3 freeze thaw cycles with freeze lasting approximately 20 seconds.  Patient tolerated procedure well.  Counseled for postprocedure care.    2. Chronic atrial fibrillation (H)  Due for an INR check today.  Rate controlled on carvedilol, anticoagulated with warfarin.     Sravani Gonzalez, [facility]    Jyoti Lynne is a 87 year old, presenting for the following health issues:  Skin Check (Nose)        12/28/2022    10:05 AM   Additional Questions   Roomed by Juanita VICTORIA CMA   Accompanied by Daughter     History of Present Illness       Reason for visit:  Spot on nose    She eats 2-3 servings of fruits and vegetables daily.She consumes 1 sweetened beverage(s) daily.She exercises with enough effort to increase her heart rate 9 or less minutes per day.  She exercises with enough effort to increase her heart rate 7 days per week.   She is taking medications regularly.     NOSE: lesion remains crusty after initial treatment of cryotherapy.      Due for INR check today.   INR   Date Value Ref Range Status   03/15/2023 2.4 (H) 0.9 - 1.1 Final   07/07/2021 1.90 (H) 0.90 - 1.10 Final   01/09/2018 3.70 (H) 0.86 - 1.14 Final     INR Point of Care   Date Value Ref Range Status   01/26/2018 3.1 (A) 0.86 - 1.14 Final            Review of Systems   See HPI above.       Objective    BP (!) 158/74 (BP Location: Left arm, Patient Position: Sitting, Cuff Size: Adult Regular)   Pulse 64   Temp 97.7  F (36.5  C) (Oral)   Resp 24   " Ht 1.628 m (5' 4.1\")   Wt 79.4 kg (175 lb)   LMP  (LMP Unknown)   SpO2 93%   BMI 29.95 kg/m    Body mass index is 29.95 kg/m .  Physical Exam   GENERAL: healthy, alert and no distress.  SKIN: crusty lesion on nasal bridge.              "

## 2023-05-02 ENCOUNTER — ANTICOAGULATION THERAPY VISIT (OUTPATIENT)
Dept: ANTICOAGULATION | Facility: CLINIC | Age: 88
End: 2023-05-02

## 2023-05-02 ENCOUNTER — LAB (OUTPATIENT)
Dept: LAB | Facility: CLINIC | Age: 88
End: 2023-05-02
Payer: COMMERCIAL

## 2023-05-02 DIAGNOSIS — I65.23 CAROTID STENOSIS, BILATERAL: ICD-10-CM

## 2023-05-02 DIAGNOSIS — I48.20 CHRONIC ATRIAL FIBRILLATION (H): ICD-10-CM

## 2023-05-02 DIAGNOSIS — Z79.01 LONG TERM (CURRENT) USE OF ANTICOAGULANTS: Primary | ICD-10-CM

## 2023-05-02 DIAGNOSIS — Z79.01 LONG TERM (CURRENT) USE OF ANTICOAGULANTS: ICD-10-CM

## 2023-05-02 DIAGNOSIS — Z86.73 HISTORY OF TIA (TRANSIENT ISCHEMIC ATTACK) AND STROKE: ICD-10-CM

## 2023-05-02 DIAGNOSIS — Z86.73 HISTORY OF EMBOLIC STROKE: ICD-10-CM

## 2023-05-02 LAB — INR BLD: 2.1 (ref 0.9–1.1)

## 2023-05-02 PROCEDURE — 85610 PROTHROMBIN TIME: CPT

## 2023-05-02 PROCEDURE — 36416 COLLJ CAPILLARY BLOOD SPEC: CPT

## 2023-05-02 NOTE — PROGRESS NOTES
ANTICOAGULATION MANAGEMENT     Guera Peters 87 year old female is on warfarin with therapeutic INR result. (Goal INR 2.0-3.0)    Recent labs: (last 7 days)     05/02/23  1133   INR 2.1*       ASSESSMENT      Warfarin Lab Questionnaire    Warfarin Doses Last 7 Days      5/1/2023    12:08 PM   Dose in Tablet or Mg   TAB or MG? - warfarin 1mg tablets tablet (tab)     Pt Rptd Dose SUNDAY MONDAY TUESDAY WED THURS FRIDAY SATURDAY 5/1/2023  12:08 PM 2.5 2 2.5 2 2 2.5 2         5/1/2023   Warfarin Lab Questionnaire   Missed doses within past 14 days? No   Changes in diet or alcohol within past 14 days? No   Medication changes since last result? No   Injuries or illness since last result? No   New shortness of breath, severe headaches or sudden changes in vision since last result? No   Abnormal bleeding since last result? No   Upcoming surgery, procedure? No   Best number to call with results? [phone]        Previous result: Subtherapeutic at 1.8 on 4/18/23.    Additional findings: None       PLAN     Recommended plan for no diet, medication or health factor changes affecting INR     Dosing Instructions: Continue your current warfarin dose with next INR in 2 weeks       Summary  As of 5/2/2023    Full warfarin instructions:  2.5 mg every Sun, Tue, Fri; 2 mg all other days   Next INR check:  5/16/2023             Telephone call with  daughterSienna ([phone]) who verbalizes understanding and agrees to plan    Lab visit scheduled - INR on 5/16/23 @ [facility]    Education provided:     Taking warfarin: Importance of taking warfarin as instructed    Goal range and lab monitoring: goal range and significance of current result    Plan made per [facility] anticoagulation protocol    Columba Aguilar, RN  Anticoagulation Clinic  5/2/2023    _______________________________________________________________________     Anticoagulation Episode Summary     Current INR goal:  2.0-3.0   TTR:  73.4 % (1 y)   Target end date:   Indefinite   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

## 2023-05-06 DIAGNOSIS — F02.80 ALZHEIMER'S DEMENTIA WITHOUT BEHAVIORAL DISTURBANCE (H): ICD-10-CM

## 2023-05-06 DIAGNOSIS — G30.9 ALZHEIMER'S DEMENTIA WITHOUT BEHAVIORAL DISTURBANCE (H): ICD-10-CM

## 2023-05-06 DIAGNOSIS — E78.5 HYPERLIPIDEMIA LDL GOAL <100: ICD-10-CM

## 2023-05-06 RX ORDER — ROSUVASTATIN CALCIUM 20 MG/1
TABLET, COATED ORAL
Qty: 90 TABLET | Refills: 3 | Status: SHIPPED | OUTPATIENT
Start: 2023-05-06 | End: 2023-08-18

## 2023-05-06 RX ORDER — MEMANTINE HYDROCHLORIDE 5 MG/1
TABLET ORAL
Qty: 180 TABLET | Refills: 3 | Status: SHIPPED | OUTPATIENT
Start: 2023-05-06 | End: 2023-06-20

## 2023-05-06 NOTE — TELEPHONE ENCOUNTER
"Last Written Prescription Date:  5/13/22  Last Fill Quantity: 180,  # refills: 3   Last office visit provider:   4/18/23    Requested Prescriptions   Pending Prescriptions Disp Refills     memantine (NAMENDA) 5 MG tablet [Pharmacy Med Name: MEMANTINE 5MG TABLETS] 180 tablet 3     Sig: TAKE 1 TABLET(5 MG) BY MOUTH TWICE DAILY       Miscellaneous Dementia Agents Passed - 5/6/2023  4:23 AM        Passed - Recent (12 mo) or future (30 days) visit within the authorizing provider's specialty     Patient has had an office visit with the authorizing provider or a provider within the authorizing providers department within the previous 12 mos or has a future within next 30 days. See \"Patient Info\" tab in inbasket, or \"Choose Columns\" in Meds & Orders section of the refill encounter.              Passed - Medication is active on med list        Passed - Patient is 18 years of age or older           rosuvastatin (CRESTOR) 20 MG tablet [Pharmacy Med Name: ROSUVASTATIN 20MG TABLETS] 90 tablet 3     Sig: TAKE 1 TABLET(20 MG) BY MOUTH AT BEDTIME       Statins Protocol Passed - 5/6/2023  4:23 AM        Passed - LDL on file in past 12 months     Recent Labs   Lab Test 05/12/22  1328   LDL 56             Passed - No abnormal creatine kinase in past 12 months     No lab results found.             Passed - Recent (12 mo) or future (30 days) visit within the authorizing provider's specialty     Patient has had an office visit with the authorizing provider or a provider within the authorizing providers department within the previous 12 mos or has a future within next 30 days. See \"Patient Info\" tab in inbasket, or \"Choose Columns\" in Meds & Orders section of the refill encounter.              Passed - Medication is active on med list        Passed - Patient is age 18 or older        Passed - No active pregnancy on record        Passed - No positive pregnancy test in past 12 months             Aparna Fernandez 05/06/23 9:50 AM  "

## 2023-05-16 ENCOUNTER — TRANSFERRED RECORDS (OUTPATIENT)
Dept: HEALTH INFORMATION MANAGEMENT | Facility: CLINIC | Age: 88
End: 2023-05-16

## 2023-05-16 ENCOUNTER — LAB (OUTPATIENT)
Dept: LAB | Facility: CLINIC | Age: 88
End: 2023-05-16
Payer: COMMERCIAL

## 2023-05-16 ENCOUNTER — ANTICOAGULATION THERAPY VISIT (OUTPATIENT)
Dept: ANTICOAGULATION | Facility: CLINIC | Age: 88
End: 2023-05-16

## 2023-05-16 ENCOUNTER — TELEPHONE (OUTPATIENT)
Dept: FAMILY MEDICINE | Facility: CLINIC | Age: 88
End: 2023-05-16

## 2023-05-16 DIAGNOSIS — Z86.73 HISTORY OF TIA (TRANSIENT ISCHEMIC ATTACK) AND STROKE: ICD-10-CM

## 2023-05-16 DIAGNOSIS — Z79.01 LONG TERM (CURRENT) USE OF ANTICOAGULANTS: ICD-10-CM

## 2023-05-16 DIAGNOSIS — I48.20 CHRONIC ATRIAL FIBRILLATION (H): ICD-10-CM

## 2023-05-16 DIAGNOSIS — I65.23 CAROTID STENOSIS, BILATERAL: ICD-10-CM

## 2023-05-16 DIAGNOSIS — Z79.01 LONG TERM (CURRENT) USE OF ANTICOAGULANTS: Primary | ICD-10-CM

## 2023-05-16 DIAGNOSIS — Z86.73 HISTORY OF EMBOLIC STROKE: ICD-10-CM

## 2023-05-16 LAB — INR BLD: 2.4 (ref 0.9–1.1)

## 2023-05-16 PROCEDURE — 36416 COLLJ CAPILLARY BLOOD SPEC: CPT

## 2023-05-16 PROCEDURE — 85610 PROTHROMBIN TIME: CPT

## 2023-05-16 NOTE — PROGRESS NOTES
ANTICOAGULATION MANAGEMENT     Guera Peters 87 year old female is on warfarin with therapeutic INR result. (Goal INR 2.0-3.0)    Recent labs: (last 7 days)     05/16/23  1100   INR 2.4*       ASSESSMENT     Warfarin Lab Questionnaire    Warfarin Doses Last 7 Days      5/16/2023    10:54 AM   Dose in Tablet or Mg   TAB or MG? - warfarin 1mg tablets  milligram (mg)     Pt Rptd Dose SUNDAY MONDAY TUESDAY WED THURS FRIDAY SATURDAY 5/16/2023  10:54 AM 2.5 2 2.5 2 2 2.5 2         5/16/2023   Warfarin Lab Questionnaire   Missed doses within past 14 days? No   Changes in diet or alcohol within past 14 days? No   Medication changes since last result? No   Injuries or illness since last result? No   New shortness of breath, severe headaches or sudden changes in vision since last result? No   Abnormal bleeding since last result? No   Upcoming surgery, procedure? No   Best number to call with results? [phone]        Previous result: Therapeutic last visit at 2.1; previously outside of goal range at 1.8    Additional findings: None       PLAN     Recommended plan for no diet, medication or health factor changes affecting INR     Dosing Instructions: Continue your current warfarin dose with next INR in 3 weeks       Summary  As of 5/16/2023    Full warfarin instructions:  2.5 mg every Sun, Tue, Fri; 2 mg all other days   Next INR check:  6/6/2023             Telephone call with  daughterSienna ([phone]) who verbalizes understanding and agrees to plan    Lab visit scheduled - INR on 6/6/23 @ [facility].    Education provided:     Taking warfarin: Importance of taking warfarin as instructed    Goal range and lab monitoring: goal range and significance of current result    Dietary considerations: importance of consistent vitamin K intake    Plan made per [facility] anticoagulation protocol    Columba Aguilar, RN  Anticoagulation Clinic  5/16/2023    _______________________________________________________________________      Anticoagulation Episode Summary     Current INR goal:  2.0-3.0   TTR:  75.4 % (1 y)   Target end date:  Indefinite   Send INR reminders to:  [facility]    Indications    Long term (current) use of anticoagulants [Z79.01]  Chronic atrial fibrillation (H) [I48.20]  Carotid stenosis  bilateral [I65.23]  History of TIA (transient ischemic attack) and stroke [Z86.73]  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

## 2023-05-16 NOTE — TELEPHONE ENCOUNTER
Patient Returning Call    Reason for call:  INR call back     Information relayed to patient:      Patient has additional questions:  No      Could we send this information to you in [employer] or would you prefer to receive a phone call?:   Patient would prefer a phone call   Okay to leave a detailed message?: Yes at Home number on file [phone] (home)

## 2023-05-21 DIAGNOSIS — M81.0 OSTEOPOROSIS WITHOUT CURRENT PATHOLOGICAL FRACTURE, UNSPECIFIED OSTEOPOROSIS TYPE: ICD-10-CM

## 2023-05-21 NOTE — TELEPHONE ENCOUNTER
"Routing refill request to provider for review/approval because:  Labs not current:  creatinine    Last Written Prescription Date: 5/13/22  Last Fill Quantity: 12  # refills: 3  Last office visit provider:  4/18/23    Requested Prescriptions   Pending Prescriptions Disp Refills     alendronate (FOSAMAX) 70 MG tablet [Pharmacy Med Name: ALENDRONATE 70MG TABLETS] 12 tablet 3     Sig: TAKE 1 TABLET BY MOUTH 1 TIME A WEEK. DO NOT LIE DOWN FOR 1 HOUR AFTER TAKING, TAKE WITH A FULL GLASS OF WATER       Bisphosphonates Failed - 5/21/2023  2:56 PM        Failed - Normal serum creatinine on file within past 12 months     Recent Labs   Lab Test 05/12/22  1328   CR 0.75       Ok to refill medication if creatinine is low          Passed - Recent (12 mo) or future (30 days) visit within the authorizing provider's specialty     Patient has had an office visit with the authorizing provider or a provider within the authorizing providers department within the previous 12 mos or has a future within next 30 days. See \"Patient Info\" tab in inbasket, or \"Choose Columns\" in Meds & Orders section of the refill encounter.              Passed - Dexa on file within past 2 years     Please review last Dexa result.           Passed - Medication is active on med list        Passed - Patient is age 18 or older             Lulu Whitehead RN 05/21/23 3:00 PM  "

## 2023-05-22 RX ORDER — ALENDRONATE SODIUM 70 MG/1
TABLET ORAL
Qty: 12 TABLET | Refills: 3 | Status: SHIPPED | OUTPATIENT
Start: 2023-05-22 | End: 2023-07-13

## 2023-06-05 ENCOUNTER — TELEPHONE (OUTPATIENT)
Dept: NURSING | Facility: CLINIC | Age: 88
End: 2023-06-05
Payer: COMMERCIAL

## 2023-06-05 NOTE — TELEPHONE ENCOUNTER
Daughter, Sienna calling with concerns about;    States Pt has dementia but today her confusion/behavior was 'different than usual'  Dtr suspecting she should be seen.    Advised unable to triage until Pt is present with caller.  Transferred to scheduling for any available appt on 6/6/23 around time Pt will be at clinic for INR.    Ingrid Cerna RN, Nurse Advisor 3:16 PM 6/5/2023

## 2023-06-06 ENCOUNTER — ANTICOAGULATION THERAPY VISIT (OUTPATIENT)
Dept: ANTICOAGULATION | Facility: CLINIC | Age: 88
End: 2023-06-06

## 2023-06-06 ENCOUNTER — OFFICE VISIT (OUTPATIENT)
Dept: FAMILY MEDICINE | Facility: CLINIC | Age: 88
End: 2023-06-06
Payer: COMMERCIAL

## 2023-06-06 VITALS
OXYGEN SATURATION: 93 % | HEART RATE: 72 BPM | HEIGHT: 64 IN | RESPIRATION RATE: 16 BRPM | TEMPERATURE: 98 F | WEIGHT: 170.4 LBS | SYSTOLIC BLOOD PRESSURE: 138 MMHG | BODY MASS INDEX: 29.09 KG/M2 | DIASTOLIC BLOOD PRESSURE: 68 MMHG

## 2023-06-06 DIAGNOSIS — I10 ESSENTIAL HYPERTENSION WITH GOAL BLOOD PRESSURE LESS THAN 140/90: ICD-10-CM

## 2023-06-06 DIAGNOSIS — R30.0 DYSURIA: ICD-10-CM

## 2023-06-06 DIAGNOSIS — I48.20 CHRONIC ATRIAL FIBRILLATION (H): ICD-10-CM

## 2023-06-06 DIAGNOSIS — Z86.73 HISTORY OF TIA (TRANSIENT ISCHEMIC ATTACK) AND STROKE: ICD-10-CM

## 2023-06-06 DIAGNOSIS — D69.6 THROMBOCYTOPENIA (H): ICD-10-CM

## 2023-06-06 DIAGNOSIS — F02.80 ALZHEIMER'S DEMENTIA WITHOUT BEHAVIORAL DISTURBANCE (H): ICD-10-CM

## 2023-06-06 DIAGNOSIS — G30.9 ALZHEIMER'S DEMENTIA WITHOUT BEHAVIORAL DISTURBANCE (H): ICD-10-CM

## 2023-06-06 DIAGNOSIS — R41.0 CONFUSION: Primary | ICD-10-CM

## 2023-06-06 DIAGNOSIS — Z79.01 LONG TERM (CURRENT) USE OF ANTICOAGULANTS: ICD-10-CM

## 2023-06-06 DIAGNOSIS — Z86.73 HISTORY OF EMBOLIC STROKE: ICD-10-CM

## 2023-06-06 DIAGNOSIS — I65.23 CAROTID STENOSIS, BILATERAL: ICD-10-CM

## 2023-06-06 DIAGNOSIS — Z79.01 LONG TERM (CURRENT) USE OF ANTICOAGULANTS: Primary | ICD-10-CM

## 2023-06-06 LAB
ALBUMIN UR-MCNC: 100 MG/DL
APPEARANCE UR: CLEAR
BACTERIA #/AREA URNS HPF: ABNORMAL /HPF
BILIRUB UR QL STRIP: ABNORMAL
COLOR UR AUTO: YELLOW
ERYTHROCYTE [DISTWIDTH] IN BLOOD BY AUTOMATED COUNT: 13.3 % (ref 10–15)
GLUCOSE UR STRIP-MCNC: NEGATIVE MG/DL
HCT VFR BLD AUTO: 46.2 % (ref 35–47)
HGB BLD-MCNC: 14.4 G/DL (ref 11.7–15.7)
HGB UR QL STRIP: ABNORMAL
HYALINE CASTS #/AREA URNS LPF: ABNORMAL /LPF
INR BLD: 3.3 (ref 0.9–1.1)
KETONES UR STRIP-MCNC: NEGATIVE MG/DL
LEUKOCYTE ESTERASE UR QL STRIP: NEGATIVE
MCH RBC QN AUTO: 28.1 PG (ref 26.5–33)
MCHC RBC AUTO-ENTMCNC: 31.2 G/DL (ref 31.5–36.5)
MCV RBC AUTO: 90 FL (ref 78–100)
MUCOUS THREADS #/AREA URNS LPF: PRESENT /LPF
NITRATE UR QL: NEGATIVE
PH UR STRIP: 5.5 [PH] (ref 5–8)
PLATELET # BLD AUTO: 149 10E3/UL (ref 150–450)
RBC # BLD AUTO: 5.13 10E6/UL (ref 3.8–5.2)
RBC #/AREA URNS AUTO: ABNORMAL /HPF
SP GR UR STRIP: >=1.03 (ref 1–1.03)
SQUAMOUS #/AREA URNS AUTO: ABNORMAL /LPF
UROBILINOGEN UR STRIP-ACNC: 1 E.U./DL
WBC # BLD AUTO: 7.1 10E3/UL (ref 4–11)
WBC #/AREA URNS AUTO: ABNORMAL /HPF
YEAST #/AREA URNS HPF: ABNORMAL /HPF

## 2023-06-06 PROCEDURE — 36415 COLL VENOUS BLD VENIPUNCTURE: CPT | Performed by: FAMILY MEDICINE

## 2023-06-06 PROCEDURE — 99214 OFFICE O/P EST MOD 30 MIN: CPT | Performed by: FAMILY MEDICINE

## 2023-06-06 PROCEDURE — 80053 COMPREHEN METABOLIC PANEL: CPT | Performed by: FAMILY MEDICINE

## 2023-06-06 PROCEDURE — 81001 URINALYSIS AUTO W/SCOPE: CPT | Performed by: FAMILY MEDICINE

## 2023-06-06 PROCEDURE — 84443 ASSAY THYROID STIM HORMONE: CPT | Performed by: FAMILY MEDICINE

## 2023-06-06 PROCEDURE — 85027 COMPLETE CBC AUTOMATED: CPT | Performed by: FAMILY MEDICINE

## 2023-06-06 PROCEDURE — 85610 PROTHROMBIN TIME: CPT | Performed by: FAMILY MEDICINE

## 2023-06-06 ASSESSMENT — ANXIETY QUESTIONNAIRES
7. FEELING AFRAID AS IF SOMETHING AWFUL MIGHT HAPPEN: NOT AT ALL
1. FEELING NERVOUS, ANXIOUS, OR ON EDGE: NEARLY EVERY DAY
7. FEELING AFRAID AS IF SOMETHING AWFUL MIGHT HAPPEN: NOT AT ALL
4. TROUBLE RELAXING: NOT AT ALL
8. IF YOU CHECKED OFF ANY PROBLEMS, HOW DIFFICULT HAVE THESE MADE IT FOR YOU TO DO YOUR WORK, TAKE CARE OF THINGS AT HOME, OR GET ALONG WITH OTHER PEOPLE?: VERY DIFFICULT
2. NOT BEING ABLE TO STOP OR CONTROL WORRYING: NEARLY EVERY DAY
GAD7 TOTAL SCORE: 9
5. BEING SO RESTLESS THAT IT IS HARD TO SIT STILL: NOT AT ALL
IF YOU CHECKED OFF ANY PROBLEMS ON THIS QUESTIONNAIRE, HOW DIFFICULT HAVE THESE PROBLEMS MADE IT FOR YOU TO DO YOUR WORK, TAKE CARE OF THINGS AT HOME, OR GET ALONG WITH OTHER PEOPLE: VERY DIFFICULT
3. WORRYING TOO MUCH ABOUT DIFFERENT THINGS: NEARLY EVERY DAY
GAD7 TOTAL SCORE: 9
6. BECOMING EASILY ANNOYED OR IRRITABLE: NOT AT ALL

## 2023-06-06 NOTE — PROGRESS NOTES
Assessment/ Plan     1. Confusion  Guera has had some worsening of her baseline confusion over the last 10 days or so of unclear etiology.  She does not appear acutely ill today.  Her only symptom yesterday was a little bit of suprapubic discomfort and decreased appetite.  She has no complaints today.  There is no concern for a fall or head injury.  We will do some blood work and check a urinalysis to rule out a UTI.  If those are normal and symptoms do not resolve, would recommend evaluation back to her primary care physician.  If acutely worsening symptoms, then they should present to the emergency room.  - CBC with platelets; Future  - Comprehensive metabolic panel; Future  - TSH with free T4 reflex; Future  - CBC with platelets  - Comprehensive metabolic panel  - TSH with free T4 reflex    2. Dysuria  She was only able to leave a small urine sample so this will be sent for culture.  They will be notified when results are back.  - UA Macroscopic with reflex to Microscopic and Culture    3. Chronic atrial fibrillation (H)  Due for INR  - INR point of care    4. Essential hypertension with goal blood pressure less than 140/90  Well-controlled    5. Long term (current) use of anticoagulants  - INR point of care    6. Carotid stenosis, bilateral  - INR point of care    7. History of TIA (transient ischemic attack) and stroke  - INR point of care    8. History of embolic stroke  - INR point of care    9. Alzheimer's dementia without behavioral disturbance (H)  Has dementia at baseline and is on medication for this.    10. Thrombocytopenia (H)  Stable        Subjective:      Guera Peters is a 87 year old female who presents for evaluation of confusion.  This patient is new to me today.  At baseline she has dementia.  She is here with her daughter.  They state maybe about the last 10 days or so she feels like she has been more confused than her baseline.  They have been just kind of watching things but she has not  "returned to her baseline.  She really has not complained of anything until yesterday.  Yesterday she complained of a little bit of abdominal pain in the suprapubic area and had a decreased appetite.  The patient herself states that this has resolved today.  She has not had any increase in incontinence.  There is been no fever.  She lives with her  and there is been no reported falls or head injuries.  They have not noticed any focal neurologic symptoms such as difficulty with speech or swallowing.  She has had a normal gait and they have not noticed any weakness.  Yesterday was the first day she had a little bit of decreased of appetite.  She has not had frequent urine infections.  She does have a history in the past of diverticulitis but not for several years.  This is typically on her left side.  She keeps saying \"something happened\".  She is been feeling more anxious than her baseline.  They have not noticed any shortness of breath.  She is on blood thinners for atrial fibrillation.  She has not had any diarrhea or constipation.  There has not been any blood in the stool.    Relevant past medical, family, surgical, and social history reviewed with patient, unless noted in HPI, not pertinent for this visit.  Medications were discussed and reconciled.   Review of Systems   A 12 point comprehensive review of systems was negative except as noted.      Current Outpatient Medications   Medication Sig Dispense Refill     alendronate (FOSAMAX) 70 MG tablet TAKE 1 TABLET BY MOUTH 1 TIME A WEEK. DO NOT LIE DOWN FOR 1 HOUR AFTER TAKING, TAKE WITH A FULL GLASS OF WATER 12 tablet 3     aspirin EC 81 MG tablet Take 81 mg by mouth       carvedilol (COREG) 12.5 MG tablet TAKE 1 TABLET BY MOUTH TWICE DAILY WITH MEALS 180 tablet 3     Cholecalciferol (VITAMIN D-3 PO)        levothyroxine (SYNTHROID/LEVOTHROID) 88 MCG tablet Take 1 tablet (88 mcg) by mouth daily 90 tablet 3     lisinopril (ZESTRIL) 20 MG tablet TAKE 1 " "TABLET(20 MG) BY MOUTH DAILY 90 tablet 2     memantine (NAMENDA) 5 MG tablet TAKE 1 TABLET(5 MG) BY MOUTH TWICE DAILY 180 tablet 3     nitroGLYcerin (NITROSTAT) 0.4 MG sublingual tablet PLACE 1 TABLET UNDER THE TONGUE EVERY 5 MINUTES AS NEEDED FOR CHEST PAIN. MAX OF 3 DOSES. CALL 911 AS DIRECTED 30 tablet 1     rivastigmine (EXELON) 3 MG capsule Take 1 capsule (3 mg) by mouth 2 times daily 180 capsule 3     rosuvastatin (CRESTOR) 20 MG tablet TAKE 1 TABLET(20 MG) BY MOUTH AT BEDTIME 90 tablet 3     warfarin ANTICOAGULANT (COUMADIN) 1 MG tablet TAKE 2 TO 3 TABLETS(2 TO 3 MG) BY MOUTH DAILY 270 tablet 3         Objective:     /68   Pulse 72   Temp 98  F (36.7  C)   Resp 16   Ht 1.626 m (5' 4\")   Wt 77.3 kg (170 lb 6.4 oz)   LMP  (LMP Unknown)   SpO2 93%   BMI 29.25 kg/m      Body mass index is 29.25 kg/m .       General appearance: alert, appears stated age and cooperative  Head: Normocephalic, without obvious abnormality, atraumatic  Eyes: conjunctivae/corneas clear. PERRL, EOM's intact.  Ears: normal TM's and external ear canals both ears  Nose: Nares normal. Septum midline. Mucosa normal. No drainage or sinus tenderness.  Throat: lips, mucosa, and tongue normal; teeth and gums normal  Neck: no adenopathy, no carotid bruit, no JVD, supple, symmetrical, trachea midline and thyroid not enlarged, symmetric, no tenderness/mass/nodules  Back: symmetric, no curvature. ROM normal. No CVA tenderness.  Lungs: clear to auscultation bilaterally  Heart: regular rate and rhythm, S1, S2 normal, no murmur, click, rub or gallop  Abdomen: soft, non-tender; bowel sounds normal; no masses,  no organomegaly  Extremities: extremities normal, atraumatic, no cyanosis or edema  Pulses: 2+ and symmetric  Neurologic: Alert , normal strength and tone. Normal symmetric reflexes. Normal coordination and gait         Recent Results (from the past 168 hour(s))   INR point of care   Result Value Ref Range    INR 3.3 (H) 0.9 - 1.1 "   CBC with platelets   Result Value Ref Range    WBC Count 7.1 4.0 - 11.0 10e3/uL    RBC Count 5.13 3.80 - 5.20 10e6/uL    Hemoglobin 14.4 11.7 - 15.7 g/dL    Hematocrit 46.2 35.0 - 47.0 %    MCV 90 78 - 100 fL    MCH 28.1 26.5 - 33.0 pg    MCHC 31.2 (L) 31.5 - 36.5 g/dL    RDW 13.3 10.0 - 15.0 %    Platelet Count 149 (L) 150 - 450 10e3/uL          This note has been dictated using voice recognition software. Any grammatical or context distortions are unintentional and inherent to the software

## 2023-06-06 NOTE — PROGRESS NOTES
ANTICOAGULATION MANAGEMENT     Guera Peters 87 year old female is on warfarin with supratherapeutic INR result. (Goal INR 2.0-3.0)    Recent labs: (last 7 days)     06/06/23  1154   INR 3.3*       ASSESSMENT     Warfarin Lab Questionnaire    Warfarin Doses Last 7 Days      6/6/2023    11:02 AM   Dose in Tablet or Mg   TAB or MG? tablet (tab)     Pt Rptd Dose SUNDAY MONDAY TUESDAY WED THURS FRIDAY SATURDAY 6/6/2023  11:02 AM 2.5 2 2.5 2 2 2.5 2         6/6/2023   Warfarin Lab Questionnaire   Missed doses within past 14 days? No   Changes in diet or alcohol within past 14 days? No - reported decreased appetite yesterday.   Medication changes since last result? No   Injuries or illness since last result? No - OV visit today with Dr. Vincent d/t worsening confusion x1-1/2 wks.and suprapubic discomfort yesterday (dysuria)        - will await for pending labs and UA results.        - has dementia.    No   Abnormal bleeding since last result? No   Upcoming surgery, procedure? No   Best number to call with results? [phone]        Previous result: Therapeutic last 2 visits    Additional findings:  was advised per Dr. Vincent - then to go to ED for further evaluation.       PLAN     Recommended plan for temporary change(s) affecting INR     Dosing Instructions: partial hold then continue your current warfarin dose with next INR in 1-2 weeks       Summary  As of 6/6/2023    Full warfarin instructions:  6/6: 1.5 mg; Otherwise 2.5 mg every Sun, Tue, Fri; 2 mg all other days   Next INR check:  6/20/2023             Telephone call with  daughterSienna ([phone]) who verbalizes understanding and agrees to plan    Lab visit scheduled - INR on 6/16/23 @ [facility]    Education provided:     Taking warfarin: Importance of taking warfarin as instructed    Goal range and lab monitoring: goal range and significance of current result    Dietary considerations: importance of consistent vitamin K intake and impact of vitamin K foods  on INR    Symptom monitoring: monitoring for bleeding signs and symptoms    Plan made per [facility] anticoagulation protocol    Columba Aguilar RN  Anticoagulation Clinic  6/6/2023    _______________________________________________________________________     Anticoagulation Episode Summary     Current INR goal:  2.0-3.0   TTR:  76.8 % (1 y)   Target end date:  Indefinite   Send INR reminders to:  [facility]    Indications    Long term (current) use of anticoagulants [Z79.01]  Chronic atrial fibrillation (H) [I48.20]  Carotid stenosis  bilateral [I65.23]  History of TIA (transient ischemic attack) and stroke [Z86.73]  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

## 2023-06-07 ENCOUNTER — TELEPHONE (OUTPATIENT)
Dept: FAMILY MEDICINE | Facility: CLINIC | Age: 88
End: 2023-06-07
Payer: COMMERCIAL

## 2023-06-07 LAB
ALBUMIN SERPL BCG-MCNC: 4.3 G/DL (ref 3.5–5.2)
ALP SERPL-CCNC: 82 U/L (ref 35–104)
ALT SERPL W P-5'-P-CCNC: 16 U/L (ref 10–35)
ANION GAP SERPL CALCULATED.3IONS-SCNC: 14 MMOL/L (ref 7–15)
AST SERPL W P-5'-P-CCNC: 24 U/L (ref 10–35)
BILIRUB SERPL-MCNC: 1.6 MG/DL
BUN SERPL-MCNC: 19.7 MG/DL (ref 8–23)
CALCIUM SERPL-MCNC: 9.6 MG/DL (ref 8.8–10.2)
CHLORIDE SERPL-SCNC: 105 MMOL/L (ref 98–107)
CREAT SERPL-MCNC: 0.95 MG/DL (ref 0.51–0.95)
DEPRECATED HCO3 PLAS-SCNC: 25 MMOL/L (ref 22–29)
GFR SERPL CREATININE-BSD FRML MDRD: 58 ML/MIN/1.73M2
GLUCOSE SERPL-MCNC: 144 MG/DL (ref 70–99)
POTASSIUM SERPL-SCNC: 4.3 MMOL/L (ref 3.4–5.3)
PROT SERPL-MCNC: 7.1 G/DL (ref 6.4–8.3)
SODIUM SERPL-SCNC: 144 MMOL/L (ref 136–145)
TSH SERPL DL<=0.005 MIU/L-ACNC: 1.68 UIU/ML (ref 0.3–4.2)

## 2023-06-07 NOTE — TELEPHONE ENCOUNTER
Guera El's daughter called in to check on test results for her urine yesterday that she dropped off later in the day. I read her the note from Dr. Vincent and she was ok with this. She had no further questions and stated that she is going to keep an eye on her mom and talk to us later if anything changes.

## 2023-06-20 ENCOUNTER — OFFICE VISIT (OUTPATIENT)
Dept: FAMILY MEDICINE | Facility: CLINIC | Age: 88
End: 2023-06-20
Payer: COMMERCIAL

## 2023-06-20 ENCOUNTER — ANTICOAGULATION THERAPY VISIT (OUTPATIENT)
Dept: ANTICOAGULATION | Facility: CLINIC | Age: 88
End: 2023-06-20

## 2023-06-20 VITALS
OXYGEN SATURATION: 95 % | WEIGHT: 166 LBS | HEART RATE: 67 BPM | DIASTOLIC BLOOD PRESSURE: 55 MMHG | HEIGHT: 64 IN | RESPIRATION RATE: 24 BRPM | BODY MASS INDEX: 28.34 KG/M2 | SYSTOLIC BLOOD PRESSURE: 129 MMHG | TEMPERATURE: 97.5 F

## 2023-06-20 DIAGNOSIS — Z79.01 LONG TERM (CURRENT) USE OF ANTICOAGULANTS: Primary | ICD-10-CM

## 2023-06-20 DIAGNOSIS — E03.9 ACQUIRED HYPOTHYROIDISM: ICD-10-CM

## 2023-06-20 DIAGNOSIS — Z79.01 LONG TERM (CURRENT) USE OF ANTICOAGULANTS: ICD-10-CM

## 2023-06-20 DIAGNOSIS — I65.23 CAROTID STENOSIS, BILATERAL: ICD-10-CM

## 2023-06-20 DIAGNOSIS — F02.80 ALZHEIMER'S DEMENTIA WITHOUT BEHAVIORAL DISTURBANCE (H): Primary | ICD-10-CM

## 2023-06-20 DIAGNOSIS — I10 ESSENTIAL HYPERTENSION WITH GOAL BLOOD PRESSURE LESS THAN 140/90: ICD-10-CM

## 2023-06-20 DIAGNOSIS — I48.20 CHRONIC ATRIAL FIBRILLATION (H): ICD-10-CM

## 2023-06-20 DIAGNOSIS — Z86.73 HISTORY OF TIA (TRANSIENT ISCHEMIC ATTACK) AND STROKE: ICD-10-CM

## 2023-06-20 DIAGNOSIS — Z86.73 HISTORY OF EMBOLIC STROKE: ICD-10-CM

## 2023-06-20 DIAGNOSIS — R73.09 ELEVATED GLUCOSE: ICD-10-CM

## 2023-06-20 DIAGNOSIS — G30.9 ALZHEIMER'S DEMENTIA WITHOUT BEHAVIORAL DISTURBANCE (H): Primary | ICD-10-CM

## 2023-06-20 LAB
HBA1C MFR BLD: 6.3 % (ref 0–5.6)
INR BLD: 7.9 (ref 0.9–1.1)

## 2023-06-20 PROCEDURE — 99214 OFFICE O/P EST MOD 30 MIN: CPT | Performed by: FAMILY MEDICINE

## 2023-06-20 PROCEDURE — 85610 PROTHROMBIN TIME: CPT | Performed by: FAMILY MEDICINE

## 2023-06-20 PROCEDURE — 36415 COLL VENOUS BLD VENIPUNCTURE: CPT | Performed by: FAMILY MEDICINE

## 2023-06-20 PROCEDURE — 83036 HEMOGLOBIN GLYCOSYLATED A1C: CPT | Performed by: FAMILY MEDICINE

## 2023-06-20 RX ORDER — MEMANTINE HYDROCHLORIDE 5 MG/1
5 TABLET ORAL 2 TIMES DAILY
Qty: 180 TABLET | Refills: 0 | Status: SHIPPED | OUTPATIENT
Start: 2023-06-20 | End: 2023-08-22

## 2023-06-20 RX ORDER — CARVEDILOL 12.5 MG/1
TABLET ORAL
Qty: 180 TABLET | Refills: 3 | Status: SHIPPED | OUTPATIENT
Start: 2023-06-20

## 2023-06-20 RX ORDER — RIVASTIGMINE TARTRATE 3 MG/1
CAPSULE ORAL
Qty: 180 CAPSULE | Refills: 0 | Status: SHIPPED | OUTPATIENT
Start: 2023-06-20

## 2023-06-20 RX ORDER — LEVOTHYROXINE SODIUM 88 UG/1
88 TABLET ORAL DAILY
Qty: 90 TABLET | Refills: 3 | Status: SHIPPED | OUTPATIENT
Start: 2023-06-20

## 2023-06-20 RX ORDER — LISINOPRIL 20 MG/1
20 TABLET ORAL DAILY
Qty: 90 TABLET | Refills: 3 | Status: SHIPPED | OUTPATIENT
Start: 2023-06-20 | End: 2023-08-09

## 2023-06-20 NOTE — PROGRESS NOTES
Assessment & Plan     1. Alzheimer's dementia without behavioral disturbance, unspecified timing of dementia onset  - Adult Neurology  Referral; Future  - memantine (NAMENDA) 5 MG tablet; Take 1 tablet (5 mg) by mouth 2 times daily  Dispense: 180 tablet; Refill: 0  - rivastigmine (EXELON) 3 MG capsule; TAKE 1 CAPSULE(3 MG) BY MOUTH TWICE DAILY  Dispense: 180 capsule; Refill: 0    Work up for confusion was unrevealing, labs normal.   Clinically Guera is doing well.   I suspect ongoing progression of her alzheimer disease.   Still living independently at home with spouse, receives plenty of support from family.  Son denies concerns for safety, delusions, hallucinations, or paranoia.   I took over managing her alzheimer medications from prior PCP as was presumed stable, but with concerns for worsening dementia request they check back in with neurology.  Offered referral to social work to evaluate for any additional support or resources that may be available, decline for now.     2. Elevated glucose  - Hemoglobin A1c    Hemoglobin A1C   Date Value Ref Range Status   06/20/2023 6.3 (H) 0.0 - 5.6 % Final     Comment:     Normal <5.7%   Prediabetes 5.7-6.4%    Diabetes 6.5% or higher     Note: Adopted from ADA consensus guidelines.   11/16/2017 6.0 4.3 - 6.0 % Final     Glucose was mildly elevated at recent visit. Trend A1c today, remains in prediabetes range.  Continue to monitor.     3. Essential hypertension with goal blood pressure less than 140/90  - carvedilol (COREG) 12.5 MG tablet; TAKE 1 TABLET BY MOUTH TWICE DAILY WITH MEALS  Dispense: 180 tablet; Refill: 3  - lisinopril (ZESTRIL) 20 MG tablet; Take 1 tablet (20 mg) by mouth daily  Dispense: 90 tablet; Refill: 3    BP at goal. Recent labs normal. Continue current treatment plan.     4. Acquired hypothyroidism  - levothyroxine (SYNTHROID/LEVOTHROID) 88 MCG tablet; Take 1 tablet (88 mcg) by mouth daily  Dispense: 90 tablet; Refill: 3    TSH   Date Value  Ref Range Status   06/06/2023 1.68 0.30 - 4.20 uIU/mL Final   06/27/2022 0.85 0.30 - 5.00 uIU/mL Final   11/16/2017 0.43 0.40 - 4.00 mU/L Final     TSH euthyroid. Due for refill. Continue current treatment plan.     5. Chronic atrial fibrillation (H)  6. Long term (current) use of anticoagulants  7. Carotid stenosis, bilateral  8. History of TIA (transient ischemic attack) and stroke  9. History of embolic stroke  - INR point of care     Continue warfarin for anticoagulation, carvedilol for rate control.   Due for INR check today.    Sravani Gonzalez, [facility]    Jyoti Lynne is a 88 year old, presenting for the following health issues:  Follow Up (UTI)        6/20/2023     2:57 PM   Additional Questions   Roomed by Juanita VICTORIA CMA   Accompanied by Son     History of Present Illness       Reason for visit:  Any new medication to help with her confusion and anxiety    She eats 2-3 servings of fruits and vegetables daily.She consumes 0 sweetened beverage(s) daily.She exercises with enough effort to increase her heart rate 9 or less minutes per day.  She exercises with enough effort to increase her heart rate 3 or less days per week.   She is taking medications regularly.     Concerned about her memory, every once in a while doesn't know what is going on.   Guera does feel safe at home. Went to son's house for father's day, enjoyed time out on the deck.   Has a kid there nearly every day. Doing meals on wheels. Uses microwave. Not concerned about safety with stove, irons, etc.     Guera still living with  independently at home.     Has been a while since seeing neurology. Has been on namenda and Exelon for a while, doses have been stable for a long time.       Review of Systems   See HPI above.         Objective    /55 (BP Location: Left arm, Patient Position: Sitting, Cuff Size: Adult Regular)   Pulse 67   Temp 97.5  F (36.4  C) (Oral)   Resp 24   Ht 1.626 m (5'  "4\")   Wt 75.3 kg (166 lb)   LMP  (LMP Unknown)   SpO2 95%   BMI 28.49 kg/m    Body mass index is 28.49 kg/m .  Physical Exam   GENERAL: healthy, alert and no distress  NECK: no adenopathy, no asymmetry, masses, or scars and thyroid normal to palpation  RESP: lungs clear to auscultation - no rales, rhonchi or wheezes  CV: regular rate and rhythm, normal S1 S2, no S3 or S4, no murmur, click or rub, no peripheral edema and peripheral pulses strong  ABDOMEN: soft, nontender, no hepatosplenomegaly, no masses and bowel sounds normal  MS: no gross musculoskeletal defects noted, no edema                "

## 2023-06-20 NOTE — PROGRESS NOTES
ANTICOAGULATION MANAGEMENT     Guera Peters 88 year old female is on warfarin with supratherapeutic INR result. (Goal INR 2.0-3.0)    Recent labs: (last 7 days)     06/20/23  1607   INR 7.9*       ASSESSMENT       Source(s): Chart Review and Patient/Caregiver Call       Warfarin doses taken: Warfarin taken as instructed    Diet: poor appetite may be affecting diet and INR    Poor daily nutritional intake.    Current WT today is 166 lbs.       Last WT on 4/18/23 WT was 175 lbs.    Medication/supplement changes: None noted    New illness, injury, or hospitalization: Yes:    OV today with Dr. Gonzalez - concerns with confusion and memory (Alzheimer's dementia).    Signs or symptoms of bleeding or clotting: No    Previous result: Supratherapeutic at 3.3 on 6/6/23 d/t decreased appetite.    Additional findings:  Referral to Neurology for follow-up.         PLAN     Recommended plan for ongoing change(s) affecting INR     Dosing Instructions:   - advised to hold warfarin dose tonight   - will await orders from pharmacist.   - will call Sienna killian with further orders on 6/21.    Summary  As of 6/20/2023    Full warfarin instructions:  2.5 mg every Sun, Tue, Fri; 2 mg all other days   Next INR check:               Telephone call with  daughterSienna called  who verbalizes understanding and agrees to plan    - instructed warfarin HOLD tonight.      Education provided:     Dietary considerations: importance of consistent vitamin K intake, impact of vitamin K foods on INR and vitamin K content of foods    Symptom monitoring: monitoring for bleeding signs and symptoms, when to seek medical attention/emergency care and if you hit your head or have a bad fall seek emergency care    Plan made per [facility] anticoagulation protocol  (AWAITING FOR FURTHER ORDERS FROM PHARMACIST - Areli Rondon, [facility].)    Columba Aguilar, RN  Anticoagulation  Clinic  6/20/2023    _______________________________________________________________________     Anticoagulation Episode Summary     Current INR goal:  2.0-3.0   TTR:  72.9 % (1 y)   Target end date:  Indefinite   Send INR reminders to:  [facility]    Indications    Long term (current) use of anticoagulants [Z79.01]  Chronic atrial fibrillation (H) [I48.20]  Carotid stenosis  bilateral [I65.23]  History of TIA (transient ischemic attack) and stroke [Z86.73]  History of embolic stroke [Z86.73]           Comments:           Anticoagulation Care Providers     Provider Role Specialty Phone number    Dora Castillo MD Referring Family Medicine [phone]    Sravani Gonzalez DO Referring Family Medicine [phone]

## 2023-06-21 NOTE — RESULT ENCOUNTER NOTE
Patient/family updated by [employer] message with lab results.       A1c checked today as blood sugar was mildly elevated at recent appointment.  A1c remains in prediabetes range.   We should continue to monitor this annually.  Sravani Gonzalez, DO

## 2023-06-21 NOTE — PROGRESS NOTES
ANTICOAGULATION MANAGEMENT     Guera Peters 88 year old female is on warfarin with supratherapeutic INR result. (Goal INR 2.0-3.0)    Recent labs: (last 7 days)     06/20/23  1607   INR 7.9*       ASSESSMENT     Source(s): Chart Review and Patient/Caregiver Call       Warfarin doses taken: Warfarin taken as instructed    Diet: poor appetite may be affecting diet and INR    Poor daily nutritional intake.    Current WT today is 166 lbs.       Last WT on 4/18/23 WT was 175 lbs.    Medication/supplement changes: None noted    New illness, injury, or hospitalization: Yes:    OV today with Dr. Gonzalez - concerns with confusion and memory (Alzheimer's dementia).    Signs or symptoms of bleeding or clotting:  No.    Previous result: Supratherapeutic at 3.3 on 6/6/23 d/t decreased appetite.    Additional findings:  Referral to Neurology for follow-up.       PLAN     Recommended plan for ongoing change(s) affecting INR     Dosing Instructions:   - HELD warfarin dose on 6/20, as instructed.   - advised to hold dose 6/21,   - then decrease your warfarin dose (16.1% change) with next INR in 2 days       Summary  As of 6/20/2023    Full warfarin instructions:  6/20: Hold; 6/21: Hold; Otherwise 1 mg every Thu; 2 mg all other days   Next INR check:  6/23/2023             Telephone call with  daughterSienna ([phone]) who verbalizes understanding and agrees to plan    Lab visit scheduled - INR on 6/23/23 @ [facility]    Education provided:     Taking warfarin: Importance of taking warfarin as instructed    Goal range and lab monitoring: goal range and significance of current result    Dietary considerations: importance of consistent vitamin K intake and impact of vitamin K foods on INR    Symptom monitoring: monitoring for bleeding signs and symptoms    Plan made with [facility] Pharmacist Areli Aguilar, RN  Anticoagulation Clinic  6/21/2023    _______________________________________________________________________      Anticoagulation Episode Summary     Current INR goal:  2.0-3.0   TTR:  72.9 % (1 y)   Target end date:  Indefinite   Send INR reminders to:  [facility]    Indications    Long term (current) use of anticoagulants [Z79.01]  Chronic atrial fibrillation (H) [I48.20]  Carotid stenosis  bilateral [I65.23]  History of TIA (transient ischemic attack) and stroke [Z86.73]  History of embolic stroke [Z86.73]           Comments:           Anticoagulation Care Providers     Provider Role Specialty Phone number    Dora Castillo MD Referring Family Medicine [phone]    Sravani Gonzalez DO Referring Family Medicine [phone]

## 2023-06-23 ENCOUNTER — ANTICOAGULATION THERAPY VISIT (OUTPATIENT)
Dept: ANTICOAGULATION | Facility: CLINIC | Age: 88
End: 2023-06-23

## 2023-06-23 ENCOUNTER — LAB (OUTPATIENT)
Dept: LAB | Facility: CLINIC | Age: 88
End: 2023-06-23
Payer: COMMERCIAL

## 2023-06-23 DIAGNOSIS — Z86.73 HISTORY OF EMBOLIC STROKE: ICD-10-CM

## 2023-06-23 DIAGNOSIS — Z79.01 LONG TERM (CURRENT) USE OF ANTICOAGULANTS: ICD-10-CM

## 2023-06-23 DIAGNOSIS — Z79.01 LONG TERM (CURRENT) USE OF ANTICOAGULANTS: Primary | ICD-10-CM

## 2023-06-23 DIAGNOSIS — Z86.73 HISTORY OF TIA (TRANSIENT ISCHEMIC ATTACK) AND STROKE: ICD-10-CM

## 2023-06-23 DIAGNOSIS — I65.23 CAROTID STENOSIS, BILATERAL: ICD-10-CM

## 2023-06-23 DIAGNOSIS — I48.20 CHRONIC ATRIAL FIBRILLATION (H): ICD-10-CM

## 2023-06-23 LAB — INR BLD: 3.7 (ref 0.9–1.1)

## 2023-06-23 PROCEDURE — 85610 PROTHROMBIN TIME: CPT

## 2023-06-23 PROCEDURE — 36416 COLLJ CAPILLARY BLOOD SPEC: CPT

## 2023-06-23 NOTE — PROGRESS NOTES
ANTICOAGULATION MANAGEMENT     Guera Peters 88 year old female is on warfarin with supratherapeutic INR result. (Goal INR 2.0-3.0)    Recent labs: (last 7 days)     06/23/23  1110   INR 3.7*       ASSESSMENT     Warfarin Lab Questionnaire    Warfarin Doses Last 7 Days      6/22/2023     2:28 PM   Dose in Tablet or Mg   TAB or MG? tablet (tab)     Pt Rptd Dose SUNDAY MONDAY TUESDAY WED THURS 6/22/2023   2:28 PM 2.5 2 0 0 1         6/22/2023   Warfarin Lab Questionnaire   Missed doses within past 14 days? No - HELD dose for 2 days on 6/20-21, then weekly warfarin dose was decreaesd.     Changes in diet or alcohol within past 14 days? No - ongoing poor daily intake.     Medication changes since last result? No   Injuries or illness since last result? No   New shortness of breath, severe headaches or sudden changes in vision since last result? No   Abnormal bleeding since last result? No   Upcoming surgery, procedure? No   Best number to call with results? [phone]     Previous result: Supratherapeutic at 7.9 on 6/20/23.    Additional findings: None       PLAN     Recommended plan for ongoing change(s) affecting INR     Dosing Instructions: hold dose then decrease your warfarin dose (15.4% change) with next INR in 5-6 days       Summary  As of 6/23/2023    Full warfarin instructions:  6/23: Hold; Otherwise 1 mg every Mon, Wed, Fri; 2 mg all other days   Next INR check:  6/29/2023             Telephone call with  daughterSienna ([phone]) who verbalizes understanding and agrees to plan    Lab visit scheduled - INR on 6/28/23 @ [facility]    Education provided:     Taking warfarin: Importance of taking warfarin as instructed    Goal range and lab monitoring: goal range and significance of current result    Dietary considerations: importance of consistent vitamin K intake    Symptom monitoring: monitoring for bleeding signs and symptoms    Plan made with [facility] Pharmacist Areli Aguilar,  RN  Anticoagulation Clinic  6/23/2023    _______________________________________________________________________     Anticoagulation Episode Summary     Current INR goal:  2.0-3.0   TTR:  72.1 % (1 y)   Target end date:  Indefinite   Send INR reminders to:  [facility]    Indications    Long term (current) use of anticoagulants [Z79.01]  Chronic atrial fibrillation (H) [I48.20]  Carotid stenosis  bilateral [I65.23]  History of TIA (transient ischemic attack) and stroke [Z86.73]  History of embolic stroke [Z86.73]           Comments:           Anticoagulation Care Providers     Provider Role Specialty Phone number    Dora Castillo MD Referring Family Medicine [phone]    Sravani Gonzalez DO Referring Family Medicine [phone]

## 2023-06-28 ENCOUNTER — ANTICOAGULATION THERAPY VISIT (OUTPATIENT)
Dept: ANTICOAGULATION | Facility: CLINIC | Age: 88
End: 2023-06-28

## 2023-06-28 ENCOUNTER — LAB (OUTPATIENT)
Dept: LAB | Facility: CLINIC | Age: 88
End: 2023-06-28
Payer: COMMERCIAL

## 2023-06-28 DIAGNOSIS — Z86.73 HISTORY OF TIA (TRANSIENT ISCHEMIC ATTACK) AND STROKE: ICD-10-CM

## 2023-06-28 DIAGNOSIS — Z79.01 LONG TERM (CURRENT) USE OF ANTICOAGULANTS: ICD-10-CM

## 2023-06-28 DIAGNOSIS — I65.23 CAROTID STENOSIS, BILATERAL: ICD-10-CM

## 2023-06-28 DIAGNOSIS — I10 ESSENTIAL HYPERTENSION WITH GOAL BLOOD PRESSURE LESS THAN 140/90: ICD-10-CM

## 2023-06-28 DIAGNOSIS — I48.20 CHRONIC ATRIAL FIBRILLATION (H): ICD-10-CM

## 2023-06-28 DIAGNOSIS — Z86.73 HISTORY OF EMBOLIC STROKE: ICD-10-CM

## 2023-06-28 DIAGNOSIS — Z79.01 LONG TERM (CURRENT) USE OF ANTICOAGULANTS: Primary | ICD-10-CM

## 2023-06-28 LAB — INR BLD: 3.3 (ref 0.9–1.1)

## 2023-06-28 PROCEDURE — 36416 COLLJ CAPILLARY BLOOD SPEC: CPT

## 2023-06-28 PROCEDURE — 85610 PROTHROMBIN TIME: CPT

## 2023-06-28 RX ORDER — CARVEDILOL 12.5 MG/1
TABLET ORAL
Qty: 180 TABLET | Refills: 3 | OUTPATIENT
Start: 2023-06-28

## 2023-06-28 NOTE — PROGRESS NOTES
ANTICOAGULATION MANAGEMENT     Guera Peters 88 year old female is on warfarin with supratherapeutic INR result. (Goal INR 2.0-3.0)    Recent labs: (last 7 days)     06/28/23  0928   INR 3.3*       ASSESSMENT     Warfarin Lab Questionnaire    Warfarin Doses Last 7 Days      6/27/2023     5:30 PM   Dose in Tablet or Mg   TAB or MG? - warfarin 1mg tablets. tablet (tab)     Pt Rptd Dose SUNDAY MONDAY TUESDAY WED THURS FRIDAY SATURDAY 6/27/2023   5:30 PM 2 1 2 0 1 0 2         6/27/2023   Warfarin Lab Questionnaire   Missed doses within past 14 days? No -  Held one dose and also decreased wkly warfarin dose on 6/23/23.     Changes in diet or alcohol within past 14 days? No - ongoing decreased daily nutritional intake.     Medication changes since last result? No   Injuries or illness since last result? No   New shortness of breath, severe headaches or sudden changes in vision since last result? No   Abnormal bleeding since last result? No   Upcoming surgery, procedure? No   Best number to call with results? [phone]     Previous result: Supratherapeutic last 3 INR results.    Additional findings: None       PLAN     Recommended plan for ongoing change(s) affecting INR     Dosing Instructions: decrease your warfarin dose (9.1% change) with next INR in 5-7 days       Summary  As of 6/28/2023    Full warfarin instructions:  2 mg every Sun, Tue, Fri; 1 mg all other days   Next INR check:  7/5/2023             Telephone call with  daughterSienna ([phone]) who verbalizes understanding and agrees to plan    - currently driving, instructed to send [employer] message.    Lab visit scheduled - INR on 7/7/23 @ [facility]    Education provided:     Taking warfarin: Importance of taking warfarin as instructed    Goal range and lab monitoring: goal range and significance of current result    Dietary considerations: importance of consistent vitamin K intake    Symptom monitoring: monitoring for bleeding signs and symptoms    Plan  made per [facility] anticoagulation protocol    Columba Aguilar, RN  Anticoagulation Clinic  6/28/2023    _______________________________________________________________________     Anticoagulation Episode Summary     Current INR goal:  2.0-3.0   TTR:  70.8 % (1 y)   Target end date:  Indefinite   Send INR reminders to:  [facility]    Indications    Long term (current) use of anticoagulants [Z79.01]  Chronic atrial fibrillation (H) [I48.20]  Carotid stenosis  bilateral [I65.23]  History of TIA (transient ischemic attack) and stroke [Z86.73]  History of embolic stroke [Z86.73]           Comments:           Anticoagulation Care Providers     Provider Role Specialty Phone number    Dora Castillo MD Referring Family Medicine [phone]    Sravani Gonzalez DO Referring Family Medicine [phone]

## 2023-07-07 ENCOUNTER — ANTICOAGULATION THERAPY VISIT (OUTPATIENT)
Dept: ANTICOAGULATION | Facility: CLINIC | Age: 88
End: 2023-07-07

## 2023-07-07 ENCOUNTER — LAB (OUTPATIENT)
Dept: LAB | Facility: CLINIC | Age: 88
End: 2023-07-07
Payer: COMMERCIAL

## 2023-07-07 DIAGNOSIS — Z86.73 HISTORY OF TIA (TRANSIENT ISCHEMIC ATTACK) AND STROKE: ICD-10-CM

## 2023-07-07 DIAGNOSIS — I65.23 CAROTID STENOSIS, BILATERAL: ICD-10-CM

## 2023-07-07 DIAGNOSIS — Z86.73 HISTORY OF EMBOLIC STROKE: ICD-10-CM

## 2023-07-07 DIAGNOSIS — Z79.01 LONG TERM (CURRENT) USE OF ANTICOAGULANTS: ICD-10-CM

## 2023-07-07 DIAGNOSIS — I48.20 CHRONIC ATRIAL FIBRILLATION (H): ICD-10-CM

## 2023-07-07 DIAGNOSIS — Z79.01 LONG TERM (CURRENT) USE OF ANTICOAGULANTS: Primary | ICD-10-CM

## 2023-07-07 LAB — INR BLD: 2.1 (ref 0.9–1.1)

## 2023-07-07 PROCEDURE — 85610 PROTHROMBIN TIME: CPT

## 2023-07-07 PROCEDURE — 36416 COLLJ CAPILLARY BLOOD SPEC: CPT

## 2023-07-07 NOTE — PROGRESS NOTES
ANTICOAGULATION MANAGEMENT     Guera Peters 88 year old female is on warfarin with therapeutic INR result. (Goal INR 2.0-3.0)    Recent labs: (last 7 days)     07/07/23  1015   INR 2.1*       ASSESSMENT     Warfarin Lab Questionnaire    Warfarin Doses Last 7 Days      7/6/2023     5:41 PM   Dose in Tablet or Mg   TAB or MG? - warfarin 1mg tablets tablet (tab)     Pt Rptd Dose SUNDAY MONDAY TUESDAY WED THURS FRIDAY SATURDAY 7/6/2023   5:41 PM 1 2 2 1 2 1 2         7/6/2023   Warfarin Lab Questionnaire   Missed doses within past 14 days? No - wkly warfarin dose was decreased on 6/28/23.     Changes in diet or alcohol within past 14 days? No - same decreased daily nutritional intake.   Medication changes since last result? No   Injuries or illness since last result? No   New shortness of breath, severe headaches or sudden changes in vision since last result? No   Abnormal bleeding since last result? No   Upcoming surgery, procedure? No   Best number to call with results? [phone]     Previous result: Supratherapeutic last 4 INR results.    Additional findings: None       PLAN     Recommended plan for no diet, medication or health factor changes affecting INR     Dosing Instructions: Continue your current warfarin dose with next INR in 2 weeks       Summary  As of 7/7/2023    Full warfarin instructions:  2 mg every Sun, Tue, Fri; 1 mg all other days   Next INR check:  7/21/2023             Telephone call with  daughterSienna ([phone]) who verbalizes understanding and agrees to plan    Lab visit scheduled - INR on 7/21/23 @ [facility]    Education provided:     Taking warfarin: Importance of taking warfarin as instructed    Goal range and lab monitoring: goal range and significance of current result    Dietary considerations: importance of consistent vitamin K intake    Plan made per [facility] anticoagulation protocol    Columba Aguilar, RN  Anticoagulation  Clinic  7/7/2023    _______________________________________________________________________     Anticoagulation Episode Summary     Current INR goal:  2.0-3.0   TTR:  70.2 % (1 y)   Target end date:  Indefinite   Send INR reminders to:  [facility]    Indications    Long term (current) use of anticoagulants [Z79.01]  Chronic atrial fibrillation (H) [I48.20]  Carotid stenosis  bilateral [I65.23]  History of TIA (transient ischemic attack) and stroke [Z86.73]  History of embolic stroke [Z86.73]           Comments:           Anticoagulation Care Providers     Provider Role Specialty Phone number    Dora Castillo MD Referring Family Medicine [phone]    Sravani Gonzalez DO Referring Family Medicine [phone]

## 2023-07-12 ASSESSMENT — PATIENT HEALTH QUESTIONNAIRE - PHQ9
SUM OF ALL RESPONSES TO PHQ QUESTIONS 1-9: 4
10. IF YOU CHECKED OFF ANY PROBLEMS, HOW DIFFICULT HAVE THESE PROBLEMS MADE IT FOR YOU TO DO YOUR WORK, TAKE CARE OF THINGS AT HOME, OR GET ALONG WITH OTHER PEOPLE: NOT DIFFICULT AT ALL
SUM OF ALL RESPONSES TO PHQ QUESTIONS 1-9: 4

## 2023-07-13 ENCOUNTER — ANTICOAGULATION THERAPY VISIT (OUTPATIENT)
Dept: ANTICOAGULATION | Facility: CLINIC | Age: 88
End: 2023-07-13

## 2023-07-13 ENCOUNTER — LAB (OUTPATIENT)
Dept: LAB | Facility: CLINIC | Age: 88
End: 2023-07-13
Payer: COMMERCIAL

## 2023-07-13 ENCOUNTER — TELEPHONE (OUTPATIENT)
Dept: FAMILY MEDICINE | Facility: CLINIC | Age: 88
End: 2023-07-13

## 2023-07-13 ENCOUNTER — TRANSFERRED RECORDS (OUTPATIENT)
Dept: HEALTH INFORMATION MANAGEMENT | Facility: CLINIC | Age: 88
End: 2023-07-13

## 2023-07-13 ENCOUNTER — MEDICAL CORRESPONDENCE (OUTPATIENT)
Dept: HEALTH INFORMATION MANAGEMENT | Facility: CLINIC | Age: 88
End: 2023-07-13

## 2023-07-13 ENCOUNTER — VIRTUAL VISIT (OUTPATIENT)
Dept: FAMILY MEDICINE | Facility: CLINIC | Age: 88
End: 2023-07-13
Payer: COMMERCIAL

## 2023-07-13 DIAGNOSIS — I48.20 CHRONIC ATRIAL FIBRILLATION (H): ICD-10-CM

## 2023-07-13 DIAGNOSIS — I65.23 CAROTID STENOSIS, BILATERAL: ICD-10-CM

## 2023-07-13 DIAGNOSIS — Z86.73 HISTORY OF TIA (TRANSIENT ISCHEMIC ATTACK) AND STROKE: ICD-10-CM

## 2023-07-13 DIAGNOSIS — Z78.9 POLST (PHYSICIAN ORDERS FOR LIFE-SUSTAINING TREATMENT): ICD-10-CM

## 2023-07-13 DIAGNOSIS — R41.3 MEMORY LOSS: ICD-10-CM

## 2023-07-13 DIAGNOSIS — Z86.73 HISTORY OF EMBOLIC STROKE: ICD-10-CM

## 2023-07-13 DIAGNOSIS — Z02.89 ENCOUNTER FOR COMPLETION OF FORM WITH PATIENT: Primary | ICD-10-CM

## 2023-07-13 DIAGNOSIS — Z79.01 LONG TERM (CURRENT) USE OF ANTICOAGULANTS: ICD-10-CM

## 2023-07-13 DIAGNOSIS — Z02.89 ENCOUNTER FOR COMPLETION OF FORM WITH PATIENT: ICD-10-CM

## 2023-07-13 DIAGNOSIS — Z79.01 LONG TERM (CURRENT) USE OF ANTICOAGULANTS: Primary | ICD-10-CM

## 2023-07-13 LAB — INR BLD: 2.3 (ref 0.9–1.1)

## 2023-07-13 PROCEDURE — 99214 OFFICE O/P EST MOD 30 MIN: CPT | Mod: VID | Performed by: FAMILY MEDICINE

## 2023-07-13 PROCEDURE — 36415 COLL VENOUS BLD VENIPUNCTURE: CPT

## 2023-07-13 PROCEDURE — 85610 PROTHROMBIN TIME: CPT

## 2023-07-13 PROCEDURE — 86481 TB AG RESPONSE T-CELL SUSP: CPT

## 2023-07-13 NOTE — PROGRESS NOTES
ANTICOAGULATION MANAGEMENT     Guera Peters 88 year old female is on warfarin with therapeutic INR result. (Goal INR 2.0-3.0)    Recent labs: (last 7 days)     07/13/23  1431   INR 2.3*       ASSESSMENT     Warfarin Lab Questionnaire    Warfarin Doses Last 7 Days      7/13/2023     2:09 PM   Dose in Tablet or Mg   TAB or MG? tablet (tab)     Pt Rptd Dose SUNDAY MONDAY TUESDAY WED THURS FRIDAY SATURDAY 7/13/2023   2:09 PM 2 1 2 1 2 1 1         7/13/2023   Warfarin Lab Questionnaire   Missed doses within past 14 days? No   Changes in diet or alcohol within past 14 days? No   Medication changes since last result? No   Injuries or illness since last result? No   New shortness of breath, severe headaches or sudden changes in vision since last result? No   Abnormal bleeding since last result? No   Upcoming surgery, procedure? No   Best number to call with results? [phone]     Previous result: Therapeutic last visit; previously outside of goal range  Additional findings: None       PLAN     Recommended plan for no diet, medication or health factor changes affecting INR     Dosing Instructions: Continue your current warfarin dose with next INR in 1 week       Summary  As of 7/13/2023      Full warfarin instructions:  2 mg every Sun, Tue, Fri; 1 mg all other days   Next INR check:  7/21/2023               Detailed voice message left for  Sienna with dosing instructions and follow up date.     Lab visit scheduled    Education provided:   Please call back if any changes to your diet, medications or how you've been taking warfarin    Plan made per [facility] anticoagulation protocol    Tommie Mccoy RN  Anticoagulation Clinic  7/13/2023    _______________________________________________________________________     Anticoagulation Episode Summary       Current INR goal:  2.0-3.0   TTR:  70.2 % (1 y)   Target end date:  Indefinite   Send INR reminders to:  [facility]    Indications    Long term (current) use of  anticoagulants [Z79.01]  Chronic atrial fibrillation (H) [I48.20]  Carotid stenosis  bilateral [I65.23]  History of TIA (transient ischemic attack) and stroke [Z86.73]  History of embolic stroke [Z86.73]             Comments:               Anticoagulation Care Providers       Provider Role Specialty Phone number    Dora Castillo MD Referring Family Medicine [phone]    Sravani Gonzalez DO Referring Family Medicine [phone]

## 2023-07-13 NOTE — TELEPHONE ENCOUNTER
Called and spoke with daughterSienna     - Her mom is going into memory care.     - having labs today, would like to include INR.     - INR added today

## 2023-07-13 NOTE — TELEPHONE ENCOUNTER
General Call      Reason for Call:  Sienna is calling because patient is having a blood draw today and would like to know if patient can just do her INR at todays appt instead of on 7/21.     Please call and advise.     What are your questions or concerns:      Date of last appointment with provider:     Could we [place] you [place] or would you prefer to receive a phone call?:   Patient would prefer a phone call   Okay to leave a detailed message?: Yes at Other phone number:  [phone]

## 2023-07-13 NOTE — PROGRESS NOTES
Guera is a 88 year old who is being evaluated via a billable video visit.      How would you like to obtain your AVS? MyChart  If the video visit is dropped, the invitation should be resent by: Text to cell phone: [phone]  Will anyone else be joining your video visit? No        Assessment & Plan     1. Encounter for completion of form with patient  2. POLST (Physician Orders for Life-Sustaining Treatment)  3. Memory loss  - Quantiferon TB Gold Plus; Future    Reviewed paperwork with family and completed forms.  POLST form completed.  We will do TB screening.  Check with living facility to see if she needs a COVID booster, can schedule nurse only visit for this if needed.  Reviewed medications, deprescribed some of her medications to simplify her regimen.      Sravani Gonzalez, [facility]    34 minutes spent on date of encounter with chart review, patient visit, counseling, coordination of care, and completion of paperwork.    Subjective   Guera is a 88 year old, presenting for the following health issues:  Forms      7/13/2023     9:39 AM   Additional Questions   Roomed by Juanita VICTORIA CMA   Accompanied by Daughter     History of Present Illness       Reason for visit:  Post    She eats 2-3 servings of fruits and vegetables daily.She consumes 1 sweetened beverage(s) daily.She exercises with enough effort to increase her heart rate 10 to 19 minutes per day.  She exercises with enough effort to increase her heart rate 3 or less days per week.   She is taking medications regularly.     Guera is moving into memory care and needs paperwork completed.   Seems to be rapidly declining.      Review of Systems   Worsening memory, more fatigued.       Objective           Vitals:  No vitals were obtained today due to virtual visit.    Physical Exam   GENERAL: Healthy, alert and no distress  EYES: Eyes grossly normal to inspection.  No discharge or erythema, or obvious scleral/conjunctival  abnormalities.  RESP: No audible wheeze, cough, or visible cyanosis.  No visible retractions or increased work of breathing.    SKIN: Visible skin clear. No significant rash, abnormal pigmentation or lesions.  NEURO: Cranial nerves grossly intact.  Mentation and speech appropriate for age.  PSYCH: Mentation appears normal, affect normal/bright, judgement and insight intact, normal speech and appearance well-groomed.            Video-Visit Details    Type of service:  Video Visit   Video Start Time: 10:15 AM  Video End Time:10:46 AM    Originating Location (pt. Location): Home  Distant Location (provider location):  On-site  Platform used for Video Visit: Guardly

## 2023-07-15 LAB
GAMMA INTERFERON BACKGROUND BLD IA-ACNC: 0.07 IU/ML
M TB IFN-G BLD-IMP: NEGATIVE
M TB IFN-G CD4+ BCKGRND COR BLD-ACNC: 9.93 IU/ML
MITOGEN IGNF BCKGRD COR BLD-ACNC: 0.01 IU/ML
MITOGEN IGNF BCKGRD COR BLD-ACNC: 0.01 IU/ML
QUANTIFERON MITOGEN: 10 IU/ML
QUANTIFERON NIL TUBE: 0.07 IU/ML
QUANTIFERON TB1 TUBE: 0.08 IU/ML
QUANTIFERON TB2 TUBE: 0.08

## 2023-07-16 NOTE — RESULT ENCOUNTER NOTE
Patient/family updated by [employer] message with lab results.   [facility] Staff - please ensure family is aware. Can we fax this result to the memory care facility she is planning to move to?     TB screen normal (negative).   Sravani Gonzalez, DO

## 2023-07-21 ENCOUNTER — DOCUMENTATION ONLY (OUTPATIENT)
Dept: OTHER | Facility: CLINIC | Age: 88
End: 2023-07-21

## 2023-07-21 ENCOUNTER — TELEPHONE (OUTPATIENT)
Dept: FAMILY MEDICINE | Facility: CLINIC | Age: 88
End: 2023-07-21

## 2023-07-21 NOTE — TELEPHONE ENCOUNTER
Sandro RN with [facility] - called with several requests:     1.  Current warfarin dose to continue with mg on Sunday, Tuesday, Friday and 1mg all other days.   2.  Aspirin 81mg daily.     3.  Patient's family is requesting for patient to get INRs drawn at her AL which per Sandro, facility has contract with [facility] to come out & draw residents labs every Monday.      4.  Patient has INR standing orders in place already:   PLEASE GO AHEAD DRAW LAB ON MONDAY, 7/24/23.   FAX INR RESULTS TO:  [phone]    Order Providers    Authorizing Encounter   Sravani Gonzalez Erica Rose            Standing Order Information    Remaining Occurrences Interval Next Expected Expires     99/99 q 1 to 6 wks and PRN N/A 2/16/2024       Associated Diagnoses    Chronic atrial fibrillation (H)  - Primary      Long term (current) use of anticoagulants      Carotid stenosis, bilateral      History of TIA (transient ischemic attack) and stroke      History of embolic stroke

## 2023-07-21 NOTE — TELEPHONE ENCOUNTER
Routing to anticoag team as patient cancelled INR lab today. Need updated Warfarin order sent to AL. See note below. Routing to covering provider to address once dosage confirmed. Please fax orders to [phone].    Incoming call from BELIA Jo with [facility] calling with multiple requests.  1) Would like clarification on Aspirin & Warfarin sig. Aspirin Rx does not specify dose frequency. Warfarin directions did not specify when to take 2 tabs & when to take 3 tabs. Pharmacy is requesting for clarification & requiring new Rxs for both meds.    2) Patient's family is requesting for patient to get INRs drawn at her AL which per Sandro, facility has contract with [facility] to come out & draw residents labs every Monday. Labs would then be reported to PCP/clinic. If okay, an order would need to be placed by provider.    Per pillo,       PCP is out of the clinic until next week. Patient has upcoming INR appt on 7/25 & would like order for INR draw completed before so family does not have to bring patient in to clinic. Unsure if covering provider would address this or wait to have PCP address when back next week. Informed Sandro of this.    Per chart review, Aspirin was a reported med that does not have frequency listed & was noted that it was prescribed by [facility] provider. Per Dr. Gonzalez's note on 2/6, anticoag team is assisting with dosing adjustment for Warfarin.    Deja Morales, DEAN, RN  [facility]

## 2023-07-22 ENCOUNTER — LAB REQUISITION (OUTPATIENT)
Dept: LAB | Facility: CLINIC | Age: 88
End: 2023-07-22
Payer: COMMERCIAL

## 2023-07-24 LAB — INR PPP: 2.35 (ref 0.85–1.15)

## 2023-07-24 PROCEDURE — P9603 ONE-WAY ALLOW PRORATED MILES: HCPCS | Mod: ORL | Performed by: FAMILY MEDICINE

## 2023-07-24 PROCEDURE — 85610 PROTHROMBIN TIME: CPT | Mod: ORL | Performed by: FAMILY MEDICINE

## 2023-07-24 PROCEDURE — 36415 COLL VENOUS BLD VENIPUNCTURE: CPT | Mod: ORL | Performed by: FAMILY MEDICINE

## 2023-07-25 ENCOUNTER — TELEPHONE (OUTPATIENT)
Dept: FAMILY MEDICINE | Facility: CLINIC | Age: 88
End: 2023-07-25

## 2023-07-25 ENCOUNTER — ANTICOAGULATION THERAPY VISIT (OUTPATIENT)
Dept: ANTICOAGULATION | Facility: CLINIC | Age: 88
End: 2023-07-25

## 2023-07-25 DIAGNOSIS — Z79.01 LONG TERM (CURRENT) USE OF ANTICOAGULANTS: Primary | ICD-10-CM

## 2023-07-25 DIAGNOSIS — Z86.73 HISTORY OF EMBOLIC STROKE: ICD-10-CM

## 2023-07-25 DIAGNOSIS — I48.20 CHRONIC ATRIAL FIBRILLATION (H): ICD-10-CM

## 2023-07-25 DIAGNOSIS — Z86.73 HISTORY OF TIA (TRANSIENT ISCHEMIC ATTACK) AND STROKE: ICD-10-CM

## 2023-07-25 DIAGNOSIS — I65.23 CAROTID STENOSIS, BILATERAL: ICD-10-CM

## 2023-07-25 NOTE — TELEPHONE ENCOUNTER
General Call    Contacts         Type Contact Phone/Fax    07/25/2023 01:18 PM CDT Phone (Incoming) Sienna Yanez (Emergency Contact) [phone]          Reason for Call:  Daughter was calling back regarding message left regarding her mom's health    What are your questions or concerns:  I relayed the information about dosing instructions given today for her INR's and warfarin from the notes. She had no further questions.     Date of last appointment with provider: 7-    Could we send this information to you in OpenROV or would you prefer to receive a phone call?:   Patient would prefer a phone call   Okay to leave a detailed message?: Yes at Cell number on file:    Telephone Information:   Mobile [phone]

## 2023-07-25 NOTE — PROGRESS NOTES
ANTICOAGULATION MANAGEMENT     Guera Peters 88 year old female is on warfarin with therapeutic INR result. (Goal INR 2.0-3.0)    Recent labs: (last 7 days)     07/24/23  1445   INR 2.35*       ASSESSMENT     Source(s): Chart Review and Patient/Caregiver Call     Warfarin doses taken: Warfarin taken as instructed  Diet: No new diet changes identified  Medication/supplement changes: None noted  New illness, injury, or hospitalization: No  Signs or symptoms of bleeding or clotting: No  Previous result: Therapeutic last 2 visits.  Additional findings:  FAXED results to [facility] - [phone]   ATTN:  BELIA Vaughan.       PLAN     Recommended plan for no diet, medication or health factor changes affecting INR     Dosing Instructions: Continue your current warfarin dose with next INR in 3 weeks       Summary  As of 7/25/2023      Full warfarin instructions:  2 mg every Sun, Tue, Fri; 1 mg all other days   Next INR check:  8/15/2023               Telephone call from Sienna killian and warfarin dosing was provided.    Orders given to  Homecare nurse/facility to recheck - NEXT INR CHECK ON 8/15/23.    Education provided:   Please call back if any changes to your diet, medications or how you've been taking warfarin  Taking warfarin: Importance of taking warfarin as instructed  Goal range and lab monitoring: goal range and significance of current result    Plan made per [facility] anticoagulation protocol    Columba Aguilar RN  Anticoagulation Clinic  7/25/2023    _______________________________________________________________________     Anticoagulation Episode Summary       Current INR goal:  2.0-3.0   TTR:  73.5 % (1 y)   Target end date:  Indefinite   Send INR reminders to:  [facility]    Indications    Long term (current) use of anticoagulants [Z79.01]  Chronic atrial fibrillation (H) [I48.20]  Carotid stenosis  bilateral [I65.23]  History of TIA (transient ischemic attack) and  stroke [Z86.73]  History of embolic stroke [Z86.73]             Comments:               Anticoagulation Care Providers       Provider Role Specialty Phone number    Dora Castillo MD Referring Family Medicine [phone]    Sravain Gonzalez DO Referring Family Medicine [phone]

## 2023-08-04 ENCOUNTER — MEDICAL CORRESPONDENCE (OUTPATIENT)
Dept: HEALTH INFORMATION MANAGEMENT | Facility: CLINIC | Age: 88
End: 2023-08-04
Payer: COMMERCIAL

## 2023-08-04 ENCOUNTER — TELEPHONE (OUTPATIENT)
Dept: FAMILY MEDICINE | Facility: CLINIC | Age: 88
End: 2023-08-04
Payer: COMMERCIAL

## 2023-08-04 NOTE — TELEPHONE ENCOUNTER
Meggan calling from [facility], stating she has orders for OT/PT for patient to be faxed over. Trying to clarify if these orders came from PCP or from memory care. Per chart review, I do not see PT/OT orders from PCP.     Ilana Steinberg RN

## 2023-08-08 DIAGNOSIS — F02.80 ALZHEIMER'S DEMENTIA WITHOUT BEHAVIORAL DISTURBANCE (H): ICD-10-CM

## 2023-08-08 DIAGNOSIS — G30.9 ALZHEIMER'S DEMENTIA WITHOUT BEHAVIORAL DISTURBANCE (H): ICD-10-CM

## 2023-08-08 RX ORDER — MEMANTINE HYDROCHLORIDE 5 MG/1
5 TABLET ORAL 2 TIMES DAILY
Qty: 28 TABLET | Refills: 11 | OUTPATIENT
Start: 2023-08-08

## 2023-08-09 DIAGNOSIS — I10 ESSENTIAL HYPERTENSION WITH GOAL BLOOD PRESSURE LESS THAN 140/90: ICD-10-CM

## 2023-08-09 RX ORDER — LISINOPRIL 20 MG/1
20 TABLET ORAL DAILY
Qty: 90 TABLET | Refills: 3 | Status: SHIPPED | OUTPATIENT
Start: 2023-08-09

## 2023-08-10 ENCOUNTER — TELEPHONE (OUTPATIENT)
Dept: FAMILY MEDICINE | Facility: CLINIC | Age: 88
End: 2023-08-10
Payer: COMMERCIAL

## 2023-08-10 NOTE — TELEPHONE ENCOUNTER
Incoming call from Lauren Osorio with [facility], wanting to know if Dr. Gonzalez received patient's PT and OT orders form. They faxed yesterday, if you have question please call Lauren Osorio at [phone] Please advise

## 2023-08-10 NOTE — TELEPHONE ENCOUNTER
"Last Written Prescription Date:  6/20/2023 Change in pharmacy noted.   Last Fill Quantity: 90,  # refills: 3   Last office visit provider:  Virtual visit 7/13/2023 with PCP: Dr SHANI Gonzalez      Requested Prescriptions   Pending Prescriptions Disp Refills    lisinopril (ZESTRIL) 20 MG tablet [Pharmacy Med Name: LISINOPRIL 20 MG TAB] 10 tablet 0     Sig: TAKE 1 TABLET BY MOUTH ONCE DAILY       ACE Inhibitors (Including Combos) Protocol Passed - 8/9/2023  2:23 PM        Passed - Blood pressure under 140/90 in past 12 months     BP Readings from Last 3 Encounters:   06/20/23 129/55   06/06/23 138/68   04/18/23 (!) 158/74                 Passed - Recent (12 mo) or future (30 days) visit within the authorizing provider's specialty     Patient has had an office visit with the authorizing provider or a provider within the authorizing providers department within the previous 12 mos or has a future within next 30 days. See \"Patient Info\" tab in inbasket, or \"Choose Columns\" in Meds & Orders section of the refill encounter.              Passed - Medication is active on med list        Passed - Patient is age 18 or older        Passed - No active pregnancy on record        Passed - Normal serum creatinine on file in past 12 months     Recent Labs   Lab Test 06/06/23  1154   CR 0.95       Ok to refill medication if creatinine is low          Passed - Normal serum potassium on file in past 12 months     Recent Labs   Lab Test 06/06/23  1154   POTASSIUM 4.3             Passed - No positive pregnancy test within past 12 months             Renay Carlisle RN 08/09/23 10:41 PM  "

## 2023-08-11 NOTE — TELEPHONE ENCOUNTER
Called Lauren back and left a VM letting her know that we have not received and faxes and asked her to fax them again.

## 2023-08-12 ENCOUNTER — LAB REQUISITION (OUTPATIENT)
Dept: LAB | Facility: CLINIC | Age: 88
End: 2023-08-12
Payer: COMMERCIAL

## 2023-08-12 DIAGNOSIS — I48.20 CHRONIC ATRIAL FIBRILLATION, UNSPECIFIED (H): ICD-10-CM

## 2023-08-12 DIAGNOSIS — E78.49 OTHER HYPERLIPIDEMIA: ICD-10-CM

## 2023-08-13 ENCOUNTER — LAB REQUISITION (OUTPATIENT)
Dept: LAB | Facility: CLINIC | Age: 88
End: 2023-08-13
Payer: COMMERCIAL

## 2023-08-13 DIAGNOSIS — I48.20 CHRONIC ATRIAL FIBRILLATION, UNSPECIFIED (H): ICD-10-CM

## 2023-08-14 ENCOUNTER — ANTICOAGULATION THERAPY VISIT (OUTPATIENT)
Dept: ANTICOAGULATION | Facility: CLINIC | Age: 88
End: 2023-08-14

## 2023-08-14 DIAGNOSIS — Z86.73 HISTORY OF EMBOLIC STROKE: ICD-10-CM

## 2023-08-14 DIAGNOSIS — I65.23 CAROTID STENOSIS, BILATERAL: ICD-10-CM

## 2023-08-14 DIAGNOSIS — Z86.73 HISTORY OF TIA (TRANSIENT ISCHEMIC ATTACK) AND STROKE: ICD-10-CM

## 2023-08-14 DIAGNOSIS — Z79.01 LONG TERM (CURRENT) USE OF ANTICOAGULANTS: Primary | ICD-10-CM

## 2023-08-14 DIAGNOSIS — I48.20 CHRONIC ATRIAL FIBRILLATION (H): ICD-10-CM

## 2023-08-14 DIAGNOSIS — G30.9 ALZHEIMER'S DEMENTIA WITHOUT BEHAVIORAL DISTURBANCE (H): ICD-10-CM

## 2023-08-14 DIAGNOSIS — F02.80 ALZHEIMER'S DEMENTIA WITHOUT BEHAVIORAL DISTURBANCE (H): ICD-10-CM

## 2023-08-14 DIAGNOSIS — E78.5 HYPERLIPIDEMIA LDL GOAL <100: ICD-10-CM

## 2023-08-14 LAB
CHOLEST SERPL-MCNC: 101 MG/DL
HDLC SERPL-MCNC: 36 MG/DL
INR PPP: 1.39 (ref 0.85–1.15)
LDLC SERPL CALC-MCNC: 47 MG/DL
NONHDLC SERPL-MCNC: 65 MG/DL
TRIGL SERPL-MCNC: 89 MG/DL

## 2023-08-14 PROCEDURE — 36415 COLL VENOUS BLD VENIPUNCTURE: CPT | Mod: ORL | Performed by: FAMILY MEDICINE

## 2023-08-14 PROCEDURE — 80061 LIPID PANEL: CPT | Mod: ORL | Performed by: FAMILY MEDICINE

## 2023-08-14 PROCEDURE — 85610 PROTHROMBIN TIME: CPT | Mod: ORL | Performed by: FAMILY MEDICINE

## 2023-08-14 PROCEDURE — P9604 ONE-WAY ALLOW PRORATED TRIP: HCPCS | Mod: ORL | Performed by: FAMILY MEDICINE

## 2023-08-14 NOTE — PROGRESS NOTES
ANTICOAGULATION  MANAGEMENT    Guera Peters is being discharged from the [facility]ticoagulation Management Program ([facility]).    Reason for discharge: care has been transferred to [facility]    Anticoagulation episode resolved, ACC referral closed, and Standing order discontinued    If patient needs warfarin management in the future, please send a new referral    Columba Aguilar RN

## 2023-08-14 NOTE — PROGRESS NOTES
ANTICOAGULATION MANAGEMENT     Guera Peters 88 year old female is on warfarin with subtherapeutic INR result. (Goal INR 2.0-3.0)    Recent labs: (last 7 days)     08/14/23  0906   INR 1.39*       ASSESSMENT     Source(s): Chart Review and Patient/Caregiver Call     Warfarin doses taken: Warfarin taken as instructed  Diet: No new diet changes identified   current 165 lbs. on 7/25/23.  Medication/supplement changes: None noted  New illness, injury, or hospitalization: No   Moved to [facility] 7/21/23.   Signs or symptoms of bleeding or clotting: No  Previous result: Therapeutic last 3 INR visits  Additional findings:  INR result not FAXED - patient is now under the care of [facility], and already has been dosed and scheduled for next INR.  - called and spoke with BELIA Jo with [facility] @  [phone]   - ACN informed daughterSienna that we will be discharging from [facility] program, since she is now being cared and managed by [facility].     PLAN that     Recommended plan for no diet, medication or health factor changes affecting INR     Dosing Instructions:  Will discharged patient from [facility] program.      - now managed by [facility] since 7/21/23.       Summary  As of 8/14/2023      Full warfarin instructions:  8/14: 3 mg; Otherwise 2 mg every Sun, Tue, Fri; 1 mg all other days   Next INR check:  8/21/2023               Telephone call with BELIA Jo facility nurse who verbalizes understanding and agrees to plan   - discharged from [facility] program.    Columba Aguilar RN  Anticoagulation Clinic  8/14/2023    _______________________________________________________________________     Anticoagulation Episode Summary       Current INR goal:  2.0-3.0   TTR:  72.7 % (1 y)   Target end date:  Indefinite   Send INR reminders to:  [facility]    Indications    Long term (current) use of anticoagulants [Z79.01]  Chronic atrial  fibrillation (H) [I48.20]  Carotid stenosis  bilateral [I65.23]  History of TIA (transient ischemic attack) and stroke [Z86.73]  History of embolic stroke [Z86.73]             Comments:               Anticoagulation Care Providers       Provider Role Specialty Phone number    Dora Castillo MD Referring Family Medicine [phone]    Sravani Gonzalez DO Referring Family Medicine [phone]

## 2023-08-15 RX ORDER — MEMANTINE HYDROCHLORIDE 5 MG/1
5 TABLET ORAL 2 TIMES DAILY
Qty: 180 TABLET | Refills: 0 | Status: CANCELLED | OUTPATIENT
Start: 2023-08-15

## 2023-08-15 RX ORDER — ROSUVASTATIN CALCIUM 20 MG/1
20 TABLET, COATED ORAL AT BEDTIME
Qty: 90 TABLET | Refills: 3 | OUTPATIENT
Start: 2023-08-14

## 2023-08-15 NOTE — TELEPHONE ENCOUNTER
I referred patient to neurology due to worsening memory concerns.  I did not start her Namenda but took over prescription when she was stable.  I do not see a neurology appointment scheduled yet.  Already going to see neurology?  I think we could discuss this medication further at her upcoming appointment.  Sravani Gonzalez, DO

## 2023-08-15 NOTE — TELEPHONE ENCOUNTER
"Routing refill request to provider for review/approval because:  Requesting refills too soon for namenda    Last Written Prescription Date:  6/20/23  Last Fill Quantity: 180,  # refills: 0   Last office visit provider:  7/13/23     Requested Prescriptions   Pending Prescriptions Disp Refills    memantine (NAMENDA) 5 MG tablet 180 tablet 0     Sig: Take 1 tablet (5 mg) by mouth 2 times daily       Miscellaneous Dementia Agents Passed - 8/14/2023  3:06 PM        Passed - Recent (12 mo) or future (30 days) visit within the authorizing provider's specialty     Patient has had an office visit with the authorizing provider or a provider within the authorizing providers department within the previous 12 mos or has a future within next 30 days. See \"Patient Info\" tab in inbasket, or \"Choose Columns\" in Meds & Orders section of the refill encounter.              Passed - Medication is active on med list        Passed - Patient is 18 years of age or older          rosuvastatin (CRESTOR) 20 MG tablet 90 tablet 3     Sig: Take 1 tablet (20 mg) by mouth At Bedtime       Statins Protocol Passed - 8/14/2023  3:06 PM        Passed - LDL on file in past 12 months     Recent Labs   Lab Test 08/14/23  0906   LDL 47             Passed - No abnormal creatine kinase in past 12 months     No lab results found.             Passed - Recent (12 mo) or future (30 days) visit within the authorizing provider's specialty     Patient has had an office visit with the authorizing provider or a provider within the authorizing providers department within the previous 12 mos or has a future within next 30 days. See \"Patient Info\" tab in inbasket, or \"Choose Columns\" in Meds & Orders section of the refill encounter.              Passed - Medication is active on med list        Passed - Patient is age 18 or older        Passed - No active pregnancy on record        Passed - No positive pregnancy test in past 12 months             Nadiya Colon RN " 08/15/23 12:01 AM

## 2023-08-18 RX ORDER — ROSUVASTATIN CALCIUM 20 MG/1
20 TABLET, COATED ORAL AT BEDTIME
Qty: 90 TABLET | Refills: 1 | Status: SHIPPED | OUTPATIENT
Start: 2023-08-18

## 2023-08-18 NOTE — TELEPHONE ENCOUNTER
[facility]    Pending Prescriptions:                       Disp   Refills    rosuvastatin (CRESTOR) 20 MG tablet       90 tab*3            Sig: Take 1 tablet (20 mg) by mouth At Bedtime

## 2023-08-18 NOTE — TELEPHONE ENCOUNTER
"  Last Written Prescription Date:  5/6/2023  Last Fill Quantity: 90,  # refills: 3   Last office visit provider:  7/13/2023     Requested Prescriptions   Pending Prescriptions Disp Refills    rosuvastatin (CRESTOR) 20 MG tablet 90 tablet 3     Sig: Take 1 tablet (20 mg) by mouth At Bedtime       Statins Protocol Passed - 8/18/2023  3:48 PM        Passed - LDL on file in past 12 months     Recent Labs   Lab Test 08/14/23  0906   LDL 47             Passed - No abnormal creatine kinase in past 12 months     No lab results found.             Passed - Recent (12 mo) or future (30 days) visit within the authorizing provider's specialty     Patient has had an office visit with the authorizing provider or a provider within the authorizing providers department within the previous 12 mos or has a future within next 30 days. See \"Patient Info\" tab in inbasket, or \"Choose Columns\" in Meds & Orders section of the refill encounter.              Passed - Medication is active on med list        Passed - Patient is age 18 or older        Passed - No active pregnancy on record        Passed - No positive pregnancy test in past 12 months         Refused Prescriptions Disp Refills    rosuvastatin (CRESTOR) 20 MG tablet 90 tablet 3     Sig: Take 1 tablet (20 mg) by mouth At Bedtime       Statins Protocol Passed - 8/18/2023  3:45 PM        Passed - LDL on file in past 12 months     Recent Labs   Lab Test 08/14/23  0906   LDL 47             Passed - No abnormal creatine kinase in past 12 months     No lab results found.             Passed - Recent (12 mo) or future (30 days) visit within the authorizing provider's specialty     Patient has had an office visit with the authorizing provider or a provider within the authorizing providers department within the previous 12 mos or has a future within next 30 days. See \"Patient Info\" tab in inbasket, or \"Choose Columns\" in Meds & Orders section of the refill encounter.              Passed - " Medication is active on med list        Passed - Patient is age 18 or older        Passed - No active pregnancy on record        Passed - No positive pregnancy test in past 12 months             Rafia Thomas RN 08/18/23 3:49 PM

## 2023-08-21 LAB — INR PPP: 1.61 (ref 0.85–1.15)

## 2023-08-21 PROCEDURE — 85610 PROTHROMBIN TIME: CPT | Mod: ORL | Performed by: FAMILY MEDICINE

## 2023-08-21 PROCEDURE — 36415 COLL VENOUS BLD VENIPUNCTURE: CPT | Mod: ORL | Performed by: FAMILY MEDICINE

## 2023-08-21 PROCEDURE — P9604 ONE-WAY ALLOW PRORATED TRIP: HCPCS | Mod: ORL | Performed by: FAMILY MEDICINE

## 2023-08-22 ENCOUNTER — TRANSFERRED RECORDS (OUTPATIENT)
Dept: HEALTH INFORMATION MANAGEMENT | Facility: CLINIC | Age: 88
End: 2023-08-22
Payer: COMMERCIAL

## 2023-08-22 ENCOUNTER — TELEPHONE (OUTPATIENT)
Dept: FAMILY MEDICINE | Facility: CLINIC | Age: 88
End: 2023-08-22
Payer: COMMERCIAL

## 2023-08-22 DIAGNOSIS — F02.80 ALZHEIMER'S DEMENTIA WITHOUT BEHAVIORAL DISTURBANCE (H): ICD-10-CM

## 2023-08-22 DIAGNOSIS — G30.9 ALZHEIMER'S DEMENTIA WITHOUT BEHAVIORAL DISTURBANCE (H): ICD-10-CM

## 2023-08-22 DIAGNOSIS — I25.10 ARTERIOSCLEROSIS OF CORONARY ARTERY: Primary | ICD-10-CM

## 2023-08-22 NOTE — TELEPHONE ENCOUNTER
Questions from refill request 8/14/2023 not yet addressed.  Appointment for today canceled.      I referred patient to neurology due to worsening memory concerns.  I did not start her Namenda but took over prescription when she was stable.  I do not see a neurology appointment scheduled yet.  Already going to see neurology?    Sravani Gonzalez, DO

## 2023-08-22 NOTE — TELEPHONE ENCOUNTER
Attempted to call Jason RN with [facility]. LMTCB please transfer to RN line when they call.    Thanks!    Lam Mehta RN     [facility]      ----- Message from Peg Weinberg sent at 8/22/2023  8:33 AM CDT -----    ----- Message -----  From: Sravani Gonzalez DO  Sent: 8/22/2023   7:49 AM CDT  To: hospitals Lab Pool    Patient has been discharged from our anticoagulation team.  I these lab results being forwarded to the new person who is managing her anticoagulation?  Sravani Gonzalez, DO

## 2023-08-22 NOTE — TELEPHONE ENCOUNTER
"Routing refill request to provider for review/approval because:  Early refill request.   Drug not active on patient's medication list.           MEMANTINE  Last Written Prescription Date:  6/20/2023  Last Fill Quantity: 180,  # refills: 0   Last office visit provider:  7/13/2023     ASPIRIN  Last Written Prescription Date:  6/3/2016  Last Fill Quantity: ?,  # refills: ?   Last office visit provider:  7/13/2023     Requested Prescriptions   Pending Prescriptions Disp Refills    memantine (NAMENDA) 5 MG tablet 180 tablet 0     Sig: Take 1 tablet (5 mg) by mouth 2 times daily       Miscellaneous Dementia Agents Passed - 8/22/2023  3:08 PM        Passed - Recent (12 mo) or future (30 days) visit within the authorizing provider's specialty     Patient has had an office visit with the authorizing provider or a provider within the authorizing providers department within the previous 12 mos or has a future within next 30 days. See \"Patient Info\" tab in in[employer]oGensket, or \"Choose Columns\" in Meds & Orders section of the refill encounter.              Passed - Medication is active on med list        Passed - Patient is 18 years of age or older          aspirin 81 MG EC tablet       Sig: Take 1 tablet (81 mg) by mouth       Analgesics (Non-Narcotic Tylenol and ASA Only) Passed - 8/22/2023  3:08 PM        Passed - Recent (12 mo) or future (30 days) visit within the authorizing provider's specialty     Patient has had an office visit with the authorizing provider or a provider within the authorizing providers department within the previous 12 mos or has a future within next 30 days. See \"Patient Info\" tab in inbasket, or \"Choose Columns\" in Meds & Orders section of the refill encounter.              Passed - Patient is age 20 years or older     If ASA is flagged for ages under 20 years old. Forward to provider for confirmation Ryes Syndrome is not a concern.              Passed - Medication is active on med list             Rafia " BELIA Thomas 08/22/23 3:08 PM

## 2023-08-23 RX ORDER — MEMANTINE HYDROCHLORIDE 5 MG/1
5 TABLET ORAL 2 TIMES DAILY
Qty: 180 TABLET | Refills: 3 | Status: SHIPPED | OUTPATIENT
Start: 2023-08-23

## 2023-08-23 RX ORDER — ASPIRIN 81 MG/1
81 TABLET ORAL DAILY
Qty: 90 TABLET | Refills: 3 | Status: SHIPPED | OUTPATIENT
Start: 2023-08-23

## 2023-08-23 NOTE — TELEPHONE ENCOUNTER
Called and spoke to pt's daughter Sienna. They attempted to schedule an appt with Neurology, but they were scheduling 6 mo out. They will call her insurance to see who is covered and try to get an appt sooner.

## 2023-08-25 ENCOUNTER — LAB REQUISITION (OUTPATIENT)
Dept: LAB | Facility: CLINIC | Age: 88
End: 2023-08-25
Payer: COMMERCIAL

## 2023-08-25 DIAGNOSIS — I48.20 CHRONIC ATRIAL FIBRILLATION, UNSPECIFIED (H): ICD-10-CM

## 2023-08-28 LAB — INR PPP: 1.69 (ref 0.85–1.15)

## 2023-08-28 PROCEDURE — 85610 PROTHROMBIN TIME: CPT | Mod: ORL | Performed by: FAMILY MEDICINE

## 2023-08-28 PROCEDURE — 36415 COLL VENOUS BLD VENIPUNCTURE: CPT | Mod: ORL | Performed by: FAMILY MEDICINE

## 2023-08-28 PROCEDURE — P9603 ONE-WAY ALLOW PRORATED MILES: HCPCS | Mod: ORL | Performed by: FAMILY MEDICINE

## 2023-08-30 ENCOUNTER — DOCUMENTATION ONLY (OUTPATIENT)
Dept: ANTICOAGULATION | Facility: CLINIC | Age: 88
End: 2023-08-30
Payer: COMMERCIAL

## 2023-08-30 DIAGNOSIS — Z79.01 LONG TERM (CURRENT) USE OF ANTICOAGULANTS: Primary | ICD-10-CM

## 2023-08-30 DIAGNOSIS — Z86.73 HISTORY OF TIA (TRANSIENT ISCHEMIC ATTACK) AND STROKE: ICD-10-CM

## 2023-08-30 DIAGNOSIS — Z86.73 HISTORY OF EMBOLIC STROKE: ICD-10-CM

## 2023-08-30 DIAGNOSIS — I48.20 CHRONIC ATRIAL FIBRILLATION (H): ICD-10-CM

## 2023-08-30 DIAGNOSIS — I65.23 CAROTID STENOSIS, BILATERAL: ICD-10-CM

## 2023-08-30 NOTE — PROGRESS NOTES
ANTICOAGULATION  MANAGEMENT    Guera Peters is being discharged from the [facility] Anticoagulation Management Program ([facility]).    Reason for discharge: care has been transferred to [facility]    Anticoagulation episode resolved, ACC referral closed, and Standing order discontinued    Patient moved to [facility]    Columba Aguilar RN

## 2023-09-01 ENCOUNTER — LAB REQUISITION (OUTPATIENT)
Dept: LAB | Facility: CLINIC | Age: 88
End: 2023-09-01
Payer: COMMERCIAL

## 2023-09-01 DIAGNOSIS — I48.20 CHRONIC ATRIAL FIBRILLATION, UNSPECIFIED (H): ICD-10-CM

## 2023-09-05 NOTE — TELEPHONE ENCOUNTER
On follow up chart review it appears that INR results are being forwarded correctly.    Lam Mehta RN     [facility]

## 2023-09-07 LAB — INR PPP: 1.62 (ref 0.85–1.15)

## 2023-09-07 PROCEDURE — 36415 COLL VENOUS BLD VENIPUNCTURE: CPT | Mod: ORL | Performed by: FAMILY MEDICINE

## 2023-09-07 PROCEDURE — P9604 ONE-WAY ALLOW PRORATED TRIP: HCPCS | Mod: ORL | Performed by: FAMILY MEDICINE

## 2023-09-07 PROCEDURE — 85610 PROTHROMBIN TIME: CPT | Mod: ORL | Performed by: FAMILY MEDICINE

## 2023-09-08 ENCOUNTER — LAB REQUISITION (OUTPATIENT)
Dept: LAB | Facility: CLINIC | Age: 88
End: 2023-09-08
Payer: COMMERCIAL

## 2023-09-08 DIAGNOSIS — I48.20 CHRONIC ATRIAL FIBRILLATION, UNSPECIFIED (H): ICD-10-CM

## 2023-09-11 LAB — INR PPP: 2 (ref 0.85–1.15)

## 2023-09-11 PROCEDURE — P9604 ONE-WAY ALLOW PRORATED TRIP: HCPCS | Mod: ORL | Performed by: FAMILY MEDICINE

## 2023-09-11 PROCEDURE — 36415 COLL VENOUS BLD VENIPUNCTURE: CPT | Mod: ORL | Performed by: FAMILY MEDICINE

## 2023-09-11 PROCEDURE — 85610 PROTHROMBIN TIME: CPT | Mod: ORL | Performed by: FAMILY MEDICINE

## 2023-09-14 ENCOUNTER — LAB REQUISITION (OUTPATIENT)
Dept: LAB | Facility: CLINIC | Age: 88
End: 2023-09-14
Payer: COMMERCIAL

## 2023-09-14 DIAGNOSIS — I48.20 CHRONIC ATRIAL FIBRILLATION, UNSPECIFIED (H): ICD-10-CM

## 2023-09-16 ENCOUNTER — LAB REQUISITION (OUTPATIENT)
Dept: LAB | Facility: CLINIC | Age: 88
End: 2023-09-16
Payer: COMMERCIAL

## 2023-09-16 DIAGNOSIS — R41.82 ALTERED MENTAL STATUS, UNSPECIFIED: ICD-10-CM

## 2023-09-18 LAB
BASOPHILS # BLD AUTO: 0 10E3/UL (ref 0–0.2)
BASOPHILS NFR BLD AUTO: 0 %
EOSINOPHIL # BLD AUTO: 0.1 10E3/UL (ref 0–0.7)
EOSINOPHIL NFR BLD AUTO: 1 %
ERYTHROCYTE [DISTWIDTH] IN BLOOD BY AUTOMATED COUNT: 14.8 % (ref 10–15)
HCT VFR BLD AUTO: 44.2 % (ref 35–47)
HGB BLD-MCNC: 13.5 G/DL (ref 11.7–15.7)
IMM GRANULOCYTES # BLD: 0 10E3/UL
IMM GRANULOCYTES NFR BLD: 0 %
INR PPP: 1.66 (ref 0.85–1.15)
LYMPHOCYTES # BLD AUTO: 1 10E3/UL (ref 0.8–5.3)
LYMPHOCYTES NFR BLD AUTO: 14 %
MCH RBC QN AUTO: 28.7 PG (ref 26.5–33)
MCHC RBC AUTO-ENTMCNC: 30.5 G/DL (ref 31.5–36.5)
MCV RBC AUTO: 94 FL (ref 78–100)
MONOCYTES # BLD AUTO: 0.6 10E3/UL (ref 0–1.3)
MONOCYTES NFR BLD AUTO: 9 %
NEUTROPHILS # BLD AUTO: 5.5 10E3/UL (ref 1.6–8.3)
NEUTROPHILS NFR BLD AUTO: 76 %
NRBC # BLD AUTO: 0 10E3/UL
NRBC BLD AUTO-RTO: 0 /100
PLATELET # BLD AUTO: 138 10E3/UL (ref 150–450)
RBC # BLD AUTO: 4.71 10E6/UL (ref 3.8–5.2)
WBC # BLD AUTO: 7.2 10E3/UL (ref 4–11)

## 2023-09-18 PROCEDURE — 85025 COMPLETE CBC W/AUTO DIFF WBC: CPT | Mod: ORL | Performed by: FAMILY MEDICINE

## 2023-09-18 PROCEDURE — 36415 COLL VENOUS BLD VENIPUNCTURE: CPT | Mod: ORL | Performed by: FAMILY MEDICINE

## 2023-09-18 PROCEDURE — 85610 PROTHROMBIN TIME: CPT | Mod: ORL | Performed by: FAMILY MEDICINE

## 2023-09-21 ENCOUNTER — TELEPHONE (OUTPATIENT)
Dept: FAMILY MEDICINE | Facility: CLINIC | Age: 88
End: 2023-09-21
Payer: COMMERCIAL

## 2023-09-21 NOTE — TELEPHONE ENCOUNTER
Incoming call from Norma looking to see Dr. Gonzalez signed the forms for OT plan of care on 8/22/23, they re-faxed on 9/15. Provided temporary fax number because Dr. Gonzalez didn't receive the form.

## 2023-09-24 ENCOUNTER — LAB REQUISITION (OUTPATIENT)
Dept: LAB | Facility: CLINIC | Age: 88
End: 2023-09-24
Payer: COMMERCIAL

## 2023-09-24 DIAGNOSIS — F41.1 GENERALIZED ANXIETY DISORDER: ICD-10-CM

## 2023-09-25 LAB
ANION GAP SERPL CALCULATED.3IONS-SCNC: 12 MMOL/L (ref 7–15)
BUN SERPL-MCNC: 16.1 MG/DL (ref 8–23)
CALCIUM SERPL-MCNC: 9.5 MG/DL (ref 8.8–10.2)
CHLORIDE SERPL-SCNC: 104 MMOL/L (ref 98–107)
CREAT SERPL-MCNC: 0.71 MG/DL (ref 0.51–0.95)
DEPRECATED HCO3 PLAS-SCNC: 27 MMOL/L (ref 22–29)
EGFRCR SERPLBLD CKD-EPI 2021: 82 ML/MIN/1.73M2
GLUCOSE SERPL-MCNC: 116 MG/DL (ref 70–99)
INR PPP: 2.2 (ref 0.85–1.15)
POTASSIUM SERPL-SCNC: 3.6 MMOL/L (ref 3.4–5.3)
SODIUM SERPL-SCNC: 143 MMOL/L (ref 136–145)

## 2023-09-25 PROCEDURE — 80048 BASIC METABOLIC PNL TOTAL CA: CPT | Mod: ORL | Performed by: FAMILY MEDICINE

## 2023-09-25 PROCEDURE — P9603 ONE-WAY ALLOW PRORATED MILES: HCPCS | Mod: ORL | Performed by: FAMILY MEDICINE

## 2023-09-25 PROCEDURE — 85610 PROTHROMBIN TIME: CPT | Mod: ORL | Performed by: FAMILY MEDICINE

## 2023-09-25 PROCEDURE — 36415 COLL VENOUS BLD VENIPUNCTURE: CPT | Mod: ORL | Performed by: FAMILY MEDICINE

## 2023-09-29 ENCOUNTER — LAB REQUISITION (OUTPATIENT)
Dept: LAB | Facility: CLINIC | Age: 88
End: 2023-09-29
Payer: COMMERCIAL

## 2023-09-29 DIAGNOSIS — I48.20 CHRONIC ATRIAL FIBRILLATION, UNSPECIFIED (H): ICD-10-CM

## 2023-10-02 LAB — INR PPP: 2.13 (ref 0.85–1.15)

## 2023-10-02 PROCEDURE — 85610 PROTHROMBIN TIME: CPT | Mod: ORL | Performed by: FAMILY MEDICINE

## 2023-10-02 PROCEDURE — P9604 ONE-WAY ALLOW PRORATED TRIP: HCPCS | Mod: ORL | Performed by: FAMILY MEDICINE

## 2023-10-02 PROCEDURE — 36415 COLL VENOUS BLD VENIPUNCTURE: CPT | Mod: ORL | Performed by: FAMILY MEDICINE

## 2023-10-14 ENCOUNTER — LAB REQUISITION (OUTPATIENT)
Dept: LAB | Facility: CLINIC | Age: 88
End: 2023-10-14
Payer: COMMERCIAL

## 2023-10-14 DIAGNOSIS — I48.20 CHRONIC ATRIAL FIBRILLATION, UNSPECIFIED (H): ICD-10-CM

## 2023-10-16 LAB — INR PPP: 2.06 (ref 0.85–1.15)

## 2023-10-16 PROCEDURE — 85610 PROTHROMBIN TIME: CPT | Mod: ORL | Performed by: FAMILY MEDICINE

## 2023-10-16 PROCEDURE — 36415 COLL VENOUS BLD VENIPUNCTURE: CPT | Mod: ORL | Performed by: FAMILY MEDICINE

## 2023-10-16 PROCEDURE — P9604 ONE-WAY ALLOW PRORATED TRIP: HCPCS | Performed by: FAMILY MEDICINE

## 2023-10-17 ENCOUNTER — LAB REQUISITION (OUTPATIENT)
Dept: LAB | Facility: CLINIC | Age: 88
End: 2023-10-17
Payer: COMMERCIAL

## 2023-10-17 DIAGNOSIS — Z11.52 ENCOUNTER FOR SCREENING FOR COVID-19: ICD-10-CM

## 2023-10-17 PROCEDURE — 87635 SARS-COV-2 COVID-19 AMP PRB: CPT | Mod: ORL | Performed by: FAMILY MEDICINE

## 2023-10-18 LAB — SARS-COV-2 RNA RESP QL NAA+PROBE: POSITIVE

## 2023-11-11 ENCOUNTER — LAB REQUISITION (OUTPATIENT)
Dept: LAB | Facility: CLINIC | Age: 88
End: 2023-11-11
Payer: COMMERCIAL

## 2023-11-11 DIAGNOSIS — I48.20 CHRONIC ATRIAL FIBRILLATION, UNSPECIFIED (H): ICD-10-CM

## 2023-11-13 LAB
ANION GAP SERPL CALCULATED.3IONS-SCNC: 11 MMOL/L (ref 7–15)
BUN SERPL-MCNC: 16.3 MG/DL (ref 8–23)
CALCIUM SERPL-MCNC: 9 MG/DL (ref 8.8–10.2)
CHLORIDE SERPL-SCNC: 105 MMOL/L (ref 98–107)
CREAT SERPL-MCNC: 0.72 MG/DL (ref 0.51–0.95)
DEPRECATED HCO3 PLAS-SCNC: 25 MMOL/L (ref 22–29)
EGFRCR SERPLBLD CKD-EPI 2021: 81 ML/MIN/1.73M2
GLUCOSE SERPL-MCNC: 142 MG/DL (ref 70–99)
INR PPP: 1.6 (ref 0.85–1.15)
POTASSIUM SERPL-SCNC: 4 MMOL/L (ref 3.4–5.3)
SODIUM SERPL-SCNC: 141 MMOL/L (ref 135–145)

## 2023-11-13 PROCEDURE — 36415 COLL VENOUS BLD VENIPUNCTURE: CPT | Mod: ORL | Performed by: FAMILY MEDICINE

## 2023-11-13 PROCEDURE — 85610 PROTHROMBIN TIME: CPT | Mod: ORL | Performed by: FAMILY MEDICINE

## 2023-11-13 PROCEDURE — 80048 BASIC METABOLIC PNL TOTAL CA: CPT | Mod: ORL | Performed by: FAMILY MEDICINE

## 2023-11-16 ENCOUNTER — LAB REQUISITION (OUTPATIENT)
Dept: LAB | Facility: CLINIC | Age: 88
End: 2023-11-16
Payer: COMMERCIAL

## 2023-11-16 DIAGNOSIS — I48.0 PAROXYSMAL ATRIAL FIBRILLATION (H): ICD-10-CM

## 2023-11-16 NOTE — PROGRESS NOTES
ANTICOAGULATION MANAGEMENT     Guera Peters 86 year old female is on warfarin with therapeutic INR result. (Goal INR 2.0-3.0)    Recent labs: (last 7 days)     10/28/21  1442   INR 2.0*       ASSESSMENT     Source(s): Chart Review, Patient/Caregiver Call and Template       Warfarin doses taken: Warfarin taken as instructed    Diet: No new diet changes identified    New illness, injury, or hospitalization: No    Medication/supplement changes: None noted    Signs or symptoms of bleeding or clotting: No    Previous INR: Therapeutic last visit at 2.7; previously outside of goal range at 3.1    Additional findings: None     PLAN     Recommended plan for no diet, medication or health factor changes affecting INR     Dosing Instructions:  (1mg tabs)    Continue your current warfarin dose with next INR in 3 weeks       Summary  As of 10/28/2021    Full warfarin instructions:  2 mg every day   Next INR check:  11/18/2021             Telephone call with  daughterSienna ([phone]) who verbalizes understanding and agrees to plan    Lab visit scheduled - INR on 11/18/21 @ [facility]    Education provided: Importance of consistent vitamin K intake and Goal range and significance of current result    Plan made per [facility] anticoagulation protocol    Columba Aguilar, RN  Anticoagulation Clinic  10/28/2021    _______________________________________________________________________     Anticoagulation Episode Summary     Current INR goal:  2.0-3.0   TTR:  67.0 % (1 y)   Target end date:     Send INR reminders to:  [facility]       Comments:           Anticoagulation Care Providers     Provider Role Specialty Phone number    Dora Castillo MD Referring Family Medicine [phone]           Care Due:                  Date            Visit Type   Department     Provider  --------------------------------------------------------------------------------                                EP -                              PRIMARY      [facility] INTERNAL  Last Visit: 08-      CARE ([facility])   MEDICINE       Dipak Treviño  Next Visit: None Scheduled  None         None Found                                                            Last  Test          Frequency    Reason                     Performed    Due Date  --------------------------------------------------------------------------------    HBA1C.......  6 months...  glipiZIDE, insulin,        08- 02-                             metFORMIN, semaglutide...    [employer] Embedded Care Due Messages. Reference number: [id number].   11/16/2023 3:31:45 PM CST

## 2023-11-20 LAB — INR PPP: 1.7 (ref 0.85–1.15)

## 2023-11-20 PROCEDURE — 36415 COLL VENOUS BLD VENIPUNCTURE: CPT | Mod: ORL | Performed by: FAMILY MEDICINE

## 2023-11-20 PROCEDURE — 85610 PROTHROMBIN TIME: CPT | Mod: ORL | Performed by: FAMILY MEDICINE

## 2023-11-20 PROCEDURE — P9604 ONE-WAY ALLOW PRORATED TRIP: HCPCS | Mod: ORL | Performed by: FAMILY MEDICINE

## 2023-11-23 ENCOUNTER — LAB REQUISITION (OUTPATIENT)
Dept: LAB | Facility: CLINIC | Age: 88
End: 2023-11-23
Payer: COMMERCIAL

## 2023-11-23 DIAGNOSIS — I48.20 CHRONIC ATRIAL FIBRILLATION, UNSPECIFIED (H): ICD-10-CM

## 2023-11-27 LAB — INR PPP: 1.84 (ref 0.85–1.15)

## 2023-11-27 PROCEDURE — 36415 COLL VENOUS BLD VENIPUNCTURE: CPT | Mod: ORL | Performed by: FAMILY MEDICINE

## 2023-11-27 PROCEDURE — 85610 PROTHROMBIN TIME: CPT | Mod: ORL | Performed by: FAMILY MEDICINE

## 2023-11-27 PROCEDURE — P9604 ONE-WAY ALLOW PRORATED TRIP: HCPCS | Mod: ORL | Performed by: FAMILY MEDICINE

## 2023-12-15 ENCOUNTER — LAB REQUISITION (OUTPATIENT)
Dept: LAB | Facility: CLINIC | Age: 88
End: 2023-12-15
Payer: COMMERCIAL

## 2023-12-15 DIAGNOSIS — I48.20 CHRONIC ATRIAL FIBRILLATION, UNSPECIFIED (H): ICD-10-CM

## 2023-12-18 LAB — INR PPP: 1.43 (ref 0.85–1.15)

## 2023-12-18 PROCEDURE — P9603 ONE-WAY ALLOW PRORATED MILES: HCPCS | Mod: ORL | Performed by: FAMILY MEDICINE

## 2023-12-18 PROCEDURE — 85610 PROTHROMBIN TIME: CPT | Mod: ORL | Performed by: FAMILY MEDICINE

## 2023-12-18 PROCEDURE — 36415 COLL VENOUS BLD VENIPUNCTURE: CPT | Mod: ORL | Performed by: FAMILY MEDICINE

## 2024-01-12 ENCOUNTER — LAB REQUISITION (OUTPATIENT)
Dept: LAB | Facility: CLINIC | Age: 89
End: 2024-01-12
Payer: COMMERCIAL

## 2024-01-12 DIAGNOSIS — I48.20 CHRONIC ATRIAL FIBRILLATION, UNSPECIFIED (H): ICD-10-CM

## 2024-01-15 LAB — INR PPP: 1.8 (ref 0.85–1.15)

## 2024-01-15 PROCEDURE — 36415 COLL VENOUS BLD VENIPUNCTURE: CPT | Mod: ORL | Performed by: FAMILY MEDICINE

## 2024-01-15 PROCEDURE — P9604 ONE-WAY ALLOW PRORATED TRIP: HCPCS | Mod: ORL | Performed by: FAMILY MEDICINE

## 2024-01-15 PROCEDURE — 85610 PROTHROMBIN TIME: CPT | Mod: ORL | Performed by: FAMILY MEDICINE

## 2024-01-19 ENCOUNTER — LAB REQUISITION (OUTPATIENT)
Dept: LAB | Facility: CLINIC | Age: 89
End: 2024-01-19
Payer: COMMERCIAL

## 2024-01-19 DIAGNOSIS — I48.20 CHRONIC ATRIAL FIBRILLATION, UNSPECIFIED (H): ICD-10-CM

## 2024-01-20 ENCOUNTER — LAB REQUISITION (OUTPATIENT)
Dept: LAB | Facility: CLINIC | Age: 89
End: 2024-01-20
Payer: COMMERCIAL

## 2024-01-20 DIAGNOSIS — I10 ESSENTIAL (PRIMARY) HYPERTENSION: ICD-10-CM

## 2024-01-20 DIAGNOSIS — F05 DELIRIUM DUE TO KNOWN PHYSIOLOGICAL CONDITION: ICD-10-CM

## 2024-01-22 LAB
ANION GAP SERPL CALCULATED.3IONS-SCNC: 10 MMOL/L (ref 7–15)
BASOPHILS # BLD AUTO: 0 10E3/UL (ref 0–0.2)
BASOPHILS NFR BLD AUTO: 0 %
BUN SERPL-MCNC: 19.1 MG/DL (ref 8–23)
CALCIUM SERPL-MCNC: 9.4 MG/DL (ref 8.8–10.2)
CHLORIDE SERPL-SCNC: 105 MMOL/L (ref 98–107)
CREAT SERPL-MCNC: 0.73 MG/DL (ref 0.51–0.95)
DEPRECATED HCO3 PLAS-SCNC: 26 MMOL/L (ref 22–29)
EGFRCR SERPLBLD CKD-EPI 2021: 80 ML/MIN/1.73M2
EOSINOPHIL # BLD AUTO: 0.1 10E3/UL (ref 0–0.7)
EOSINOPHIL NFR BLD AUTO: 1 %
ERYTHROCYTE [DISTWIDTH] IN BLOOD BY AUTOMATED COUNT: 14.6 % (ref 10–15)
GLUCOSE SERPL-MCNC: 171 MG/DL (ref 70–99)
HCT VFR BLD AUTO: 43.6 % (ref 35–47)
HGB BLD-MCNC: 13.2 G/DL (ref 11.7–15.7)
IMM GRANULOCYTES # BLD: 0 10E3/UL
IMM GRANULOCYTES NFR BLD: 0 %
INR PPP: 1.87 (ref 0.85–1.15)
LYMPHOCYTES # BLD AUTO: 1 10E3/UL (ref 0.8–5.3)
LYMPHOCYTES NFR BLD AUTO: 15 %
MCH RBC QN AUTO: 28 PG (ref 26.5–33)
MCHC RBC AUTO-ENTMCNC: 30.3 G/DL (ref 31.5–36.5)
MCV RBC AUTO: 92 FL (ref 78–100)
MONOCYTES # BLD AUTO: 0.5 10E3/UL (ref 0–1.3)
MONOCYTES NFR BLD AUTO: 8 %
NEUTROPHILS # BLD AUTO: 5.1 10E3/UL (ref 1.6–8.3)
NEUTROPHILS NFR BLD AUTO: 76 %
NRBC # BLD AUTO: 0 10E3/UL
NRBC BLD AUTO-RTO: 0 /100
PLATELET # BLD AUTO: 172 10E3/UL (ref 150–450)
POTASSIUM SERPL-SCNC: 4.2 MMOL/L (ref 3.4–5.3)
RBC # BLD AUTO: 4.72 10E6/UL (ref 3.8–5.2)
SODIUM SERPL-SCNC: 141 MMOL/L (ref 135–145)
WBC # BLD AUTO: 6.7 10E3/UL (ref 4–11)

## 2024-01-22 PROCEDURE — 85610 PROTHROMBIN TIME: CPT | Mod: ORL | Performed by: FAMILY MEDICINE

## 2024-01-22 PROCEDURE — 85025 COMPLETE CBC W/AUTO DIFF WBC: CPT | Mod: ORL | Performed by: FAMILY MEDICINE

## 2024-01-22 PROCEDURE — 80048 BASIC METABOLIC PNL TOTAL CA: CPT | Mod: ORL | Performed by: FAMILY MEDICINE

## 2024-01-22 PROCEDURE — P9604 ONE-WAY ALLOW PRORATED TRIP: HCPCS | Mod: ORL | Performed by: FAMILY MEDICINE

## 2024-01-22 PROCEDURE — 36415 COLL VENOUS BLD VENIPUNCTURE: CPT | Mod: ORL | Performed by: FAMILY MEDICINE

## 2024-02-02 ENCOUNTER — LAB REQUISITION (OUTPATIENT)
Dept: LAB | Facility: CLINIC | Age: 89
End: 2024-02-02
Payer: COMMERCIAL

## 2024-02-02 DIAGNOSIS — I48.20 CHRONIC ATRIAL FIBRILLATION, UNSPECIFIED (H): ICD-10-CM

## 2024-02-05 LAB — INR PPP: 2.66 (ref 0.85–1.15)

## 2024-02-05 PROCEDURE — P9604 ONE-WAY ALLOW PRORATED TRIP: HCPCS | Mod: ORL | Performed by: FAMILY MEDICINE

## 2024-02-05 PROCEDURE — 85610 PROTHROMBIN TIME: CPT | Mod: ORL | Performed by: FAMILY MEDICINE

## 2024-02-05 PROCEDURE — 36415 COLL VENOUS BLD VENIPUNCTURE: CPT | Mod: ORL | Performed by: FAMILY MEDICINE

## 2024-02-11 ENCOUNTER — HEALTH MAINTENANCE LETTER (OUTPATIENT)
Age: 89
End: 2024-02-11

## 2024-02-16 ENCOUNTER — LAB REQUISITION (OUTPATIENT)
Dept: LAB | Facility: CLINIC | Age: 89
End: 2024-02-16
Payer: COMMERCIAL

## 2024-02-16 DIAGNOSIS — I48.20 CHRONIC ATRIAL FIBRILLATION, UNSPECIFIED (H): ICD-10-CM

## 2024-02-19 LAB — INR PPP: 3.82 (ref 0.85–1.15)

## 2024-02-19 PROCEDURE — 36415 COLL VENOUS BLD VENIPUNCTURE: CPT | Mod: ORL | Performed by: FAMILY MEDICINE

## 2024-02-19 PROCEDURE — P9603 ONE-WAY ALLOW PRORATED MILES: HCPCS | Mod: ORL | Performed by: FAMILY MEDICINE

## 2024-02-19 PROCEDURE — 85610 PROTHROMBIN TIME: CPT | Mod: ORL | Performed by: FAMILY MEDICINE

## 2024-02-22 ENCOUNTER — LAB REQUISITION (OUTPATIENT)
Dept: LAB | Facility: CLINIC | Age: 89
End: 2024-02-22
Payer: COMMERCIAL

## 2024-02-22 DIAGNOSIS — N39.0 URINARY TRACT INFECTION, SITE NOT SPECIFIED: ICD-10-CM

## 2024-02-22 LAB
ALBUMIN UR-MCNC: >600 MG/DL
APPEARANCE UR: ABNORMAL
BILIRUB UR QL STRIP: NEGATIVE
COLOR UR AUTO: ABNORMAL
GLUCOSE UR STRIP-MCNC: NEGATIVE MG/DL
HGB UR QL STRIP: ABNORMAL
KETONES UR STRIP-MCNC: NEGATIVE MG/DL
LEUKOCYTE ESTERASE UR QL STRIP: ABNORMAL
NITRATE UR QL: NEGATIVE
PH UR STRIP: 8.5 [PH] (ref 5–7)
RBC URINE: 99 /HPF
SP GR UR STRIP: 1.02 (ref 1–1.03)
UROBILINOGEN UR STRIP-MCNC: 6 MG/DL
WBC CLUMPS #/AREA URNS HPF: PRESENT /HPF
WBC URINE: >182 /HPF

## 2024-02-22 PROCEDURE — 81001 URINALYSIS AUTO W/SCOPE: CPT | Mod: ORL | Performed by: FAMILY MEDICINE

## 2024-02-22 PROCEDURE — 87186 SC STD MICRODIL/AGAR DIL: CPT | Mod: ORL | Performed by: FAMILY MEDICINE

## 2024-02-22 PROCEDURE — 87086 URINE CULTURE/COLONY COUNT: CPT | Mod: ORL | Performed by: FAMILY MEDICINE

## 2024-02-23 ENCOUNTER — LAB REQUISITION (OUTPATIENT)
Dept: LAB | Facility: CLINIC | Age: 89
End: 2024-02-23
Payer: COMMERCIAL

## 2024-02-23 DIAGNOSIS — I48.20 CHRONIC ATRIAL FIBRILLATION, UNSPECIFIED (H): ICD-10-CM

## 2024-02-25 LAB
BACTERIA UR CULT: ABNORMAL
BACTERIA UR CULT: ABNORMAL

## 2024-02-26 LAB — INR PPP: 4.16 (ref 0.85–1.15)

## 2024-02-26 PROCEDURE — P9603 ONE-WAY ALLOW PRORATED MILES: HCPCS | Mod: ORL | Performed by: FAMILY MEDICINE

## 2024-02-26 PROCEDURE — 36415 COLL VENOUS BLD VENIPUNCTURE: CPT | Mod: ORL | Performed by: FAMILY MEDICINE

## 2024-02-26 PROCEDURE — 85610 PROTHROMBIN TIME: CPT | Mod: ORL | Performed by: FAMILY MEDICINE

## 2024-03-01 ENCOUNTER — LAB REQUISITION (OUTPATIENT)
Dept: LAB | Facility: CLINIC | Age: 89
End: 2024-03-01
Payer: COMMERCIAL

## 2024-03-01 DIAGNOSIS — I48.20 CHRONIC ATRIAL FIBRILLATION, UNSPECIFIED (H): ICD-10-CM

## 2024-03-04 LAB — INR PPP: 3.83 (ref 0.85–1.15)

## 2024-03-04 PROCEDURE — 85610 PROTHROMBIN TIME: CPT | Mod: ORL | Performed by: FAMILY MEDICINE

## 2024-03-04 PROCEDURE — 36415 COLL VENOUS BLD VENIPUNCTURE: CPT | Mod: ORL | Performed by: FAMILY MEDICINE

## 2024-03-04 PROCEDURE — P9603 ONE-WAY ALLOW PRORATED MILES: HCPCS | Mod: ORL | Performed by: FAMILY MEDICINE

## 2024-03-08 ENCOUNTER — LAB REQUISITION (OUTPATIENT)
Dept: LAB | Facility: CLINIC | Age: 89
End: 2024-03-08
Payer: COMMERCIAL

## 2024-03-08 DIAGNOSIS — I48.20 CHRONIC ATRIAL FIBRILLATION, UNSPECIFIED (H): ICD-10-CM

## 2024-03-11 LAB — INR PPP: 1.57 (ref 0.85–1.15)

## 2024-03-11 PROCEDURE — P9604 ONE-WAY ALLOW PRORATED TRIP: HCPCS | Mod: ORL | Performed by: FAMILY MEDICINE

## 2024-03-11 PROCEDURE — 85610 PROTHROMBIN TIME: CPT | Mod: ORL | Performed by: FAMILY MEDICINE

## 2024-03-11 PROCEDURE — 36415 COLL VENOUS BLD VENIPUNCTURE: CPT | Mod: ORL | Performed by: FAMILY MEDICINE

## 2024-03-16 ENCOUNTER — LAB REQUISITION (OUTPATIENT)
Dept: LAB | Facility: CLINIC | Age: 89
End: 2024-03-16
Payer: COMMERCIAL

## 2024-03-16 DIAGNOSIS — I48.20 CHRONIC ATRIAL FIBRILLATION, UNSPECIFIED (H): ICD-10-CM

## 2024-03-16 DIAGNOSIS — K59.1 FUNCTIONAL DIARRHEA: ICD-10-CM

## 2024-03-16 DIAGNOSIS — N30.00 ACUTE CYSTITIS WITHOUT HEMATURIA: ICD-10-CM

## 2024-03-18 LAB
ANION GAP SERPL CALCULATED.3IONS-SCNC: 11 MMOL/L (ref 7–15)
BASOPHILS # BLD AUTO: 0 10E3/UL (ref 0–0.2)
BASOPHILS NFR BLD AUTO: 1 %
BUN SERPL-MCNC: 13.9 MG/DL (ref 8–23)
CALCIUM SERPL-MCNC: 9.6 MG/DL (ref 8.8–10.2)
CHLORIDE SERPL-SCNC: 105 MMOL/L (ref 98–107)
CREAT SERPL-MCNC: 0.74 MG/DL (ref 0.51–0.95)
DEPRECATED HCO3 PLAS-SCNC: 25 MMOL/L (ref 22–29)
EGFRCR SERPLBLD CKD-EPI 2021: 78 ML/MIN/1.73M2
EOSINOPHIL # BLD AUTO: 0.1 10E3/UL (ref 0–0.7)
EOSINOPHIL NFR BLD AUTO: 2 %
ERYTHROCYTE [DISTWIDTH] IN BLOOD BY AUTOMATED COUNT: 13.6 % (ref 10–15)
GLUCOSE SERPL-MCNC: 125 MG/DL (ref 70–99)
HCT VFR BLD AUTO: 47.8 % (ref 35–47)
HGB BLD-MCNC: 14.4 G/DL (ref 11.7–15.7)
IMM GRANULOCYTES # BLD: 0 10E3/UL
IMM GRANULOCYTES NFR BLD: 0 %
INR PPP: 1.55 (ref 0.85–1.15)
LYMPHOCYTES # BLD AUTO: 1 10E3/UL (ref 0.8–5.3)
LYMPHOCYTES NFR BLD AUTO: 17 %
MCH RBC QN AUTO: 27.5 PG (ref 26.5–33)
MCHC RBC AUTO-ENTMCNC: 30.1 G/DL (ref 31.5–36.5)
MCV RBC AUTO: 91 FL (ref 78–100)
MONOCYTES # BLD AUTO: 0.5 10E3/UL (ref 0–1.3)
MONOCYTES NFR BLD AUTO: 8 %
NEUTROPHILS # BLD AUTO: 4.2 10E3/UL (ref 1.6–8.3)
NEUTROPHILS NFR BLD AUTO: 72 %
NRBC # BLD AUTO: 0 10E3/UL
NRBC BLD AUTO-RTO: 0 /100
PLATELET # BLD AUTO: 138 10E3/UL (ref 150–450)
POTASSIUM SERPL-SCNC: 4.8 MMOL/L (ref 3.4–5.3)
RBC # BLD AUTO: 5.24 10E6/UL (ref 3.8–5.2)
SODIUM SERPL-SCNC: 141 MMOL/L (ref 135–145)
WBC # BLD AUTO: 5.8 10E3/UL (ref 4–11)

## 2024-03-18 PROCEDURE — 85025 COMPLETE CBC W/AUTO DIFF WBC: CPT | Mod: ORL | Performed by: FAMILY MEDICINE

## 2024-03-18 PROCEDURE — P9604 ONE-WAY ALLOW PRORATED TRIP: HCPCS | Mod: ORL | Performed by: FAMILY MEDICINE

## 2024-03-18 PROCEDURE — 85610 PROTHROMBIN TIME: CPT | Mod: ORL | Performed by: FAMILY MEDICINE

## 2024-03-18 PROCEDURE — 80048 BASIC METABOLIC PNL TOTAL CA: CPT | Mod: ORL | Performed by: FAMILY MEDICINE

## 2024-03-18 PROCEDURE — 36415 COLL VENOUS BLD VENIPUNCTURE: CPT | Mod: ORL | Performed by: FAMILY MEDICINE

## 2024-03-22 ENCOUNTER — LAB REQUISITION (OUTPATIENT)
Dept: LAB | Facility: CLINIC | Age: 89
End: 2024-03-22
Payer: OTHER MISCELLANEOUS

## 2024-03-22 DIAGNOSIS — I48.20 CHRONIC ATRIAL FIBRILLATION, UNSPECIFIED (H): ICD-10-CM

## 2024-03-26 LAB — INR PPP: 1.29 (ref 0.85–1.15)

## 2024-03-26 PROCEDURE — 36415 COLL VENOUS BLD VENIPUNCTURE: CPT | Mod: ORL | Performed by: FAMILY MEDICINE

## 2024-03-26 PROCEDURE — 85610 PROTHROMBIN TIME: CPT | Mod: ORL | Performed by: FAMILY MEDICINE

## 2024-03-26 PROCEDURE — P9603 ONE-WAY ALLOW PRORATED MILES: HCPCS | Mod: ORL | Performed by: FAMILY MEDICINE

## 2024-10-25 ENCOUNTER — MYC MEDICAL ADVICE (OUTPATIENT)
Dept: FAMILY MEDICINE | Facility: CLINIC | Age: 89
End: 2024-10-25
Payer: COMMERCIAL

## 2024-10-25 ENCOUNTER — TELEPHONE (OUTPATIENT)
Dept: FAMILY MEDICINE | Facility: CLINIC | Age: 89
End: 2024-10-25
Payer: COMMERCIAL

## 2024-10-25 NOTE — TELEPHONE ENCOUNTER
Patient Quality Outreach    Patient is due for the following:   Physical Annual Wellness Visit      Topic Date Due    Flu Vaccine (1) 09/01/2024    COVID-19 Vaccine (6 - 2024-25 season) 09/01/2024       Next Steps:   Schedule a Annual Wellness Visit    Type of outreach:    Sent [employer] message.      Questions for provider review:    None           Laureano Chand MA

## 2024-11-03 NOTE — TELEPHONE ENCOUNTER
Who is calling: Sienna  Reason for Call:  Needs to speak to [facility] again  Date of last appointment with primary care: n/a  Okay to leave a detailed message: Yes       No